# Patient Record
Sex: FEMALE | Race: WHITE | NOT HISPANIC OR LATINO | Employment: OTHER | URBAN - METROPOLITAN AREA
[De-identification: names, ages, dates, MRNs, and addresses within clinical notes are randomized per-mention and may not be internally consistent; named-entity substitution may affect disease eponyms.]

---

## 2023-07-12 ENCOUNTER — OFFICE VISIT (OUTPATIENT)
Dept: PRIMARY CARE | Facility: CLINIC | Age: 68
End: 2023-07-12
Payer: MEDICARE

## 2023-07-12 VITALS
HEIGHT: 65 IN | SYSTOLIC BLOOD PRESSURE: 122 MMHG | OXYGEN SATURATION: 97 % | WEIGHT: 101 LBS | RESPIRATION RATE: 16 BRPM | HEART RATE: 64 BPM | DIASTOLIC BLOOD PRESSURE: 72 MMHG | TEMPERATURE: 98.2 F | BODY MASS INDEX: 16.83 KG/M2

## 2023-07-12 DIAGNOSIS — E46 PROTEIN-CALORIE MALNUTRITION, UNSPECIFIED SEVERITY (MULTI): Primary | ICD-10-CM

## 2023-07-12 DIAGNOSIS — F51.01 PRIMARY INSOMNIA: ICD-10-CM

## 2023-07-12 DIAGNOSIS — G47.33 OSA ON CPAP: ICD-10-CM

## 2023-07-12 DIAGNOSIS — Z00.00 HEALTHCARE MAINTENANCE: ICD-10-CM

## 2023-07-12 DIAGNOSIS — E55.9 VITAMIN D DEFICIENCY: ICD-10-CM

## 2023-07-12 DIAGNOSIS — E04.2 NONTOXIC MULTINODULAR GOITER: ICD-10-CM

## 2023-07-12 DIAGNOSIS — E03.9 ACQUIRED HYPOTHYROIDISM: ICD-10-CM

## 2023-07-12 PROBLEM — Z20.822 EXPOSURE TO COVID-19 VIRUS: Status: RESOLVED | Noted: 2023-07-12 | Resolved: 2023-07-12

## 2023-07-12 PROBLEM — K29.01 ACUTE GASTRITIS WITH HEMORRHAGE: Status: RESOLVED | Noted: 2023-07-12 | Resolved: 2023-07-12

## 2023-07-12 PROBLEM — L53.9 ERYTHEMA OF BREAST: Status: ACTIVE | Noted: 2023-07-12

## 2023-07-12 PROBLEM — T17.308A CHOKING: Status: ACTIVE | Noted: 2023-07-12

## 2023-07-12 PROBLEM — R13.10 DYSPHAGIA: Status: ACTIVE | Noted: 2023-07-12

## 2023-07-12 PROBLEM — R06.81 WITNESSED EPISODE OF APNEA: Status: RESOLVED | Noted: 2023-07-12 | Resolved: 2023-07-12

## 2023-07-12 PROBLEM — J02.9 EXUDATIVE PHARYNGITIS: Status: ACTIVE | Noted: 2023-07-12

## 2023-07-12 PROBLEM — N63.0 BREAST LUMP: Status: ACTIVE | Noted: 2023-07-12

## 2023-07-12 PROBLEM — R06.83 SNORING: Status: ACTIVE | Noted: 2023-07-12

## 2023-07-12 PROBLEM — R06.09 EXERTIONAL DYSPNEA: Status: RESOLVED | Noted: 2023-07-12 | Resolved: 2023-07-12

## 2023-07-12 PROBLEM — N63.0 BREAST LUMP: Status: RESOLVED | Noted: 2023-07-12 | Resolved: 2023-07-12

## 2023-07-12 PROBLEM — T17.308A CHOKING: Status: RESOLVED | Noted: 2023-07-12 | Resolved: 2023-07-12

## 2023-07-12 PROBLEM — J02.9 EXUDATIVE PHARYNGITIS: Status: RESOLVED | Noted: 2023-07-12 | Resolved: 2023-07-12

## 2023-07-12 PROBLEM — G47.19 DAYTIME HYPERSOMNOLENCE: Status: RESOLVED | Noted: 2023-07-12 | Resolved: 2023-07-12

## 2023-07-12 PROBLEM — R92.8 ABNORMAL FINDING ON BREAST IMAGING: Status: ACTIVE | Noted: 2023-07-12

## 2023-07-12 PROBLEM — M25.542 JOINT PAIN IN BOTH HANDS: Status: ACTIVE | Noted: 2023-07-12

## 2023-07-12 PROBLEM — R92.8 ABNORMAL FINDING ON BREAST IMAGING: Status: RESOLVED | Noted: 2023-07-12 | Resolved: 2023-07-12

## 2023-07-12 PROBLEM — R06.09 EXERTIONAL DYSPNEA: Status: ACTIVE | Noted: 2023-07-12

## 2023-07-12 PROBLEM — J38.3 VOCAL FOLD DYSFUNCTION: Status: RESOLVED | Noted: 2023-07-12 | Resolved: 2023-07-12

## 2023-07-12 PROBLEM — R53.83 FATIGUE: Status: RESOLVED | Noted: 2023-07-12 | Resolved: 2023-07-12

## 2023-07-12 PROBLEM — R13.10 DYSPHAGIA: Status: RESOLVED | Noted: 2023-07-12 | Resolved: 2023-07-12

## 2023-07-12 PROBLEM — L53.9 ERYTHEMA OF BREAST: Status: RESOLVED | Noted: 2023-07-12 | Resolved: 2023-07-12

## 2023-07-12 PROBLEM — R06.83 SNORING: Status: RESOLVED | Noted: 2023-07-12 | Resolved: 2023-07-12

## 2023-07-12 PROBLEM — M25.541 JOINT PAIN IN BOTH HANDS: Status: ACTIVE | Noted: 2023-07-12

## 2023-07-12 PROBLEM — K29.01 ACUTE GASTRITIS WITH HEMORRHAGE: Status: ACTIVE | Noted: 2023-07-12

## 2023-07-12 PROBLEM — R09.81 NASAL SINUS CONGESTION: Status: ACTIVE | Noted: 2023-07-12

## 2023-07-12 PROBLEM — M25.541 JOINT PAIN IN BOTH HANDS: Status: RESOLVED | Noted: 2023-07-12 | Resolved: 2023-07-12

## 2023-07-12 PROBLEM — J38.3 VOCAL FOLD DYSFUNCTION: Status: ACTIVE | Noted: 2023-07-12

## 2023-07-12 PROBLEM — K22.2 ESOPHAGEAL STRICTURE: Status: ACTIVE | Noted: 2023-07-12

## 2023-07-12 PROBLEM — Z85.3 HISTORY OF BREAST CANCER: Status: ACTIVE | Noted: 2023-07-12

## 2023-07-12 PROBLEM — M25.542 JOINT PAIN IN BOTH HANDS: Status: RESOLVED | Noted: 2023-07-12 | Resolved: 2023-07-12

## 2023-07-12 PROBLEM — R09.81 NASAL SINUS CONGESTION: Status: RESOLVED | Noted: 2023-07-12 | Resolved: 2023-07-12

## 2023-07-12 PROBLEM — R06.81 WITNESSED EPISODE OF APNEA: Status: ACTIVE | Noted: 2023-07-12

## 2023-07-12 PROBLEM — G47.19 DAYTIME HYPERSOMNOLENCE: Status: ACTIVE | Noted: 2023-07-12

## 2023-07-12 PROBLEM — R53.83 FATIGUE: Status: ACTIVE | Noted: 2023-07-12

## 2023-07-12 PROBLEM — Z20.822 EXPOSURE TO COVID-19 VIRUS: Status: ACTIVE | Noted: 2023-07-12

## 2023-07-12 PROCEDURE — G0439 PPPS, SUBSEQ VISIT: HCPCS | Performed by: INTERNAL MEDICINE

## 2023-07-12 PROCEDURE — 1126F AMNT PAIN NOTED NONE PRSNT: CPT | Performed by: INTERNAL MEDICINE

## 2023-07-12 PROCEDURE — 1159F MED LIST DOCD IN RCRD: CPT | Performed by: INTERNAL MEDICINE

## 2023-07-12 PROCEDURE — 1160F RVW MEDS BY RX/DR IN RCRD: CPT | Performed by: INTERNAL MEDICINE

## 2023-07-12 PROCEDURE — 1170F FXNL STATUS ASSESSED: CPT | Performed by: INTERNAL MEDICINE

## 2023-07-12 PROCEDURE — 1036F TOBACCO NON-USER: CPT | Performed by: INTERNAL MEDICINE

## 2023-07-12 PROCEDURE — 99214 OFFICE O/P EST MOD 30 MIN: CPT | Performed by: INTERNAL MEDICINE

## 2023-07-12 RX ORDER — CHOLECALCIFEROL (VITAMIN D3) 50 MCG
1 TABLET ORAL DAILY
COMMUNITY
Start: 2021-02-10

## 2023-07-12 RX ORDER — ASCORBIC ACID 1500 MG
1 TABLET, EXTENDED RELEASE ORAL DAILY
Status: ON HOLD | COMMUNITY
Start: 2018-12-31 | End: 2024-04-11 | Stop reason: ENTERED-IN-ERROR

## 2023-07-12 ASSESSMENT — ENCOUNTER SYMPTOMS
OCCASIONAL FEELINGS OF UNSTEADINESS: 0
EYE REDNESS: 0
HEADACHES: 0
FEVER: 0
DYSURIA: 0
DIFFICULTY URINATING: 0
EYE ITCHING: 0
RHINORRHEA: 0
FATIGUE: 0
EYE PAIN: 0
DEPRESSION: 0
BACK PAIN: 0
SORE THROAT: 0
CONSTIPATION: 0
PSYCHIATRIC NEGATIVE: 1
WEAKNESS: 0
NAUSEA: 0
UNEXPECTED WEIGHT CHANGE: 0
ABDOMINAL PAIN: 0
LOSS OF SENSATION IN FEET: 0
WHEEZING: 0
SHORTNESS OF BREATH: 0
PALPITATIONS: 0
ARTHRALGIAS: 0
DIARRHEA: 0
COUGH: 0
FREQUENCY: 0

## 2023-07-12 ASSESSMENT — ACTIVITIES OF DAILY LIVING (ADL)
DRESSING: INDEPENDENT
TAKING_MEDICATION: INDEPENDENT
BATHING: INDEPENDENT
MANAGING_FINANCES: INDEPENDENT
DOING_HOUSEWORK: INDEPENDENT
GROCERY_SHOPPING: INDEPENDENT

## 2023-07-12 ASSESSMENT — PATIENT HEALTH QUESTIONNAIRE - PHQ9
2. FEELING DOWN, DEPRESSED OR HOPELESS: NOT AT ALL
1. LITTLE INTEREST OR PLEASURE IN DOING THINGS: NOT AT ALL
SUM OF ALL RESPONSES TO PHQ9 QUESTIONS 1 AND 2: 0

## 2023-07-12 ASSESSMENT — PAIN SCALES - GENERAL: PAINLEVEL: 0-NO PAIN

## 2023-07-12 NOTE — PROGRESS NOTES
"Subjective   Reason for Visit: Fadia Bolden is an 68 y.o. female here for a Medicare Wellness visit.     Past Medical, Surgical, and Family History reviewed and updated in chart.    Reviewed all medications by prescribing practitioner or clinical pharmacist (such as prescriptions, OTCs, herbal therapies and supplements) and documented in the medical record.    HPI  Influenza declines  Covid declines  PCV declines  Shingrix declines  Tdap 2019  Mammogram 3/2023  Dexa declines  Colonoscopy 2021 dakhil  Adv Dir No  Bmi 17  Depression screen 23  Eye exam  Fall risk 23    Reports \"ingrown hair\" to L axillary area.  Denies pain, swelling, drainage.  Reports discoloration that has changes.    Not sleeping well.  Having difficulty falling back asleep.  Mind racing.  Previously did home sleep study.  Has Cpap. Not sealing well.   Airway closing up d/t tongue position.  Saw Sleep specialist x2 years ago.  Saw Dr. Luis keller 1 year ago.    Pt reports previously noted thyroid nodule.   US done in 2021.    Patient Care Team:  Michelle Martinez DO as PCP - General  Michelle Martinez DO as PCP - [facility] Attributed Provider     Review of Systems   Constitutional:  Negative for fatigue, fever and unexpected weight change.   HENT:  Negative for congestion, ear pain, rhinorrhea and sore throat.    Eyes:  Negative for pain, redness and itching.   Respiratory:  Negative for cough, shortness of breath and wheezing.    Cardiovascular:  Negative for chest pain, palpitations and leg swelling.   Gastrointestinal:  Negative for abdominal pain, constipation, diarrhea and nausea.   Genitourinary:  Negative for difficulty urinating, dysuria and frequency.   Musculoskeletal:  Negative for arthralgias and back pain.   Allergic/Immunologic: Negative for environmental allergies, food allergies and immunocompromised state.   Neurological:  Negative for weakness and headaches.   Psychiatric/Behavioral: Negative.     All other systems reviewed and " "are negative.      Objective   Vitals:  /72   Pulse 64   Temp 36.8 °C (98.2 °F)   Resp 16   Ht 1.638 m (5' 4.5\")   Wt 45.8 kg (101 lb)   SpO2 97%   BMI 17.07 kg/m²       Physical Exam  Vitals and nursing note reviewed.   Constitutional:       Appearance: Normal appearance.   HENT:      Head: Normocephalic.   Eyes:      Conjunctiva/sclera: Conjunctivae normal.      Pupils: Pupils are equal, round, and reactive to light.   Cardiovascular:      Rate and Rhythm: Normal rate and regular rhythm.      Pulses: Normal pulses.      Heart sounds: Normal heart sounds.   Pulmonary:      Effort: Pulmonary effort is normal.      Breath sounds: Normal breath sounds.   Musculoskeletal:         General: No swelling.      Cervical back: Neck supple.   Skin:     General: Skin is warm and dry.   Neurological:      General: No focal deficit present.      Mental Status: She is oriented to person, place, and time.         Assessment/Plan   Problem List Items Addressed This Visit       Acquired hypothyroidism    Relevant Orders    CBC    Comprehensive Metabolic Panel    Lipid Panel    Thyroid Stimulating Hormone    US thyroid    Nontoxic multinodular goiter    Relevant Orders    Thyroid Stimulating Hormone    DAIJA on CPAP    Vitamin D deficiency    Relevant Orders    Vitamin D, Total    Vitamin B12    Protein-calorie malnutrition, unspecified severity (CMS/HCC) - Primary     Other Visit Diagnoses       Healthcare maintenance        Primary insomnia              Cont meds   Check labs  Update preventive  Stable based on symptoms and exam.  Continue established treatment plan and follow up at least yearly.  Contact dme for equipment change  Obs ingrown hair  Check thyroid us         "

## 2023-07-29 ENCOUNTER — LAB (OUTPATIENT)
Dept: LAB | Facility: LAB | Age: 68
End: 2023-07-29
Payer: MEDICARE

## 2023-07-29 DIAGNOSIS — E03.9 ACQUIRED HYPOTHYROIDISM: ICD-10-CM

## 2023-07-29 DIAGNOSIS — E04.2 NONTOXIC MULTINODULAR GOITER: ICD-10-CM

## 2023-07-29 DIAGNOSIS — E55.9 VITAMIN D DEFICIENCY: ICD-10-CM

## 2023-07-29 LAB
ALANINE AMINOTRANSFERASE (SGPT) (U/L) IN SER/PLAS: 18 U/L (ref 7–45)
ALBUMIN (G/DL) IN SER/PLAS: 4.4 G/DL (ref 3.4–5)
ALKALINE PHOSPHATASE (U/L) IN SER/PLAS: 63 U/L (ref 33–136)
ANION GAP IN SER/PLAS: 9 MMOL/L (ref 10–20)
ASPARTATE AMINOTRANSFERASE (SGOT) (U/L) IN SER/PLAS: 19 U/L (ref 9–39)
BILIRUBIN TOTAL (MG/DL) IN SER/PLAS: 1.2 MG/DL (ref 0–1.2)
CALCIDIOL (25 OH VITAMIN D3) (NG/ML) IN SER/PLAS: 87 NG/ML
CALCIUM (MG/DL) IN SER/PLAS: 9.8 MG/DL (ref 8.6–10.3)
CARBON DIOXIDE, TOTAL (MMOL/L) IN SER/PLAS: 34 MMOL/L (ref 21–32)
CHLORIDE (MMOL/L) IN SER/PLAS: 102 MMOL/L (ref 98–107)
CHOLESTEROL (MG/DL) IN SER/PLAS: 193 MG/DL (ref 0–199)
CHOLESTEROL IN HDL (MG/DL) IN SER/PLAS: 71.5 MG/DL
CHOLESTEROL/HDL RATIO: 2.7
COBALAMIN (VITAMIN B12) (PG/ML) IN SER/PLAS: 404 PG/ML (ref 211–911)
CREATININE (MG/DL) IN SER/PLAS: 0.83 MG/DL (ref 0.5–1.05)
ERYTHROCYTE DISTRIBUTION WIDTH (RATIO) BY AUTOMATED COUNT: 12.8 % (ref 11.5–14.5)
ERYTHROCYTE MEAN CORPUSCULAR HEMOGLOBIN CONCENTRATION (G/DL) BY AUTOMATED: 33 G/DL (ref 32–36)
ERYTHROCYTE MEAN CORPUSCULAR VOLUME (FL) BY AUTOMATED COUNT: 94 FL (ref 80–100)
ERYTHROCYTES (10*6/UL) IN BLOOD BY AUTOMATED COUNT: 4.51 X10E12/L (ref 4–5.2)
GFR FEMALE: 76 ML/MIN/1.73M2
GLUCOSE (MG/DL) IN SER/PLAS: 84 MG/DL (ref 74–99)
HEMATOCRIT (%) IN BLOOD BY AUTOMATED COUNT: 42.4 % (ref 36–46)
HEMOGLOBIN (G/DL) IN BLOOD: 14 G/DL (ref 12–16)
LDL: 108 MG/DL (ref 0–99)
LEUKOCYTES (10*3/UL) IN BLOOD BY AUTOMATED COUNT: 4.8 X10E9/L (ref 4.4–11.3)
PLATELETS (10*3/UL) IN BLOOD AUTOMATED COUNT: 173 X10E9/L (ref 150–450)
POTASSIUM (MMOL/L) IN SER/PLAS: 4.2 MMOL/L (ref 3.5–5.3)
PROTEIN TOTAL: 7.3 G/DL (ref 6.4–8.2)
SODIUM (MMOL/L) IN SER/PLAS: 141 MMOL/L (ref 136–145)
THYROTROPIN (MIU/L) IN SER/PLAS BY DETECTION LIMIT <= 0.05 MIU/L: 1.79 MIU/L (ref 0.44–3.98)
TRIGLYCERIDE (MG/DL) IN SER/PLAS: 66 MG/DL (ref 0–149)
UREA NITROGEN (MG/DL) IN SER/PLAS: 20 MG/DL (ref 6–23)
VLDL: 13 MG/DL (ref 0–40)

## 2023-07-29 PROCEDURE — 84443 ASSAY THYROID STIM HORMONE: CPT

## 2023-07-29 PROCEDURE — 82607 VITAMIN B-12: CPT

## 2023-07-29 PROCEDURE — 80061 LIPID PANEL: CPT

## 2023-07-29 PROCEDURE — 82306 VITAMIN D 25 HYDROXY: CPT

## 2023-07-29 PROCEDURE — 36415 COLL VENOUS BLD VENIPUNCTURE: CPT

## 2023-07-29 PROCEDURE — 80053 COMPREHEN METABOLIC PANEL: CPT

## 2023-07-29 PROCEDURE — 85027 COMPLETE CBC AUTOMATED: CPT

## 2023-08-02 ENCOUNTER — TELEPHONE (OUTPATIENT)
Dept: PRIMARY CARE | Facility: CLINIC | Age: 68
End: 2023-08-02
Payer: MEDICARE

## 2023-08-02 NOTE — TELEPHONE ENCOUNTER
----- Message from Michelle Martinez DO sent at 8/2/2023  8:38 AM EDT -----  Call patient her labs look great

## 2023-08-10 DIAGNOSIS — E04.2 NONTOXIC MULTINODULAR GOITER: Primary | ICD-10-CM

## 2023-08-10 NOTE — RESULT ENCOUNTER NOTE
Call patient her thyroid gland shows some enlargement compared to previous  She has also a lymph node in her neck, it looks reactive     I would like to have her do a follow up us in 6 months   Order in

## 2023-08-11 ENCOUNTER — TELEPHONE (OUTPATIENT)
Dept: PRIMARY CARE | Facility: CLINIC | Age: 68
End: 2023-08-11
Payer: MEDICARE

## 2023-08-11 NOTE — TELEPHONE ENCOUNTER
----- Message from Sharmila Rashid LPN sent at 8/11/2023  8:24 AM EDT -----  Left vm returning call.  ----- Message -----  From: Sharmila Rashid LPN  Sent: 8/10/2023   3:48 PM EDT  To: Sharmila Rashid LPN    Lmtcb    ----- Message -----  From: Michelle Martinez DO  Sent: 8/10/2023  12:43 PM EDT  To: Sharmila Rashid LPN    Call patient her thyroid gland shows some enlargement compared to previous  She has also a lymph node in her neck, it looks reactive     I would like to have her do a follow up us in 6 months   Order in

## 2023-11-27 ENCOUNTER — APPOINTMENT (OUTPATIENT)
Dept: NEUROLOGY | Facility: CLINIC | Age: 68
End: 2023-11-27
Payer: COMMERCIAL

## 2023-11-30 ENCOUNTER — OFFICE VISIT (OUTPATIENT)
Dept: ORTHOPEDIC SURGERY | Facility: CLINIC | Age: 68
End: 2023-11-30
Payer: COMMERCIAL

## 2023-11-30 ENCOUNTER — ANCILLARY PROCEDURE (OUTPATIENT)
Dept: RADIOLOGY | Facility: CLINIC | Age: 68
End: 2023-11-30
Payer: COMMERCIAL

## 2023-11-30 VITALS — BODY MASS INDEX: 16.83 KG/M2 | WEIGHT: 101 LBS | HEIGHT: 65 IN

## 2023-11-30 DIAGNOSIS — Z91.89 STROKE RISK: ICD-10-CM

## 2023-11-30 DIAGNOSIS — M25.512 ACUTE PAIN OF LEFT SHOULDER: ICD-10-CM

## 2023-11-30 DIAGNOSIS — S42.292D CLOSED 3-PART FRACTURE OF PROXIMAL HUMERUS WITH ROUTINE HEALING, LEFT: Primary | ICD-10-CM

## 2023-11-30 PROCEDURE — 1036F TOBACCO NON-USER: CPT | Performed by: ORTHOPAEDIC SURGERY

## 2023-11-30 PROCEDURE — 1160F RVW MEDS BY RX/DR IN RCRD: CPT | Performed by: ORTHOPAEDIC SURGERY

## 2023-11-30 PROCEDURE — 1159F MED LIST DOCD IN RCRD: CPT | Performed by: ORTHOPAEDIC SURGERY

## 2023-11-30 PROCEDURE — 99213 OFFICE O/P EST LOW 20 MIN: CPT | Performed by: ORTHOPAEDIC SURGERY

## 2023-11-30 PROCEDURE — 99203 OFFICE O/P NEW LOW 30 MIN: CPT | Performed by: ORTHOPAEDIC SURGERY

## 2023-11-30 PROCEDURE — 73030 X-RAY EXAM OF SHOULDER: CPT | Mod: LEFT SIDE | Performed by: ORTHOPAEDIC SURGERY

## 2023-11-30 PROCEDURE — 1126F AMNT PAIN NOTED NONE PRSNT: CPT | Performed by: ORTHOPAEDIC SURGERY

## 2023-11-30 PROCEDURE — 73030 X-RAY EXAM OF SHOULDER: CPT | Mod: LT,FY

## 2023-11-30 ASSESSMENT — PAIN - FUNCTIONAL ASSESSMENT: PAIN_FUNCTIONAL_ASSESSMENT: 0-10

## 2023-11-30 ASSESSMENT — PAIN SCALES - GENERAL: PAINLEVEL_OUTOF10: 2

## 2023-11-30 NOTE — PROGRESS NOTES
History of present: Patient is here for evaluation of her proximal humerus she had a fracture of the is gone on to heal is about 5 weeks out she had a stroke she is interested in therapy and evaluation and progression and weaning out of the sling        Past medical history:    The patient's past medical history, family history, social history and review of systems were documented on the patient's medical intake form.  The medical intake form was reviewed and scanned into the electronic medical record for future use.  History is otherwise negative except as stated in the history of present illness.    Physical exam:    General: Alert and oriented to person place and time.  No acute distress and breathing comfortably, pleasant and cooperative with examination.    Head and neck exam: Head is normocephalic atraumatic.    Neck: Supple no visible swelling or deformity.    Cardiovascular: Good perfusion to affected extremities without signs or symptoms of chest pain.    Lungs no audible wheezing or labored breathing on examination.    Abdominal exam: Nondistended nontender    Extremities: The left shoulder was inspected and was found to have no obvious deformity.  There was tenderness to touch over the lateral edges of the shoulder over the rotator cuff insertion.  Active forward flexion 120 degrees, external rotation to 60 degrees, abduction to 45 degrees, and internal rotation to the level of L2.    At this time the shoulder is neurovascular tact and neurosensory intact.  Motor intact C5-T1.  There was no obvious neck pain or radiculopathy noted.  There was no gross instability or adhesive capsulitis symptoms.  There was no evidence of apprehension or apprehension suppression.    Strength was tested in 4 planes with weakness in the supraspinatus strength testing and external rotation position.  There was no strength deficit in internal rotation.  Impingement signs were positive both supine and standing for impingement  test type I and II.  There was mild pain over the bicipital groove with a positive speeds sign    Before aspiration injection the benefits of a cortisone injection including infection, local skin irritation, skin atrophy, calcification, continued pain and discomfort, elevated blood sugar, burning, failure to relieve pain, possible late infection were discussed with the patient.    Postprocedure discomfort can be alleviated with additional medications, ice, elevation, rest over the first 24 hours as recommended.    Patient verbalized understanding and wanted to proceed with the planned procedure.    After informed consent was provided and allergies verified, the patient was positioned appropriately on thel bed.  The left shoulder to be aspirated and injected was prepped and draped in a sterile fashion.  The skin was anesthetized with ethyl chloride spray.  A joint aspiration was to be performed an 18-gauge needle was used otherwise a 22-gauge needle was used to inject the appropriate joint.    Joint injection was performed with a mixture of 5 cc 1% lidocaine plain and 2 cc Celestone Soluspan 6 mg per mL.  The needle was removed and the puncture site closed and sealed with a Band-Aid.  The patient tolerated the procedure well.        Diagnostic studies: X-rays show an impacted healed proximal humerus fracture with minimal to no displacement      Impression: Left proximal humerus fracture already healed now for therapy and range of motion and rotator cuff rehab program      Plan: DC sling    1 to 2 pound max resistance    Active passive motion with gentle therapy conditioning program twice a week for 6 weeks    I will see her back in 5 weeks with AP axillary x-ray and then we will release her from all restrictions she knows to down the road if she develops cuff issues or cuff tearing symptoms injections MRI could be obtained she is recovering from a stroke and had a big craniotomy so she is a nonsurgical candidate at  least I would say for 6 months and most likely she will not need surgery

## 2023-12-05 ENCOUNTER — EVALUATION (OUTPATIENT)
Dept: PHYSICAL THERAPY | Facility: CLINIC | Age: 68
End: 2023-12-05
Payer: COMMERCIAL

## 2023-12-05 ENCOUNTER — EVALUATION (OUTPATIENT)
Dept: SPEECH THERAPY | Facility: CLINIC | Age: 68
End: 2023-12-05
Payer: COMMERCIAL

## 2023-12-05 VITALS — DIASTOLIC BLOOD PRESSURE: 68 MMHG | SYSTOLIC BLOOD PRESSURE: 143 MMHG

## 2023-12-05 DIAGNOSIS — S42.295D OTHER CLOSED NONDISPLACED FRACTURE OF PROXIMAL END OF LEFT HUMERUS WITH ROUTINE HEALING, SUBSEQUENT ENCOUNTER: ICD-10-CM

## 2023-12-05 DIAGNOSIS — I63.9 CEREBRAL INFARCTION, UNSPECIFIED (MULTI): ICD-10-CM

## 2023-12-05 DIAGNOSIS — R53.1 WEAKNESS GENERALIZED: ICD-10-CM

## 2023-12-05 DIAGNOSIS — I69.320 APHASIA AS LATE EFFECT OF CEREBROVASCULAR ACCIDENT (CVA): Primary | ICD-10-CM

## 2023-12-05 DIAGNOSIS — R26.89 BALANCE PROBLEM: ICD-10-CM

## 2023-12-05 DIAGNOSIS — Z74.09 DECREASED MOBILITY AND ENDURANCE: Primary | ICD-10-CM

## 2023-12-05 PROBLEM — S42.202D CLOSED FRACTURE OF PROXIMAL END OF LEFT HUMERUS WITH ROUTINE HEALING: Status: ACTIVE | Noted: 2023-12-05

## 2023-12-05 PROCEDURE — 92523 SPEECH SOUND LANG COMPREHEN: CPT | Mod: GN | Performed by: STUDENT IN AN ORGANIZED HEALTH CARE EDUCATION/TRAINING PROGRAM

## 2023-12-05 PROCEDURE — 97162 PT EVAL MOD COMPLEX 30 MIN: CPT | Mod: GP

## 2023-12-05 PROCEDURE — 97112 NEUROMUSCULAR REEDUCATION: CPT | Mod: GP

## 2023-12-05 ASSESSMENT — PATIENT HEALTH QUESTIONNAIRE - PHQ9
1. LITTLE INTEREST OR PLEASURE IN DOING THINGS: NOT AT ALL
2. FEELING DOWN, DEPRESSED OR HOPELESS: NOT AT ALL
SUM OF ALL RESPONSES TO PHQ9 QUESTIONS 1 AND 2: 0

## 2023-12-05 ASSESSMENT — ENCOUNTER SYMPTOMS
DEPRESSION: 0
OCCASIONAL FEELINGS OF UNSTEADINESS: 0
LOSS OF SENSATION IN FEET: 0

## 2023-12-05 ASSESSMENT — PAIN SCALES - GENERAL
PAINLEVEL_OUTOF10: 0 - NO PAIN
PAINLEVEL_OUTOF10: 0 - NO PAIN

## 2023-12-05 ASSESSMENT — PAIN - FUNCTIONAL ASSESSMENT
PAIN_FUNCTIONAL_ASSESSMENT: 0-10
PAIN_FUNCTIONAL_ASSESSMENT: 0-10

## 2023-12-05 NOTE — PROGRESS NOTES
Speech-Language Pathology    SLP ADULT Outpatient Speech-Language Cognition  Patient Name: Fadia Bolden  MRN: [id number]  : 1955  Today's Date: 23  Time Calculation  Start Time: 1115  Stop Time: 1200  Time Calculation (min): 45 min        SLP Assessment:  Patient presents with moderate expressive aphasia characterized by anomia, impaired verbal reasoning and impaired written language at the word level. She also presents with mild receptive aphasia specific to reading and following along with conversations per patient report. The patient has residual R visual deficits which likely contribute to reading difficulties. The patient is able to produce sentence level utterances with speech rate slower than baseline production. Neither the patient nor family report cognitive concerns but this can be assessed in future sessions if warranted. Processing speed is generally slowed.    SLP Plan:  Skilled speech therapy services are warranted in order for the patient to actively participate in communication exchanges with familiar and unfamiliar communication partners in the home and clinical settings to a level commensurate with her prior level of functioning.    General Information  Chart Reviewed: Yes  Referred By: Latha Echavarria  Past Medical History Relevant to Rehab: Patient was on a family vacation in [place] in 2022 when she sustained a CVA. She is s/p craniotomy  Patient Seen During This Visit: Yes  Certification Period Start Date: 23  Certification Period End Date: 24  Frequency: SLP Frequency: 2x per week  Duration: Duration: 12 weeks (re-evalutate 3/4/24)    Goals:  Short-term goals:  Patient will complete naming activities (including picture naming, generative naming, verb naming, descriptive naming, synonyms/antonyms, etc.) with 80% accuracy given minimal-moderate cues  Patient will complete targeted expressive language tasks (including stating personal information,  phrase/sentence completion, open-ended questions) with 80% accuracy given minimal-moderate cues.  Patient will complete targeted receptive language tasks (including complex yes/no questions, functional reading, paragraph level comprehension) with 80% accuracy given minimal-moderate cues.  Patient will complete further assessment of cognitive-linguistic skills (MoCA, EM, etc.) to determine additional therapy goals as indicated.    Long-term goals:  Patient will improve functional global communication skills in order to promote safety and independence with ADLs and communicate wants/needs/preferences/opinions.     Subjective:  The patient arrived with her spouse and daughter. 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 They call themselves [employer].    Pain:  Scale: 0-10   Pt. Rating: No pain    Cognition:   No impairments identified at this time    Motor Speech Production: WFL    Auditory Comprehension: Mild impairment    Expressive language: Moderate    Results for the [place] Evidence-Based Language Test (Standard Level) as follows:  Auditory Comprehension:  17/18     Y/N questions      Following verbal commands        Verbal Expression:   39/119     Automatic speech      Sentence completion 2/2     Personal/orientation questions      Repetition      Picture Naming 2/4     Naming objects in the room /     Naming gestures 2/     Picture description      Verbal fluency - Animals      Verbal fluency - F words        Perceptual      Semantic links     Readin/14     Written word to picture matching      Following written commands      Reading aloud         Functional reading - Medicine label 3/3       Writin/13     Functional writing - Name 22     Functional writing - Address 3/3     Writing to dictation 3/6     Written object      Written gesture 0    TOTAL: 81/169    The  [place] EBLT is not based on the premise that a perfect test score is indicative of normal (non-impaired) language. Instead, the test was specifically designed to avoid floor and ceiling effects (where nil or perfect scores are achieved) and be able to reflect variation in language functioning across the entire ability spectrum.  Judgement as to whether a test score constitutes a 'mild', 'moderate' or 'severe' condition is at the discretion of the  assessing clinician.       Education:  Adult Outpatient Education  Individual(s) Educated: Patient, Spouse, Child  Verbal Education : Results of the assessment and POC  Risk and Benefits Discussed with Patient/Caregiver/Other: yes  Patient/Caregiver Demonstrated Understanding: yes  Plan of Care Discussed and Agreed Upon: yes  Patient Response to Education: Patient/Caregiver Verbalized Understanding of Information     Treatment:   Eval only

## 2023-12-05 NOTE — PROGRESS NOTES
Physical Therapy    Physical Therapy Evaluation and Treatment      Patient Name: Fadia Bolden  MRN: [id number]  Today's Date: 12/5/2023  Time Calculation  Start Time: 0848  Stop Time: 0945  Time Calculation (min): 57 min    Insurance:  3900 OOP MAX, VS MED NEC, NO AUTH    Assessment:     Pt is a 68 year old female presenting to PT with hemorrhagic stroke. Standardized testing of FGA, gait mechanics, UE function and MMT reveal that pt has multiple impairments in body structures/functions, activity limitations and participation restrictions. These include subjective and objective findings such as decreased righting reactions, altered gait mechanics, decreased pain free ROM of L UE and LE weakness. Pt to benefit from skilled PT interventions to improve functional mobility deficits to improve independence and decrease fall risk.      Plan:  Treatment/Interventions: Education/ Instruction, Gait training, Therapeutic activities, Therapeutic exercises  PT Plan: Skilled PT  PT Frequency: 2 times per week  Duration: 6 weeks      Current Problem:   1. Decreased mobility and endurance  Follow Up In Physical Therapy      2. Cerebral infarction, unspecified (CMS/HCC)  Referral to Physical Therapy      3. Balance problem  Follow Up In Physical Therapy      4. Weakness generalized  Follow Up In Physical Therapy      5. Other closed nondisplaced fracture of proximal end of left humerus with routine healing, subsequent encounter  Follow Up In Physical Therapy          Subjective     Pt presents to PT with spouse and dtr. Spouse reports family out of town on October 24 when pt got up to go to the restroom resulting in a fall onto L UE. Pt went back to bed and upon awakening, pt unable to speak clearly. At the ER; pt found to have L parietal occipital hemorrhage and subdural hemorrhage resulting in craniectomy. Pt currently with helmet. Pt also found to have L proximal humeral fracture; ortho recently discharge sling    General:    "  Vital Signs:  Vital Signs  BP: 143/68  BP Location: Right arm  Precautions  1-2 lbs max resistance  Active passive motion with gentle therapy conditioning   Pain:   Pain Assessment  Pain Assessment: 0-10  Pain Score: 0 - No pain  Home Living:    Pt lives in a single level home, no JOB, lives with spouse and dtr  Prior Level of Function:   Functional mobility; independent  ADLs; independent  IADLs; family assists   Patient goal;  \"Normalize function\"       Objective   Right lower extremity  Hip flexion;3+/5  Hip abduction; 4-/5  Hip adduction; 4-/5   Knee flexion; 4/5  Knee extension; 4/5  Ankle dorsiflexion; 4/5  Ankle plantarflexion; 4/5    Left lower extremity  Hip flexion; 4-/5  Hip abduction; 4/5  Hip adduction; 4/5   Knee flexion; 4/5  Knee extension; 4/5  Ankle dorsiflexion; 4/5  Ankle plantarflexion; 4/5    Right upper extremity  Shldr flexion; 4/5  Shldr abduction; 4/5  Elbow flexion; 4/5  Elbow extension; 4/5  ; 4/5    Left upper extremity  Shldr flexion; limited AROM due to pain ~85*  Shldr abduction; limited AROM due to pain ~70* without compensation  Elbow flexion; WFL  Elbow extension; WFL  ; WFL    Sensation:  Intact B LE    Coordination  RAMSEY at ankles; intact  Heel to shin, slightly altered R LE    Vision   Peripheral; decreased on R  Double vision reported with bringing cap to pen, pt overshoots      Outcome Measures:  FGA - Functional Gait Assessment  Gait level surface: 3  Change in gait speed: 2  Gait with horizontal head turns: 2  Gait with vertical head turns: 3  Gait and pivot turn: 2  Step over obstacle: 3  Gait with narrow base of support: 1  Gait with eyes closed: 3  Ambulating backwards: 3  Steps: 2  FGA Total Score: 24    Timed Up and Go Test  How many seconds did it take to complete the 5 tasks?: 13.06 seconds    Other Measures  Activities - Specific Balance Confidence Scale: 66.875     Treatments:  Neuromuscular Re-education (10991): timed minutes 10, units 1 .  Education and " discussion on HEP and treatment regarding the benefits related to current condition, POC, pathophysiology, and precautions    HEP  -Tandem amb  -Side stepping  -Backward amb  -Romberg with eyes closed    Goals:  Pt will improve FGA by 4 points to meet KENDRICK  Pt will improve TUG to < 12 seconds to decrease fall risk  Pt will demonstrate independence with HEP  Pt will report 0 falls during POC  Pt will demonstrate full PROM of L UE without pain

## 2023-12-11 ENCOUNTER — TREATMENT (OUTPATIENT)
Dept: SPEECH THERAPY | Facility: CLINIC | Age: 68
End: 2023-12-11
Payer: COMMERCIAL

## 2023-12-11 DIAGNOSIS — I69.320 APHASIA AS LATE EFFECT OF CEREBROVASCULAR ACCIDENT (CVA): Primary | ICD-10-CM

## 2023-12-11 PROCEDURE — 92507 TX SP LANG VOICE COMM INDIV: CPT | Mod: GN | Performed by: STUDENT IN AN ORGANIZED HEALTH CARE EDUCATION/TRAINING PROGRAM

## 2023-12-11 ASSESSMENT — PAIN - FUNCTIONAL ASSESSMENT: PAIN_FUNCTIONAL_ASSESSMENT: 0-10

## 2023-12-11 ASSESSMENT — PAIN SCALES - GENERAL: PAINLEVEL_OUTOF10: 0 - NO PAIN

## 2023-12-11 NOTE — PROGRESS NOTES
Speech-Language Pathology    Outpatient Speech-Language Pathology Treatment     Patient Name: Fadia Bolden  MRN: [id number]  Today's Date: 2023  Time Calculation  Start Time: 0850  Stop Time: 0935  Time Calculation (min): 45 min         Current Problem:   1. Aphasia as late effect of cerebrovascular accident (CVA)              SLP Assessment:  The patient was actively engaged in therapeutic tasks targeting expressive language. The patient had difficulty with naming and semantic analysis which make it difficulty to use compensatory strategies. When given extra time, the patient was able to find the word with min cues.  SLP Assessment Results: Receptive Comprehension deficits, Expression deficits  Prognosis: Good       Plan:  Inpatient/Swing Bed or Outpatient: Outpatient  SLP Plan: Skilled SLP  SLP Frequency: 2x per week  Duration: 12 weeks (re-eval 3/4/24)  Next Treatment Priority: semantic analysis  Discussed POC: Patient, Caregiver/family  Discussed Risks/Benefits: Yes  Patient/Caregiver Agreeable: Yes    Goals:  Short-term goals:  Patient will complete naming activities (including picture naming, generative naming, verb naming, descriptive naming, synonyms/antonyms, etc.) with 80% accuracy given minimal-moderate cues   -confrontational namin%; improved to 90 with min cues and extended time   -Name the category (concrete) - task attempted with IEV apps (words only, words + pics). The patient required extensive time to scan choices. Reading comprehension and/or visual field deficits may have contributed to difficulty with this task. Use of the speaker option did not affect performance on this task. Will attempt similar task with a different presentation next session    Patient will complete targeted expressive language tasks (including stating personal information, phrase/sentence completion, open-ended questions) with 80% accuracy given minimal-moderate cues.  Patient will complete targeted receptive  language tasks (including complex yes/no questions, functional reading, paragraph level comprehension) with 80% accuracy given minimal-moderate cues.  Patient will complete further assessment of cognitive-linguistic skills (MoCA, EM, etc.) to determine additional therapy goals as indicated.     Long-term goals:  Patient will improve functional global communication skills in order to promote safety and independence with ADLs and communicate wants/needs/preferences/opinions.     Subjective   Patient reports no new or worsening symptoms.    Most Recent Visit:  SLP Received On: 12/11/23    General Visit Information:   Certification Period Start Date: 12/05/23  Certification Period End Date: 03/04/24  Total Number of Visits : 2      Pain Assessment:   Pain Assessment: 0-10  Pain Score: 0 - No pain      Outpatient Education:  Adult Outpatient Education  Individual(s) Educated: Patient, Spouse, Child  Written Education : Home exercises, cueing and communciation strategies  Risk and Benefits Discussed with Patient/Caregiver/Other: yes  Patient/Caregiver Demonstrated Understanding: yes  Plan of Care Discussed and Agreed Upon: yes  Patient Response to Education: Patient/Caregiver Verbalized Understanding of Information

## 2023-12-12 ENCOUNTER — OFFICE VISIT (OUTPATIENT)
Dept: PRIMARY CARE | Facility: CLINIC | Age: 68
End: 2023-12-12
Payer: COMMERCIAL

## 2023-12-12 ENCOUNTER — APPOINTMENT (OUTPATIENT)
Dept: OCCUPATIONAL THERAPY | Facility: CLINIC | Age: 68
End: 2023-12-12
Payer: COMMERCIAL

## 2023-12-12 VITALS
RESPIRATION RATE: 16 BRPM | HEART RATE: 80 BPM | BODY MASS INDEX: 17.83 KG/M2 | WEIGHT: 107 LBS | HEIGHT: 65 IN | SYSTOLIC BLOOD PRESSURE: 118 MMHG | OXYGEN SATURATION: 100 % | DIASTOLIC BLOOD PRESSURE: 72 MMHG | TEMPERATURE: 97.9 F

## 2023-12-12 DIAGNOSIS — R26.89 IMPAIRED GAIT AND MOBILITY: ICD-10-CM

## 2023-12-12 DIAGNOSIS — Z17.0 MALIGNANT NEOPLASM OF UPPER-OUTER QUADRANT OF BREAST IN FEMALE, ESTROGEN RECEPTOR POSITIVE, UNSPECIFIED LATERALITY (MULTI): Primary | ICD-10-CM

## 2023-12-12 DIAGNOSIS — R47.01 APHASIA: ICD-10-CM

## 2023-12-12 DIAGNOSIS — I63.89 CEREBROVASCULAR ACCIDENT (CVA) DUE TO OTHER MECHANISM (MULTI): ICD-10-CM

## 2023-12-12 DIAGNOSIS — C50.419 MALIGNANT NEOPLASM OF UPPER-OUTER QUADRANT OF BREAST IN FEMALE, ESTROGEN RECEPTOR POSITIVE, UNSPECIFIED LATERALITY (MULTI): Primary | ICD-10-CM

## 2023-12-12 DIAGNOSIS — I69.320 APHASIA AS LATE EFFECT OF CEREBROVASCULAR ACCIDENT (CVA): ICD-10-CM

## 2023-12-12 DIAGNOSIS — I10 PRIMARY HYPERTENSION: ICD-10-CM

## 2023-12-12 DIAGNOSIS — E78.2 MIXED HYPERLIPIDEMIA: ICD-10-CM

## 2023-12-12 PROBLEM — I61.2: Status: ACTIVE | Noted: 2023-10-24

## 2023-12-12 PROBLEM — R53.1 RIGHT SIDED WEAKNESS: Status: ACTIVE | Noted: 2023-10-27

## 2023-12-12 PROBLEM — H57.02 ANISOCORIA: Status: ACTIVE | Noted: 2023-10-30

## 2023-12-12 PROBLEM — G93.6 CEREBRAL EDEMA (MULTI): Status: ACTIVE | Noted: 2023-10-27

## 2023-12-12 PROBLEM — I63.9 CEREBROVASCULAR ACCIDENT (CVA) (MULTI): Status: ACTIVE | Noted: 2023-11-08

## 2023-12-12 PROCEDURE — 1126F AMNT PAIN NOTED NONE PRSNT: CPT | Performed by: INTERNAL MEDICINE

## 2023-12-12 PROCEDURE — 3078F DIAST BP <80 MM HG: CPT | Performed by: INTERNAL MEDICINE

## 2023-12-12 PROCEDURE — 3074F SYST BP LT 130 MM HG: CPT | Performed by: INTERNAL MEDICINE

## 2023-12-12 PROCEDURE — 1159F MED LIST DOCD IN RCRD: CPT | Performed by: INTERNAL MEDICINE

## 2023-12-12 PROCEDURE — 1160F RVW MEDS BY RX/DR IN RCRD: CPT | Performed by: INTERNAL MEDICINE

## 2023-12-12 PROCEDURE — 99215 OFFICE O/P EST HI 40 MIN: CPT | Performed by: INTERNAL MEDICINE

## 2023-12-12 PROCEDURE — 1036F TOBACCO NON-USER: CPT | Performed by: INTERNAL MEDICINE

## 2023-12-12 RX ORDER — LISINOPRIL 20 MG/1
20 TABLET ORAL
COMMUNITY
Start: 2023-11-20 | End: 2023-12-12 | Stop reason: SDUPTHER

## 2023-12-12 RX ORDER — ATORVASTATIN CALCIUM 80 MG/1
80 TABLET, FILM COATED ORAL DAILY
COMMUNITY
Start: 2023-11-20 | End: 2023-12-12 | Stop reason: SDUPTHER

## 2023-12-12 RX ORDER — ATORVASTATIN CALCIUM 80 MG/1
80 TABLET, FILM COATED ORAL DAILY
Qty: 30 TABLET | Refills: 11 | Status: SHIPPED | OUTPATIENT
Start: 2023-12-12 | End: 2024-12-12

## 2023-12-12 RX ORDER — LISINOPRIL 20 MG/1
20 TABLET ORAL
Qty: 30 TABLET | Refills: 11 | Status: SHIPPED | OUTPATIENT
Start: 2023-12-12 | End: 2024-12-12

## 2023-12-12 RX ORDER — AMLODIPINE BESYLATE 10 MG/1
10 TABLET ORAL
Qty: 30 TABLET | Refills: 11 | Status: SHIPPED | OUTPATIENT
Start: 2023-12-12 | End: 2024-02-20 | Stop reason: HOSPADM

## 2023-12-12 RX ORDER — AMLODIPINE BESYLATE 10 MG/1
10 TABLET ORAL
COMMUNITY
Start: 2023-11-20 | End: 2023-12-12 | Stop reason: SDUPTHER

## 2023-12-12 RX ORDER — NAPROXEN SODIUM 220 MG/1
81 TABLET, FILM COATED ORAL
COMMUNITY
Start: 2023-11-20 | End: 2023-12-14 | Stop reason: SDUPTHER

## 2023-12-12 ASSESSMENT — PAIN SCALES - GENERAL: PAINLEVEL: 0-NO PAIN

## 2023-12-12 NOTE — PROGRESS NOTES
"Subjective   Fadia Bolden is a 68 y.o. female who presents for Hospital Follow-up.    HPI   Hospitalized [facility] 10/24/23 - 11/8/23  Rehab 11/8/23-11/21/23  Dx: CVA, L humerus fx, Nontraumatic Cortical Hemorrhage of L cerebral hemisphere, Ischemic stroke, Aphasia.  Med changes: Amlodipine, Atorvastatin, ASA,  Lisinopril  Outpatient PT/OT, Speech  Follow up: PCP, needing referral to Neurosurgeon.     Review of Systems   Constitutional:  Positive for fatigue. Negative for fever and unexpected weight change.   HENT:  Negative for congestion, ear pain, rhinorrhea and sore throat.    Eyes:  Negative for pain, redness and itching.   Respiratory:  Negative for cough, shortness of breath and wheezing.    Cardiovascular:  Negative for chest pain, palpitations and leg swelling.   Gastrointestinal:  Negative for abdominal pain, constipation, diarrhea and nausea.   Genitourinary:  Negative for difficulty urinating, dysuria and frequency.   Musculoskeletal:  Positive for back pain. Negative for arthralgias.   Allergic/Immunologic: Negative for environmental allergies, food allergies and immunocompromised state.   Neurological:  Positive for speech difficulty and weakness. Negative for headaches.   Psychiatric/Behavioral:  Positive for decreased concentration.    All other systems reviewed and are negative.      Health Maintenance Due   Topic Date Due    Bone Density Scan  Never done    COVID-19 Vaccine (1) Never done    Hepatitis C Screening  Never done    Zoster Vaccines (1 of 2) Never done    Hepatitis B Vaccines (2 of 3 - 19+ 3-dose series) 08/18/2005    Pneumococcal Vaccine: 65+ Years (1 - PCV) Never done    Influenza Vaccine (1) Never done       Objective   /72   Pulse 80   Temp 36.6 °C (97.9 °F)   Resp 16   Ht 1.651 m (5' 5\")   Wt 48.5 kg (107 lb)   SpO2 100%   BMI 17.81 kg/m²     Physical Exam  Vitals and nursing note reviewed.   Constitutional:       Appearance: Normal " appearance.   Eyes:      Conjunctiva/sclera: Conjunctivae normal.      Pupils: Pupils are equal, round, and reactive to light.   Cardiovascular:      Rate and Rhythm: Normal rate and regular rhythm.      Pulses: Normal pulses.      Heart sounds: Normal heart sounds.   Pulmonary:      Effort: Pulmonary effort is normal.      Breath sounds: Normal breath sounds.   Musculoskeletal:         General: No swelling.      Cervical back: Neck supple.   Skin:     General: Skin is warm and dry.   Neurological:      General: No focal deficit present.      Mental Status: She is oriented to person, place, and time.     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, estrogen receptor positive, unspecified laterality (CMS/HCC) - Primary    Cerebrovascular accident (CVA) (CMS/HCC)    Relevant Orders    Referral to Neurosurgery    Referral to Neurology    Aphasia    Relevant Orders    Referral to Neurology    Impaired gait and mobility    Relevant Orders    Referral to Neurology    Mixed hyperlipidemia    Relevant Medications    atorvastatin (Lipitor) 80 mg tablet    Primary hypertension    Relevant Medications    amLODIPine (Norvasc) 10 mg tablet    lisinopril 20 mg tablet     Reviewed records  In hx I feel this cva may have been hemorrhagic from fall and head trauma  Per hx  Will review meds  Arrange follow up   Continue therapy  Monitor bp  Adjust meds in time  Answered questions

## 2023-12-13 ENCOUNTER — EVALUATION (OUTPATIENT)
Dept: OCCUPATIONAL THERAPY | Facility: CLINIC | Age: 68
End: 2023-12-13
Payer: COMMERCIAL

## 2023-12-13 ENCOUNTER — APPOINTMENT (OUTPATIENT)
Dept: OCCUPATIONAL THERAPY | Facility: CLINIC | Age: 68
End: 2023-12-13

## 2023-12-13 ENCOUNTER — APPOINTMENT (OUTPATIENT)
Dept: NEUROLOGY | Facility: CLINIC | Age: 68
End: 2023-12-13
Payer: COMMERCIAL

## 2023-12-13 DIAGNOSIS — R53.1 RIGHT SIDED WEAKNESS: Primary | ICD-10-CM

## 2023-12-13 DIAGNOSIS — I63.9 CEREBRAL INFARCTION, UNSPECIFIED (MULTI): ICD-10-CM

## 2023-12-13 DIAGNOSIS — R27.8 COORDINATION ABNORMAL: ICD-10-CM

## 2023-12-13 DIAGNOSIS — R53.1 WEAKNESS GENERALIZED: ICD-10-CM

## 2023-12-13 DIAGNOSIS — S42.295D OTHER CLOSED NONDISPLACED FRACTURE OF PROXIMAL END OF LEFT HUMERUS WITH ROUTINE HEALING, SUBSEQUENT ENCOUNTER: ICD-10-CM

## 2023-12-13 PROCEDURE — 97535 SELF CARE MNGMENT TRAINING: CPT | Mod: GO | Performed by: OCCUPATIONAL THERAPIST

## 2023-12-13 PROCEDURE — 97166 OT EVAL MOD COMPLEX 45 MIN: CPT | Mod: GO | Performed by: OCCUPATIONAL THERAPIST

## 2023-12-13 ASSESSMENT — ENCOUNTER SYMPTOMS
EYE ITCHING: 0
COUGH: 0
EYE PAIN: 0
SORE THROAT: 0
ABDOMINAL PAIN: 0
FREQUENCY: 0
PALPITATIONS: 0
UNEXPECTED WEIGHT CHANGE: 0
NAUSEA: 0
DIARRHEA: 0
EYE REDNESS: 0
ARTHRALGIAS: 0
BACK PAIN: 1
DIFFICULTY URINATING: 0
FEVER: 0
OCCASIONAL FEELINGS OF UNSTEADINESS: 0
SPEECH DIFFICULTY: 1
DEPRESSION: 0
DYSURIA: 0
WEAKNESS: 1
DECREASED CONCENTRATION: 1
HEADACHES: 0
WHEEZING: 0
RHINORRHEA: 0
FATIGUE: 1
CONSTIPATION: 0
LOSS OF SENSATION IN FEET: 0
SHORTNESS OF BREATH: 0

## 2023-12-13 ASSESSMENT — ACTIVITIES OF DAILY LIVING (ADL): HOME_MANAGEMENT_TIME_ENTRY: 14

## 2023-12-13 NOTE — PROGRESS NOTES
Occupational Therapy    Evaluation    Patient Name: Fadia Bolden  MRN: [id number]  Today's Date: 12/14/2023           Assessment:   Fadia Bolden presents to occupational therapy with complaint of decreased LUE ROM/weakness, R UE coordination deficits/weakness, decreased activity tolerance, and impacted perception s/p CVA with craniotomy all limiting independence with ADLs, functional mobility, IADLs, and leisure activities. Standardized testing and measures administered today reveal that the patient has multiple impairments in body structures and functions, activity limitations, and participation restrictions. The patient has personal factors and comorbidities that may serve as barriers affecting the plan of care, including recent L proximal humeral fracture. Skilled OT services are warranted in order to realize measurable change in the above outcome measures and achieve improvements in patient's functional status and individual goals. Pt verbalized understanding and is in agreement with goals and plan of care.        Plan:   Interventions: Self Care/ADL, neuromuscular reeducation, therapeutic exercise   Duration: 1-2x/wk for 10 visits total      Insurance: 10 visits    Subjective   Current Problem:  1. Right sided weakness        2. Cerebral infarction, unspecified (CMS/HCC)  Referral to Occupational Therapy      3. Other closed nondisplaced fracture of proximal end of left humerus with routine healing, subsequent encounter        4. Weakness generalized        5. Coordination abnormal          Patient arrives to OT with spouse present with chief complaint of LUE ROM/weakness, R UE coordination deficits/weakness, decreased activity tolerance, and impacted perception s/p CVA with craniotomy all impacting independence with ADLs, IADLs, functional mobility, work and leisure tasks.   Patient with fall in middle of the night while out of town in [place] on 10/24.  Upon waking up in the morning, patient presented with  stroke like symptoms.  Once at the ED, pt was found to L parietal occipital hemorrhage and subdural hemorrhage treated with craniectomy.  Patient was discharged from hospital to inpatient rehab.  Patient was admitted to [facility] for ~ two weeks.  Patient was discharged home without home Pt/OT.  Patient's recovery is complicated by L proximal humeral fracture that occurred during the fall.      Patient reports she has been during well since returning home.  Patient does note having decreased activity tolerance.     ADL profile:   Pt ambulates without use of device   Patient able dress self with exerted effort   Patient has walk in shower with shower chair; unable to wash back and hair.  Daughter assists   Grooming - Independent   Not driving   Difficulty with handing writing   Patient right hand dominant        Precautions:   Helmet; able to remove when sitting still and sleeping    L proximal humeral fracture; able to begin gentle AROM and lift 1-2#.     Pain:   Pt reports 1/10 aching pain in LUE    Objective   Cognition:   Expressive aphasia  -difficulty with word finding       Required exerted effort and time for clock draw      Home Living:   Pt lives with spouse and daughter     Prior Function:   Independent     Activity Tolerance:  Reports decreased activity tolerance; especially in evening. Pt reports it takes her ~ 1 hour to dress herself     Vision:  Significant right peripheral vision deficits     ROM:   L shoulder flexion- 85*  L shoulder abduction- 70*    RUE - WNL    Sensation:   Numbness hand and feet per patient report   Stereognosis- intact B hands    Strength:   L : 34, 36, 29 #   R : 35, 36, 37 #    L shoulder- 2/5   L elbow- 4/5     R UE grossly 4/5 throughout     Perception:   Effected perception; decreased awareness on right side. Pt noted to bump into objects on right side    Pt with dysmetria with functional reaching     Coordination:   9 Hole Peg:   L- 27.32  R-41.38    Box and  Blocks:  L-41 R- 32     R finger to nose test- impaired   R digit opposition- impaired    Outcome Measures:  QuickDASH: 30= 43.18%    OP EDUCATION:    Treatment: Self Care/ADL: 14 minutes   OT educated patient on role of OT in outpatient setting.  OT developed goals and plan of care with patient in spouse; both in agreement.    OT issued gentle BUE AAROM HEP.  Pt expressed carryover.  Will further assess.   OT issued R FMC HEP. Pt expressed carryover     Goals:  Pt will increase dynamic standing balance to “good” to maximize independence with standing ADLs/IADLs and reduce risk of falls   Pt will demo independence with HEP  Pt will demo and verbalize use of energy conservation/work simplification techniques to increase activity tolerance will completing ADL/IADL tasks.   Pt will increase B  strength by 5-10# to increase independence with ADLs/IADLs (opening jars, medicine caps, etcs)  Pt will demo increased B UE function, as indicated by the QuickDash score, current score is 43.18%  Pt will demo increased R FMC by decreasing 5 seconds on the 9 Hole Peg test, to maximize independence with ADLs/IADLs  Pt will demo increased right sided awareness by completing obstacle course by hitting no objects on right side.   Pt will increase left shoulder ROM to WNL

## 2023-12-13 NOTE — LETTER
December 14, 2023    Latha Echavarria    Patient: Fadia Bolden   YOB: 1955   Date of Visit: 12/13/2023       Dear Latha Echavarria  No address on file    The attached plan of care is being sent to you because your patient’s medical reimbursement requires that you certify the plan of care. Your signature is required to allow uninterrupted insurance coverage.      You may indicate your approval by signing below and faxing this form back to us at Dept Fax: [phone].    Please call Dept: [phone] with any questions or concerns.    Thank you for this referral,        Sanjuana White OT  [facility] [address] [address] [facility]  [address] [facility] [address]    Payer: Payor: [employer] / Plan: [employer] / Product Type: *No Product type* /                                                                         Date:     Dear Sanjuana White OT,     Re: Ms. Fadia Bolden, MRN:[id number]    I certify that I have reviewed the attached plan of care and it is medically necessary for Ms. Fadia Bolden (1955) who is under my care.          ______________________________________                    _________________  Provider name and credentials                                           Date and time                                                                                           Plan of Care 12/14/23   Effective from: 12/14/2023  Effective to: 3/13/2024    Plan ID: [id number]            Participants as of Finalize on 12/14/2023    Name Type Comments Contact Info    Latha Echavarria Referring Provider      Sanjuana White OT Occupational Therapist  [phone]       Last Plan Note     Author: Sanjuana White OT Status: Incomplete Last edited: 12/13/2023  4:00 PM       Occupational Therapy    Evaluation    Patient Name: Fadia Bolden  MRN: [id number]  Today's Date: 12/14/2023           Assessment:   Fadia Bolden presents to occupational therapy with complaint of decreased  LUE ROM/weakness, R UE coordination deficits/weakness, decreased activity tolerance, and impacted perception s/p CVA with craniotomy all limiting independence with ADLs, functional mobility, IADLs, and leisure activities. Standardized testing and measures administered today reveal that the patient has multiple impairments in body structures and functions, activity limitations, and participation restrictions. The patient has personal factors and comorbidities that may serve as barriers affecting the plan of care, including recent L proximal humeral fracture. Skilled OT services are warranted in order to realize measurable change in the above outcome measures and achieve improvements in patient's functional status and individual goals. Pt verbalized understanding and is in agreement with goals and plan of care.        Plan:   Interventions: Self Care/ADL, neuromuscular reeducation, therapeutic exercise   Duration: 1-2x/wk for 10 visits total      Insurance: 10 visits    Subjective  Current Problem:  1. Right sided weakness        2. Cerebral infarction, unspecified (CMS/HCC)  Referral to Occupational Therapy      3. Other closed nondisplaced fracture of proximal end of left humerus with routine healing, subsequent encounter        4. Weakness generalized        5. Coordination abnormal          Patient arrives to OT with spouse present with chief complaint of LUE ROM/weakness, R UE coordination deficits/weakness, decreased activity tolerance, and impacted perception s/p CVA with craniotomy all impacting independence with ADLs, IADLs, functional mobility, work and leisure tasks.   Patient with fall in middle of the night while out of town in [place] on 10/24.  Upon waking up in the morning, patient presented with stroke like symptoms.  Once at the ED, pt was found to L parietal occipital hemorrhage and subdural hemorrhage treated with craniectomy.  Patient was discharged from hospital to inpatient rehab.  Patient was  admitted to [facility] for ~ two weeks.  Patient was discharged home without home Pt/OT.  Patient's recovery is complicated by L proximal humeral fracture that occurred during the fall.      Patient reports she has been during well since returning home.  Patient does note having decreased activity tolerance.     ADL profile:   Pt ambulates without use of device   Patient able dress self with exerted effort   Patient has walk in shower with shower chair; unable to wash back and hair.  Daughter assists   Grooming - Independent   Not driving   Difficulty with handing writing   Patient right hand dominant        Precautions:   Helmet; able to remove when sitting still and sleeping    L proximal humeral fracture; able to begin gentle AROM and lift 1-2#.     Pain:   Pt reports 1/10 aching pain in LUE    Objective  Cognition:   Expressive aphasia  -difficulty with word finding       Required exerted effort and time for clock draw      Home Living:   Pt lives with spouse and daughter     Prior Function:   Independent     Activity Tolerance:  Reports decreased activity tolerance; especially in evening. Pt reports it takes her ~ 1 hour to dress herself     Vision:  Significant right peripheral vision deficits     ROM:   L shoulder flexion- 85*  L shoulder abduction- 70*    RUE - WNL    Sensation:   Numbness hand and feet per patient report   Stereognosis- intact B hands    Strength:   L : 34, 36, 29 #   R : 35, 36, 37 #    L shoulder- 2/5   L elbow- 4/5     R UE grossly 4/5 throughout     Perception:   Effected perception; decreased awareness on right side. Pt noted to bump into objects on right side    Pt with dysmetria with functional reaching     Coordination:   9 Hole Peg:   L- 27.32  R-41.38    Box and Blocks:  L-41 R- 32     R finger to nose test- impaired   R digit opposition- impaired    Outcome Measures:  QuickDASH: 30= 43.18%    OP EDUCATION:    Treatment: Self Care/ADL: 14 minutes   OT educated patient on role of  OT in outpatient setting.  OT developed goals and plan of care with patient in spouse; both in agreement.    OT issued gentle BUE AARJASEN HEP.  Pt expressed carryover.  Will further assess.   OT issued R FMC HEP. Pt expressed carryover     Goals:  Pt will increase dynamic standing balance to “good” to maximize independence with standing ADLs/IADLs and reduce risk of falls   Pt will demo independence with HEP  Pt will demo and verbalize use of energy conservation/work simplification techniques to increase activity tolerance will completing ADL/IADL tasks.   Pt will increase B  strength by 5-10# to increase independence with ADLs/IADLs (opening jars, medicine caps, etcs)  Pt will demo increased B UE function, as indicated by the QuickDash score, current score is 43.18%  Pt will demo increased R FMC by decreasing 5 seconds on the 9 Hole Peg test, to maximize independence with ADLs/IADLs  Pt will demo increased right sided awareness by completing obstacle course by hitting no objects on right side.   Pt will increase left shoulder ROM to WNL               Current Participants as of 12/14/2023    Name Type Comments Contact Info    Latha Echavarria Referring Provider      Signature pending    Sanjuana White OT Occupational Therapist  [phone]    Signature pending

## 2023-12-14 ENCOUNTER — TREATMENT (OUTPATIENT)
Dept: PHYSICAL THERAPY | Facility: CLINIC | Age: 68
End: 2023-12-14
Payer: COMMERCIAL

## 2023-12-14 ENCOUNTER — TELEPHONE (OUTPATIENT)
Dept: PEDIATRICS | Facility: CLINIC | Age: 68
End: 2023-12-14
Payer: COMMERCIAL

## 2023-12-14 ENCOUNTER — TREATMENT (OUTPATIENT)
Dept: SPEECH THERAPY | Facility: CLINIC | Age: 68
End: 2023-12-14
Payer: COMMERCIAL

## 2023-12-14 DIAGNOSIS — S42.295D OTHER CLOSED NONDISPLACED FRACTURE OF PROXIMAL END OF LEFT HUMERUS WITH ROUTINE HEALING, SUBSEQUENT ENCOUNTER: ICD-10-CM

## 2023-12-14 DIAGNOSIS — Z74.09 DECREASED MOBILITY AND ENDURANCE: ICD-10-CM

## 2023-12-14 DIAGNOSIS — I63.9 CEREBROVASCULAR ACCIDENT (CVA), UNSPECIFIED MECHANISM (MULTI): ICD-10-CM

## 2023-12-14 DIAGNOSIS — R53.1 WEAKNESS GENERALIZED: ICD-10-CM

## 2023-12-14 DIAGNOSIS — I69.320 APHASIA AS LATE EFFECT OF CEREBROVASCULAR ACCIDENT (CVA): Primary | ICD-10-CM

## 2023-12-14 DIAGNOSIS — R26.89 BALANCE PROBLEM: ICD-10-CM

## 2023-12-14 PROBLEM — R27.8 COORDINATION ABNORMAL: Status: ACTIVE | Noted: 2023-12-14

## 2023-12-14 PROCEDURE — 92507 TX SP LANG VOICE COMM INDIV: CPT | Mod: GN | Performed by: STUDENT IN AN ORGANIZED HEALTH CARE EDUCATION/TRAINING PROGRAM

## 2023-12-14 PROCEDURE — 97112 NEUROMUSCULAR REEDUCATION: CPT | Mod: GP

## 2023-12-14 RX ORDER — NAPROXEN SODIUM 220 MG/1
81 TABLET, FILM COATED ORAL
Qty: 90 TABLET | Refills: 3 | Status: SHIPPED | OUTPATIENT
Start: 2023-12-14 | End: 2024-02-20 | Stop reason: HOSPADM

## 2023-12-14 ASSESSMENT — PAIN SCALES - GENERAL: PAINLEVEL_OUTOF10: 0 - NO PAIN

## 2023-12-14 ASSESSMENT — PAIN - FUNCTIONAL ASSESSMENT: PAIN_FUNCTIONAL_ASSESSMENT: 0-10

## 2023-12-14 NOTE — PROGRESS NOTES
Physical Therapy    Patient Name: Fadia Bolden  MRN: [id number]  Today's Date: 12/14/2023  Time Calculation  Start Time: 0846  Stop Time: 0928  Time Calculation (min): 42 min    Insurance reviewed   Visit number: 1   VS MED NEC, NO AUTH,     Assessment:  Session focused on improving dynamic balance. Pt challenged with uneven surfaces relying on usage of harness support. Fatigue well managed with therapeutic rest. Pt to benefit from continued skilled PT services to further progress dynamic balance and reduce fall risk.     Plan;  Continue to progress  -Fall prevention  -Dynamic balance    Current Problem:   1. Decreased mobility and endurance  Follow Up In Physical Therapy      2. Balance problem  Follow Up In Physical Therapy      3. Weakness generalized  Follow Up In Physical Therapy      4. Other closed nondisplaced fracture of proximal end of left humerus with routine healing, subsequent encounter  Follow Up In Physical Therapy          Subjective   Pt reports to PT ambulatory without a device accompanied by dtr. No medical changes. No falls    Treatments:      Neuromuscular Re-education (31559): timed minutes 41, units 3 .  Pt with overhead harness usage to complete the following  -Tandem amb on balance beam (12 ft) x 6  -Side stepping on balance beam (12ft) x 4  -Stepping forward over x 6 hurdles x 6  -Stepping laterally over x 6 hurdles x 4  -Stepping on varying surface types (stepping stones, foam pads, rockerboards) 15 ft x 6  -Stepping stones placed on ground covered by mat to mimic uneven surface; 15 ft x 4  -Slip  with anterior/posterior translations; x 7 minutes, difficulty with posterior stepping.       Education and discussion on treatment regarding the benefits related to current condition, POC, pathophysiology, and precautions      Goals;   Pt will improve FGA by 4 points to meet KENDRICK  Pt will improve TUG to < 12 seconds to decrease fall risk  Pt will demonstrate independence with HEP  Pt will  report 0 falls during POC  Pt will demonstrate full PROM of L UE without pain

## 2023-12-14 NOTE — TELEPHONE ENCOUNTER
Refill of the aspirin 81 mg chewable tablet sent to analy in [place]. Please advise.     They are saying she is running very very low.

## 2023-12-14 NOTE — PROGRESS NOTES
Outpatient Speech-Language Pathology Speech Therapy    Patient Name: Fadia Bolden  MRN: [id number]  : 1955  Today's Date: 23  Time Calculation  Start Time: 1355  Stop Time: 1440  Time Calculation (min): 45 min       Current Problem:   1. Aphasia as late effect of cerebrovascular accident (CVA)            SLP Assessment:  The patient was actively engaged in therapeutic tasks targeting word finding skills. She demonstrated improvement with categorization tasks at the concrete level. She demonstrated difficulty recalling family member names and generating synonyms. The patient may continue to benefit from skilled speech therapy services in order to improve expressive language skills in the home and clinical settings.   SLP TX Intervention Outcome: Making Progress Towards Goals  Prognosis: Good    General Visit Information:  Certification Period Start Date: 23  Certification Period End Date: 24  Total Number of Visits : 3    Pain:  Pain Assessment  Pain Assessment: 0-10  Pain Score: 0 - No pain     Plan:  Skilled speech language treatment continues to be warranted to address expressive aphasia.  Plan  Inpatient/Swing Bed or Outpatient: Outpatient  SLP Plan: Skilled SLP  SLP Frequency: 2x per week  Duration: 12 weeks  SLP Discharge Recommendations:  (re-eval 3/4/24)  Discussed POC: Patient, Caregiver/family  Discussed Risks/Benefits: Yes  Patient/Caregiver Agreeable: Yes    Goals:  Short-term goals:  Patient will complete naming activities (including picture naming, generative naming, verb naming, descriptive naming, synonyms/antonyms, etc.) with 80% accuracy given minimal-moderate cues  -synonyms - 60% (3/5)  -antonyms - 100%            Previous             -confrontational namin%; improved to 90 with min cues and extended time              -Name the category (concrete) - task attempted with Parents Journey apps (words only, words + pics). The patient required extensive time to scan choices. Reading  comprehension and/or visual field deficits may have contributed to difficulty with this task. Use of the speaker option did not affect performance on this task. Will attempt similar task with a different presentation next session       Patient will complete targeted expressive language tasks (including stating personal information, phrase/sentence completion, open-ended questions) with 80% accuracy given minimal-moderate cues.   -naming immediate family members and friends, 82% (14/17) to name children, their spouses and close friends and siblings    Patient will complete targeted receptive language tasks (including complex yes/no questions, functional reading, paragraph level comprehension) with 80% accuracy given minimal-moderate cues.  Patient will complete further assessment of cognitive-linguistic skills (MoCA, EM, etc.) to determine additional therapy goals as indicated.    In response to patient's report of feeling overwhelmed with her stroke and recovery. SLP provided support and encouragement as the patient is only 2 months post stroke. SLP informed pt/spouse that there are neuropyschologist available if they wanted that type of service. SLP also introduced pt/spouse to community resources (Speak Easy) as well.     A scanning ismael was used to determine the patient's ability to visually scan a smaller surface area (iPad). The patient scored 100% on the test screen. The patient required min cues to scan the full table tray when completing a sorting task.     Long-term goals:  Patient will improve functional global communication skills in order to promote safety and independence with ADLs and communicate wants/needs/preferences/opinions.     Subjective:  Patient/spouse report that she feels she has been going through this ordeal for a very long time.     Outpatient Education:  Adult Outpatient Education  Individual(s) Educated: Patient, Spouse  Risk and Benefits Discussed with Patient/Caregiver/Other:  yes  Patient/Caregiver Demonstrated Understanding: yes  Plan of Care Discussed and Agreed Upon: yes  Patient Response to Education: Patient/Caregiver Verbalized Understanding of Information

## 2023-12-18 ENCOUNTER — TREATMENT (OUTPATIENT)
Dept: SPEECH THERAPY | Facility: CLINIC | Age: 68
End: 2023-12-18
Payer: COMMERCIAL

## 2023-12-18 DIAGNOSIS — I69.320 APHASIA AS LATE EFFECT OF CEREBROVASCULAR ACCIDENT (CVA): Primary | ICD-10-CM

## 2023-12-18 PROCEDURE — 92507 TX SP LANG VOICE COMM INDIV: CPT | Mod: GN | Performed by: STUDENT IN AN ORGANIZED HEALTH CARE EDUCATION/TRAINING PROGRAM

## 2023-12-18 ASSESSMENT — PAIN - FUNCTIONAL ASSESSMENT: PAIN_FUNCTIONAL_ASSESSMENT: 0-10

## 2023-12-18 ASSESSMENT — PAIN SCALES - GENERAL: PAINLEVEL_OUTOF10: 0 - NO PAIN

## 2023-12-18 NOTE — PROGRESS NOTES
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 She demonstrated improvement with responsive naming and concrete categorization tasks. She demonstrated difficulty when asked to provide a description of common objects. The patient may continue to benefit from skilled speech therapy services in order to address moderate impairments with expressive and receptive language skills.     General Visit Information:  Certification Period Start Date: 23  Certification Period End Date: 24  Total Number of Visits : 4    Pain:  Pain Assessment  Pain Assessment: 0-10  Pain Score: 0 - No pain     Plan:  Skilled speech language treatment continues to be warranted to address expressive and receptive aphasia.   Plan  Inpatient/Swing Bed or Outpatient: Outpatient  SLP Plan: Skilled SLP  SLP Frequency: 2x per week  Duration: 12 weeks  SLP Discharge Recommendations:  (re-eval 3/4/24)  Next Treatment Priority: generative naming; c  Discussed POC: Patient, Caregiver/family  Discussed Risks/Benefits: Yes  Patient/Caregiver Agreeable: Yes    Goals:  Short-term goals:  Patient will complete naming activities (including picture naming, generative naming, verb naming, descriptive naming, synonyms/antonyms, etc.) with 80% accuracy given minimal-moderate cues    -Stating the category ([employer] ismael): FO4 choices, words only - 84% (21/25)   -responsive naming - 85% (17/20)   -describing common objects - 67% (2/3) with min cues; the patient struggled with this task this date    Previous  -synonyms - 60%  (3/5)  -antonyms - 100%          -confrontational namin%; improved to 90 with min cues and extended time              -Name the category (concrete) - task attempted with [employer] apps (words only, words + pics). The patient required extensive time to scan choices. Reading comprehension and/or visual field deficits may have contributed to difficulty with this task. Use of the speaker option did not affect performance on this task. Will attempt similar task with a different presentation next session     Patient will complete targeted expressive language tasks (including stating personal information, phrase/sentence completion, open-ended questions) with 80% accuracy given minimal-moderate cues.              -naming immediate family members and friends, 82% (14/17) to name children, their spouses and close friends and siblings     Patient will complete targeted receptive language tasks (including complex yes/no questions, functional reading, paragraph level comprehension) with 80% accuracy given minimal-moderate cues.  Patient will complete further assessment of cognitive-linguistic skills (MoCA, EM, etc.) to determine additional therapy goals as indicated.     In response to patient's report of feeling overwhelmed with her stroke and recovery. SLP provided support and encouragement as the patient is only 2 months post stroke. SLP informed pt/spouse that there are neuropyschologist available if they wanted that type of service. SLP also introduced pt/spouse to community resources (Spe[employer] Easy) as well.      A scanning ismael was used to determine the patient's ability to visually scan a smaller surface area (iPad). The patient scored 100% on the test screen. The patient required min cues to scan the full table tray when completing a sorting task.     Long-term goals:  Patient will improve functional global communication skills in order to promote safety and independence with ADLs and communicate  wants/needs/preferences/opinions.     Subjective:  The patient required extended time to generate responses during all of the therapy exercises.     Outpatient Education:  Adult Outpatient Education  Individual(s) Educated: Patient, Spouse  Risk and Benefits Discussed with Patient/Caregiver/Other: yes  Patient/Caregiver Demonstrated Understanding: yes  Plan of Care Discussed and Agreed Upon: yes  Patient Response to Education: Patient/Caregiver Verbalized Understanding of Information

## 2023-12-19 ENCOUNTER — OFFICE VISIT (OUTPATIENT)
Dept: NEUROSURGERY | Facility: HOSPITAL | Age: 68
End: 2023-12-19
Payer: COMMERCIAL

## 2023-12-19 ENCOUNTER — TREATMENT (OUTPATIENT)
Dept: PHYSICAL THERAPY | Facility: CLINIC | Age: 68
End: 2023-12-19
Payer: COMMERCIAL

## 2023-12-19 VITALS
BODY MASS INDEX: 17.69 KG/M2 | DIASTOLIC BLOOD PRESSURE: 67 MMHG | SYSTOLIC BLOOD PRESSURE: 133 MMHG | RESPIRATION RATE: 15 BRPM | HEART RATE: 72 BPM | WEIGHT: 103.62 LBS | HEIGHT: 64 IN

## 2023-12-19 DIAGNOSIS — S42.295D OTHER CLOSED NONDISPLACED FRACTURE OF PROXIMAL END OF LEFT HUMERUS WITH ROUTINE HEALING, SUBSEQUENT ENCOUNTER: ICD-10-CM

## 2023-12-19 DIAGNOSIS — R53.1 WEAKNESS GENERALIZED: ICD-10-CM

## 2023-12-19 DIAGNOSIS — M95.2 ACQUIRED SKULL DEFECT: ICD-10-CM

## 2023-12-19 DIAGNOSIS — S06.5XAA SDH (SUBDURAL HEMATOMA) (MULTI): Primary | ICD-10-CM

## 2023-12-19 DIAGNOSIS — Z74.09 DECREASED MOBILITY AND ENDURANCE: ICD-10-CM

## 2023-12-19 DIAGNOSIS — R26.89 BALANCE PROBLEM: ICD-10-CM

## 2023-12-19 DIAGNOSIS — I63.89 CEREBROVASCULAR ACCIDENT (CVA) DUE TO OTHER MECHANISM (MULTI): ICD-10-CM

## 2023-12-19 PROCEDURE — 1126F AMNT PAIN NOTED NONE PRSNT: CPT | Performed by: NEUROLOGICAL SURGERY

## 2023-12-19 PROCEDURE — 99214 OFFICE O/P EST MOD 30 MIN: CPT | Performed by: NEUROLOGICAL SURGERY

## 2023-12-19 PROCEDURE — 1159F MED LIST DOCD IN RCRD: CPT | Performed by: NEUROLOGICAL SURGERY

## 2023-12-19 PROCEDURE — 1036F TOBACCO NON-USER: CPT | Performed by: NEUROLOGICAL SURGERY

## 2023-12-19 PROCEDURE — 3075F SYST BP GE 130 - 139MM HG: CPT | Performed by: NEUROLOGICAL SURGERY

## 2023-12-19 PROCEDURE — 99204 OFFICE O/P NEW MOD 45 MIN: CPT | Performed by: NEUROLOGICAL SURGERY

## 2023-12-19 PROCEDURE — 1160F RVW MEDS BY RX/DR IN RCRD: CPT | Performed by: NEUROLOGICAL SURGERY

## 2023-12-19 PROCEDURE — 97112 NEUROMUSCULAR REEDUCATION: CPT | Mod: GP

## 2023-12-19 PROCEDURE — 3078F DIAST BP <80 MM HG: CPT | Performed by: NEUROLOGICAL SURGERY

## 2023-12-19 NOTE — PATIENT INSTRUCTIONS
Welcome to Dr. Blevins's clinic. We are here to assist you through your Neurological Surgery care at [facility]. Dr. Blevins's office number is [phone]. This number is the most direct way to communicate with the office. The office fax number is [phone].     A MRI brain w/wo, MRA head, and MR venogram was ordered during your visit today. Please call [phone] to assist you in scheduling your imaging.

## 2023-12-19 NOTE — PROGRESS NOTES
"Chief Complaint:   NPV      History Of Present Illness:    Fadia Bolden is a 68-year-old female with a PMH significant for hypothyroidism, DAIJA on CPAP, breast CA ( ), esophageal stricture, who was visiting her daughter in [place] when her  heard her fall in the early morning. Post fall patient with an episode of vomiting and went back to sleep. When she woke up around 9am she had difficulty with language and R visual deficit and was taken to [facility] ED in [place]. 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 Neurosurgery was consulted and patient was taken to the OR for left hemicraniectomy and left occipital and left thin subdural hematoma evacuation with Dr. Rodriguez on 10/25/23. Neurology was consulted for concern for ischemic CVA with hemorrhagic vonversion and started on ASA 81mg and atorvastatin 80mg daily. Additionally, patient was found to have an acute L comminuted nondisplaced proximal humeral neck fracture. She was recommended for NWB status and follow up outpatient with orthopedic surgery, whom she has seen in followup and have recommended she begin light weight bearing exercises. Patient was discharged to rehab with instructions for outpatient follow up with repeat CTH and evaluation for cranioplasty. She presents to clinic for NPV.     She has not yet seen stroke neurology in follow-up. She did have echo and holter per the records.         Vital Signs   /67   Pulse 72   Resp 15   Ht 1.626 m (5' 4\")   Wt 47 kg (103 lb 9.9 oz)   BMI 17.79 kg/m²          Physical Exam:  Awake and alert; some hesitation in speech  Able to repeat  Difficulty in following R/L crossed commands  Face symmetric  Digitini minimi sign on R  Walking tentative , but gait steady  R VF homonymous hemianopsia   Sensation intact to LT without extinction    Hemicrani flap relatively sunken, soft. Incision well healed " except areas of eschar posteriorly        Past Medical History:  Past Medical History:   Diagnosis Date    Other chest pain 02/10/2021    Chest tightness    Other specified health status 12/31/2018    No pertinent past medical history    Personal history of malignant neoplasm of breast 06/27/2022    History of malignant neoplasm of breast       Past Surgical History:   Past Surgical History:   Procedure Laterality Date    MR HEAD ANGIO W AND WO IV CONTRAST  10/25/2023    MR HEAD ANGIO W AND WO IV CONTRAST 10/25/2023    OTHER SURGICAL HISTORY  11/22/2021    Tonsillectomy       Outpatient Medications:  Current Outpatient Medications   Medication Instructions    amLODIPine (NORVASC) 10 mg, oral, Daily RT    ascorbic acid, vitamin C, 1,500 mg ER tablet 1 tablet, oral, Daily    aspirin 81 mg, oral, Daily RT    atorvastatin (LIPITOR) 80 mg, oral, Daily    cholecalciferol (Vitamin D-3) 50 MCG (2000 UT) tablet 1 tablet, oral, Daily    lisinopril 20 mg, oral, Daily RT         Relevant Results:   I imaging from her admission in October, which demonstrates patchy hemorrhage in the left primarily occipital lobe.  There is some progressive edema and shift on subsequent scans, and scans demonstrating hemicraniectomy with improvement in midline shift.    She also has MRI and CTA and MRV imaging.  On the MRV she has an attenuated left transverse sinus which is interpreted on the reports as hypoplastic.  No obvious vessel stenoses.  Her MRI does not show derrick areas of DWI abnormality beyond the region of hemorrahge but is obscured by the hemorrhage in those regions.      Assessment/Plan   The primary encounter diagnosis was SDH (subdural hematoma) (CMS/HCC). Diagnoses of Acquired skull defect and Cerebrovascular accident (CVA) due to other mechanism (CMS/HCC) were also pertinent to this visit.    Patient presents status post hemicraniectomy approximately 2 months ago in the setting of presumptive hemorrhagic conversion of ischemic  stroke.  Her imaging has the appearance that could be consistent also with venous infarction and hemorrhage, although her MRV imaging was interpreted as not suspicious for sinus thrombosis.  She did have a fall at the time of the event, but no obvious sequelae of trauma in terms of skull fracture or external evidence of head injury based on the family's report, and her imaging  findings or not as typical for traumatic contusion.    I discussed with the patient and her family (, and 2 daughters) was present with her today that we would recommend proceeding with cranioplasty.  However she would benefit from having repeat MRI MRA and MRV imaging prior as a follow-up to her prior workup.  Additionally she would need a CT scan for custom made implant as her previous bone flap was stored but contaminated per the family's report.  Additionally she does need follow-up with stroke neurology and we will refer her to Dr. Jones  We discussed in detail the risks and benefits of the implant, including but not limited to the risk of seizure, bleeding, neurological deficit, exacerbation of prior symptoms, healing issues, infection, need for redo surgery, and unexpected complications.  She has a large hemicrania excision which extends towards the occiput on the left.  Will ask plastic service to be available if needed.  She would need to stop aspirin 1 week preoperatively and for up to a month postoperatively and we discussed with the family that may pose risk of additional strokes if in fact she has a ischemic stroke etiology to her original presentation.    I also requested they start a regimen of applying antibiotic ointment to the area of eschar to allow this to soften and loosen and that we would need to see the incision is fully healed without eschar before proceeding with the surgery.  Questions were answered and they wish to proceed.    Total time for this visit was 55 minutes.

## 2023-12-19 NOTE — PROGRESS NOTES
Physical Therapy    Patient Name: Fadia Bolden  MRN: [id number]  Today's Date: 12/19/2023  Time Calculation  Start Time: 0920  Stop Time: 0959  Time Calculation (min): 39 min    Insurance reviewed   Visit number: 2   VS MED NEC, NO AUTH,     Assessment:  Session focused on dual tasking. Pt challenged with addition of dual tasking leading to a decrease of speed and sequencing. No more than CGA to maintain balance with tasks. Pt to benefit from continued skilled PT services to further progress dynamic balance and reduce fall risk.     Plan;  Continue to progress  -Dynamic balance  -Usage of Neurocom    Current Problem:   1. Decreased mobility and endurance  Follow Up In Physical Therapy      2. Balance problem  Follow Up In Physical Therapy      3. Weakness generalized  Follow Up In Physical Therapy      4. Other closed nondisplaced fracture of proximal end of left humerus with routine healing, subsequent encounter  Follow Up In Physical Therapy          Subjective   Pt reports to PT ambulatory without a device accompanied by dtr. No medical changes. No falls.    Treatments:      Neuromuscular Re-education (52356): timed minutes 39, units 3 .  To improve dual tasking  -Amb while pouring water between two cups, FW and BW ~ 5 minutes, decreased speed with focus on task  -Stepping over canes on floor while balancing ball on spoon; 10 ft each lateral direction x 3, forward x 4  -Weaving in/out of canes while throwing ball in air; forward weave 10 ft x 4, side ways 10 ft x 6, difficulty with sequencing  -Pt amb while carrying weight 1# in L UE and 5# in R UE on resistance bands to challenge weight distrubution and lateral shifting; x 4 minutes     Education and discussion on treatment regarding the benefits related to current condition, POC, pathophysiology, and precautions      Goals;   Pt will improve FGA by 4 points to meet KENDRICK  Pt will improve TUG to < 12 seconds to decrease fall risk  Pt will demonstrate independence  with HEP  Pt will report 0 falls during POC  Pt will demonstrate full PROM of L UE without pain

## 2023-12-20 ENCOUNTER — TREATMENT (OUTPATIENT)
Dept: OCCUPATIONAL THERAPY | Facility: CLINIC | Age: 68
End: 2023-12-20
Payer: COMMERCIAL

## 2023-12-20 DIAGNOSIS — R27.8 COORDINATION ABNORMAL: Primary | ICD-10-CM

## 2023-12-20 DIAGNOSIS — R53.1 WEAKNESS GENERALIZED: ICD-10-CM

## 2023-12-20 DIAGNOSIS — R53.1 RIGHT SIDED WEAKNESS: ICD-10-CM

## 2023-12-20 DIAGNOSIS — S42.295D OTHER CLOSED NONDISPLACED FRACTURE OF PROXIMAL END OF LEFT HUMERUS WITH ROUTINE HEALING, SUBSEQUENT ENCOUNTER: ICD-10-CM

## 2023-12-20 PROCEDURE — 97110 THERAPEUTIC EXERCISES: CPT | Mod: GO | Performed by: OCCUPATIONAL THERAPIST

## 2023-12-20 PROCEDURE — 97112 NEUROMUSCULAR REEDUCATION: CPT | Mod: GO | Performed by: OCCUPATIONAL THERAPIST

## 2023-12-20 NOTE — PROGRESS NOTES
"Occupational Therapy    Treatment  Patient Name: Fadia Bolden  MRN: [id number]  Today's Date: 12/20/2023       Visit: #2     Assessment:   Patient participated in treatment well with minimal complaints of pain.  Pt took intermittent rest breaks throughout. Patient demonstrated good visual scanning techniques throughout course of treatment    Plan:   Interventions: Self Care/ADL, neuromuscular reeducation, therapeutic exercise   Duration: 1-2x/wk for 10 visits total      Insurance: 10 visits    Subjective   Current Problem:  1. Coordination abnormal        2. Weakness generalized        3. Right sided weakness        4. Other closed nondisplaced fracture of proximal end of left humerus with routine healing, subsequent encounter          Patient reports she has been working on her exercises intermittently       Precautions:   Helmet; able to remove when sitting still and sleeping    L proximal humeral fracture; able to begin gentle AROM and lift 1-2#.     Pain:   Pt reports 1/10 aching pain in LUE    Objective   Required intermittent rest breaks with ROM exercises   Expressed \"stinging\" in L shoulder with ROM >90*  Demonstrate good R visual scanning     Treatment:     Therapeutic Exercise:     OT provided AAROM stretches to LUE at start of treatment     OT facilitated BUE AAROM stretches with use of [place]  -shoulder flexion 2x10  -horizontal abduction 2x10      Neuromuscular Reeducation:     OT addressed R visual scanning and R hand coordination by having patient complete pattern block card with R hand   -OT placed pieces on patient's R side to facilitate R visual scanning       "

## 2023-12-21 ENCOUNTER — TREATMENT (OUTPATIENT)
Dept: PHYSICAL THERAPY | Facility: CLINIC | Age: 68
End: 2023-12-21
Payer: COMMERCIAL

## 2023-12-21 DIAGNOSIS — R53.1 WEAKNESS GENERALIZED: ICD-10-CM

## 2023-12-21 DIAGNOSIS — Z74.09 DECREASED MOBILITY AND ENDURANCE: ICD-10-CM

## 2023-12-21 DIAGNOSIS — R26.89 BALANCE PROBLEM: ICD-10-CM

## 2023-12-21 DIAGNOSIS — S42.295D OTHER CLOSED NONDISPLACED FRACTURE OF PROXIMAL END OF LEFT HUMERUS WITH ROUTINE HEALING, SUBSEQUENT ENCOUNTER: ICD-10-CM

## 2023-12-21 PROCEDURE — 97112 NEUROMUSCULAR REEDUCATION: CPT | Mod: GP

## 2023-12-21 NOTE — PROGRESS NOTES
Physical Therapy    Patient Name: Fadia Bolden  MRN: [id number]  Today's Date: 12/21/2023  Time Calculation  Start Time: 0916  Stop Time: 0958  Time Calculation (min): 42 min    Insurance reviewed   Visit number: 3   VS MED NEC, NO AUTH,     Assessment:  Session focused on improving single leg stability and control in preparation for stairs. Pt demonstrates ability to complete stairs reciprocally without UE support with only SBA.  At some times during descent exhibits poor control however improves with decreased speed of movement. Pt to benefit from continued skilled PT services to further progress dynamic balance and reduce fall risk.     Plan;  Continue to progress  -Dynamic balance  -Usage of Neurocom    Current Problem:   1. Decreased mobility and endurance  Follow Up In Physical Therapy      2. Balance problem  Follow Up In Physical Therapy      3. Weakness generalized  Follow Up In Physical Therapy      4. Other closed nondisplaced fracture of proximal end of left humerus with routine healing, subsequent encounter  Follow Up In Physical Therapy          Subjective   Pt reports to PT ambulatory without a device accompanied by spouse. No medical changes. No falls.    Treatments:    Neuromuscular Re-education (11246): timed minutes 39, units 3 .    To improve SLS stability in preparation for stairs  -Standing on foam surface completing toe taps onto cones; 2 x 2 minutes  -Stepping forward over hurdles with reciprocal lead LE; 10 ft x 6  -Modified runners step up; x 20 on 4 in riser, x 20 on 6 in riser, no HR  -Eccentric step downs off 6 inch riser, U UE support; 2 x 15 each LE lead    To improve stair climbing ability and dynamic balance  -Pt ascend/descend 4 stairs with step to pattern, no HR  -Pt ascend/descend 12 stairs with reciprocal pattern, no HR    Education and discussion on treatment regarding the benefits related to current condition, POC, pathophysiology, and precautions      Goals;   Pt will improve  FGA by 4 points to meet KENDRICK  Pt will improve TUG to < 12 seconds to decrease fall risk  Pt will demonstrate independence with HEP  Pt will report 0 falls during POC  Pt will demonstrate full PROM of L UE without pain

## 2023-12-26 ENCOUNTER — APPOINTMENT (OUTPATIENT)
Dept: OCCUPATIONAL THERAPY | Facility: CLINIC | Age: 68
End: 2023-12-26
Payer: COMMERCIAL

## 2023-12-27 ENCOUNTER — TREATMENT (OUTPATIENT)
Dept: OCCUPATIONAL THERAPY | Facility: CLINIC | Age: 68
End: 2023-12-27
Payer: COMMERCIAL

## 2023-12-27 DIAGNOSIS — R27.8 COORDINATION ABNORMAL: Primary | ICD-10-CM

## 2023-12-27 DIAGNOSIS — R53.1 RIGHT SIDED WEAKNESS: ICD-10-CM

## 2023-12-27 DIAGNOSIS — R53.1 WEAKNESS GENERALIZED: ICD-10-CM

## 2023-12-27 DIAGNOSIS — S42.295D OTHER CLOSED NONDISPLACED FRACTURE OF PROXIMAL END OF LEFT HUMERUS WITH ROUTINE HEALING, SUBSEQUENT ENCOUNTER: ICD-10-CM

## 2023-12-27 PROCEDURE — 97110 THERAPEUTIC EXERCISES: CPT | Mod: GO | Performed by: OCCUPATIONAL THERAPIST

## 2023-12-27 NOTE — PROGRESS NOTES
Occupational Therapy    Occupational Therapy    Treatment  Patient Name: Fadia Bolden  MRN: [id number]  Today's Date: 12/27/2023       Visit: #3    Assessment:   Patient with noted increased L UE AROM within pain free range. Pt able to lift LUE ~100*    Plan:   Interventions: Self Care/ADL, neuromuscular reeducation, therapeutic exercise   Duration: 1-2x/wk for 10 visits total      Insurance: 10 visits    Subjective   Current Problem:  1. Coordination abnormal        2. Right sided weakness        3. Weakness generalized        4. Other closed nondisplaced fracture of proximal end of left humerus with routine healing, subsequent encounter          Patient reports she has been working on her exercises regularly       Precautions:   Helmet; able to remove when sitting still and sleeping    L proximal humeral fracture; able to begin gentle AROM and lift 1-2#.     Objective   Pt took intermittent rest breaks throughout   Noted decreased complaints of join with joint support from therapist   Pt with increased L shoulder ROM; ~100*    Treatment:     Therapeutic Exercise:     Left UE strengthening and ROM with use of #1 weight:  -shoulder flexion 3x10  -shoulder external rotation 3x10  -elbow flexion 3x10   *patient with 5 second hold at end range of each exercise     L digit strengthening addressed with use of power web; 5 second hold at end range - 2x10    OT issued hand exercise ball (red) and red theraband for digit strengthening home program

## 2023-12-28 ENCOUNTER — TREATMENT (OUTPATIENT)
Dept: SPEECH THERAPY | Facility: CLINIC | Age: 68
End: 2023-12-28
Payer: COMMERCIAL

## 2023-12-28 ENCOUNTER — TREATMENT (OUTPATIENT)
Dept: PHYSICAL THERAPY | Facility: CLINIC | Age: 68
End: 2023-12-28
Payer: COMMERCIAL

## 2023-12-28 DIAGNOSIS — R53.1 WEAKNESS GENERALIZED: ICD-10-CM

## 2023-12-28 DIAGNOSIS — S42.295D OTHER CLOSED NONDISPLACED FRACTURE OF PROXIMAL END OF LEFT HUMERUS WITH ROUTINE HEALING, SUBSEQUENT ENCOUNTER: ICD-10-CM

## 2023-12-28 DIAGNOSIS — Z74.09 DECREASED MOBILITY AND ENDURANCE: ICD-10-CM

## 2023-12-28 DIAGNOSIS — I69.320 APHASIA AS LATE EFFECT OF CEREBROVASCULAR ACCIDENT (CVA): Primary | ICD-10-CM

## 2023-12-28 DIAGNOSIS — R26.89 BALANCE PROBLEM: ICD-10-CM

## 2023-12-28 PROCEDURE — 92507 TX SP LANG VOICE COMM INDIV: CPT | Mod: GN | Performed by: STUDENT IN AN ORGANIZED HEALTH CARE EDUCATION/TRAINING PROGRAM

## 2023-12-28 PROCEDURE — 97112 NEUROMUSCULAR REEDUCATION: CPT | Mod: GP

## 2023-12-28 ASSESSMENT — PAIN SCALES - GENERAL: PAINLEVEL_OUTOF10: 0 - NO PAIN

## 2023-12-28 ASSESSMENT — PAIN - FUNCTIONAL ASSESSMENT: PAIN_FUNCTIONAL_ASSESSMENT: 0-10

## 2023-12-28 NOTE — PROGRESS NOTES
Physical Therapy    Patient Name: Fadia Bolden  MRN: [id number]  Today's Date: 12/28/2023  Time Calculation  Start Time: 1018  Stop Time: 1058  Time Calculation (min): 40 min    Insurance reviewed   Visit number: 4   VS MED NEC, NO AUTH,     Assessment:  Session focused on improving balance. Pt challenged with altered surfaces with slight shift from midline requiring no more than CGA.  Pt to benefit from continued skilled PT services to further progress dynamic balance and reduce fall risk.     Plan;  Continue to progress  -Dynamic balance  -Usage of Neurocom    Current Problem:   1. Decreased mobility and endurance  Follow Up In Physical Therapy      2. Balance problem  Follow Up In Physical Therapy      3. Weakness generalized  Follow Up In Physical Therapy      4. Other closed nondisplaced fracture of proximal end of left humerus with routine healing, subsequent encounter  Follow Up In Physical Therapy          Subjective   Pt reports to PT ambulatory without a device accompanied by spouse. No medical changes. No falls.    Treatments:    Neuromuscular Re-education (19708): timed minutes 39, units 3 .    To utilize SOT completed NeuroCom  --Con 1; no sway  --Con 2; no sway  --Con 3; minimal A-P sway  --Con 4; moderate A-P sway  --Con 5; moderate A-P sway  --Con 6; moderate A-P sway    To improve standing balance  -BOSU ball  --Ball side down; finding midline balance x 2 minutes, Squats 2 x 12  --Ball side up; marching in place; 2 minutes x 2  -Dynadisc  --Holding with one LE on each disc; x 2 minute hold      Education and discussion on treatment regarding the benefits related to current condition, POC, pathophysiology, and precautions      Goals;   Pt will improve FGA by 4 points to meet KENDRICK  Pt will improve TUG to < 12 seconds to decrease fall risk  Pt will demonstrate independence with HEP  Pt will report 0 falls during POC  Pt will demonstrate full PROM of L UE without pain

## 2023-12-28 NOTE — PROGRESS NOTES
Speech-Language Pathology    Outpatient Speech-Language Pathology Treatment     Patient Name: Fadia Bolden  MRN: [id number]  Today's Date: 12/28/2023  Time Calculation  Start Time: 1300  Stop Time: 1345  Time Calculation (min): 45 min         Current Problem:   1. Aphasia as late effect of cerebrovascular accident (CVA)            SLP Assessment:  SLP TX Intervention Outcome: Making Progress Towards Goals  Prognosis: Good    The patient was actively engaged in therapeutic tasks targeting word finding and semantic analysis skills. She demonstrated improvement naming items from description. She demonstrated difficulty with describing objects. The patient may continue to benefit from skilled speech therapy services in order to improve her ability to communicate in the home and clinical settings.     General Visit Information:  Certification Period Start Date: 12/05/23  Certification Period End Date: 03/04/24  Total Number of Visits : 5    Pain:  Pain Assessment  Pain Assessment: 0-10  Pain Score: 0 - No pain     Plan:  Plan  Inpatient/Swing Bed or Outpatient: Outpatient  SLP Plan: Skilled SLP  SLP Frequency: 2x per week  Duration: 12 weeks (Re-eval 3/4/24)  Next Treatment Priority: confront name w/ semantic analysis  Discussed POC: Patient, Caregiver/family  Discussed Risks/Benefits: Yes  Patient/Caregiver Agreeable: Yes    Goals:  Short-term goals:  Patient will complete naming activities (including picture naming, generative naming, verb naming, descriptive naming, synonyms/antonyms, etc.) with 80% accuracy given minimal-moderate cues               -describing common objects - 56% (5/9), mod cues required to complete task   -naming items from definitions/description - 70% (7/10)    Previous    -Stating the category ([employer] ismael): FO4 choices, words only - 84% (21/25)              -responsive naming - 85% (17/20)              -describing common objects - 67% (2/3) with min cues; the patient struggled with this task this  date  -synonyms - 60% (3/5)  -antonyms - 100%          -confrontational namin%; improved to 90 with min cues and extended time                Patient will complete targeted expressive language tasks (including stating personal information, phrase/sentence completion, open-ended questions) with 80% accuracy given minimal-moderate cues.              -naming immediate family members and friends, 82% (14/17) to name children, their spouses and close friends and siblings     Patient will complete targeted receptive language tasks (including complex yes/no questions, functional reading, paragraph level comprehension) with 80% accuracy given minimal-moderate cues.    Patient will complete further assessment of cognitive-linguistic skills (MoCA, EM, etc.) to determine additional therapy goals as indicated.     Long-term goals:  Patient will improve functional global communication skills in order to promote safety and independence with ADLs and communicate wants/needs/preferences/opinions.     Subjective:  Patient reports no new or worsening symptoms.    Outpatient Education:  Adult Outpatient Education  Individual(s) Educated: Patient, Spouse  Risk and Benefits Discussed with Patient/Caregiver/Other: yes  Patient/Caregiver Demonstrated Understanding: yes  Plan of Care Discussed and Agreed Upon: yes  Patient Response to Education: Patient/Caregiver Verbalized Understanding of Information

## 2024-01-02 ENCOUNTER — TREATMENT (OUTPATIENT)
Dept: PHYSICAL THERAPY | Facility: CLINIC | Age: 69
End: 2024-01-02
Payer: COMMERCIAL

## 2024-01-02 DIAGNOSIS — S42.295D OTHER CLOSED NONDISPLACED FRACTURE OF PROXIMAL END OF LEFT HUMERUS WITH ROUTINE HEALING, SUBSEQUENT ENCOUNTER: ICD-10-CM

## 2024-01-02 DIAGNOSIS — R26.89 BALANCE PROBLEM: ICD-10-CM

## 2024-01-02 DIAGNOSIS — Z74.09 DECREASED MOBILITY AND ENDURANCE: ICD-10-CM

## 2024-01-02 DIAGNOSIS — R53.1 WEAKNESS GENERALIZED: ICD-10-CM

## 2024-01-02 PROCEDURE — 97112 NEUROMUSCULAR REEDUCATION: CPT | Mod: GP

## 2024-01-02 NOTE — PROGRESS NOTES
Physical Therapy    Patient Name: Fadia Bolden  MRN: [id number]  Today's Date: 1/2/2024  Time Calculation  Start Time: 1018  Stop Time: 1059  Time Calculation (min): 41 min    Insurance reviewed   Visit number: 5   VS MED NEC, NO AUTH,     Assessment:  Session focused on improving balance. Pt challenged with dynamic tasks without a external support, typically resulting in right lateral lean. Pt running into objects frequently on right side during session; urged pt and spouse to consult with neurologist regarding visual deficits.   Pt to benefit from continued skilled PT services to further progress dynamic balance and reduce fall risk.     Plan;  Continue to progress  -Dynamic balance  -Usage of Neurocom    Current Problem:   1. Decreased mobility and endurance  Follow Up In Physical Therapy      2. Balance problem  Follow Up In Physical Therapy      3. Weakness generalized  Follow Up In Physical Therapy      4. Other closed nondisplaced fracture of proximal end of left humerus with routine healing, subsequent encounter  Follow Up In Physical Therapy          Subjective   Pt reports to PT ambulatory without a device accompanied by spouse. No medical changes. No falls.    Treatments:    Neuromuscular Re-education (90557): timed minutes 39, units 3 .    To challenge dynamic balance  -Tandem amb on balance beam (12 ft) x 6  -Side stepping on balance beam (12ft) x 4  -Stepping forward over x 6 hurdles x 6  -Stepping laterally over x 6 hurdles x 4  -Stepping on stepping stones with narrow MARY; 10 ft x 8  -Stepping stones placed on ground covered by mat to mimic uneven surface; 15 ft x 6  -Amb with perturbations; 400ft       Education and discussion on treatment regarding the benefits related to current condition, POC, pathophysiology, and precautions      Goals;   Pt will improve FGA by 4 points to meet KENDRICK  Pt will improve TUG to < 12 seconds to decrease fall risk  Pt will demonstrate independence with HEP  Pt will  report 0 falls during POC  Pt will demonstrate full PROM of L UE without pain

## 2024-01-03 ENCOUNTER — LAB (OUTPATIENT)
Dept: LAB | Facility: LAB | Age: 69
End: 2024-01-03
Payer: COMMERCIAL

## 2024-01-03 DIAGNOSIS — I63.89 CEREBROVASCULAR ACCIDENT (CVA) DUE TO OTHER MECHANISM (MULTI): ICD-10-CM

## 2024-01-03 DIAGNOSIS — M95.2 ACQUIRED SKULL DEFECT: ICD-10-CM

## 2024-01-03 DIAGNOSIS — S06.5XAA SDH (SUBDURAL HEMATOMA) (MULTI): ICD-10-CM

## 2024-01-03 LAB
CREAT SERPL-MCNC: 0.75 MG/DL (ref 0.5–1.05)
GFR SERPL CREATININE-BSD FRML MDRD: 86 ML/MIN/1.73M*2

## 2024-01-03 PROCEDURE — 82565 ASSAY OF CREATININE: CPT

## 2024-01-03 PROCEDURE — 36415 COLL VENOUS BLD VENIPUNCTURE: CPT

## 2024-01-04 ENCOUNTER — TELEPHONE (OUTPATIENT)
Dept: PRIMARY CARE | Facility: CLINIC | Age: 69
End: 2024-01-04
Payer: COMMERCIAL

## 2024-01-04 NOTE — TELEPHONE ENCOUNTER
Patient calling retaining water in leg and ankles is due for surgery soon.  Is concerned on what she should do.  Please call

## 2024-01-05 ENCOUNTER — ANCILLARY PROCEDURE (OUTPATIENT)
Dept: RADIOLOGY | Facility: CLINIC | Age: 69
End: 2024-01-05
Payer: COMMERCIAL

## 2024-01-05 DIAGNOSIS — I63.89 CEREBROVASCULAR ACCIDENT (CVA) DUE TO OTHER MECHANISM (MULTI): ICD-10-CM

## 2024-01-05 DIAGNOSIS — S06.5XAA SDH (SUBDURAL HEMATOMA) (MULTI): ICD-10-CM

## 2024-01-05 DIAGNOSIS — M95.2 ACQUIRED SKULL DEFECT: ICD-10-CM

## 2024-01-05 PROCEDURE — 2550000001 HC RX 255 CONTRASTS: Performed by: NURSE PRACTITIONER

## 2024-01-05 PROCEDURE — 70544 MR ANGIOGRAPHY HEAD W/O DYE: CPT | Mod: 59

## 2024-01-05 PROCEDURE — 70544 MR ANGIOGRAPHY HEAD W/O DYE: CPT | Performed by: RADIOLOGY

## 2024-01-05 PROCEDURE — A9575 INJ GADOTERATE MEGLUMI 0.1ML: HCPCS | Performed by: NURSE PRACTITIONER

## 2024-01-05 PROCEDURE — 70553 MRI BRAIN STEM W/O & W/DYE: CPT

## 2024-01-05 PROCEDURE — 70553 MRI BRAIN STEM W/O & W/DYE: CPT | Performed by: RADIOLOGY

## 2024-01-05 RX ORDER — GADOTERATE MEGLUMINE 376.9 MG/ML
9 INJECTION INTRAVENOUS
Status: COMPLETED | OUTPATIENT
Start: 2024-01-05 | End: 2024-01-05

## 2024-01-05 RX ADMIN — GADOTERATE MEGLUMINE 9 ML: 376.9 INJECTION INTRAVENOUS at 11:36

## 2024-01-08 ENCOUNTER — OFFICE VISIT (OUTPATIENT)
Dept: PRIMARY CARE | Facility: CLINIC | Age: 69
End: 2024-01-08
Payer: COMMERCIAL

## 2024-01-08 ENCOUNTER — OFFICE VISIT (OUTPATIENT)
Dept: ORTHOPEDIC SURGERY | Facility: CLINIC | Age: 69
End: 2024-01-08
Payer: COMMERCIAL

## 2024-01-08 ENCOUNTER — ANCILLARY PROCEDURE (OUTPATIENT)
Dept: RADIOLOGY | Facility: CLINIC | Age: 69
End: 2024-01-08
Payer: COMMERCIAL

## 2024-01-08 ENCOUNTER — LAB (OUTPATIENT)
Dept: LAB | Facility: LAB | Age: 69
End: 2024-01-08
Payer: COMMERCIAL

## 2024-01-08 VITALS
DIASTOLIC BLOOD PRESSURE: 70 MMHG | OXYGEN SATURATION: 100 % | BODY MASS INDEX: 17.93 KG/M2 | TEMPERATURE: 98.2 F | WEIGHT: 105 LBS | HEIGHT: 64 IN | SYSTOLIC BLOOD PRESSURE: 128 MMHG | HEART RATE: 72 BPM | RESPIRATION RATE: 16 BRPM

## 2024-01-08 DIAGNOSIS — E78.2 MIXED HYPERLIPIDEMIA: ICD-10-CM

## 2024-01-08 DIAGNOSIS — S42.292D CLOSED 3-PART FRACTURE OF PROXIMAL HUMERUS WITH ROUTINE HEALING, LEFT: ICD-10-CM

## 2024-01-08 DIAGNOSIS — Z17.0 MALIGNANT NEOPLASM OF UPPER-OUTER QUADRANT OF BREAST IN FEMALE, ESTROGEN RECEPTOR POSITIVE, UNSPECIFIED LATERALITY (MULTI): ICD-10-CM

## 2024-01-08 DIAGNOSIS — R60.1 GENERALIZED EDEMA: ICD-10-CM

## 2024-01-08 DIAGNOSIS — C50.419 MALIGNANT NEOPLASM OF UPPER-OUTER QUADRANT OF BREAST IN FEMALE, ESTROGEN RECEPTOR POSITIVE, UNSPECIFIED LATERALITY (MULTI): ICD-10-CM

## 2024-01-08 DIAGNOSIS — E46 PROTEIN-CALORIE MALNUTRITION, UNSPECIFIED SEVERITY (MULTI): ICD-10-CM

## 2024-01-08 DIAGNOSIS — F41.9 ANXIETY: ICD-10-CM

## 2024-01-08 DIAGNOSIS — S06.5XAA SDH (SUBDURAL HEMATOMA) (MULTI): Primary | ICD-10-CM

## 2024-01-08 DIAGNOSIS — I10 PRIMARY HYPERTENSION: ICD-10-CM

## 2024-01-08 LAB
ALBUMIN SERPL BCP-MCNC: 4.4 G/DL (ref 3.4–5)
ALP SERPL-CCNC: 77 U/L (ref 33–136)
ALT SERPL W P-5'-P-CCNC: 15 U/L (ref 7–45)
ANION GAP SERPL CALC-SCNC: 9 MMOL/L (ref 10–20)
AST SERPL W P-5'-P-CCNC: 16 U/L (ref 9–39)
BILIRUB SERPL-MCNC: 0.9 MG/DL (ref 0–1.2)
BUN SERPL-MCNC: 18 MG/DL (ref 6–23)
CALCIUM SERPL-MCNC: 9.5 MG/DL (ref 8.6–10.3)
CHLORIDE SERPL-SCNC: 103 MMOL/L (ref 98–107)
CHOLEST SERPL-MCNC: 139 MG/DL (ref 0–199)
CHOLESTEROL/HDL RATIO: 2
CO2 SERPL-SCNC: 31 MMOL/L (ref 21–32)
CREAT SERPL-MCNC: 0.81 MG/DL (ref 0.5–1.05)
EGFRCR SERPLBLD CKD-EPI 2021: 79 ML/MIN/1.73M*2
ERYTHROCYTE [DISTWIDTH] IN BLOOD BY AUTOMATED COUNT: 12.5 % (ref 11.5–14.5)
GLUCOSE SERPL-MCNC: 103 MG/DL (ref 74–99)
HCT VFR BLD AUTO: 38.4 % (ref 36–46)
HDLC SERPL-MCNC: 69.9 MG/DL
HGB BLD-MCNC: 12.7 G/DL (ref 12–16)
LDLC SERPL CALC-MCNC: 55 MG/DL
MCH RBC QN AUTO: 31.1 PG (ref 26–34)
MCHC RBC AUTO-ENTMCNC: 33.1 G/DL (ref 32–36)
MCV RBC AUTO: 94 FL (ref 80–100)
NON HDL CHOLESTEROL: 69 MG/DL (ref 0–149)
NRBC BLD-RTO: 0 /100 WBCS (ref 0–0)
PLATELET # BLD AUTO: 204 X10*3/UL (ref 150–450)
POTASSIUM SERPL-SCNC: 4.1 MMOL/L (ref 3.5–5.3)
PROT SERPL-MCNC: 7.1 G/DL (ref 6.4–8.2)
RBC # BLD AUTO: 4.08 X10*6/UL (ref 4–5.2)
SODIUM SERPL-SCNC: 139 MMOL/L (ref 136–145)
TRIGL SERPL-MCNC: 70 MG/DL (ref 0–149)
VLDL: 14 MG/DL (ref 0–40)
WBC # BLD AUTO: 6.4 X10*3/UL (ref 4.4–11.3)

## 2024-01-08 PROCEDURE — 1126F AMNT PAIN NOTED NONE PRSNT: CPT | Performed by: INTERNAL MEDICINE

## 2024-01-08 PROCEDURE — 1159F MED LIST DOCD IN RCRD: CPT | Performed by: INTERNAL MEDICINE

## 2024-01-08 PROCEDURE — 99213 OFFICE O/P EST LOW 20 MIN: CPT | Performed by: ORTHOPAEDIC SURGERY

## 2024-01-08 PROCEDURE — 80061 LIPID PANEL: CPT

## 2024-01-08 PROCEDURE — 80053 COMPREHEN METABOLIC PANEL: CPT

## 2024-01-08 PROCEDURE — 1126F AMNT PAIN NOTED NONE PRSNT: CPT | Performed by: ORTHOPAEDIC SURGERY

## 2024-01-08 PROCEDURE — 36415 COLL VENOUS BLD VENIPUNCTURE: CPT

## 2024-01-08 PROCEDURE — 85027 COMPLETE CBC AUTOMATED: CPT

## 2024-01-08 PROCEDURE — 1036F TOBACCO NON-USER: CPT | Performed by: ORTHOPAEDIC SURGERY

## 2024-01-08 PROCEDURE — 1160F RVW MEDS BY RX/DR IN RCRD: CPT | Performed by: INTERNAL MEDICINE

## 2024-01-08 PROCEDURE — 1036F TOBACCO NON-USER: CPT | Performed by: INTERNAL MEDICINE

## 2024-01-08 PROCEDURE — 3074F SYST BP LT 130 MM HG: CPT | Performed by: INTERNAL MEDICINE

## 2024-01-08 PROCEDURE — 3078F DIAST BP <80 MM HG: CPT | Performed by: INTERNAL MEDICINE

## 2024-01-08 PROCEDURE — 73030 X-RAY EXAM OF SHOULDER: CPT | Mod: LEFT SIDE | Performed by: ORTHOPAEDIC SURGERY

## 2024-01-08 PROCEDURE — 1159F MED LIST DOCD IN RCRD: CPT | Performed by: ORTHOPAEDIC SURGERY

## 2024-01-08 PROCEDURE — 1160F RVW MEDS BY RX/DR IN RCRD: CPT | Performed by: ORTHOPAEDIC SURGERY

## 2024-01-08 PROCEDURE — 73030 X-RAY EXAM OF SHOULDER: CPT | Mod: LT

## 2024-01-08 PROCEDURE — 99214 OFFICE O/P EST MOD 30 MIN: CPT | Performed by: INTERNAL MEDICINE

## 2024-01-08 RX ORDER — SERTRALINE HYDROCHLORIDE 25 MG/1
25 TABLET, FILM COATED ORAL DAILY
Qty: 30 TABLET | Refills: 5 | Status: SHIPPED | OUTPATIENT
Start: 2024-01-08 | End: 2024-01-31 | Stop reason: WASHOUT

## 2024-01-08 ASSESSMENT — PAIN SCALES - GENERAL: PAINLEVEL: 0-NO PAIN

## 2024-01-08 NOTE — PROGRESS NOTES
History of present illness: 4 months out proximal humerus fracture patient is gone on to heal nicely with essentially nondisplaced consolidated healed tuberosity fracture fragment    Physical exam:    General: No acute distress or breathing difficulty or discomfort, pleasant and cooperative with the examination.    Extremities: Examination today shows that she can flex up to 160 abduct 70 externally to 80 region push pull she has good strength at her side and not too bad overhead no impingement no apprehension no apprehension suppression she is neurovascular tact ecchymosis improving is completely resolved besides a little bit of limited forward flexion she has no significant complaints    Diagnostic studies: X-rays 2 views show well-positioned healed greater tuberosity fracture left shoulder    Impression: Left shoulder healed greater tuberosity fracture with no obvious strength deficit or anything to suggest rotator cuff injury    Plan: Follow-up as needed resume full activities no restrictions

## 2024-01-08 NOTE — PROGRESS NOTES
"Subjective   Fadia Bolden is a 68 y.o. female who presents for Edema.    HPI   Pt reports new onset of BLE edema x1 month.  Denies dyspnea.  2 lb weight gain since last visit.    MRI's completed last week.  Continues w/PT.  Not sleeping well d/t Anxiety    Review of Systems   Constitutional:  Negative for fatigue and fever.   Respiratory:  Negative for cough and shortness of breath.    Cardiovascular:  Negative for chest pain and leg swelling.   All other systems reviewed and are negative.      Health Maintenance Due   Topic Date Due    Bone Density Scan  Never done    COVID-19 Vaccine (1) Never done    Hepatitis C Screening  Never done    Zoster Vaccines (1 of 2) Never done    Hepatitis B Vaccines (2 of 3 - 19+ 3-dose series) 08/18/2005    Pneumococcal Vaccine: 65+ Years (1 - PCV) Never done    Influenza Vaccine (1) Never done       Objective   /70   Pulse 72   Temp 36.8 °C (98.2 °F)   Resp 16   Ht 1.626 m (5' 4\")   Wt 47.6 kg (105 lb)   SpO2 100%   BMI 18.02 kg/m²     Physical Exam  Vitals and nursing note reviewed.   Constitutional:       Appearance: Normal appearance.   HENT:      Head: Normocephalic.   Eyes:      Conjunctiva/sclera: Conjunctivae normal.      Pupils: Pupils are equal, round, and reactive to light.   Cardiovascular:      Rate and Rhythm: Normal rate and regular rhythm.      Pulses: Normal pulses.      Heart sounds: Normal heart sounds.   Pulmonary:      Effort: Pulmonary effort is normal.      Breath sounds: Normal breath sounds.   Musculoskeletal:         General: No swelling.      Cervical back: Neck supple.   Skin:     General: Skin is warm and dry.   Neurological:      General: No focal deficit present.      Mental Status: She is oriented to person, place, and time.         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  Continue established treatment plan and follow up at least yearly.

## 2024-01-09 ENCOUNTER — TREATMENT (OUTPATIENT)
Dept: OCCUPATIONAL THERAPY | Facility: CLINIC | Age: 69
End: 2024-01-09
Payer: COMMERCIAL

## 2024-01-09 ENCOUNTER — TREATMENT (OUTPATIENT)
Dept: SPEECH THERAPY | Facility: CLINIC | Age: 69
End: 2024-01-09
Payer: COMMERCIAL

## 2024-01-09 ENCOUNTER — TELEPHONE (OUTPATIENT)
Dept: PRIMARY CARE | Facility: CLINIC | Age: 69
End: 2024-01-09

## 2024-01-09 ENCOUNTER — TREATMENT (OUTPATIENT)
Dept: PHYSICAL THERAPY | Facility: CLINIC | Age: 69
End: 2024-01-09
Payer: COMMERCIAL

## 2024-01-09 DIAGNOSIS — R53.1 RIGHT SIDED WEAKNESS: ICD-10-CM

## 2024-01-09 DIAGNOSIS — R53.1 WEAKNESS GENERALIZED: ICD-10-CM

## 2024-01-09 DIAGNOSIS — I69.320 APHASIA AS LATE EFFECT OF CEREBROVASCULAR ACCIDENT (CVA): Primary | ICD-10-CM

## 2024-01-09 DIAGNOSIS — Z74.09 DECREASED MOBILITY AND ENDURANCE: Primary | ICD-10-CM

## 2024-01-09 DIAGNOSIS — R27.8 COORDINATION ABNORMAL: Primary | ICD-10-CM

## 2024-01-09 DIAGNOSIS — R26.89 BALANCE PROBLEM: ICD-10-CM

## 2024-01-09 PROCEDURE — 97112 NEUROMUSCULAR REEDUCATION: CPT | Mod: GO,CO

## 2024-01-09 PROCEDURE — 92507 TX SP LANG VOICE COMM INDIV: CPT | Mod: GN | Performed by: STUDENT IN AN ORGANIZED HEALTH CARE EDUCATION/TRAINING PROGRAM

## 2024-01-09 PROCEDURE — 97112 NEUROMUSCULAR REEDUCATION: CPT | Mod: GP

## 2024-01-09 PROCEDURE — 97535 SELF CARE MNGMENT TRAINING: CPT | Mod: GO,CO

## 2024-01-09 ASSESSMENT — PAIN SCALES - GENERAL: PAINLEVEL_OUTOF10: 0 - NO PAIN

## 2024-01-09 ASSESSMENT — ACTIVITIES OF DAILY LIVING (ADL): HOME_MANAGEMENT_TIME_ENTRY: 10

## 2024-01-09 ASSESSMENT — ENCOUNTER SYMPTOMS
FATIGUE: 0
FEVER: 0
SHORTNESS OF BREATH: 0
COUGH: 0

## 2024-01-09 ASSESSMENT — PAIN - FUNCTIONAL ASSESSMENT: PAIN_FUNCTIONAL_ASSESSMENT: 0-10

## 2024-01-09 NOTE — PROGRESS NOTES
Speech-Language Pathology    Outpatient Speech-Language Pathology Treatment     Patient Name: Fadia Bolden  MRN: [id number]  Today's Date: 1/9/2024  Time Calculation  Start Time: 1122  Stop Time: 1205  Time Calculation (min): 43 min         Current Problem:   1. 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 She demonstrated improvement with confrontational naming. She demonstrated difficulty verbalizing semantic features of an item.     General Visit Information:  Certification Period Start Date: 12/05/23  Certification Period End Date: 03/04/24  Total Number of Visits : 6    Pain:  Pain Assessment  Pain Assessment: 0-10  Pain Score: 0 - No pain     Plan:  Skilled speech language treatment continues to be warranted to address expressive language skills.  Plan  Inpatient/Swing Bed or Outpatient: Outpatient  SLP Plan: Skilled SLP  SLP Frequency: 2x per week  Duration: 12 weeks  Next Treatment Priority: verb naming; Y/N ?s; WH questions  Discussed POC: Patient, Caregiver/family  Discussed Risks/Benefits: Yes  Patient/Caregiver Agreeable: Yes    Goals:  Short-term goals:  Patient will complete naming activities (including picture naming, generative naming, verb naming, descriptive naming, synonyms/antonyms, etc.) with 80% accuracy given minimal-moderate cues              -object naming - 100% given occasional cues and extra time    Previous     -describing common objects - 56% (5/9), mod cues required to complete task              -naming items from definitions/description - 70% (7/10)               -Stating the category ([employer] ismael): FO4 choices, words only - 84% (21/25)              -responsive naming - 85% (17/20)              -describing common objects - 67% (2/3) with min cues; the patient struggled with this task this  date  -synonyms - 60% (3/5)  -antonyms - 100%          -confrontational namin%; improved to 90 with min cues and extended time                Patient will complete targeted expressive language tasks (including stating personal information, phrase/sentence completion, open-ended questions) with 80% accuracy given minimal-moderate cues.   -Semantic analysis task - 88% accuracy to provide relevant and accurate features of the target item; accuracy improved when given cues or suggested non verbal stategies    Previous              -naming immediate family members and friends, 82% (14/17) to name children, their spouses and close friends and siblings     Patient will complete targeted receptive language tasks (including complex yes/no questions, functional reading, paragraph level comprehension) with 80% accuracy given minimal-moderate cues.     Patient will complete further assessment of cognitive-linguistic skills (MoCA, EM, etc.) to determine additional therapy goals as indicated.    Subjective:  Patient reports no new or worsening symptoms.    Outpatient Education:  Adult Outpatient Education  Individual(s) Educated: Patient, Spouse  Risk and Benefits Discussed with Patient/Caregiver/Other: yes  Patient/Caregiver Demonstrated Understanding: yes  Plan of Care Discussed and Agreed Upon: yes  Patient Response to Education: Patient/Caregiver Verbalized Understanding of Information

## 2024-01-09 NOTE — PROGRESS NOTES
Occupational Therapy    Occupational Therapy    Treatment  Patient Name: Fadia Bolden  MRN: [id number]  Today's Date: 1/9/2024      Time Calculation  Start Time: 1016  Stop Time: 1101  Time Calculation (min): 45 min  Visit: #3    Assessment:   Pt appears to tolerate treatment well without complaints of pain. Increased ROM UE, rest breaks taken as needed. Home exercise program reviewed and issued.     Plan:   Interventions: Self Care/ADL, neuromuscular reeducation, therapeutic exercise   Duration: 1-2x/wk for 10 visits total      Insurance: 10 visits    Subjective   Current Problem:  1. Coordination abnormal        2. Right sided weakness        3. Weakness generalized              Patient reports she has been working on her exercises regularly       Precautions:   Helmet; able to remove when sitting still and sleeping    L proximal humeral healed,    Objective   Pt took intermittent rest breaks throughout     Treatment:     Therapeutic Exercise:     Left UE strengthening and ROM: 10  -shoulder flexion 1x10, shoulder flexion palms up 1x10  -rows, yellow theraband 2x10  Home exercise program issued and reviewed to include listed exercises, 3x10.    Neuro 35    Static standing to complete functional table top activities to promote coordination, multitasking, B UE reaching to promote ease of ADL and IADL tasks.   - Pt grasps cones from each corner of the desk, alternates hands to unstack and restack cones.  -pt repeats grasping cones by crossing midline, releasing onto tray table in diagnol direction, alternates hands each time. Verbal cues as needed, good follow through  -using right hand pt places small blocks on top of cone, removes with left hand. Pt repeats by grasping two blocks at a time, removing two blocks at a time.    Medical Necessity Statement: Based on my medical judgement, Mohs surgery is the most appropriate treatment for this cancer compared to other treatments.

## 2024-01-09 NOTE — PROGRESS NOTES
Physical Therapy    Patient Name: Fadia Bolden  MRN: [id number]  Today's Date: 1/9/2024  Time Calculation  Start Time: 0932  Stop Time: 1014  Time Calculation (min): 42 min    Insurance reviewed   Visit number: 6   VS MED NEC, NO AUTH,     Assessment:  Session focused on improving balance and visual attention toward R side. Pt with difficulty recognizing objects on R side even with visual cues to attend. Pt frequently bumping into objects on R side as well.  Pt to benefit from continued skilled PT services to further progress dynamic balance and reduce fall risk.     Plan;  Continue to progress  -Dynamic balance  -Usage of Neurocom    Current Problem:   1. Decreased mobility and endurance        2. Balance problem        3. Weakness generalized              Subjective   Pt reports to PT ambulatory without a device accompanied by spouse. No medical changes. Saw PCP yesterday, changing BP meds to assist with swelling. No falls.    Treatments:    Neuromuscular Re-education (33996): timed minutes 40, units 3 .    To challenge dynamic balance while encouraging awareness toward R visual field  -Weaving in/out of canes in figure 8 pattern while touching targets placed on R side; x 5 minutes  -Amb around track x 2 while attending toward R side to find and tap playing cards  -Stepping over thresholds while retreiving bean bags on R side x 5 minutes  -Pt retreiving ball-->side stepping to then place ball on top of cone placed on right side x 4 minutes    Education and discussion on treatment regarding the benefits related to current condition, POC, pathophysiology, and precautions      Goals;   Pt will improve FGA by 4 points to meet KENDRICK  Pt will improve TUG to < 12 seconds to decrease fall risk  Pt will demonstrate independence with HEP  Pt will report 0 falls during POC  Pt will demonstrate full PROM of L UE without pain

## 2024-01-09 NOTE — TELEPHONE ENCOUNTER
----- Message from Michelle Martinez DO sent at 1/9/2024  9:26 AM EST -----  Call patient labs look great

## 2024-01-10 ENCOUNTER — APPOINTMENT (OUTPATIENT)
Dept: RADIOLOGY | Facility: CLINIC | Age: 69
End: 2024-01-10
Payer: COMMERCIAL

## 2024-01-10 DIAGNOSIS — M95.2 ACQUIRED SKULL DEFECT: Primary | ICD-10-CM

## 2024-01-11 ENCOUNTER — APPOINTMENT (OUTPATIENT)
Dept: SPEECH THERAPY | Facility: CLINIC | Age: 69
End: 2024-01-11
Payer: COMMERCIAL

## 2024-01-11 ENCOUNTER — TREATMENT (OUTPATIENT)
Dept: PHYSICAL THERAPY | Facility: CLINIC | Age: 69
End: 2024-01-11
Payer: COMMERCIAL

## 2024-01-11 ENCOUNTER — TREATMENT (OUTPATIENT)
Dept: OCCUPATIONAL THERAPY | Facility: CLINIC | Age: 69
End: 2024-01-11
Payer: COMMERCIAL

## 2024-01-11 DIAGNOSIS — R53.1 RIGHT SIDED WEAKNESS: ICD-10-CM

## 2024-01-11 DIAGNOSIS — R27.8 COORDINATION ABNORMAL: Primary | ICD-10-CM

## 2024-01-11 DIAGNOSIS — R53.1 WEAKNESS GENERALIZED: ICD-10-CM

## 2024-01-11 DIAGNOSIS — Z74.09 DECREASED MOBILITY AND ENDURANCE: Primary | ICD-10-CM

## 2024-01-11 DIAGNOSIS — R26.89 BALANCE PROBLEM: ICD-10-CM

## 2024-01-11 PROCEDURE — 97112 NEUROMUSCULAR REEDUCATION: CPT | Mod: GP

## 2024-01-11 PROCEDURE — 97112 NEUROMUSCULAR REEDUCATION: CPT | Mod: GO,CO

## 2024-01-11 PROCEDURE — 97110 THERAPEUTIC EXERCISES: CPT | Mod: GO,CO

## 2024-01-11 NOTE — PROGRESS NOTES
Occupational Therapy    Occupational Therapy    Treatment  Patient Name: Fadia Bolden  MRN: [id number]  Today's Date: 1/11/2024      Time Calculation  Start Time: 1101  Stop Time: 1146  Time Calculation (min): 45 min  Visit: #4    Assessment:   Pt appears to tolerate treatment well without complaints of pain. Increased ROM UE, rest breaks taken as needed. Home exercise program reviewed and issued.     Plan:   Interventions: Self Care/ADL, neuromuscular reeducation, therapeutic exercise   Duration: 1-2x/wk for 10 visits total      Insurance: 10 visits    Subjective   Pt and spouse state they have been doing home exercises, working on coordination activities. Pt spouse states wife was practicing touching opposite ears, then tried to lift foot at       Current Problem:  1. Coordination abnormal        2. Right sided weakness        3. Weakness generalized                Precautions:   Helmet; able to remove when sitting still and sleeping    L proximal humeral healed,    Objective   Pt took intermittent rest breaks throughout     Treatment:     Therapeutic Exercise: 10    Seated on blue dynadisc, B UE horizontal shoulder abduction, 1x10 ROM. 1x10 passing 1# dumbbell back and forth to each hand. Rest breaks taken as needed.    Neuro 35    Seated edge of mat to complete 2 rows of Kwesi peg board using tweezers to place pegs. Pt uses right hand 1x10, left hand 1x10.  MALDONADO instructs pt to choose color parlor bead and place over peg, MALDONADO instructs pt using a letter the color begins with, pt must choose from pile of beads (ex. B, blue bead).   Static standing pt alternates hands to remove beads, pegs from kwesi peg board. MALDONADO instructs pt on 2-3 step sequence of which color beads to remove.

## 2024-01-11 NOTE — PROGRESS NOTES
Physical Therapy    Patient Name: Fadia Bolden  MRN: [id number]  Today's Date: 1/11/2024  Time Calculation  Start Time: 1147  Stop Time: 1230  Time Calculation (min): 43 min    Insurance reviewed   Visit number: 7   VS MED NEC, NO AUTH,     Assessment:  Session focused on improving balance, coordination and visual attention toward R side. Improved visual scanning toward R side  with usage of blaze pods. Difficulty with uneven surfaces requiring min assist. Pt to benefit from continued skilled PT services to further progress dynamic balance and reduce fall risk.     Plan;  Continue to progress  -Dynamic balance  -Usage of Neurocom    Current Problem:   1. Decreased mobility and endurance        2. Balance problem        3. Weakness generalized                Subjective   Pt reports to PT ambulatory without a device accompanied by spouse. No medical changes.  No falls.    Treatments:    Neuromuscular Re-education (68228): timed minutes 40, units 3 .    To improve balance,coordination and overall R sided awareness;  -In standing reaching cross body to hit blaze pods on R side; 2 x 90 seconds 19 hits and 25 hits  -Amb through ladder while hitting blaze pods on R side; length of ladder x 4 each patterning; forward amb, narrow to wide MARY  -Standing on foam surface, tapping blaze pods for 90 seconds each LE; 33 with R LE tapping, 38 with L LE tapping   -Standing tandem on foam surface; 60 seconds each LE forward    Education and discussion on treatment regarding the benefits related to current condition, POC, pathophysiology, and precautions      Goals;   Pt will improve FGA by 4 points to meet KENDRICK  Pt will improve TUG to < 12 seconds to decrease fall risk  Pt will demonstrate independence with HEP  Pt will report 0 falls during POC  Pt will demonstrate full PROM of L UE without pain

## 2024-01-16 ENCOUNTER — TREATMENT (OUTPATIENT)
Dept: SPEECH THERAPY | Facility: CLINIC | Age: 69
End: 2024-01-16
Payer: COMMERCIAL

## 2024-01-16 ENCOUNTER — TREATMENT (OUTPATIENT)
Dept: PHYSICAL THERAPY | Facility: CLINIC | Age: 69
End: 2024-01-16
Payer: COMMERCIAL

## 2024-01-16 ENCOUNTER — TREATMENT (OUTPATIENT)
Dept: OCCUPATIONAL THERAPY | Facility: CLINIC | Age: 69
End: 2024-01-16
Payer: COMMERCIAL

## 2024-01-16 DIAGNOSIS — R53.1 WEAKNESS GENERALIZED: Primary | ICD-10-CM

## 2024-01-16 DIAGNOSIS — Z74.09 DECREASED MOBILITY AND ENDURANCE: Primary | ICD-10-CM

## 2024-01-16 DIAGNOSIS — R27.8 COORDINATION ABNORMAL: ICD-10-CM

## 2024-01-16 DIAGNOSIS — R53.1 RIGHT SIDED WEAKNESS: ICD-10-CM

## 2024-01-16 DIAGNOSIS — S42.295D OTHER CLOSED NONDISPLACED FRACTURE OF PROXIMAL END OF LEFT HUMERUS WITH ROUTINE HEALING, SUBSEQUENT ENCOUNTER: ICD-10-CM

## 2024-01-16 DIAGNOSIS — R53.1 WEAKNESS GENERALIZED: ICD-10-CM

## 2024-01-16 DIAGNOSIS — I69.320 APHASIA AS LATE EFFECT OF CEREBROVASCULAR ACCIDENT (CVA): Primary | ICD-10-CM

## 2024-01-16 DIAGNOSIS — R26.89 BALANCE PROBLEM: ICD-10-CM

## 2024-01-16 PROCEDURE — 97112 NEUROMUSCULAR REEDUCATION: CPT | Mod: GO | Performed by: OCCUPATIONAL THERAPIST

## 2024-01-16 PROCEDURE — 97112 NEUROMUSCULAR REEDUCATION: CPT | Mod: GP

## 2024-01-16 PROCEDURE — 97535 SELF CARE MNGMENT TRAINING: CPT | Mod: GO | Performed by: OCCUPATIONAL THERAPIST

## 2024-01-16 PROCEDURE — 92507 TX SP LANG VOICE COMM INDIV: CPT | Mod: GN | Performed by: STUDENT IN AN ORGANIZED HEALTH CARE EDUCATION/TRAINING PROGRAM

## 2024-01-16 ASSESSMENT — PAIN - FUNCTIONAL ASSESSMENT: PAIN_FUNCTIONAL_ASSESSMENT: 0-10

## 2024-01-16 ASSESSMENT — PAIN SCALES - GENERAL: PAINLEVEL_OUTOF10: 0 - NO PAIN

## 2024-01-16 ASSESSMENT — ACTIVITIES OF DAILY LIVING (ADL): HOME_MANAGEMENT_TIME_ENTRY: 23

## 2024-01-16 NOTE — PROGRESS NOTES
Occupational Therapy    Occupational Therapy    Treatment  Patient Name: Fadia Bolden  MRN: [id number]  Today's Date: 1/16/2024         Visit: #5    Assessment:   Patient participated well in towards with noted improved LUE ROM, visual scanning techniques and R FMC     Plan:   Interventions: Self Care/ADL, neuromuscular reeducation, therapeutic exercise   Duration: 1-2x/wk for 10 visits total      Insurance: 10 visits    Subjective   Patient reports she has been completing exercises regularly and is doing well.       Current Problem:  1. Weakness generalized        2. Coordination abnormal        3. Right sided weakness        4. Other closed nondisplaced fracture of proximal end of left humerus with routine healing, subsequent encounter                Precautions:   Helmet; able to remove when sitting still and sleeping    L proximal humeral healed,    Objective   Pt with noted improved LUE ROM; near full ROM   Demonstrated good R FMC and visual scanning techniques    Treatment:     Therapeutic Exercise:    Shoulder flexion w/ 1# weight -1x10  Shoulder abduction w/ 1# weight - 1x10  Shoulder abduction AAROM - 1x10    Neuro     R visual scanning and R FMC addressed by having patient place perler beads into pattern block with tweezers in R hand.  OT created pattern of perler beads and placed on patient's right side.  Patient to recreate pattern.

## 2024-01-16 NOTE — PROGRESS NOTES
Outpatient Speech-Language Pathology Speech Therapy    Patient Name: Fadia Bolden  MRN: [id number]  : 1955  Today's Date: 24  Time Calculation  Start Time: 1033  Stop Time: 1115  Time Calculation (min): 42 min       Diagnosis:    Aphasia as late result of CVA    SLP Assessment:  SLP TX Intervention Outcome: Making Progress Towards Goals  Prognosis: Good    The patient was actively engaged in therapeutic tasks targeting word finding and receptive language skills. She demonstrated improvement with responsive naming and verb naming tasks. She demonstrated mild difficulty with a functional reading task. In general, responses were faster than previous sessions. The patient may continue to benefit from skilled speech therapy services in order to improve her ability to communicate with others in the home, social and clinical settings as well as engage in meaningful conversations with family and friends.    General Visit Information:  Certification Period Start Date: 23  Certification Period End Date: 24  Total Number of Visits : 7    Pain:  Pain Assessment  Pain Assessment: 0-10  Pain Score: 0 - No pain     Plan:  Plan  Inpatient/Swing Bed or Outpatient: Outpatient  SLP Plan: Skilled SLP  SLP Frequency: 2x per week  Duration: 12 weeks  Next Treatment Priority: functional reading  Discussed POC: Patient, Caregiver/family  Discussed Risks/Benefits: Yes  Patient/Caregiver Agreeable: Yes    Goals:  Short-term goals:  Patient will complete naming activities (including picture naming, generative naming, verb naming, descriptive naming, synonyms/antonyms, etc.) with 80% accuracy given minimal-moderate cues   -Verb namin% (16/16), indep   -Complete the sentence (1 word): 95% (19/20)     Previous      -object naming - 100% given occasional cues and extra time                -describing common objects - 56% (5/9), mod cues required to complete task              -naming items from  definitions/description - 70% (7/10)               -Stating the category ([employer] ismael): FO4 choices, words only - 84% (21/25)              -responsive naming - 85% (17/20)              -describing common objects - 67% (2/3) with min cues; the patient struggled with this task this date  -synonyms - 60% (3/5)  -antonyms - 100%          -confrontational namin%; improved to 90 with min cues and extended time                Patient will complete targeted expressive language tasks (including stating personal information, phrase/sentence completion, open-ended questions) with 80% accuracy given minimal-moderate cues.              -Semantic analysis task - 88% accuracy to provide relevant and accurate features of the target item; accuracy improved when given cues or suggested non verbal stategies     Previous              -naming immediate family members and friends, 82% (14/17) to name children, their spouses and close friends and siblings     Patient will complete targeted receptive language tasks (including complex yes/no questions, functional reading, paragraph level comprehension) with 80% accuracy given minimal-moderate cues.  -Complex Y/N, comparisons - 89% (40/45)  -Functional reading (CT ismael lvl 1/3) - 88%    Patient will complete further assessment of cognitive-linguistic skills (MoCA, EM, etc.) to determine additional therapy goals as indicated.    Subjective:  Patient reports no new or worsening symptoms.      Outpatient Education:  Adult Outpatient Education  Individual(s) Educated: Patient, Spouse  Risk and Benefits Discussed with Patient/Caregiver/Other: yes  Patient/Caregiver Demonstrated Understanding: yes  Plan of Care Discussed and Agreed Upon: yes  Patient Response to Education: Patient/Caregiver Verbalized Understanding of Information

## 2024-01-16 NOTE — PROGRESS NOTES
Physical Therapy    Patient Name: Fadia Bolden  MRN: [id number]  Today's Date: 1/16/2024  Time Calculation  Start Time: 0845  Stop Time: 0914  Time Calculation (min): 29 min    Insurance reviewed   Visit number: 8   VS MED NEC, NO AUTH,     Assessment:  Session focused on reassessment of goals. Pt achieved 3/5 goals with positive progress on forth goal. Pt and spouse feel comfortable with discharge from PT at this time. Pt and spouse aware of needs for referral in future should needs arise.     Plan;  Discharge from PT    Current Problem:   1. Decreased mobility and endurance        2. Balance problem        3. Weakness generalized            Subjective   Pt reports to PT ambulatory without a device accompanied by spouse. No medical changes.  No falls.    Treatments:    Neuromuscular Re-education (22676): timed minutes 29, units 2 .    Reassessment of goals  -FGA; 26  -TUG; 9.9    Education and discussion on treatment regarding the benefits related to current condition, POC, pathophysiology, and precautions      Goals;   Pt will improve FGA by 4 points to meet KENDRICK SCORE APPROVED BY 2 POINTS  Pt will improve TUG to < 12 seconds to decrease fall risk GOAL ACHIEVED; TUG AT 9.9 SEC  Pt will demonstrate independence with HEP GOAL ACHIEVED  Pt will report 0 falls during POC GOAL ACHIEVED   Pt will demonstrate full PROM of L UE without pain DEFER TO OT

## 2024-01-17 ENCOUNTER — HOSPITAL ENCOUNTER (OUTPATIENT)
Dept: RADIOLOGY | Facility: HOSPITAL | Age: 69
Discharge: HOME | End: 2024-01-17
Payer: COMMERCIAL

## 2024-01-17 DIAGNOSIS — M95.2 ACQUIRED SKULL DEFECT: ICD-10-CM

## 2024-01-17 PROCEDURE — 70450 CT HEAD/BRAIN W/O DYE: CPT

## 2024-01-17 PROCEDURE — 70450 CT HEAD/BRAIN W/O DYE: CPT | Performed by: STUDENT IN AN ORGANIZED HEALTH CARE EDUCATION/TRAINING PROGRAM

## 2024-01-18 ENCOUNTER — APPOINTMENT (OUTPATIENT)
Dept: PHYSICAL THERAPY | Facility: CLINIC | Age: 69
End: 2024-01-18
Payer: COMMERCIAL

## 2024-01-18 ENCOUNTER — TREATMENT (OUTPATIENT)
Dept: SPEECH THERAPY | Facility: CLINIC | Age: 69
End: 2024-01-18
Payer: COMMERCIAL

## 2024-01-18 ENCOUNTER — TREATMENT (OUTPATIENT)
Dept: OCCUPATIONAL THERAPY | Facility: CLINIC | Age: 69
End: 2024-01-18
Payer: COMMERCIAL

## 2024-01-18 DIAGNOSIS — R53.1 RIGHT SIDED WEAKNESS: Primary | ICD-10-CM

## 2024-01-18 DIAGNOSIS — I69.320 APHASIA AS LATE EFFECT OF CEREBROVASCULAR ACCIDENT (CVA): Primary | ICD-10-CM

## 2024-01-18 DIAGNOSIS — R27.8 COORDINATION ABNORMAL: ICD-10-CM

## 2024-01-18 DIAGNOSIS — S42.295D OTHER CLOSED NONDISPLACED FRACTURE OF PROXIMAL END OF LEFT HUMERUS WITH ROUTINE HEALING, SUBSEQUENT ENCOUNTER: ICD-10-CM

## 2024-01-18 PROCEDURE — 97112 NEUROMUSCULAR REEDUCATION: CPT | Mod: GO | Performed by: OCCUPATIONAL THERAPIST

## 2024-01-18 PROCEDURE — 92507 TX SP LANG VOICE COMM INDIV: CPT | Mod: GN | Performed by: STUDENT IN AN ORGANIZED HEALTH CARE EDUCATION/TRAINING PROGRAM

## 2024-01-18 ASSESSMENT — PAIN SCALES - GENERAL: PAINLEVEL_OUTOF10: 0 - NO PAIN

## 2024-01-18 ASSESSMENT — PAIN - FUNCTIONAL ASSESSMENT: PAIN_FUNCTIONAL_ASSESSMENT: 0-10

## 2024-01-18 NOTE — PROGRESS NOTES
Speech-Language Pathology    Outpatient Speech-Language Pathology Treatment     Patient Name: Fadia Bolden  MRN: [id number]  Today's Date: 2024  Time Calculation  Start Time: 1300  Stop Time: 1345  Time Calculation (min): 45 min         Current Problem:   1. 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 She demonstrated improvement with responsive naming skills and difficulty identifying key features when completing description task. The patient may continue to benefit from skilled speech therapy services in order to improve her ability to communicate wants, needs and participate in meaningful communication in the home, social and clinical settings.    General Visit Information:  Certification Period Start Date: 23  Certification Period End Date: 24  Total Number of Visits : 8    Pain:  Pain Assessment  Pain Assessment: 0-10  Pain Score: 0 - No pain     Plan:  Plan  Inpatient/Swing Bed or Outpatient: Outpatient  SLP Plan: Skilled SLP  SLP Frequency: 2x per week  Duration: 12 weeks  Next Treatment Priority: functional reading  Discussed POC: Patient, Caregiver/family  Discussed Risks/Benefits: Yes  Patient/Caregiver Agreeable: Yes    Goals:  Short-term goals:  Patient will complete naming activities (including picture naming, generative naming, verb naming, descriptive naming, synonyms/antonyms, etc.) with 80% accuracy given minimal-moderate cues   -Responsive naming, general information: 71% (15/21)              -describing common objects - 33% (2/6) indep; increased to 67% with min cues; mod-max cues required to provide enough relevant information about the item    Previous               -Verb namin% (16/16), indep              -Complete the sentence (1 word): 95% (19/20)-object naming -  100% given occasional cues and extra time                         -naming items from definitions/description - 70% (7/10)               -Stating the category (Tact[employer] ismael): FO4 choices, words only - 84% (21/25)              -responsive naming - 85% (17/20)              -describing common objects - 67% (2/3) with min cues; the patient struggled with this task this date  -synonyms - 60% (3/5)  -antonyms - 100%          -confrontational namin%; improved to 90 with min cues and extended time                Patient will complete targeted expressive language tasks (including stating personal information, phrase/sentence completion, open-ended questions) with 80% accuracy given minimal-moderate cues.                Previous               -Semantic analysis task - 88% accuracy to provide relevant and accurate features of the target item; accuracy improved when given cues or suggested non verbal strategies  -naming immediate family members and friends, 82% (14/17) to name children, their spouses and close friends and siblings     Patient will complete targeted receptive language tasks (including complex yes/no questions, functional reading, paragraph level comprehension) with 80% accuracy given minimal-moderate cues.  -Complex Y/N, comparisons - 89% (40/45)  -Functional reading (CT ismael lvl 1/3) - 88%     Patient will complete further assessment of cognitive-linguistic skills (MoCA, EM, etc.) to determine additional therapy goals as indicated.    Subjective:  Patient reports no new or worsening symptoms.    Outpatient Education:  Adult Outpatient Education  Individual(s) Educated: Patient, Spouse  Risk and Benefits Discussed with Patient/Caregiver/Other: yes  Patient/Caregiver Demonstrated Understanding: yes  Plan of Care Discussed and Agreed Upon: yes  Patient Response to Education: Patient/Caregiver Verbalized Understanding of Information

## 2024-01-18 NOTE — PROGRESS NOTES
Occupational Therapy    Treatment  Patient Name: Fadia Bolden  MRN: [id number]  Today's Date: 1/18/2024         Visit: #6    Assessment:   Patient with mild difficulty with multitasking and categorization activity.  Patient required task grading for full completion of task.    Plan:   Interventions: Self Care/ADL, neuromuscular reeducation, therapeutic exercise   Duration: 1-2x/wk for 10 visits total      Insurance: 10 visits    Subjective   Patient reports no new changes       Current Problem:  1. Right sided weakness        2. Coordination abnormal        3. Other closed nondisplaced fracture of proximal end of left humerus with routine healing, subsequent encounter              Precautions:   Helmet; able to remove when sitting still and sleeping    L proximal humeral healed,    Objective   Required seated rest breaks after two minutes on arm bike  Pt with difficulty w/ multitasking; unable to provide >5 words with categorization task    Treatment:     Neuro: 45    Standing arm bike at workload 1.0 for total 5 minutes.  Rest breaks taken at 2 minutes     OT provided PROM stretches to LUE   Scapular retraction - 1x10     R GMC addressed by patient toss/catch ball in upside down cone with R UE   -upgraded by having patient identify playing card held out at right side while tossing ball within cone   -further upgraded by having patient name words within category provided by therapist while tossing ball within cone

## 2024-01-23 ENCOUNTER — TREATMENT (OUTPATIENT)
Dept: SPEECH THERAPY | Facility: CLINIC | Age: 69
End: 2024-01-23
Payer: COMMERCIAL

## 2024-01-23 ENCOUNTER — TREATMENT (OUTPATIENT)
Dept: OCCUPATIONAL THERAPY | Facility: CLINIC | Age: 69
End: 2024-01-23
Payer: COMMERCIAL

## 2024-01-23 DIAGNOSIS — S42.295D OTHER CLOSED NONDISPLACED FRACTURE OF PROXIMAL END OF LEFT HUMERUS WITH ROUTINE HEALING, SUBSEQUENT ENCOUNTER: ICD-10-CM

## 2024-01-23 DIAGNOSIS — R53.1 WEAKNESS GENERALIZED: Primary | ICD-10-CM

## 2024-01-23 DIAGNOSIS — I69.320 APHASIA AS LATE EFFECT OF CEREBROVASCULAR ACCIDENT (CVA): Primary | ICD-10-CM

## 2024-01-23 DIAGNOSIS — R27.8 COORDINATION ABNORMAL: ICD-10-CM

## 2024-01-23 DIAGNOSIS — R53.1 RIGHT SIDED WEAKNESS: ICD-10-CM

## 2024-01-23 PROCEDURE — 97112 NEUROMUSCULAR REEDUCATION: CPT | Mod: GO | Performed by: OCCUPATIONAL THERAPIST

## 2024-01-23 PROCEDURE — 97110 THERAPEUTIC EXERCISES: CPT | Mod: GO | Performed by: OCCUPATIONAL THERAPIST

## 2024-01-23 PROCEDURE — 92507 TX SP LANG VOICE COMM INDIV: CPT | Mod: GN | Performed by: STUDENT IN AN ORGANIZED HEALTH CARE EDUCATION/TRAINING PROGRAM

## 2024-01-23 ASSESSMENT — PAIN - FUNCTIONAL ASSESSMENT: PAIN_FUNCTIONAL_ASSESSMENT: 0-10

## 2024-01-23 ASSESSMENT — PAIN SCALES - GENERAL: PAINLEVEL_OUTOF10: 0 - NO PAIN

## 2024-01-23 NOTE — PROGRESS NOTES
Speech-Language Pathology    Outpatient Speech-Language Pathology Treatment     Patient Name: Fadia Bolden  MRN: [id number]  Today's Date: 2024  Time Calculation  Start Time: 1115  Stop Time: 1200  Time Calculation (min): 45 min         Current Problem:   1. Aphasia as late effect of cerebrovascular accident (CVA)          SLP Assessment:  SLP TX Intervention Outcome: Making Progress Towards Goals  Prognosis: Good    The patient was actively engaged in therapeutic tasks targeting expressive and receptive language skills. She demonstrated improvement with naming tasks when given extra time. She demonstrated difficulty recalling a family member's name. The patient may continue to benefit from skilled speech therapy services in order to improve her ability to express wants, needs and opinions in the home and clinical settings.    General Visit Information:  Certification Period Start Date: 23  Certification Period End Date: 24     Total Number of Visits : 9    Pain:  Pain Assessment  Pain Assessment: 0-10  Pain Score: 0 - No pain     Plan:  Plan  Inpatient/Swing Bed or Outpatient: Outpatient  SLP Plan: Skilled SLP  SLP Frequency: 2x per week  Duration: 12 weeks  Next Treatment Priority: [facility] 8 gen info  Discussed POC: Patient, Caregiver/family  Discussed Risks/Benefits: Yes  Patient/Caregiver Agreeable: Yes    Goals:  Short-term goals:  Patient will complete naming activities (including picture naming, generative naming, verb naming, descriptive naming, synonyms/antonyms, etc.) with 80% accuracy given minimal-moderate cues   -Naming from description: 90% (18/20) when given extended time; phonemic cues increased accuracy to 100%    Previous              -Responsive naming, general information: 71% (15/21)              -describing common objects - 33% (2/6) indep; increased to 67% with min cues; mod-max cues required to provide enough relevant information about the item  -Verb namin% (16/16),  indep              -Complete the sentence (1 word): 95% (19/20)-object naming - 100% given occasional cues and extra time                         -naming items from definitions/description - 70% (7/10)               -Stating the category (Gini ismael): FO4 choices, words only - 84% (21/25)              -responsive naming - 85% (17/20)              -describing common objects - 67% (2/3) with min cues; the patient struggled with this task this date  -synonyms - 60% (3/5)  -antonyms - 100%          -confrontational namin%; improved to 90 with min cues and extended time                Patient will complete targeted expressive language tasks (including stating personal information, phrase/sentence completion, open-ended questions) with 80% accuracy given minimal-moderate cues.              -Patient was able to state her full name, address and the names of her children and their spouses. She required extra time and one phonemic cue for her son in law.    Previous               -Semantic analysis task - 88% accuracy to provide relevant and accurate features of the target item; accuracy improved when given cues or suggested non verbal strategies  -naming immediate family members and friends, 82% (14/17) to name children, their spouses and close friends and siblings     Patient will complete targeted receptive language tasks (including complex yes/no questions, functional reading, paragraph level comprehension) with 80% accuracy given minimal-moderate cues.   -Reading comprehension (121 and 136 word passages) - when given FO4 choices the patient answered comprehension questions with 100% accuracy. She referred back to the passage in 1/6 questions. Patient required extended time    Previous:  -Complex Y/N, comparisons - 89% (40/45)  -Functional reading (CT ismael lvl 1/3) - 88%     Patient will complete further assessment of cognitive-linguistic skills (MoCA, EM, etc.) to determine additional therapy goals as  indicated.    Subjective:  Patient reports no new or worsening symptoms.    Outpatient Education:  Adult Outpatient Education  Individual(s) Educated: Patient, Spouse  Risk and Benefits Discussed with Patient/Caregiver/Other: yes  Patient/Caregiver Demonstrated Understanding: yes  Plan of Care Discussed and Agreed Upon: yes  Patient Response to Education: Patient/Caregiver Verbalized Understanding of Information

## 2024-01-23 NOTE — PROGRESS NOTES
Occupational Therapy    Treatment  Patient Name: Fadia Bolden  MRN: [id number]  Today's Date: 1/23/2024         Visit: #7    Assessment:  Patient participated well in OT treatment; required several rest breaks throughout.  Pt required several verbal cues for form correction in treatment     Plan:   Interventions: Self Care/ADL, neuromuscular reeducation, therapeutic exercise   Duration: 1-2x/wk for 10 visits total      Insurance: 10 visits    Subjective   Patient reports she has been completing her cog word exercises       Current Problem:  1. Weakness generalized        2. Other closed nondisplaced fracture of proximal end of left humerus with routine healing, subsequent encounter        3. Coordination abnormal        4. Right sided weakness                Precautions:   Helmet; able to remove when sitting still and sleeping    L proximal humeral healed,    Objective   Required decreased verbal cues to attend to R side during visual scanning activity   Required standing rest break after two minutes on standing arm bike     Treatment:     Therapeutic Exercise:   Standing arm bike at workload 1.0 for total 5 minutes.  Rest breaks taken at 2 minutes     LUE strengthening exercises with use of 1# weight   -shoulder flexion 1x10  -elbow flexion 1x10  -shoulder abduction 1x10  -forearm pronation/supination 1x10    Neuro:   R visual scanning, FMC and problem solving addressed with pattern block card

## 2024-01-25 ENCOUNTER — TREATMENT (OUTPATIENT)
Dept: SPEECH THERAPY | Facility: CLINIC | Age: 69
End: 2024-01-25
Payer: COMMERCIAL

## 2024-01-25 ENCOUNTER — TREATMENT (OUTPATIENT)
Dept: OCCUPATIONAL THERAPY | Facility: CLINIC | Age: 69
End: 2024-01-25
Payer: COMMERCIAL

## 2024-01-25 DIAGNOSIS — R53.1 WEAKNESS GENERALIZED: ICD-10-CM

## 2024-01-25 DIAGNOSIS — I69.320 APHASIA AS LATE EFFECT OF CEREBROVASCULAR ACCIDENT (CVA): Primary | ICD-10-CM

## 2024-01-25 DIAGNOSIS — R53.1 RIGHT SIDED WEAKNESS: ICD-10-CM

## 2024-01-25 DIAGNOSIS — R27.8 COORDINATION ABNORMAL: ICD-10-CM

## 2024-01-25 DIAGNOSIS — S42.295D OTHER CLOSED NONDISPLACED FRACTURE OF PROXIMAL END OF LEFT HUMERUS WITH ROUTINE HEALING, SUBSEQUENT ENCOUNTER: Primary | ICD-10-CM

## 2024-01-25 PROCEDURE — 97530 THERAPEUTIC ACTIVITIES: CPT | Mod: GO | Performed by: OCCUPATIONAL THERAPIST

## 2024-01-25 PROCEDURE — 92507 TX SP LANG VOICE COMM INDIV: CPT | Mod: GN | Performed by: STUDENT IN AN ORGANIZED HEALTH CARE EDUCATION/TRAINING PROGRAM

## 2024-01-25 ASSESSMENT — PAIN SCALES - GENERAL: PAINLEVEL_OUTOF10: 0 - NO PAIN

## 2024-01-25 ASSESSMENT — PAIN - FUNCTIONAL ASSESSMENT: PAIN_FUNCTIONAL_ASSESSMENT: 0-10

## 2024-01-25 NOTE — PROGRESS NOTES
Occupational Therapy    Reassessment   Patient Name: Fadia Bolden  MRN: [id number]  Today's Date: 1/25/2024         Visit: #8    Assessment:  Patient is progressing well towards therapy goals within noted improved L UE ROM/strength, L and R UE FMC and GMC, and  strength.  Patient continues to lack full ROM, BUE strength, decreased R sided awareness and executive functioning deficits impacting independence with ADLs, IADLs, work and leisure tasks.  Patient would benefit from continued skilled therapy to further continue progress for optimal participation in ADLs, IADLs, and leisure tasks.     Plan:   Interventions: Self Care/ADL, neuromuscular reeducation, therapeutic exercise   Duration: 1x/wk for 4 weeks     Subjective   Patient reports that she has been having a dull pain in her left arm lately.       Current Problem:  1. Other closed nondisplaced fracture of proximal end of left humerus with routine healing, subsequent encounter        2. Weakness generalized        3. Coordination abnormal        4. Right sided weakness          Precautions:   Helmet; able to remove when sitting still and sleeping       Objective     ROM:  L shoulder flexion- 130*    L shoulder abduction- 135*     RUE - WNL    Strength:   L : 34, 36, 29      42#, 40#, 40#    R : 35, 36, 37 #  40#, 40#, 41#      L lateral: 9#   R lateral: 13#   L shoulder- 2/5   L elbow- 4/5     Coordination:   9 Hole Peg:   L- 25.19 R-30.39      Box and Blocks:  L-51 R- 41     Treatment:     Therapeutic Activity:   Standing arm bike at workload 1.0 for total 5 minutes.  Rest breaks taken at 2:30 minutes     Patient completed standardized tests for reassessment:   -box and blocks, 9 hole peg, goniometry, MMT,  and pinch strength     Goals:   Pt will increase dynamic standing balance to “good” to maximize independence with standing ADLs/IADLs and reduce risk of falls (goal met)  Pt will demo independence with HEP (goal met; home exercise program  updated to reflect patient progress)   Pt will demo and verbalize use of energy conservation/work simplification techniques to increase activity tolerance will completing ADL/IADL tasks.   Pt will increase B  strength by 5-10# to increase independence with ADLs/IADLs (opening jars, medicine caps, etcs) (goal met)    * updated 1/25/24: Pt will increase B  strength by 3# to increase independence with ADLs/IADLs   Pt will demo increased B UE function, as indicated by the QuickDash score, current score is 43.18%  Pt will demo increased R FMC by decreasing 5 seconds on the 9 Hole Peg test, to maximize independence with ADLs/IADLs- goal met   Pt will demo increased right sided awareness by completing obstacle course by hitting no objects on right side. (Goal in progress)   Pt will increase left shoulder ROM to WNL (goal in progress)

## 2024-01-25 NOTE — PROGRESS NOTES
Speech-Language Pathology    Outpatient Speech-Language Pathology Treatment     Patient Name: Fadia Bolden  MRN: [id number]  Today's Date: 1/25/2024  Time Calculation  Start Time: 1120  Stop Time: 1200  Time Calculation (min): 40 min         Current Problem:   1. 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 She demonstrated improvement describing common objects. She demonstrated difficulty identifying people, characters and animals although some of the prompts may have been difficult for the patient before the stroke and may not truly represent patient function. The patient may continue to benefit from skilled speech therapy services in order to improve her ability to express wants, needs and opinions and engage in meaningful conversation with family and friends.      General Visit Information:  Certification Period Start Date: 12/05/23  Certification Period End Date: 03/04/24     Total Number of Visits : 10    Pain:  Pain Assessment  Pain Assessment: 0-10  Pain Score: 0 - No pain     Plan:  Plan  Inpatient/Swing Bed or Outpatient: Outpatient  SLP Plan: Skilled SLP  SLP Frequency: 2x per week  Duration: 12 weeks  Next Treatment Priority: verbal sequencing  Discussed POC: Patient, Caregiver/family  Discussed Risks/Benefits: Yes  Patient/Caregiver Agreeable: Yes    Goals:  Short-term goals:  Patient will complete naming activities (including picture naming, generative naming, verb naming, descriptive naming, synonyms/antonyms, etc.) with 80% accuracy given minimal-moderate cues   -Word finding, general info (animals/people/characters); 67% (10/15)      -Describing common objects - 80% (4/5)    Previous  -Naming from description: 90% (18/20) when given extended time; phonemic cues increased accuracy to 100%               -Responsive naming, general information: 71% (15/21)              -describing common objects - 33% (2/6) indep; increased to 67% with min cues; mod-max cues required to provide enough relevant information about the item  -Verb namin% (16/16), indep              -Complete the sentence (1 word): 95% (19/20)-object naming - 100% given occasional cues and extra time                         -naming items from definitions/description - 70% (7/10)               -Stating the category ([employer] ismael): FO4 choices, words only - 84% (21/25)              -responsive naming - 85% (17/20)              -describing common objects - 67% (2/3) with min cues; the patient struggled with this task this date  -synonyms - 60% (3/5)  -antonyms - 100%          -confrontational namin%; improved to 90 with min cues and extended time                Patient will complete targeted expressive language tasks (including stating personal information, phrase/sentence completion, open-ended questions) with 80% accuracy given minimal-moderate cues.                Previous               -Patient was able to state her full name, address and the names of her children and their spouses. She required extra time and one phonemic cue for her son in law  -Semantic analysis task - 88% accuracy to provide relevant and accurate features of the target item; accuracy improved when given cues or suggested non verbal strategies  -naming immediate family members and friends, 82% (14/17) to name children, their spouses and close friends and siblings     Patient will complete targeted receptive language tasks (including complex yes/no questions, functional reading, paragraph level comprehension) with 80% accuracy given minimal-moderate cues.     Previous:  -Reading comprehension (121 and 136 word passages) - when given FO4 choices the patient answered comprehension questions with 100% accuracy. She referred back to the passage in 1/6 questions. Patient required  extended time  -Complex Y/N, comparisons - 89% (40/45)  -Functional reading (CT ismael lvl 1/3) - 88%     Patient will complete further assessment of cognitive-linguistic skills (MoCA, EM, etc.) to determine additional therapy goals as indicated.    Subjective:  Patient reports no new or worsening symptoms.      Outpatient Education:  Adult Outpatient Education  Individual(s) Educated: Patient, Spouse  Risk and Benefits Discussed with Patient/Caregiver/Other: yes  Patient/Caregiver Demonstrated Understanding: yes  Plan of Care Discussed and Agreed Upon: yes  Patient Response to Education: Patient/Caregiver Verbalized Understanding of Information    Bilateral Helical Rim Advancement Flap Text: The defect edges were debeveled with a #15 blade scalpel.  Given the location of the defect and the proximity to free margins (helical rim) a bilateral helical rim advancement flap was deemed most appropriate.  Using a sterile surgical marker, the appropriate advancement flaps were drawn incorporating the defect and placing the expected incisions between the helical rim and antihelix where possible.  The area thus outlined was incised through and through with a #15 scalpel blade.  With a skin hook and iris scissors, the flaps were gently and sharply undermined and freed up.

## 2024-01-30 ENCOUNTER — TREATMENT (OUTPATIENT)
Dept: SPEECH THERAPY | Facility: CLINIC | Age: 69
End: 2024-01-30
Payer: COMMERCIAL

## 2024-01-30 DIAGNOSIS — I69.320 APHASIA AS LATE EFFECT OF CEREBROVASCULAR ACCIDENT (CVA): Primary | ICD-10-CM

## 2024-01-30 PROCEDURE — 92507 TX SP LANG VOICE COMM INDIV: CPT | Mod: GN | Performed by: STUDENT IN AN ORGANIZED HEALTH CARE EDUCATION/TRAINING PROGRAM

## 2024-01-30 ASSESSMENT — PAIN - FUNCTIONAL ASSESSMENT: PAIN_FUNCTIONAL_ASSESSMENT: 0-10

## 2024-01-30 ASSESSMENT — PAIN SCALES - GENERAL: PAINLEVEL_OUTOF10: 0 - NO PAIN

## 2024-01-30 NOTE — PROGRESS NOTES
"Speech-Language Pathology    Outpatient Speech-Language Pathology Treatment     Patient Name: Fadia Bolden  MRN: [id number]  Today's Date: 1/30/2024  Time Calculation  Start Time: 1030  Stop Time: 1115  Time Calculation (min): 45 min         Current Problem:   1. Aphasia as late effect of cerebrovascular accident (CVA)          SLP Assessment:  SLP TX Intervention Outcome: Making Progress Towards Goals  Prognosis: Good    The patient was actively engaged in therapeutic tasks targeting categorization skills. She demonstrated improvement labeling concrete categories. She demonstrated difficulty naming abstract categories and identifying the \"odd one out\". The patient may continue to benefit from skilled speech therapy services in order to improve language skills to improve communication skills in the home and clinical setting.    General Visit Information:  Certification Period Start Date: 12/05/23  Certification Period End Date: 03/04/24     Total Number of Visits : 11    Pain:  Pain Assessment  Pain Assessment: 0-10  Pain Score: 0 - No pain     Plan:  Plan  Inpatient/Swing Bed or Outpatient: Outpatient  SLP Plan: Skilled SLP  SLP Frequency: 2x per week  Duration: 12 weeks  Next Treatment Priority: Categorization tasks  Discussed POC: Patient, Caregiver/family  Discussed Risks/Benefits: Yes  Patient/Caregiver Agreeable: Yes    Goals:  Short-term goals:  Patient will complete naming activities (including picture naming, generative naming, verb naming, descriptive naming, synonyms/antonyms, etc.) with 80% accuracy given minimal-moderate cues   -Name the category: 93% (concrete), 90% (abstract)   -Exclusion task, adaptive: 76% (10/13)    Previous  -Word finding, general info (animals/people/characters); 67% (10/15)      -Describing common objects - 80% (4/5)  -Naming from description: 90% (18/20) when given extended time; phonemic cues increased accuracy to 100%              -Responsive naming, general information: 71% " (15/21)              -describing common objects - 33% (2/6) indep; increased to 67% with min cues; mod-max cues required to provide enough relevant information about the item  -Verb namin% (16/16), indep              -Complete the sentence (1 word): 95% (19/20)-object naming - 100% given occasional cues and extra time                         -naming items from definitions/description - 70% (7/10)               -Stating the category (Rabbit ismael): FO4 choices, words only - 84% (21/25)              -responsive naming - 85% (17/20)              -describing common objects - 67% (2/3) with min cues; the patient struggled with this task this date  -synonyms - 60% (3/5)  -antonyms - 100%          -confrontational namin%; improved to 90 with min cues and extended time                Patient will complete targeted expressive language tasks (including stating personal information, phrase/sentence completion, open-ended questions) with 80% accuracy given minimal-moderate cues.                Previous               -Patient was able to state her full name, address and the names of her children and their spouses. She required extra time and one phonemic cue for her son in law  -Semantic analysis task - 88% accuracy to provide relevant and accurate features of the target item; accuracy improved when given cues or suggested non verbal strategies  -naming immediate family members and friends, 82% (14/17) to name children, their spouses and close friends and siblings     Patient will complete targeted receptive language tasks (including complex yes/no questions, functional reading, paragraph level comprehension) with 80% accuracy given minimal-moderate cues.     Previous:  -Reading comprehension (121 and 136 word passages) - when given FO4 choices the patient answered comprehension questions with 100% accuracy. She referred back to the passage in 1/6 questions. Patient required extended time  -Complex Y/N, comparisons -  89% (40/45)  -Functional reading (CT ismael lvl 1/3) - 88%     Patient will complete further assessment of cognitive-linguistic skills (MoCA, EM, etc.) to determine additional therapy goals as indicated.    Subjective:  Patient reports no new or worsening symptoms.    Outpatient Education:  Adult Outpatient Education  Individual(s) Educated: Patient, Spouse  Risk and Benefits Discussed with Patient/Caregiver/Other: yes  Patient/Caregiver Demonstrated Understanding: yes  Plan of Care Discussed and Agreed Upon: yes  Patient Response to Education: Patient/Caregiver Verbalized Understanding of Information

## 2024-01-31 ENCOUNTER — OFFICE VISIT (OUTPATIENT)
Dept: NEUROLOGY | Facility: CLINIC | Age: 69
End: 2024-01-31
Payer: COMMERCIAL

## 2024-01-31 VITALS
SYSTOLIC BLOOD PRESSURE: 144 MMHG | WEIGHT: 109 LBS | RESPIRATION RATE: 16 BRPM | DIASTOLIC BLOOD PRESSURE: 77 MMHG | HEART RATE: 67 BPM | BODY MASS INDEX: 18.61 KG/M2 | HEIGHT: 64 IN | TEMPERATURE: 97.1 F

## 2024-01-31 DIAGNOSIS — R47.01 APHASIA: ICD-10-CM

## 2024-01-31 DIAGNOSIS — I69.320 APHASIA AS LATE EFFECT OF CEREBROVASCULAR ACCIDENT (CVA): ICD-10-CM

## 2024-01-31 DIAGNOSIS — R00.2 PALPITATIONS WITH REGULAR CARDIAC RHYTHM: ICD-10-CM

## 2024-01-31 DIAGNOSIS — S06.5XAA SDH (SUBDURAL HEMATOMA) (MULTI): ICD-10-CM

## 2024-01-31 DIAGNOSIS — G47.33 OSA (OBSTRUCTIVE SLEEP APNEA): Primary | ICD-10-CM

## 2024-01-31 DIAGNOSIS — M95.2 ACQUIRED SKULL DEFECT: ICD-10-CM

## 2024-01-31 DIAGNOSIS — R26.89 IMPAIRED GAIT AND MOBILITY: ICD-10-CM

## 2024-01-31 DIAGNOSIS — I63.89 CEREBROVASCULAR ACCIDENT (CVA) DUE TO OTHER MECHANISM (MULTI): ICD-10-CM

## 2024-01-31 PROCEDURE — 3078F DIAST BP <80 MM HG: CPT | Performed by: STUDENT IN AN ORGANIZED HEALTH CARE EDUCATION/TRAINING PROGRAM

## 2024-01-31 PROCEDURE — 1126F AMNT PAIN NOTED NONE PRSNT: CPT | Performed by: STUDENT IN AN ORGANIZED HEALTH CARE EDUCATION/TRAINING PROGRAM

## 2024-01-31 PROCEDURE — 3077F SYST BP >= 140 MM HG: CPT | Performed by: STUDENT IN AN ORGANIZED HEALTH CARE EDUCATION/TRAINING PROGRAM

## 2024-01-31 PROCEDURE — 1159F MED LIST DOCD IN RCRD: CPT | Performed by: STUDENT IN AN ORGANIZED HEALTH CARE EDUCATION/TRAINING PROGRAM

## 2024-01-31 PROCEDURE — 1036F TOBACCO NON-USER: CPT | Performed by: STUDENT IN AN ORGANIZED HEALTH CARE EDUCATION/TRAINING PROGRAM

## 2024-01-31 PROCEDURE — 1160F RVW MEDS BY RX/DR IN RCRD: CPT | Performed by: STUDENT IN AN ORGANIZED HEALTH CARE EDUCATION/TRAINING PROGRAM

## 2024-01-31 PROCEDURE — 99205 OFFICE O/P NEW HI 60 MIN: CPT | Performed by: STUDENT IN AN ORGANIZED HEALTH CARE EDUCATION/TRAINING PROGRAM

## 2024-01-31 PROCEDURE — 1123F ACP DISCUSS/DSCN MKR DOCD: CPT | Performed by: STUDENT IN AN ORGANIZED HEALTH CARE EDUCATION/TRAINING PROGRAM

## 2024-01-31 ASSESSMENT — ENCOUNTER SYMPTOMS
DEPRESSION: 0
LOSS OF SENSATION IN FEET: 0
OCCASIONAL FEELINGS OF UNSTEADINESS: 0

## 2024-01-31 ASSESSMENT — PAIN SCALES - GENERAL: PAINLEVEL: 0-NO PAIN

## 2024-01-31 NOTE — PROGRESS NOTES
Vascular Neurology NPV:    HPI: Fadia Bolden is a 68 y.o.  female with DAIJA, breast cancer x2 now in remission, HTN, HLD who presents to stroke clinic for a follow up of hemorrhagic stroke thought to be hemorrhagic transformation of an ischemic stroke.     In Oct, she was visiting her daughter in [place] when her  heard her fall in the early morning. Post fall patient with an episode of vomiting and went back to sleep. When she woke up around 9am she had difficulty with language and R visual deficit and was taken to [facility] ED in [place]. 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 Neurosurgery was consulted and patient was taken to the OR for left hemicraniectomy and left occipital and left thin subdural hematoma evacuation with Dr. Rodriguez on 10/25/23. Neurology was consulted for concern for ischemic CVA with hemorrhagic conversion and started on ASA 81mg and atorvastatin 80mg daily. Additionally, patient was found to have an acute L comminuted nondisplaced proximal humeral neck fracture. MRI showed a couple ditzels of diffusion restriction in the L external capsule and as well possibly underneath blood as well.     LDL: 55  TTE: TTE w/bubble:  Summary  1. Normal sinus rhythm during study.  2. Normal LV size with normal wall thickness. Calculated biplane LVEF   >70%.  No regional wall motion abnormalities. (Hyperdynamic function). Grade 1  diastolic dysfunction.  3. Normal RV size and systolic function. Estimated PASP 25 mmHg + RA  Pressure.  4. No significant valvular abnormalities.  5. Grossly negative bubble study, with and without Valsalva.  6. A prior study is not available for comparison.     Vessels: MRV unremarkable  CTA with diminished L P2 compared to R  No heart monitor    Twice a week speech therapy. She is still having issues with fluency and word finding. OT doing once a week. PT is done.  Arm is still sore from the fracture. Not driving currently. Has not seen optho. She is getting the flap replaced 2/19 and is currently wearing a helmet.    Her daughter and  were present during today's visit.    ROS: Denies chest pain, SOB, abdominal pain today.     PMH:   Past Medical History:   Diagnosis Date    Other chest pain 02/10/2021    Chest tightness    Other specified health status 12/31/2018    No pertinent past medical history    Personal history of malignant neoplasm of breast 06/27/2022    History of malignant neoplasm of breast        PSH:   Past Surgical History:   Procedure Laterality Date    MR HEAD ANGIO W AND WO IV CONTRAST  10/25/2023    MR HEAD ANGIO W AND WO IV CONTRAST 10/25/2023    MR HEAD ANGIO WO IV CONTRAST  1/5/2024    MR HEAD ANGIO WO IV CONTRAST 1/5/2024 [facility] [id number] MRI    OTHER SURGICAL HISTORY  11/22/2021    Tonsillectomy        Allergies:   Allergies   Allergen Reactions    Fish Derived Other    Shrimp Rash        FH:   Family History   Problem Relation Name Age of Onset    Thyroid disease Mother      Parkinsonism Father      Parkinsonism Sister      Other (HTN) Sister      Breast cancer Father's Sister      Breast cancer Paternal Grandmother          SH: Lives in [place] with Rich and daughter. 4 kids. Feels safe at home. Smoking: None. Alcohol use: None. Illicit drug use: None. 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     Objective:    Visit Vitals  /77   Pulse 67   Temp 36.2 °C (97.1 °F) (Temporal)   Resp 16          Current Outpatient Medications:     amLODIPine (Norvasc) 10 mg tablet, Take 1 tablet (10 mg) by mouth once daily., Disp: 30 tablet, Rfl: 11    ascorbic acid, vitamin C, 1,500 mg ER tablet, Take 1 tablet (1,500 mg) by mouth once daily., Disp: , Rfl:     aspirin 81 mg chewable tablet, Chew 1 tablet (81 mg) once daily., Disp: 90 tablet, Rfl: 3    atorvastatin (Lipitor) 80 mg tablet, Take 1 tablet  (80 mg) by mouth once daily., Disp: 30 tablet, Rfl: 11    cholecalciferol (Vitamin D-3) 50 MCG (2000 UT) tablet, Take 1 tablet (2,000 Units) by mouth once daily., Disp: , Rfl:     lisinopril 20 mg tablet, Take 1 tablet (20 mg) by mouth once daily., Disp: 30 tablet, Rfl: 11    sertraline (Zoloft) 25 mg tablet, Take 1 tablet (25 mg) by mouth once daily., Disp: 30 tablet, Rfl: 5       Extensive review of notes in EMR, labs, tests, Interpretation of neuroimaging  MRI head results: MR brain w and wo IV contrast    Result Date: 1/5/2024  Interpreted By:  Jasen Sifuentes,  and Steph Del Toro STUDY: MR VENOGRAPHY INTRACRANIAL WO IV CONTRAST; MR ANGIO HEAD WO IV CONTRAST; MR BRAIN W AND WO IV CONTRAST;  1/5/2024 11:39 am; 1/5/2024 11:38 am; 1/5/2024 11:34 am   INDICATION: Signs/Symptoms:SDH;s/p craniotomy and ischemic stroke; Signs/Symptoms:SDH; cranioplasty; ischemic stroke; Signs/Symptoms:SDH s/p resection.   COMPARISON:   Outside hospital MR brain 10/24/2023     ACCESSION NUMBER(S): [id number]; [id number]; [id number]   ORDERING CLINICIAN: SANTOS LOO   TECHNIQUE: Axial diffusion, axial T2, axial FLAIR, axial gradient echo T2, axial T1, post gadolinium volumetric T1, as well as post gadolinium axial T1 weighted MRI images of the brain were obtained. Time-of-flight intracranial MRA and MRV images were obtained. The source MRA images were reformatted in multiple planes. The patient received  9 mL of Dotarem gadolinium intravenously.   FINDINGS: There are postoperative changes compatible with a left hemicraniectomy new when compared with the prior MRI dated 10/24/2023 but noted on the prior more recent CT study dated 11/06/2023.   There is smooth dural enhancement along the left craniectomy site likely postsurgical/reactive in etiology.   There has been near-complete resolution of the previously noted left hemispheric subdural hematoma. A small amount of residual subdural hemorrhage and dural enhancement remains  surrounding the posterior margin of the left occipital lobe measuring approximately 3 mm in greatest thickness.   There is again evidence of a residual but smaller mixed signal intraparenchymal hemorrhage an evolving area of more subacute to chronic infarction within the left occipital lobe, posterior left temporal lobe, and extending cranially into the left parietal lobe. The diffusion-weighted images demonstrate areas of increased diffusion signal corresponding diminished signal on the ADC map associated with the areas of evolving hematoma likely representing diffusion signal changes related to evolving blood products. There has been interval improvement in the previously noted associated mass effect within the left cerebral hemisphere with the current study demonstrating asymmetric interval enlargement of the adjacent posterior body, atrium, occipital horn, and temporal horn of the left lateral ventricle compatible with compensatory dilatation from interval surrounding brain parenchymal volume loss within left cerebral hemisphere. There is residual nonspecific nonenhancing bright signal on the FLAIR and T2 images surrounding the evolving areas of hemorrhage suggesting residual edema and/or gliosis. The post gadolinium images demonstrate enhancement along the margins of the residual evolving hematomas as well as along gyri within the left occipital lobe, posterior left temporal lobe, and left parietal lobe likely representing enhancement related to the evolving hematomas and evolving areas of subacute to more chronic infarction. A follow-up MRI in 3 months would be recommended to confirm interval improvement of these areas of enhancement/exclude underlying pathologic enhancement.   There has been interval improvement in the previously noted midline shift from left to right currently measuring approximately 2 mm compared with 7 mm on the prior MRI dated 10/24/2023.   The above findings are superimposed upon  mild-to-moderate brain parenchymal volume loss.   There are small scattered nonspecific white matter changes within the cerebral hemispheres bilaterally which while nonspecific, given the patient's age, likely represent sequelae of more remote small-vessel ischemic change.   No abnormal diffusion restriction to suggest acute infarction is noted.   The gradient echo T2 images again demonstrate blooming dark signal associated with the areas of hemorrhage within the left occipital lobe, left temporal lobe, and left parietal lobe.   There is minimal mucosal thickening noted within a few scattered ethmoid air cells. The remaining paranasal sinuses are clear.   There is opacification of a few inferior left mastoid air cells.   The intracranial MRA demonstrates asymmetric diminished caliber of the P2 segment of the left posterior cerebral artery as well as diminished visualization of P3 and more distal left posterior cerebral artery when compared with the right. 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 No other significant intracranial stenosis or intracranial aneurysm is noted.   The intracranial MRV demonstrates a right dominant transverse and sigmoid sinus. 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 Otherwise, the intracranial MRV is within normal limits without evidence of venous sinus thrombosis.       There are postoperative changes compatible with a left hemicraniectomy new when compared with the prior MRI dated 10/24/2023 but noted on the prior more recent CT study dated 11/06/2023. There is smooth dural enhancement along the left craniectomy site likely postsurgical/reactive in etiology.    There has been near-complete resolution of the previously noted left hemispheric subdural hematoma. A small amount of residual subdural hemorrhage and dural enhancement remains surrounding the posterior margin of the left occipital lobe measuring approximately 3 mm in greatest thickness.   There is again evidence of a residual but smaller mixed signal intraparenchymal hemorrhage an evolving area of more subacute to chronic infarction within the left occipital lobe, posterior left temporal lobe, and extending cranially into the left parietal lobe. The diffusion-weighted images demonstrate areas of increased diffusion signal corresponding diminished signal on the ADC map associated with the areas of evolving hematoma likely representing diffusion signal changes related to evolving blood products. There has been interval improvement in the previously noted associated mass effect within the left cerebral hemisphere with the current study demonstrating asymmetric interval enlargement of the adjacent posterior body, atrium, occipital horn, and temporal horn of the left lateral ventricle compatible with compensatory dilatation from interval surrounding brain parenchymal volume loss within left cerebral hemisphere. There is residual nonspecific nonenhancing bright signal on the FLAIR and T2 images surrounding the evolving areas of hemorrhage suggesting residual edema and/or gliosis. The post gadolinium images demonstrate enhancement along the margins of the residual evolving hematomas as well as along gyri within the left occipital lobe, posterior left temporal lobe, and left parietal lobe likely representing enhancement related to the evolving hematomas and evolving areas of subacute to more chronic infarction. A follow-up MRI in 3 months would be recommended to confirm interval improvement of these areas of enhancement/exclude underlying pathologic enhancement.   There has been interval improvement in the previously noted  midline shift from left to right currently measuring approximately 2 mm compared with 7 mm on the prior MRI dated 10/24/2023.   The above findings are superimposed upon mild-to-moderate brain parenchymal volume loss.   There are small scattered nonspecific white matter changes within the cerebral hemispheres bilaterally which while nonspecific, given the patient's age, likely represent sequelae of more remote small-vessel ischemic change.     The intracranial MRA demonstrates asymmetric diminished caliber of the P2 segment of the left posterior cerebral artery as well as diminished visualization of P3 and more distal left posterior cerebral artery when compared with the right. 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 No other significant intracranial stenosis or intracranial aneurysm is noted.   The intracranial MRV demonstrates a right dominant transverse and sigmoid sinus. 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 Otherwise, the intracranial MRV is within normal limits without evidence of venous sinus thrombosis.   I personally reviewed the images/study and I agree with the findings as stated by resident physician Dr. Alverto Choi. This study was interpreted at [facility], [place], [place].   MACRO: None.   Signed by: Jasen Sifuentes 1/5/2024 3:59 PM Dictation workstation:   [id number]    MR brain w and wo IV contrast    Result Date: 10/25/2023  EXAM: MR BRAIN W/WO IV CONT LOCATION: [facility] DATE: 10/24/2023 INDICATION: Intracranial hemorrhage COMPARISON:  Earlier same day CTA. CONTRAST: GADOBUTROL 1 MMOL/ML IV SOLN 4.5 mL TECHNIQUE: Routine  multiplanar multisequence head MRI without and with intravenous contrast. FINDINGS: INTRACRANIAL CONTENTS: No significant change in size of the left temporoparietooccipital parenchymal hematomas with associated surrounding edema and small rim of infarct. Tiny punctate infarcts in the left external capsule (series 5, images 47 and 48). Gyriform susceptibility in the region of parenchymal hemorrhage. Patchy area of gyriform enhancement in the left temporal lobe. Similar small left convexity/parafalcine subdural hematoma measuring up to 4 mm in thickness along the left convexity.  Slight increased 5 mm rightward midline shift. Subarachnoid extension of hemorrhage with intraventricular redistribution in the atrium of the right lateral ventricle. Stable ventricular caliber. Normal position of the cerebellar tonsils. SELLA: No abnormality accounting for technique. OSSEOUS STRUCTURES/SOFT TISSUES: Normal marrow signal. Major vascular structures better assessed on earlier same day CTA. ORBITS: No abnormality accounting for technique. SINUSES/MASTOIDS: No paranasal sinus mucosal disease. No middle ear or mastoid effusion. IMPRESSION: 1.  Similar appearance of multicompartmental hemorrhage with surrounding edema and small rim of infarct. 2.  Slight increased 5 mm rightward midline shift. 3.  Patchy gyriform enhancement and edema in the left temporal lobe, favor subacute infarct. Follow-up is recommended to ensure resolution and exclude underlying lesion (4-6 weeks). 4.  Two tiny punctate infarcts in the left external capsule. , CT head results: CT head wo IV contrast    Result Date: 1/17/2024  Interpreted By:  Bradley Morrissey, STUDY: CT HEAD WO IV CONTRAST;  1/17/2024 8:15 am   INDICATION: Signs/Symptoms: Cranioplasty.   COMPARISON: Multiple prior head CTs, most recently 11/06/2023   ACCESSION NUMBER(S): [id number]   ORDERING CLINICIAN: SANTOS LOO   TECHNIQUE: Noncontrast axial CT scan of the head was performed.  Angled reformats in brain and bone windows were generated. The images were reviewed in bone, brain, blood and soft tissue windows.   FINDINGS: Changes of left hemicraniectomy are again noted with resolution of the residual subcutaneous emphysema and pneumocephalus. Thickening of the dura is mildly progressed from the prior exam. The previously noted hemorrhage has resolved and there is now an area of encephalomalacia in the posterior left cerebral hemisphere. Mass effect on the left lateral ventricle has resolved with interval development of ex vacuo dilatation of the left atrium. There is mild flattening of the dura in the hemicraniectomy bed anteriorly (coronal image 39), new from previous. This partially effaces the adjacent sulci. The bifrontal ventricular diameter measures up to 3.6 cm, previously 4.0 cm. Ventricular size is otherwise unchanged. No midline shift or herniation. The basal cisterns are patent.   No new intracranial hemorrhage or loss of gray-white differentiation. No acute bony abnormality. The visualized portions of the paranasal sinuses and mastoid air cells are clear.       1. Evolving postoperative changes with mild flattening of the dura anteriorly, resulting in mild mass effect on the left frontal sulci and frontal horn. 2. Expected evolution of the residual intracranial blood products, no acute intracranial pathology.   MACRO: None   Signed by: Bradley Morrissey 1/17/2024 12:06 PM Dictation workstation:   [id number]    CT head wo IV contrast    Result Date: 11/6/2023  EXAM: CT HEAD WO IV CONT LOCATION: [facility] DATE: 11/6/2023 INDICATION: Planning for cranioplasty COMPARISON: Head and neck CT angiogram 10/30/2023. Head CT 10/30/2023. Brain MRI 10/24/2023 TECHNIQUE: Routine CT Head without IV contrast. Multiplanar reformats. Dose reduction techniques were used. FINDINGS: INTRACRANIAL CONTENTS: No significant change in the brain tissue herniating through the left-sided decompressive  craniectomy defect, similar to the prior head CT 10/30/2023. Interval evolution of the large intraparenchymal hemorrhage centered in the left parietal-occipital region with moderate surrounding vasogenic type edema. Stable to interval slight decrease in the associated local mass effect and partial effacement of the atrium and occipital horn of the left lateral ventricle. Stable mild effacement of the third ventricle. No significant change in the size of the right lateral ventricle. Interval evolution and slight decrease in the size of the posterior parafalcine and left tentorial subdural hematoma which measures 3 to 4 mm in thickness. No new intracranial hemorrhage. Interval resolution of the previously noted small focus of gas along the anterior horn of the left lateral ventricle. Stable 2 mm midline shift to the right at the level of the lateral ventricles. Mild diffuse cerebral parenchymal volume loss. No CT evidence for an acute infarct. No cerebellar tonsillar ectopia. VISUALIZED ORBITS/SINUSES/MASTOIDS: No intraorbital abnormality. No paranasal sinus mucosal disease. No middle ear or mastoid effusion. BONES/SOFT TISSUES: No acute abnormality. IMPRESSION: 1.  No significant change in the brain tissue herniating through the left-sided decompressive craniectomy defect, similar to the prior head CT 10/30/2023. 2.  Interval evolution of the large intraparenchymal hemorrhage centered in the left parietal-occipital region with moderate surrounding vasogenic type edema. Stable to interval slight decrease in the associated local mass effect and partial effacement of the atrium and occipital horn of the left lateral ventricle. 3.  Stable mild effacement of the third ventricle. No significant change in the size of the right lateral ventricle. 4.  Interval evolution and slight decrease in the size of the posterior parafalcine and left tentorial subdural hematoma which measures 3 to 4 mm in thickness. 5.  No new  intracranial hemorrhage or CT evidence for an acute infarct.    CT head wo IV contrast    Result Date: 10/30/2023  EXAM: CT HEAD WO IV CONT LOCATION: [facility] DATE: 10/30/2023 INDICATION: ICH stability. COMPARISON: 10/27/2023. TECHNIQUE: Routine CT Head without IV contrast. Multiplanar reformats. Dose reduction techniques were used. FINDINGS: INTRACRANIAL CONTENTS: Left temporo-occipital hemorrhage is stable. Edema is similar. Previous left-sided craniectomy. Mild left to right midline shift is unchanged. Small amount of pneumocephalus again noted in the anterior left lateral ventricle. VISUALIZED ORBITS/SINUSES/MASTOIDS: No intraorbital abnormality. No paranasal sinus mucosal disease. No middle ear or mastoid effusion. BONES/SOFT TISSUES: No acute abnormality. IMPRESSION: 1.  Stable intracranial hemorrhage, edema and mass effect.         CT head wo IV contrast    Result Date: 10/27/2023  EXAM: CT HEAD WO IV CONT LOCATION: [facility] DATE/TIME: 10/27/2023 12:22 PM CDT INDICATION: Weakness. Intracranial hemorrhage COMPARISON: CT head dated 10/26/2023 1139 hours TECHNIQUE: Routine CT Head without IV contrast. Multiplanar reformats. Dose reduction techniques were used. FINDINGS: INTRACRANIAL CONTENTS: Left craniectomy changes with continued maturation of the large intraparenchymal hemorrhages involving the left parietal, occipital, and temporal lobes with extension into the adjacent subarachnoid spaces and additional hemorrhage along the falx, left tentorial leaflet, and left subdural space where there is an in-situ drainage catheter, not significantly changed since recent head CT dated 10/26/2023 1139 hours. The ventricular system remains unchanged in morphology with in in situ gas in the frontal horn of the left lateral ventricle and persistent 2 mm rightward shift of the midline structures. Patent basal cisterns. VISUALIZED ORBITS/SINUSES/MASTOIDS: The orbits are unremarkable. The visualized paranasal  sinuses and temporal bone structures are well-aerated. BONES/SOFT TISSUES: Left craniectomy changes, otherwise the calvarium and skull base are unremarkable. IMPRESSION: 1. Continued maturation of the multicompartment hemorrhage predominantly involving the left cerebral hemisphere with associated left craniectomy changes, not significantly changed since CT head dated 10/26/2023 1139 hours.    CT head wo IV contrast    Result Date: 10/26/2023  EXAM: CT HEAD WO IV CONT LOCATION: [facility] DATE: 10/26/2023 INDICATION: S/p left craniectomy. COMPARISON: CT brain 10/25/2023. TECHNIQUE: Routine CT Head without IV contrast. Multiplanar reformats. Dose reduction techniques were used. FINDINGS: INTRACRANIAL CONTENTS: Broad left frontal, parietal and temporal craniectomy. There is slight herniation of the brain parenchyma into the craniectomy defect, slightly increased in the interval. Extensive parenchymal contusions and/or subarachnoid hemorrhage are again seen along the posterior aspect of the left parietal and occipital lobes. Ventral to this there is a broad area of low-attenuation involving the posterior left temporal, ventral left occipital lobe and lateral inferior left parietal lobe. Although likely reflecting parenchymal edema, ischemia in this locale cannot be excluded. Overall extent of low-attenuation appears to be stable in the interval. Small areas of hemorrhage are again seen along the inferior lateral aspect of this area of low-attenuation. More superiorly within the left lateral parietal lobe postoperative changes relating to hematoma evacuation are again seen with associated parenchymal gas or hemostatic material. Shallow high attenuation subdural hematomas again seen along the posterior falx and left aspect of the tentorium, stable in the interval. There is mild prominence of the lateral ventricles with small amount of blood products within the right occipital horn. There is effacement of the atrium  and occipital horn left lateral ventricle with likely a small amount of blood products within the posterior left occipital horn. Small amount of nondependent pneumocephalus within the left lateral ventricle. Degree of effacement of the body of the lateral ventricles and third ventricle has diminished in the interval as has the degree of left right midline shift which now measures approximately 1 to 2 mm compared with approximately 4 mm on the prior. Cerebellar tonsils are normally positioned. Sella is unremarkable for technique. Corpus callosum is normally formed. VISUALIZED ORBITS/SINUSES/MASTOIDS: Orbits are unremarkable. Visualized paranasal sinuses, middle ear cavities, and mastoid air cells are essentially clear. BONES/SOFT TISSUES: Broad left-sided craniectomy. Surgical drain overlies the anterior aspect of the defect. IMPRESSION: 1.  Large left frontal, parietal and temporal craniectomy. Slight herniation of the brain is seen extending into the craniectomy defect. Previously seen mass effect upon the lateral and third ventricles and left to right midline shift has significantly diminished in the interval. 2.  Extensive hemorrhagic contusion and/or subarachnoid hemorrhage is again seen along the posterior aspect of the left cerebral hemisphere. Broad area of low-attenuation change is seen ventral to the area of contusion within the posterior left temporal and ventral left occipital lobes compatible with edema and/or ischemic change and similar to prior. Small areas of hemorrhage are seen along the caudal aspect of this area of low-attenuation, similar to prior. 3.  Hyperattenuating subdural hematomas again seen along the posterior falx and tentorium, similar to prior. 4.  Mild prominence of the lateral ventricles, increased in the interval. Stable pneumocephalus. Stable layering blood products within the lateral ventricles.    CT head wo IV contrast    Result Date: 10/25/2023  EXAM: CT HEAD WO IV CONT  LOCATION: [facility] DATE: 10/25/2023 INDICATION: Left craniectomy COMPARISON: CT head 10/25/2023 TECHNIQUE: Routine CT Head without IV contrast. Multiplanar reformats. Dose reduction techniques were used. FINDINGS: Streak artifact limits aspects of exam. INTRACRANIAL CONTENTS: Interval left craniectomy. Left craniectomy margin demonstrates a subdural/subgaleal fluid collection with air and a drain measuring 8 mm in thickness. Pneumocephalus. There has been subtotal resection of the left posterior cerebral hemisphere large acute hematoma with a surgical resection cavity in the left parietal, occipital, temporal region. A large residual acute hematoma is seen within the left parietal occipital region with small satellite acute hemorrhages and left posterior temporal lobe similar compared to prior exam. Surrounding edema similar compared to prior exam. Left tentorium and falx small acute subdural hematomas similar compared to prior examination.  Previously there was extension of acute hemorrhage into the left lateral ventricle. Current exam demonstrates redistribution with acute hemorrhage layering dependently in the right lateral ventricle. Right midline shift measures approximately 2 to 3 mm and appears to measured 8 mm. Decreased size of the right lateral ventricle compared to prior examination related to decreased mass effect. Increased attenuation in region of the venous confluence, left medial transverse sinus, and posterior superior sagittal sinus region likely reflects acute subdural hematoma. Patency of the dural venous sinuses cannot be assessed on current exam.  No CT evidence of acute infarct. Mild presumed chronic small vessel ischemic changes.   VISUALIZED ORBITS/SINUSES/MASTOIDS: No intraorbital abnormality. No paranasal sinus mucosal disease. No middle ear or mastoid effusion. BONES/SOFT TISSUES: Left scalp demonstrates evidence for recent surgery. IMPRESSION: 1.  Interval left craniectomy and  subtotal resection left posterior cerebral hemisphere acute hematoma described above. 2.  Sequelae of recent surgery described above. 3.  No immediate postoperative complications identified. 4.  Residual multi compartmental acute intracranial hemorrhages described above. No progressive acute hemorrhages. 5.  Decreased mass effect described above.    CT head wo IV contrast    Result Date: 10/25/2023  EXAM: CT HEAD WO IV CONT LOCATION: [facility] DATE: 10/25/2023 INDICATION: ICH COMPARISON: Head CT 10/25/2023, brain MRI 10/20/2023. Head and neck CT angiogram 10/20/2023 TECHNIQUE: Routine CT Head without IV contrast. Multiplanar reformats. Dose reduction techniques were used. FINDINGS: INTRACRANIAL CONTENTS: The intraparenchymal hemorrhage in the left parietal-occipital region measures 4 x 7 x 5.4 cm and previously measured 4 x 5.4 x 5.6 cm on 10/20/2023 and 4 x 7 x 6.2 cm on 10/25/2023. The surrounding vasogenic type edema has increased. Stable to interval slight increase in the small intraparenchymal hemorrhage in the posterior left temporal lobe which measures 0.6 x 0.8 cm. The surrounding vasogenic type edema has increased.   Stable 0.6 cm thick subdural hematoma along the left tentorial leaflet. The small subdural hematoma along the falx and left parietal occipital convexity is not significantly changed. Interval increase in the effacement of the posterior body and atrium of the left lateral ventricle. Stable 0.7 cm thick subdural hematoma left lateral frontal convexity. 8.2 mm midline shift to the right at the level of the lateral ventricles, previously 7 mm. Interval increase in the size of the temporal horn of the right lateral ventricle. No cerebellar tonsillar ectopia. No CT evidence for an acute infarct. VISUALIZED ORBITS/SINUSES/MASTOIDS: No intraorbital abnormality. No paranasal sinus mucosal disease. No middle ear or mastoid effusion. BONES/SOFT TISSUES: No acute abnormality. IMPRESSION: 1.  The  intraparenchymal hemorrhage in the left parietal-occipital region measures 4 x 7 x 5.4 cm and previously measured 4 x 5.4 x 5.6 cm on 10/20/2023 and 4 x 7 x 6.2 cm on 10/25/2023. The surrounding vasogenic type edema has increased. 2.  Stable to interval slight increase in the small intraparenchymal hemorrhage in the posterior left temporal lobe which measures 0.6 x 0.8 cm. The surrounding vasogenic type edema has increased. 3.  Stable 0.6 cm thick subdural hematoma along the left tentorial leaflet. The small subdural hematoma along the falx and left parietal occipital convexity is not significantly changed. 4.  Interval increase in the effacement of the posterior body and atrium of the left lateral ventricle. Stable 0.7 cm thick subdural hematoma left lateral frontal convexity. 8.2 mm midline shift to the right at the level of the lateral ventricles, previously 7 mm. Interval increase in the size of the temporal horn of the right lateral ventricle, suspicious for entrapment. Discussed the findings with nurse practitioner Debbi at 11:42 PM, 10/25/2023.    CT head wo IV contrast    Result Date: 10/25/2023  EXAM: CT HEAD WO IV CONT LOCATION: [facility] and/or [place] at 7:25 AM on 10/25/2023 DATE: 10/25/2023 INDICATION: Left parieto occipital hemorrhage COMPARISON: 10/24/2023. TECHNIQUE: Routine CT Head without IV contrast. Multiplanar reformats. Dose reduction techniques were used. FINDINGS: INTRACRANIAL CONTENTS: Slightly increased size of the left occipital parietal parenchymal hemorrhage, with increased ventral extension towards the left lateral ventricle, now measuring 68 mm x 63 mm x 42 mm, previously 65 mm x 62 mm x 42 mm Mildly increased size and increased conspicuity of separate clustered small foci of hemorrhage in the inferior left temporal lobe with moderate surrounding vasogenic edema, similar to the prior MRI. No significant change in small acute subdural hematomas along the left cerebellar tentorial  leaflet and posterior interhemispheric falx measuring up to 5 mm in thickness. No sufficient change in acute subdural hematoma along the left cerebral convexity measuring up to 5 mm anteriorly. No significant change in rightward midline shift of 5 mm at the level of the foramen of Mancera. Increased left lateral ventricular caliber worrisome for entrapment. Unchanged partial effacement of the third ventricle. Right lateral and fourth ventricles are unchanged in caliber. No acute large artery territory infarction.   VISUALIZED ORBITS/SINUSES/MASTOIDS: No intraorbital abnormality. No paranasal sinus mucosal disease. No middle ear or mastoid effusion. BONES/SOFT TISSUES: No acute abnormality. IMPRESSION: 1.  Increased caliber of the left lateral ventricle worrisome for entrapment. 2.  Slightly increased size of the left occipital parietal parenchymal hemorrhage. 3.  Mildly increased size and increased conspicuity of clustered small foci of hemorrhage in the inferior temporal lobe. 4.  No significant change in the subdural hematomas along the left cerebellar tentorial leaflet and posterior interhemispheric falx and along the left cerebral convexity. 5.  No significant change in rightward midline shift. Findings discussed with Dr. Stewart Morrissey at 7:25 AM on 10/25/2023    CT head wo IV contrast    Result Date: 10/24/2023  EXAM: CT HEAD WO IV CONT STEALTH LOCATION: [facility] DATE: 10/24/2023 INDICATION: F/u left pariet occ IPH, HEMATOMA (TRAUMATIC) COMPARISON: Head CT at 10:16 AM today. TECHNIQUE: Routine CT Head without IV contrast. Multiplanar reformats. Dose reduction techniques were used. FINDINGS: INTRACRANIAL CONTENTS: Again seen is the large patchy area of acute parenchymal hemorrhage in the left parietal occipital region. Majority of this patchy acute hemorrhage is stable from earlier today. There are 2 small sites of new hemorrhage from earlier today, one in the posterior left temporal lobe measuring 7 mm  and one additional small site of hemorrhage is noted along the left inferior medial temporal area above the tentorium measuring 5 mm. Again seen is a thin layer of acute subdural hemorrhage along the lateral left frontal parietal area slightly better visualized now but mainly stable in overall thickness measuring up to 3 mm. Stable blood products along the left tentorium and posterior falx measuring up to 3 mm. Associated edema in the left parietal-occipital area has mildly progressed and there is more edema extending into the posterior aspect of the left temporal lobe since earlier today. Mass effect from the hemorrhage and edema has progressed mildly with approximately 5 - 6 mm midline shift to the right at the level of the septum pellucidum versus 4 mm earlier today. More compression on the left lateral ventricle and third ventricle. Early medial displacement of the left temporal lobe uncus now present. Slight prominence of the right lateral ventricle stable from earlier today including the right temporal horn. VISUALIZED ORBITS/SINUSES/MASTOIDS: No intraorbital abnormality. No paranasal sinus mucosal disease. No middle ear or mastoid effusion. BONES/SOFT TISSUES: No acute abnormality. IMPRESSION: 1.  Again seen is the large patchy area of acute parenchymal hemorrhage in the left parietal-occipital region. The majority of this hemorrhage is stable from CT earlier this morning. 2.  There are 2 small sites of new additional hemorrhage in the posterior left temporal lobe, one measuring 7 mm best seen on coronal image 72 and an additional 5 mm focus seen on coronal image 69. 3.  Associated edema with this left parietal-occipital hemorrhage has mildly progressed in the interval extending more into the posterior left temporal lobe and there is early medial displacement of the left temporal lobe uncus. 4.  Mass effect has increased with approximately 5 - 6 mm midline shift to the right versus 4 mm earlier today with  more compression on the left lateral ventricle and third ventricle. The left ambient cistern is more narrowed. The lower basal cisterns remain well visualized. 5.  Again seen is a thin layer of acute subdural hemorrhage along the left cerebral hemisphere, slightly better visualized now but overall size is similar to earlier today measuring up to 3 mm in radial thickness. Additionally there is relatively stable hemorrhage along the posterior falx and left tentorium measuring up to 3 mm as well. 6.  Findings discussed with Dr. Morrissey from neurocritical care at 3:15 PM on 10/24/2023. , and Brain vessel imaging results: MR angio head wo IV contrast    Result Date: 1/5/2024  Interpreted By:  Jasen Sifuentes,  and Steph Del Toro STUDY: MR VENOGRAPHY INTRACRANIAL WO IV CONTRAST; MR ANGIO HEAD WO IV CONTRAST; MR BRAIN W AND WO IV CONTRAST;  1/5/2024 11:39 am; 1/5/2024 11:38 am; 1/5/2024 11:34 am   INDICATION: Signs/Symptoms:SDH;s/p craniotomy and ischemic stroke; Signs/Symptoms:SDH; cranioplasty; ischemic stroke; Signs/Symptoms:SDH s/p resection.   COMPARISON:   Outside hospital MR brain 10/24/2023     ACCESSION NUMBER(S): [id number]; [id number]; [id number]   ORDERING CLINICIAN: SANTOS LOO   TECHNIQUE: Axial diffusion, axial T2, axial FLAIR, axial gradient echo T2, axial T1, post gadolinium volumetric T1, as well as post gadolinium axial T1 weighted MRI images of the brain were obtained. Time-of-flight intracranial MRA and MRV images were obtained. The source MRA images were reformatted in multiple planes. The patient received  9 mL of Dotarem gadolinium intravenously.   FINDINGS: There are postoperative changes compatible with a left hemicraniectomy new when compared with the prior MRI dated 10/24/2023 but noted on the prior more recent CT study dated 11/06/2023.   There is smooth dural enhancement along the left craniectomy site likely postsurgical/reactive in etiology.   There has been near-complete  resolution of the previously noted left hemispheric subdural hematoma. A small amount of residual subdural hemorrhage and dural enhancement remains surrounding the posterior margin of the left occipital lobe measuring approximately 3 mm in greatest thickness.   There is again evidence of a residual but smaller mixed signal intraparenchymal hemorrhage an evolving area of more subacute to chronic infarction within the left occipital lobe, posterior left temporal lobe, and extending cranially into the left parietal lobe. The diffusion-weighted images demonstrate areas of increased diffusion signal corresponding diminished signal on the ADC map associated with the areas of evolving hematoma likely representing diffusion signal changes related to evolving blood products. There has been interval improvement in the previously noted associated mass effect within the left cerebral hemisphere with the current study demonstrating asymmetric interval enlargement of the adjacent posterior body, atrium, occipital horn, and temporal horn of the left lateral ventricle compatible with compensatory dilatation from interval surrounding brain parenchymal volume loss within left cerebral hemisphere. There is residual nonspecific nonenhancing bright signal on the FLAIR and T2 images surrounding the evolving areas of hemorrhage suggesting residual edema and/or gliosis. The post gadolinium images demonstrate enhancement along the margins of the residual evolving hematomas as well as along gyri within the left occipital lobe, posterior left temporal lobe, and left parietal lobe likely representing enhancement related to the evolving hematomas and evolving areas of subacute to more chronic infarction. A follow-up MRI in 3 months would be recommended to confirm interval improvement of these areas of enhancement/exclude underlying pathologic enhancement.   There has been interval improvement in the previously noted midline shift from left to  right currently measuring approximately 2 mm compared with 7 mm on the prior MRI dated 10/24/2023.   The above findings are superimposed upon mild-to-moderate brain parenchymal volume loss.   There are small scattered nonspecific white matter changes within the cerebral hemispheres bilaterally which while nonspecific, given the patient's age, likely represent sequelae of more remote small-vessel ischemic change.   No abnormal diffusion restriction to suggest acute infarction is noted.   The gradient echo T2 images again demonstrate blooming dark signal associated with the areas of hemorrhage within the left occipital lobe, left temporal lobe, and left parietal lobe.   There is minimal mucosal thickening noted within a few scattered ethmoid air cells. The remaining paranasal sinuses are clear.   There is opacification of a few inferior left mastoid air cells.   The intracranial MRA demonstrates asymmetric diminished caliber of the P2 segment of the left posterior cerebral artery as well as diminished visualization of P3 and more distal left posterior cerebral artery when compared with the right. 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 No other significant intracranial stenosis or intracranial aneurysm is noted.   The intracranial MRV demonstrates a right dominant transverse and sigmoid sinus. 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 Otherwise, the intracranial MRV is within normal limits without evidence of venous sinus thrombosis.       There are postoperative changes compatible with a left hemicraniectomy new when compared with the prior MRI dated 10/24/2023 but noted on the prior more  recent CT study dated 11/06/2023. There is smooth dural enhancement along the left craniectomy site likely postsurgical/reactive in etiology.   There has been near-complete resolution of the previously noted left hemispheric subdural hematoma. A small amount of residual subdural hemorrhage and dural enhancement remains surrounding the posterior margin of the left occipital lobe measuring approximately 3 mm in greatest thickness.   There is again evidence of a residual but smaller mixed signal intraparenchymal hemorrhage an evolving area of more subacute to chronic infarction within the left occipital lobe, posterior left temporal lobe, and extending cranially into the left parietal lobe. The diffusion-weighted images demonstrate areas of increased diffusion signal corresponding diminished signal on the ADC map associated with the areas of evolving hematoma likely representing diffusion signal changes related to evolving blood products. There has been interval improvement in the previously noted associated mass effect within the left cerebral hemisphere with the current study demonstrating asymmetric interval enlargement of the adjacent posterior body, atrium, occipital horn, and temporal horn of the left lateral ventricle compatible with compensatory dilatation from interval surrounding brain parenchymal volume loss within left cerebral hemisphere. There is residual nonspecific nonenhancing bright signal on the FLAIR and T2 images surrounding the evolving areas of hemorrhage suggesting residual edema and/or gliosis. The post gadolinium images demonstrate enhancement along the margins of the residual evolving hematomas as well as along gyri within the left occipital lobe, posterior left temporal lobe, and left parietal lobe likely representing enhancement related to the evolving hematomas and evolving areas of subacute to more chronic infarction. A follow-up MRI in 3 months would be recommended to confirm interval  improvement of these areas of enhancement/exclude underlying pathologic enhancement.   There has been interval improvement in the previously noted midline shift from left to right currently measuring approximately 2 mm compared with 7 mm on the prior MRI dated 10/24/2023.   The above findings are superimposed upon mild-to-moderate brain parenchymal volume loss.   There are small scattered nonspecific white matter changes within the cerebral hemispheres bilaterally which while nonspecific, given the patient's age, likely represent sequelae of more remote small-vessel ischemic change.     The intracranial MRA demonstrates asymmetric diminished caliber of the P2 segment of the left posterior cerebral artery as well as diminished visualization of P3 and more distal left posterior cerebral artery when compared with the right. 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 No other significant intracranial stenosis or intracranial aneurysm is noted.   The intracranial MRV demonstrates a right dominant transverse and sigmoid sinus. 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 Otherwise, the intracranial MRV is within normal limits without evidence of venous sinus thrombosis.   I personally reviewed the images/study and I agree with the findings as stated by resident physician Dr. Alverto Choi. This study was interpreted at [facility], [place], [place].   MACRO: None.   Signed by: Jasen Sifuentes 1/5/2024 3:59 PM Dictation workstation:   [id number]    CT angio head and neck w and wo IV contrast    Result Date: 10/30/2023  EXAM: CT ANGIO HEAD NECK W IV CONT CODE CVA  LOCATION: [facility] DATE: 10/30/2023 INDICATION: Asymetric pupils, R now > L COMPARISON: 10/30/2023 at 0353 hours, 10/27/2023, 10/26/2023, 10/24/2023, MRI 10/24/2023. CONTRAST: IOHEXOL 350 MG/ML IV SOLN 100 mL TECHNIQUE: Head and neck CT angiogram with IV contrast. Noncontrast head CT followed by axial helical CT images of the head and neck vessels obtained during the arterial phase of intravenous contrast administration. Axial 2D reconstructed images and multiplanar 3D MIP reconstructed images of the head and neck vessels were performed by the technologist. Dose reduction techniques were used. All stenosis measurements made according to NASCET criteria unless otherwise specified. FINDINGS: NONCONTRAST HEAD CT: INTRACRANIAL CONTENTS: No significant change in multicompartment intracranial hemorrhage involving the left parietal, occipital and posterior temporal lobe parenchyma causing surrounding vasogenic edema, partial left cerebral sulci effacement, and mild rightward midline shift. Similar left tentorial leaflet subdural hemorrhage. Stable scattered foci of intracranial pneumocephalus. No MRI findings of acute hydrocephalus. VISUALIZED ORBITS/SINUSES/MASTOIDS: No intraorbital abnormality. No paranasal sinus mucosal disease. No middle ear or mastoid effusion. BONES/SOFT TISSUES: Stable left-sided craniectomy changes. HEAD CTA: ANTERIOR CIRCULATION: No stenosis/occlusion, aneurysm, or high flow vascular malformation. Standard [place] anatomy. POSTERIOR CIRCULATION: No stenosis/occlusion, aneurysm, or high flow vascular malformation. Balanced vertebral arteries supply a normal basilar artery, the latter of which remains fenestrated. DURAL VENOUS SINUSES: Expected enhancement of the major dural venous sinuses. NECK CTA: RIGHT CAROTID: No measurable stenosis or dissection. LEFT CAROTID: No measurable stenosis or dissection. VERTEBRAL ARTERIES: No focal stenosis or dissection. Balanced vertebral  arteries. AORTIC ARCH: Classic aortic arch anatomy with no significant stenosis at the origin of the great vessels. NONVASCULAR STRUCTURES: Bilateral small pleural effusions. HEAD CT: 1.  No significant change in multicompartment intracranial hemorrhages with similar mass effect as detailed. No findings of acute hydrocephalus. 2.  Stable postsurgical changes. HEAD CTA: 1.  No large vessel occlusion. 2.  No significant stenosis, aneurysm, or high flow vascular malformation identified. NECK CTA: 1.  No hemodynamically significant stenosis in the neck vessels. 2.  No evidence for dissection. 3.  Small bilateral pleural effusions. Findings relayed to Dr. Trevino by me at 0733 hours on 10/30/2023    MR angio head w and wo IV contrast    Result Date: 10/25/2023  EXAM: MR VENOUS HEAD W/WO IV CONT LOCATION: [facility] DATE: 10/24/2023 INDICATION: Hemorrhagic infarct b/c of CVST? COMPARISON:  Earlier same day CTA. CONTRAST: GADOBUTROL 1 MMOL/ML IV SOLN 4.5 mL TECHNIQUE: Phase contrast and 2-D time-of-flight head MRV performed without and with intravenous contrast. FINDINGS: The postcontrast MRV is degraded by motion. DURAL SINUSES: No significant stenosis or occlusion. Dominant right and smaller left transverse and sigmoid dural sinuses. INTERNAL CEREBRAL VEINS: No significant stenosis or occlusion.   MAJOR CORTICAL VENOUS BRANCHES: No significant stenosis or occlusion. OTHER: Intracranial findings described separately. IMPRESSION: 1.  No dural venous sinus thrombosis or significant stenosis.    CT angio head and neck w and wo IV contrast    Result Date: 10/24/2023  EXAM: CT ANGIO HEAD NECK W IV CONT CODE CVA LOCATION: [facility] DATE: 10/24/2023 INDICATION: Neuro deficit, acute, stroke suspected patient found down, aphasic. COMPARISON: None. CONTRAST: IOHEXOL 350 MG/ML IV SOLN 75 mL TECHNIQUE: Head and neck CT angiogram with IV contrast. Noncontrast head CT followed by axial helical CT images of the head and neck  vessels obtained during the arterial phase of intravenous contrast administration. Axial 2D reconstructed images and multiplanar 3D MIP reconstructed images of the head and neck vessels were performed by the technologist. Dose reduction techniques were used. All stenosis measurements made according to NASCET criteria unless otherwise specified. FINDINGS: NONCONTRAST HEAD CT: INTRACRANIAL CONTENTS: Large patchy acute parenchyma hemorrhage centered in the left parieto-occipital region measuring approximately 6 cm AP by 4 cm transverse by 6 cm craniocaudad. It is associated with vasogenic edema. There is an associated thin subdural hematoma along the left parietal-occipital region measuring up to approximately 3 mm in thickness as well as subdural hemorrhage all along the left posterior falx and tentorium measuring up to 3 mm as well. Mass effect on the left lateral ventricle and third ventricle with midline shift to the right measuring approximately 4 mm at the level of the septum pellucidum. Basal cisterns remain well visualized. Normal parenchymal attenuation elsewhere. Mild generalized volume loss. No hydrocephalus. VISUALIZED ORBITS/SINUSES/MASTOIDS: No intraorbital abnormality. No paranasal sinus mucosal disease. No middle ear or mastoid effusion. BONES/SOFT TISSUES: No acute abnormality. HEAD CTA: ANTERIOR CIRCULATION: All of the major large-caliber proximal anterior circulation vessels appear patent without definite aneurysm or AVM. No definite evidence for underlying arteriovenous malformation in the left parietal-occipital hemorrhage region. Paucity of filling of the small cortical branches in the left parietal-occipital hemorrhage region. Standard [place] anatomy. POSTERIOR CIRCULATION: Both intracranial vertebral arteries and the basilar artery are patent as are both posterior cerebral arteries. Balanced vertebral arteries supply a normal basilar artery. DURAL VENOUS SINUSES: Expected early  enhancement of the major dural venous sinuses. The left transverse sinus and sigmoid sinus appear to fill with contrast but are quite small. This could be anatomic variation but it is not certain. NECK CTA: RIGHT CAROTID: No measurable stenosis or dissection. LEFT CAROTID: No measurable stenosis or dissection. VERTEBRAL ARTERIES: No focal stenosis or dissection. Balanced vertebral arteries. AORTIC ARCH: Classic aortic arch anatomy with no significant stenosis at the origin of the great vessels. NONVASCULAR STRUCTURES: Unremarkable. IMPRESSION: HEAD CT: 1.  Large patchy acute parenchymal hemorrhage centered in the left parietal-occipital region as described above with surrounding vasogenic edema. Mass effect on the left lateral ventricle and third ventricle with midline shift to the right of approximately 4 mm at the level of the septum pellucidum. Basal cisterns remain well visualized. 2.  There is a thin layer of associated subdural hemorrhage along the left parietal-occipital region measuring up to 3 mm in thickness. 3 mm in thickness subdural hemorrhage along the posterior left falx and tentorium as well. HEAD CTA: 1.  All of the major large-caliber proximal intracranial vessels are patent without definitive aneurysm or AVM by CT angiogram. 2.  Nothing definite to suggest AVM in the left parietal-occipital hemorrhage region. Paucity of contrast filling of the small cortical branches over the left parietal-occipital region at the site of hemorrhage noted. 3.  Expected early enhancement of the major dural sinuses. The left transverse and sigmoid sinus appear quite small. This could be anatomic but is nonspecific. NECK CTA: 1.  No significant stenosis or acute dissection involving the carotid or vertebral arteries in the neck. 2.  CT head and CTA findings communicated to the stroke team via [employer] ismael at 10:26 AM and 10:52 AM respectively on 10/24/2023.       Neurologic Exam:    Mental Status: Oriented to person,  "place, and time. Language is slow with paucity. Naming is intact to high frequency items, not to low frequency like \"knuckles\" although gets it with multiple choice options. Repetition intact. Follows complex, cross body commands but with multiple trials and breakdown of command per section. Mild apraxia.  Cranial Nerves:   II: Pupils equal and reactive without evidence of APD. R homonymous hemianopsia.  III/IV/VI: EOMI without nystagmus.   V: Facial sensation intact and symmetric. Strong muscles of mastication.   VII: Face symmetric without evidence of facial droop.   VIII: Hearing intact bilaterally to finger rub.  IX: Palate elevates symmetrically. Uvula midline.  XII: Tongue protrudes midline   Motor: Bulk is normal. Tone is normal. No pronator drift. No orbiting. Formal strength testing reveals 5/5 strength in bilateral upper and lower extremities. No abnormal movements.  Sensation: Sensation is intact and symmetric to light touch in bilateral upper and lower extremities. Negative Romberg.  Reflexes: Deferred.  Coordination: No evidence of ataxia in upper or lower extremities.   Gait: Walks without assistive device. Narrow based gait that is steady. Heel, toe, and tandem gait intact.    Assessment/Plan:  Fadia Bolden is a 68 year old female with DAIJA, breast cancer x2 now in remission, HTN, HLD who presents to stroke clinic for a follow up of hemorrhagic stroke. MRI with concern for ischemic stroke with hemorrhagic conversion. Will need to finish stroke workup with ziopatch and repeat MRI in 3 months to ensure no underlying lesion.    Ziopatch x2 weeks  2.   Continue aspirin 81mg  3.   MRI with and without in 3 months  4.   Follow up in 4-6 months    Patient was seen and discussed with Dr. Mann.    Nely Worrell DO  Vascular Neurology Fellow PGY5  [facility]        "

## 2024-02-01 ENCOUNTER — APPOINTMENT (OUTPATIENT)
Dept: SPEECH THERAPY | Facility: CLINIC | Age: 69
End: 2024-02-01
Payer: COMMERCIAL

## 2024-02-01 ENCOUNTER — TREATMENT (OUTPATIENT)
Dept: OCCUPATIONAL THERAPY | Facility: CLINIC | Age: 69
End: 2024-02-01
Payer: COMMERCIAL

## 2024-02-01 DIAGNOSIS — R27.8 COORDINATION ABNORMAL: ICD-10-CM

## 2024-02-01 DIAGNOSIS — R53.1 RIGHT SIDED WEAKNESS: Primary | ICD-10-CM

## 2024-02-01 DIAGNOSIS — R53.1 WEAKNESS GENERALIZED: ICD-10-CM

## 2024-02-01 PROCEDURE — 97110 THERAPEUTIC EXERCISES: CPT | Mod: GO | Performed by: OCCUPATIONAL THERAPIST

## 2024-02-01 PROCEDURE — 97112 NEUROMUSCULAR REEDUCATION: CPT | Mod: GO | Performed by: OCCUPATIONAL THERAPIST

## 2024-02-01 NOTE — PROGRESS NOTES
Occupational Therapy    Reassessment   Patient Name: Fadia Bolden  MRN: [id number]  Today's Date: 2/1/2024         Visit: #9    Assessment:  Patient demonstrated increased activity tolerance on today's date; pt able to tolerate 3:30 on bike before requiring rest breaks.     Plan:   Interventions: Self Care/ADL, neuromuscular reeducation, therapeutic exercise   Duration: 1x/wk for 4 weeks     Subjective   Patient reports that her surgery is scheduled for 2/19.       Current Problem:  1. Right sided weakness        2. Coordination abnormal        3. Weakness generalized            Precautions:   Helmet; able to remove when sitting still and sleeping       Objective   Patient tolerated 3:30 on standing arm bike without requiring a seated rest break  Pt required increased time and verbal cues for participation in multitasking, coordination task    Treatment:     Therapeutic Exercise: 31  Pt completed 5 minutes on standing arm bike     Shoulder flexion with 1# weight -3x10  Shoulder external rotation with 1# weight 3x10    Neuromuscular reeducation: 11    GMC and multi-tasking addressed by having pt step on colored dots as called out by therapist.  Simultaneously, pt to balance ball in upside cone with L hand. While patient steps on colored dot, pt to toss and catch ball within cone.

## 2024-02-02 ENCOUNTER — PREP FOR PROCEDURE (OUTPATIENT)
Dept: NEUROSURGERY | Facility: HOSPITAL | Age: 69
End: 2024-02-02
Payer: COMMERCIAL

## 2024-02-02 DIAGNOSIS — M95.2 ACQUIRED SKULL DEFECT: Primary | ICD-10-CM

## 2024-02-06 ENCOUNTER — TELEPHONE (OUTPATIENT)
Dept: NEUROLOGY | Facility: CLINIC | Age: 69
End: 2024-02-06

## 2024-02-06 ENCOUNTER — TREATMENT (OUTPATIENT)
Dept: SPEECH THERAPY | Facility: CLINIC | Age: 69
End: 2024-02-06
Payer: COMMERCIAL

## 2024-02-06 DIAGNOSIS — I69.320 APHASIA AS LATE EFFECT OF CEREBROVASCULAR ACCIDENT (CVA): Primary | ICD-10-CM

## 2024-02-06 PROCEDURE — 92507 TX SP LANG VOICE COMM INDIV: CPT | Mod: GN | Performed by: STUDENT IN AN ORGANIZED HEALTH CARE EDUCATION/TRAINING PROGRAM

## 2024-02-06 ASSESSMENT — PAIN - FUNCTIONAL ASSESSMENT: PAIN_FUNCTIONAL_ASSESSMENT: 0-10

## 2024-02-06 ASSESSMENT — PAIN SCALES - GENERAL: PAINLEVEL_OUTOF10: 0 - NO PAIN

## 2024-02-06 NOTE — TELEPHONE ENCOUNTER
Toni stopped by the office today. He talked to biometrics about picking up Fadia's monitor. They said they can only do the monitor for 24 to 48 hours? I feel like this is not correct. On your order you state she needs to wear it for 15 days. He wants to know what they should do. He states she's having surgery on the 19th and that you wanted her to have it on before that.    Please advise,  Thanks!

## 2024-02-06 NOTE — PROGRESS NOTES
Speech-Language Pathology    Outpatient Speech-Language Pathology Treatment     Patient Name: Fadia Bolden  MRN: [id number]  Today's Date: 2024  Time Calculation  Start Time: 1030  Stop Time: 1115  Time Calculation (min): 45 min         Current Problem:   1. 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 She demonstrated improvement using common objects in a sentence. She demonstrated difficulty with a naming task and describing objects. The patient may continue to benefit from skilled speech therapy services in order to improve language skills to improve communication skills in the home and clinical setting.    General Visit Information:  Certification Period Start Date: 23  Certification Period End Date: 24     Total Number of Visits : 12    Pain:  Pain Assessment  Pain Assessment: 0-10  Pain Score: 0 - No pain     Plan:  Plan  Inpatient/Swing Bed or Outpatient: Outpatient  SLP Plan: Skilled SLP  SLP Frequency: 2x per week  Duration: 12 weeks  Next Treatment Priority: Categorization ismael  Discussed POC: Patient, Caregiver/family  Discussed Risks/Benefits: Yes  Patient/Caregiver Agreeable: Yes    Goals: Established 23  Short-term goals:   Patient will complete naming activities (including picture naming, generative naming, verb naming, descriptive naming, synonyms/antonyms, etc.) with 80% accuracy given minimal-moderate cues   - Confrontational namin%, improved to 80% with use of additional descriptors in lieu of naming   -Describing common objects: 100% with extended time and min cues  Status: Goal Status: Progressing toward goal  Progress: See objective data below    Previous  -Name the category: 93% (concrete), 90% (abstract)   -Exclusion task,  adaptive: 76% (10/13)  -Word finding, general info (animals/people/characters); 67% (10/15)      -Describing common objects - 80% (4/5)  -Naming from description: 90% (18/20) when given extended time; phonemic cues increased accuracy to 100%              -Responsive naming, general information: 71% (15/21)              -describing common objects - 33% (2/6) indep; increased to 67% with min cues; mod-max cues required to provide enough relevant information about the item  -Verb namin% (16/16), indep              -Complete the sentence (1 word): 95% (19/20)-object naming - 100% given occasional cues and extra time                         -naming items from definitions/description - 70% (7/10)               -Stating the category ([employer] ismael): FO4 choices, words only - 84% (21/25)              -responsive naming - 85% (17/20)              -describing common objects - 67% (2/3) with min cues; the patient struggled with this task this date  -synonyms - 60% (3/5)  -antonyms - 100%          -confrontational namin%; improved to 90 with min cues and extended time                Patient will complete targeted expressive language tasks (including stating personal information, phrase/sentence completion, open-ended questions) with 80% accuracy given minimal-moderate cues.  Status: Goal Status: Progressing toward goal  Progress: See objective data below               -WH questions about objects in the above tasks - 100%    Previous               -Patient was able to state her full name, address and the names of her children and their spouses. She required extra time and one phonemic cue for her son in law  -Semantic analysis task - 88% accuracy to provide relevant and accurate features of the target item; accuracy improved when given cues or suggested non verbal strategies  -naming immediate family members and friends, 82% (14/17) to name children, their spouses and close friends and siblings     Patient will complete  targeted receptive language tasks (including complex yes/no questions, functional reading, paragraph level comprehension) with 80% accuracy given minimal-moderate cues.  Status: Goal Status: Progressing toward goal  Progress: Goal not addressed this date     Previous:  -Reading comprehension (121 and 136 word passages) - when given FO4 choices the patient answered comprehension questions with 100% accuracy. She referred back to the passage in 1/6 questions. Patient required extended time  -Complex Y/N, comparisons - 89% (40/45)  -Functional reading (CT ismael lvl 1/3) - 88%     Patient will complete further assessment of cognitive-linguistic skills (MoCA, EM, etc.) to determine additional therapy goals as indicated.  Status: Not indicated  Progress: No progress, goal to be discontinued with next progress note    Subjective:  Patient reports no new or worsening symptoms. Craniotomy scheduled for 2/19.    Outpatient Education:  Adult Outpatient Education  Individual(s) Educated: Patient, Spouse  Risk and Benefits Discussed with Patient/Caregiver/Other: yes  Patient/Caregiver Demonstrated Understanding: yes  Plan of Care Discussed and Agreed Upon: yes  Patient Response to Education: Patient/Caregiver Verbalized Understanding of Information

## 2024-02-07 ENCOUNTER — APPOINTMENT (OUTPATIENT)
Dept: CARDIOLOGY | Facility: HOSPITAL | Age: 69
End: 2024-02-07
Payer: COMMERCIAL

## 2024-02-07 ENCOUNTER — TELEPHONE (OUTPATIENT)
Dept: CARDIOLOGY | Facility: CLINIC | Age: 69
End: 2024-02-07

## 2024-02-07 ENCOUNTER — TREATMENT (OUTPATIENT)
Dept: OCCUPATIONAL THERAPY | Facility: CLINIC | Age: 69
End: 2024-02-07
Payer: COMMERCIAL

## 2024-02-07 DIAGNOSIS — R53.1 RIGHT SIDED WEAKNESS: Primary | ICD-10-CM

## 2024-02-07 DIAGNOSIS — R27.8 COORDINATION ABNORMAL: ICD-10-CM

## 2024-02-07 DIAGNOSIS — R53.1 WEAKNESS GENERALIZED: ICD-10-CM

## 2024-02-07 PROCEDURE — 97112 NEUROMUSCULAR REEDUCATION: CPT | Mod: GO,CO

## 2024-02-07 NOTE — PROGRESS NOTES
Occupational Therapy    Reassessment   Patient Name: Fadia Bolden  MRN: [id number]  Today's Date: 2/7/2024      Time Calculation  Start Time: 1116  Stop Time: 1200  Time Calculation (min): 44 min    Visit: #10    Assessment:  Pt appears to tolerate treatment well with no complaints of pain. During functional mobility pt bumps wall on right one time, verbal cues provided for awareness, good follow through.     Plan:   Interventions: Self Care/ADL, neuromuscular reeducation, therapeutic exercise   Duration: 1x/wk for 4 weeks     Subjective   Patient reports no changes since last visit.       Current Problem:  1. Right sided weakness        2. Coordination abnormal        3. Weakness generalized                Precautions:   Helmet; able to remove when sitting still and sleeping       Objective     Treatment:       Neuromuscular reeducation: 44    Functional obstacle course and scavenger hunt to promote increased right side awareness and safety during functional mobility tasks.   Functional fine motor tasks to promote increased strength and control of right hand to ease ADL and IADL tasks.  Pt completes bolt/washer board  Pt uses green then blue resistance clothespins to  small beads one at a time and place onto target placed on right side. 1x15 each.

## 2024-02-08 ENCOUNTER — OFFICE VISIT (OUTPATIENT)
Dept: PRIMARY CARE | Facility: CLINIC | Age: 69
End: 2024-02-08
Payer: COMMERCIAL

## 2024-02-08 ENCOUNTER — TREATMENT (OUTPATIENT)
Dept: SPEECH THERAPY | Facility: CLINIC | Age: 69
End: 2024-02-08
Payer: COMMERCIAL

## 2024-02-08 VITALS
DIASTOLIC BLOOD PRESSURE: 68 MMHG | HEART RATE: 60 BPM | HEIGHT: 64 IN | SYSTOLIC BLOOD PRESSURE: 128 MMHG | OXYGEN SATURATION: 100 % | TEMPERATURE: 97.9 F | WEIGHT: 104 LBS | BODY MASS INDEX: 17.75 KG/M2 | RESPIRATION RATE: 16 BRPM

## 2024-02-08 DIAGNOSIS — R10.13 EPIGASTRIC PAIN: ICD-10-CM

## 2024-02-08 DIAGNOSIS — I69.320 APHASIA AS LATE EFFECT OF CEREBROVASCULAR ACCIDENT (CVA): Primary | ICD-10-CM

## 2024-02-08 DIAGNOSIS — R60.1 GENERALIZED EDEMA: ICD-10-CM

## 2024-02-08 DIAGNOSIS — F51.01 PRIMARY INSOMNIA: ICD-10-CM

## 2024-02-08 DIAGNOSIS — S06.5XAA SDH (SUBDURAL HEMATOMA) (MULTI): ICD-10-CM

## 2024-02-08 DIAGNOSIS — I63.89 CEREBROVASCULAR ACCIDENT (CVA) DUE TO OTHER MECHANISM (MULTI): ICD-10-CM

## 2024-02-08 DIAGNOSIS — I10 PRIMARY HYPERTENSION: Primary | ICD-10-CM

## 2024-02-08 PROCEDURE — 99214 OFFICE O/P EST MOD 30 MIN: CPT | Performed by: INTERNAL MEDICINE

## 2024-02-08 PROCEDURE — 1036F TOBACCO NON-USER: CPT | Performed by: INTERNAL MEDICINE

## 2024-02-08 PROCEDURE — 92507 TX SP LANG VOICE COMM INDIV: CPT | Mod: GN | Performed by: STUDENT IN AN ORGANIZED HEALTH CARE EDUCATION/TRAINING PROGRAM

## 2024-02-08 PROCEDURE — 1159F MED LIST DOCD IN RCRD: CPT | Performed by: INTERNAL MEDICINE

## 2024-02-08 PROCEDURE — 1123F ACP DISCUSS/DSCN MKR DOCD: CPT | Performed by: INTERNAL MEDICINE

## 2024-02-08 PROCEDURE — 3074F SYST BP LT 130 MM HG: CPT | Performed by: INTERNAL MEDICINE

## 2024-02-08 PROCEDURE — 1126F AMNT PAIN NOTED NONE PRSNT: CPT | Performed by: INTERNAL MEDICINE

## 2024-02-08 PROCEDURE — 1160F RVW MEDS BY RX/DR IN RCRD: CPT | Performed by: INTERNAL MEDICINE

## 2024-02-08 PROCEDURE — 3078F DIAST BP <80 MM HG: CPT | Performed by: INTERNAL MEDICINE

## 2024-02-08 ASSESSMENT — ENCOUNTER SYMPTOMS
FEVER: 0
SHORTNESS OF BREATH: 0
FATIGUE: 0
COUGH: 0

## 2024-02-08 ASSESSMENT — PAIN - FUNCTIONAL ASSESSMENT: PAIN_FUNCTIONAL_ASSESSMENT: 0-10

## 2024-02-08 ASSESSMENT — PAIN SCALES - GENERAL: PAINLEVEL_OUTOF10: 0 - NO PAIN

## 2024-02-08 NOTE — PROGRESS NOTES
"Subjective   Fadia Bolden is a 68 y.o. female who presents for 1 month Follow-up.    HPI   Pt reports improvement in BLE edema.  Recent appt /72.  Denies chest pain, SOB, vision changes, headache.    Sleeping better at night.  Did not start Sertraline.  Reports feeling nervous regarding upcoming surgery on 2/19/24.  C/o epigastric burning when nervous. Denies nausea.      Review of Systems   Constitutional:  Negative for fatigue and fever.   Respiratory:  Negative for cough and shortness of breath.    Cardiovascular:  Negative for chest pain and leg swelling.   All other systems reviewed and are negative.      Health Maintenance Due   Topic Date Due    Bone Density Scan  Never done    COVID-19 Vaccine (1) Never done    Hepatitis C Screening  Never done    Zoster Vaccines (1 of 2) Never done    Hepatitis B Vaccines (2 of 3 - 19+ 3-dose series) 08/18/2005    Pneumococcal Vaccine: 65+ Years (1 - PCV) Never done    Influenza Vaccine (1) Never done       Objective   /68   Pulse 60   Temp 36.6 °C (97.9 °F)   Resp 16   Ht 1.626 m (5' 4\")   Wt 47.2 kg (104 lb)   SpO2 100%   BMI 17.85 kg/m²     Physical Exam  Vitals and nursing note reviewed.   Constitutional:       Appearance: Normal appearance.   HENT:      Head: Normocephalic.   Eyes:      Conjunctiva/sclera: Conjunctivae normal.      Pupils: Pupils are equal, round, and reactive to light.   Cardiovascular:      Rate and Rhythm: Normal rate and regular rhythm.      Pulses: Normal pulses.      Heart sounds: Normal heart sounds.   Pulmonary:      Effort: Pulmonary effort is normal.      Breath sounds: Normal breath sounds.   Musculoskeletal:         General: No swelling.      Cervical back: Neck supple.   Skin:     General: Skin is warm and dry.   Neurological:      General: No focal deficit present.      Mental Status: She is oriented to person, place, and time.      Gait: Gait abnormal.         Assessment/Plan   Problem List Items Addressed This Visit  "      Cerebrovascular accident (CVA) (CMS/HCC)    Primary hypertension - Primary    SDH (subdural hematoma) (CMS/HCC)     Other Visit Diagnoses       Primary insomnia        Generalized edema        Epigastric pain              Cont amlodipine  Will follow up after surgery  Prilosec otc x 2 weeks  Follow up on holter  Check with surgeon office if needs to hold baby asa  Stable based on symptoms and exam.  Continue established treatment plan and follow up at least yearly.

## 2024-02-08 NOTE — PROGRESS NOTES
Speech-Language Pathology    Outpatient Speech-Language Pathology Treatment     Patient Name: Fadia Bolden  MRN: [id number]  Today's Date: 2/8/2024  Time Calculation  Start Time: 1033  Stop Time: 1115  Time Calculation (min): 42 min         Current Problem:   1. 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 She demonstrated improvement word finding and reading comprehension. She demonstrated difficulty with a description task but trials were limited due to time constraints. The patient may continue to benefit from skilled speech therapy services in order to improve her ability to state wants, needs, opinions and engage in meaningful conversation.     General Visit Information:  Certification Period Start Date: 12/05/23  Certification Period End Date: 03/04/24     Total Number of Visits : 13    Pain:  Pain Assessment  Pain Assessment: 0-10  Pain Score: 0 - No pain     Plan:  Plan  Inpatient/Swing Bed or Outpatient: Outpatient  SLP Plan: Skilled SLP  SLP Frequency: 2x per week  Duration: 12 weeks  Next Treatment Priority: describing objects  Discussed POC: Patient, Caregiver/family  Discussed Risks/Benefits: Yes  Patient/Caregiver Agreeable: Yes    Goals: Established 12/5/23  Short-term goals:   Patient will complete naming activities (including picture naming, generative naming, verb naming, descriptive naming, synonyms/antonyms, etc.) with 80% accuracy given minimal-moderate cues  Status: Goal Status: Progressing toward goal  Progress: See objective data below   -Generating words to fill in blanks during the receptive language task - 100% indep  -Naming objects from description: 100% (2/2, ltd trials d/t to time constraints)   -Exclusion task; med difficulty, FO4 words+pics: 96% (24/25)  indep    Previous  - Confrontational namin%, improved to 80% with use of additional descriptors in lieu of naming  -Name the category: 93% (concrete), 90% (abstract)   -Exclusion task, adaptive: 76% (10/13)  -Word finding, gen info (animals/people/characters); 67% (10/15)      -Describing common objects - 80% (4/5)  -Naming from description: 90% (18/20) when given extended time; phonemic cues increased accuracy to 100%              -Responsive naming, general information: 71% (15/21)              -describing common objects - 33% (2/6) indep; increased to 67% with min cues; mod-max cues required to provide enough relevant information about the item  -Verb namin% (16/16), indep              -Complete the sentence (1 word): 95% (19/20)-object naming - 100% given occasional cues and extra time                         -naming items from definitions/description - 70% (7/10)               -Stating the category ([employer] ismael): FO4 choices, words only - 84% (21/25)              -responsive naming - 85% (17/20)              -describing common objects - 67% (2/3) with min cues; the patient struggled with this task this date  -synonyms - 60% (3/5)  -antonyms - 100%          -confrontational namin%; improved to 90 with min cues and extended time                Patient will complete targeted expressive language tasks (including stating personal information, phrase/sentence completion, open-ended questions) with 80% accuracy given minimal-moderate cues.  Status: Goal Status: Progressing toward goal  Progress: See objective data below           -Describing common objects: 100% with extended time and min cues (3/3, ltd trials d/t to time constraints)    Previous              -WH questions about objects in the above tasks - 100%     -Patient was able to state her full name, address and the names of her children and their spouses. She required extra time and one phonemic cue for her son in law  -Semantic analysis task -  88% accuracy to provide relevant and accurate features of the target item; accuracy improved when given cues or suggested non verbal strategies  -naming immediate family members and friends, 82% (14/17) to name children, their spouses and close friends and siblings     Patient will complete targeted receptive language tasks (including complex yes/no questions, functional reading, paragraph level comprehension) with 80% accuracy given minimal-moderate cues.  Status: Goal Status: Progressing toward goal  Progress: See objective data below    -Paragraph with missing words; patient accuracy was 100% with min cues to ensure all words were read correctly which enabled her to generate an appropriate word to fill in the blanks    Previous:  -Reading comprehension (121 and 136 word passages) - when given FO4 choices the patient answered comprehension questions with 100% accuracy. She referred back to the passage in 1/6 questions. Patient required extended time  -Complex Y/N, comparisons - 89% (40/45)  -Functional reading (CT ismael lvl 1/3) - 88%     Patient will complete further assessment of cognitive-linguistic skills (MoCA, EM, etc.) to determine additional therapy goals as indicated.  Status: Not indicated  Progress: No progress, goal to be discontinued with next progress note    Subjective:  Patient reports no new or worsening symptoms. Craniotomy scheduled for 2/19.    Outpatient Education:  Adult Outpatient Education  Individual(s) Educated: Patient, Spouse  Risk and Benefits Discussed with Patient/Caregiver/Other: yes  Patient/Caregiver Demonstrated Understanding: yes  Plan of Care Discussed and Agreed Upon: yes  Patient Response to Education: Patient/Caregiver Verbalized Understanding of Information

## 2024-02-09 ENCOUNTER — HOSPITAL ENCOUNTER (OUTPATIENT)
Dept: CARDIOLOGY | Facility: HOSPITAL | Age: 69
Discharge: HOME | End: 2024-02-09
Payer: COMMERCIAL

## 2024-02-09 DIAGNOSIS — R00.2 PALPITATIONS WITH REGULAR CARDIAC RHYTHM: ICD-10-CM

## 2024-02-09 DIAGNOSIS — I63.89 CEREBROVASCULAR ACCIDENT (CVA) DUE TO OTHER MECHANISM (MULTI): ICD-10-CM

## 2024-02-09 PROCEDURE — 93270 REMOTE 30 DAY ECG REV/REPORT: CPT

## 2024-02-09 PROCEDURE — 93272 ECG/REVIEW INTERPRET ONLY: CPT | Performed by: STUDENT IN AN ORGANIZED HEALTH CARE EDUCATION/TRAINING PROGRAM

## 2024-02-12 ENCOUNTER — APPOINTMENT (OUTPATIENT)
Dept: PRIMARY CARE | Facility: CLINIC | Age: 69
End: 2024-02-12
Payer: COMMERCIAL

## 2024-02-13 ENCOUNTER — HOSPITAL ENCOUNTER (OUTPATIENT)
Dept: RADIOLOGY | Facility: HOSPITAL | Age: 69
Discharge: HOME | End: 2024-02-13
Payer: COMMERCIAL

## 2024-02-13 ENCOUNTER — TREATMENT (OUTPATIENT)
Dept: SPEECH THERAPY | Facility: CLINIC | Age: 69
End: 2024-02-13
Payer: COMMERCIAL

## 2024-02-13 DIAGNOSIS — I69.320 APHASIA AS LATE EFFECT OF CEREBROVASCULAR ACCIDENT (CVA): Primary | ICD-10-CM

## 2024-02-13 DIAGNOSIS — E04.2 NONTOXIC MULTINODULAR GOITER: ICD-10-CM

## 2024-02-13 PROCEDURE — 76536 US EXAM OF HEAD AND NECK: CPT

## 2024-02-13 PROCEDURE — 92507 TX SP LANG VOICE COMM INDIV: CPT | Mod: GN | Performed by: STUDENT IN AN ORGANIZED HEALTH CARE EDUCATION/TRAINING PROGRAM

## 2024-02-13 PROCEDURE — 76536 US EXAM OF HEAD AND NECK: CPT | Performed by: RADIOLOGY

## 2024-02-13 ASSESSMENT — PAIN SCALES - GENERAL: PAINLEVEL_OUTOF10: 0 - NO PAIN

## 2024-02-13 ASSESSMENT — PAIN - FUNCTIONAL ASSESSMENT: PAIN_FUNCTIONAL_ASSESSMENT: 0-10

## 2024-02-13 NOTE — PROGRESS NOTES
"Speech-Language Pathology    Outpatient Speech-Language Pathology Treatment     Patient Name: Fadia Bolden  MRN: [id number]  Today's Date: 2/13/2024  Time Calculation  Start Time: 1115  Stop Time: 1200  Time Calculation (min): 45 min         Current Problem:   1. 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 She demonstrated improvement when generating lists that start with a certain letter or rhymes with a starting word. She demonstrated difficulty naming cities, presidents, diary products and pro sports teams. The patient may continue to benefit from skilled speech therapy services in order to improve word finding and verbal expression to actively participate in the home, social and clinical settings.    General Visit Information:  Certification Period Start Date: 12/05/23  Certification Period End Date: 03/04/24     Total Number of Visits : 14    Pain:  Pain Assessment  Pain Assessment: 0-10  Pain Score: 0 - No pain     Plan:  Plan  Inpatient/Swing Bed or Outpatient: Outpatient  SLP Plan: Skilled SLP  SLP Frequency: 2x per week  Duration: 12 weeks  Discussed POC: Patient, Caregiver/family  Discussed Risks/Benefits: Yes  Patient/Caregiver Agreeable: Yes    Goals: Established 12/5/23  Short-term goals:   Patient will complete naming activities (including picture naming, generative naming, verb naming, descriptive naming, synonyms/antonyms, etc.) with 80% accuracy given minimal-moderate cues  Status: Goal Status: Progressing toward goal  Progress: See objective data below      -Generative naming, 2 min, goal of 10 items    Rhymes with \"bee\" - 5    Dairy products - 2    Presidents - 3    Starts with \"T\" - 10    Rhymes with \"ray\" - 10    Cities - 1    Colors - 9    Starts with \"L\" - 9    Rhymes with \"ride\" - " "10    Pro sports teams - 3    Starts with \"C\" - 10      Avg of 6.5 items (65%)  Previous  -Generating words to fill in blanks during the receptive language task - 100% indep  -Naming objects from description: 100% (2/2, ltd trials d/t to time constraints)   -Exclusion task; med difficulty, FO4 words+pics: 96% (24/25) indep  - Confrontational namin%, improved to 80% with use of additional descriptors in lieu of naming  -Name the category: 93% (concrete), 90% (abstract)   -Exclusion task, adaptive: 76% (10/13)  -Word finding, gen info (animals/people/characters); 67% (10/15)      -Describing common objects - 80% (4/5)  -Naming from description: 90% (18/20) when given extended time; phonemic cues increased accuracy to 100%              -Responsive naming, general information: 71% (15/21)              -describing common objects - 33% (2/6) indep; increased to 67% with min cues; mod-max cues required to provide enough relevant information about the item  -Verb namin% (16/16), indep              -Complete the sentence (1 word): 95% (19/20)-object naming - 100% given occasional cues and extra time                         -naming items from definitions/description - 70% (7/10)               -Stating the category (Tactus ismael): FO4 choices, words only - 84% (21/25)              -responsive naming - 85% (17/20)              -describing common objects - 67% (2/3) with min cues; the patient struggled with this task this date  -synonyms - 60% (3/5)  -antonyms - 100%          -confrontational namin%; improved to 90 with min cues and extended time                Patient will complete targeted expressive language tasks (including stating personal information, phrase/sentence completion, open-ended questions) with 80% accuracy given minimal-moderate cues.  Status: Goal Status: Progressing toward goal  Progress: Goal not addressed this date             Previous              -Describing common objects: 100% with " extended time and min cues (3/3, ltd trials d/t to time constraints)  -WH questions about objects in the above tasks - 100%     -Patient was able to state her full name, address and the names of her children and their spouses. She required extra time and one phonemic cue for her son in law  -Semantic analysis task - 88% accuracy to provide relevant and accurate features of the target item; accuracy improved when given cues or suggested non verbal strategies  -naming immediate family members and friends, 82% (14/17) to name children, their spouses and close friends and siblings     Patient will complete targeted receptive language tasks (including complex yes/no questions, functional reading, paragraph level comprehension) with 80% accuracy given minimal-moderate cues.  Status: Goal Status: Progressing toward goal  Progress: Goal not addressed this date      Previous:  -Paragraph with missing words; patient accuracy was 100% with min cues to ensure all words were read correctly which enabled her to generate an appropriate word to fill in the blanks  -Reading comprehension (121 and 136 word passages) - when given FO4 choices the patient answered comprehension questions with 100% accuracy. She referred back to the passage in 1/6 questions. Patient required extended time  -Complex Y/N, comparisons - 89% (40/45)  -Functional reading (CT ismael lvl 1/3) - 88%     Patient will complete further assessment of cognitive-linguistic skills (MoCA, EM, etc.) to determine additional therapy goals as indicated.  Status: Not indicated  Progress: No progress, goal to be discontinued with next progress note    Subjective:  Patient reports no new or worsening symptoms. Craniotomy scheduled for 2/19.    Outpatient Education:  Adult Outpatient Education  Individual(s) Educated: Patient, Spouse  Risk and Benefits Discussed with Patient/Caregiver/Other: yes  Patient/Caregiver Demonstrated Understanding: yes  Plan of Care Discussed and  Agreed Upon: yes  Patient Response to Education: Patient/Caregiver Verbalized Understanding of Information

## 2024-02-14 ENCOUNTER — TELEPHONE (OUTPATIENT)
Dept: PRIMARY CARE | Facility: CLINIC | Age: 69
End: 2024-02-14
Payer: COMMERCIAL

## 2024-02-14 DIAGNOSIS — R68.89 ABNORMAL FINDINGS ON EXAMINATION OF SKULL AND HEAD: ICD-10-CM

## 2024-02-14 DIAGNOSIS — Z79.82 ENCOUNTER FOR MONITORING ASPIRIN THERAPY: ICD-10-CM

## 2024-02-14 DIAGNOSIS — Z01.818 PREOPERATIVE EXAMINATION: Primary | ICD-10-CM

## 2024-02-14 DIAGNOSIS — Z51.81 ENCOUNTER FOR MONITORING ASPIRIN THERAPY: ICD-10-CM

## 2024-02-14 PROBLEM — M95.2 ACQUIRED SKULL DEFECT: Status: ACTIVE | Noted: 2024-02-02

## 2024-02-14 RX ORDER — CHLORHEXIDINE GLUCONATE ORAL RINSE 1.2 MG/ML
SOLUTION DENTAL
Qty: 118 ML | Refills: 0 | Status: SHIPPED | OUTPATIENT
Start: 2024-02-14 | End: 2024-02-20 | Stop reason: HOSPADM

## 2024-02-14 RX ORDER — CHLORHEXIDINE GLUCONATE 40 MG/ML
SOLUTION TOPICAL DAILY
Qty: 473 ML | Refills: 0 | Status: SHIPPED | OUTPATIENT
Start: 2024-02-14 | End: 2024-02-20 | Stop reason: HOSPADM

## 2024-02-14 NOTE — TELEPHONE ENCOUNTER
----- Message from Michelle Martinez DO sent at 2/14/2024 10:09 AM EST -----  Call patient her thyroid us looks good

## 2024-02-15 ENCOUNTER — TREATMENT (OUTPATIENT)
Dept: SPEECH THERAPY | Facility: CLINIC | Age: 69
End: 2024-02-15
Payer: COMMERCIAL

## 2024-02-15 ENCOUNTER — TREATMENT (OUTPATIENT)
Dept: OCCUPATIONAL THERAPY | Facility: CLINIC | Age: 69
End: 2024-02-15
Payer: COMMERCIAL

## 2024-02-15 ENCOUNTER — TELEPHONE (OUTPATIENT)
Dept: NEUROSURGERY | Facility: HOSPITAL | Age: 69
End: 2024-02-15

## 2024-02-15 DIAGNOSIS — I69.320 APHASIA AS LATE EFFECT OF CEREBROVASCULAR ACCIDENT (CVA): Primary | ICD-10-CM

## 2024-02-15 DIAGNOSIS — R27.8 COORDINATION ABNORMAL: Primary | ICD-10-CM

## 2024-02-15 DIAGNOSIS — R53.1 WEAKNESS GENERALIZED: ICD-10-CM

## 2024-02-15 DIAGNOSIS — S42.295D OTHER CLOSED NONDISPLACED FRACTURE OF PROXIMAL END OF LEFT HUMERUS WITH ROUTINE HEALING, SUBSEQUENT ENCOUNTER: ICD-10-CM

## 2024-02-15 PROCEDURE — 92507 TX SP LANG VOICE COMM INDIV: CPT | Mod: GN | Performed by: STUDENT IN AN ORGANIZED HEALTH CARE EDUCATION/TRAINING PROGRAM

## 2024-02-15 PROCEDURE — 97112 NEUROMUSCULAR REEDUCATION: CPT | Mod: GO | Performed by: OCCUPATIONAL THERAPIST

## 2024-02-15 ASSESSMENT — PAIN SCALES - GENERAL: PAINLEVEL_OUTOF10: 0 - NO PAIN

## 2024-02-15 ASSESSMENT — PAIN - FUNCTIONAL ASSESSMENT: PAIN_FUNCTIONAL_ASSESSMENT: 0-10

## 2024-02-15 NOTE — PROGRESS NOTES
Speech-Language Pathology    Outpatient Speech-Language Pathology Treatment     Patient Name: Fadia Bolden  MRN: [id number]  Today's Date: 2/15/2024  Time Calculation  Start Time: 1030  Stop Time: 1115  Time Calculation (min): 45 min         Current Problem:   1. Aphasia as late effect of cerebrovascular accident (CVA)            SLP Assessment:  SLP TX Intervention Outcome: Making Progress Towards Goals  Prognosis: Good    The patient was actively engaged in therapeutic tasks targeting sentence level reading comprehension and word finding skills. She demonstrated improvement naming items to description. She demonstrated appropriate ability to find appropriate words but continues to require extra time. The patient may continue to benefit from skilled speech therapy services in order to improve expressive language to more actively engage with familiar and unfamiliar communication partners. Progress note to be completed at next visit. Craniotomy scheduled for this Monday 2/19.    General Visit Information:  Certification Period Start Date: 12/05/23  Certification Period End Date: 03/04/24     Total Number of Visits : 15    Pain:  Pain Assessment  Pain Assessment: 0-10  Pain Score: 0 - No pain     Plan:  Plan  Inpatient/Swing Bed or Outpatient: Outpatient  SLP Plan: Skilled SLP  SLP Frequency: 2x per week  Duration: 12 weeks  Next Treatment Priority: PN  Discussed POC: Patient, Caregiver/family  Discussed Risks/Benefits: Yes  Patient/Caregiver Agreeable: Yes    Goals: Established 12/5/23  Short-term goals:   Patient will complete naming activities (including picture naming, generative naming, verb naming, descriptive naming, synonyms/antonyms, etc.) with 80% accuracy given minimal-moderate cues  Status: Goal Status: Progressing toward goal  Progress: See objective data below   -Correcting illogical sentences - 100% indep   -Naming to description - 90%   -Spot It! - verbalizing the matching picture (15 trials in 10  minutes) - 87% accuracy     Previous  -Generative naming, 2 min, goal of 10 items: Avg of 6.5 items over 11 trials (65%)  -Generating words to fill in blanks during the receptive language task - 100% indep  -Naming objects from description: 100% (2/2, ltd trials d/t to time constraints)   -Exclusion task; med difficulty, FO4 words+pics: 96% (24/25) indep  - Confrontational namin%, improved to 80% with use of additional descriptors in lieu of naming  -Name the category: 93% (concrete), 90% (abstract)   -Exclusion task, adaptive: 76% (10/13)  -Word finding, gen info (animals/people/characters); 67% (10/15)      -Describing common objects - 80% (4/5)  -Naming from description: 90% (18/20) when given extended time; phonemic cues increased accuracy to 100%              -Responsive naming, general information: 71% (15/21)              -describing common objects - 33% (2/6) indep; increased to 67% with min cues; mod-max cues required to provide enough relevant information about the item  -Verb namin% (16/16), indep              -Complete the sentence (1 word): 95% (19/20)-object naming - 100% given occasional cues and extra time                         -naming items from definitions/description - 70% (7/10)               -Stating the category (Tactus ismael): FO4 choices, words only - 84% (21/25)              -responsive naming - 85% (17/20)              -describing common objects - 67% (2/3) with min cues; the patient struggled with this task this date  -synonyms - 60% (3/5)  -antonyms - 100%          -confrontational namin%; improved to 90 with min cues and extended time                Patient will complete targeted expressive language tasks (including stating personal information, phrase/sentence completion, open-ended questions) with 80% accuracy given minimal-moderate cues.  Status: Goal Status: Progressing toward goal  Progress: Goal not addressed this date             Previous              -Describing  common objects: 100% with extended time and min cues (3/3, ltd trials d/t to time constraints)  -WH questions about objects in the above tasks - 100%     -Patient was able to state her full name, address and the names of her children and their spouses. She required extra time and one phonemic cue for her son in law  -Semantic analysis task - 88% accuracy to provide relevant and accurate features of the target item; accuracy improved when given cues or suggested non verbal strategies  -naming immediate family members and friends, 82% (14/17) to name children, their spouses and close friends and siblings     Patient will complete targeted receptive language tasks (including complex yes/no questions, functional reading, paragraph level comprehension) with 80% accuracy given minimal-moderate cues.  Status: Goal Status: Progressing toward goal  Progress: Goal not addressed this date      -Illogical sentences - 90% acc to independently read and comprehend sentences in order to identify what word/words to substitute    Previous:  -Paragraph with missing words; patient accuracy was 100% with min cues to ensure all words were read correctly which enabled her to generate an appropriate word to fill in the blanks  -Reading comprehension (121 and 136 word passages) - when given FO4 choices the patient answered comprehension questions with 100% accuracy. She referred back to the passage in 1/6 questions. Patient required extended time  -Complex Y/N, comparisons - 89% (40/45)  -Functional reading (CT ismael lvl 1/3) - 88%     Patient will complete further assessment of cognitive-linguistic skills (MoCA, EM, etc.) to determine additional therapy goals as indicated.  Status: Not indicated  Progress: No progress, goal to be discontinued with next progress note    Subjective:  Patient reports no new or worsening symptoms. Craniotomy scheduled for 2/19.    Outpatient Education:  Adult Outpatient Education  Individual(s) Educated:  Patient, Spouse  Risk and Benefits Discussed with Patient/Caregiver/Other: yes  Patient/Caregiver Demonstrated Understanding: yes  Plan of Care Discussed and Agreed Upon: yes  Patient Response to Education: Patient/Caregiver Verbalized Understanding of Information

## 2024-02-15 NOTE — PROGRESS NOTES
Occupational Therapy    Reassessment   Patient Name: Fadia Bolden  MRN: [id number]  Today's Date: 2/16/2024           Visit: #10    Assessment:  Patient tolerated treatment well with no complaints of pain; noted improved tolerance to RUE exercises with improved R UE ROM.    Plan:   Interventions: Self Care/ADL, neuromuscular reeducation, therapeutic exercise   Duration: 1x/wk for 4 weeks     Subjective   Patient reports no changes since last visit.       Current Problem:  1. Coordination abnormal        2. Other closed nondisplaced fracture of proximal end of left humerus with routine healing, subsequent encounter        3. Weakness generalized                Precautions:   Helmet; able to remove when sitting still and sleeping       Objective   Patient demonstrated difficulty with word finding while completing scanning and FMC activity   Patient required verbal cues for accuracy with visual scanning exercise     Treatment:     Neuromuscular reeducation: 45    BUE strengthenng addressed with use of 4# dowel  -shoulder flexion 2x10  -shoulder external rotation 2x10  -shoulder abduction 2x10    OT addressed R visual scanning and R FMC with the following exercise:   -OT placed 5 colored cones on right side of tabletop.  OT placed ten beads on left side of tabletop.  Pt to  the beads one at a time with right hand and hold beads within palm of hand.  Pt it release beads one at a time into color cone specific cone as assigned by therapist.    *OT upgraded by having patient complete categorization cognitive exercises simultaneously.

## 2024-02-15 NOTE — TELEPHONE ENCOUNTER
Spoke with patients daughter 2/14/2024 late entry:  I spoke with patients daughter to clarify the need for pre-op labs and OR scheduling. Nicki Licea CNP will place lab orders for patient that will need to be drawn in the next two days at [facility]. Patient stopped her ASA on Monday and family understands that an OR coordinator will call them on Friday afternoon with their surgery time on Monday.

## 2024-02-16 ENCOUNTER — LAB REQUISITION (OUTPATIENT)
Dept: LAB | Facility: HOSPITAL | Age: 69
End: 2024-02-16
Payer: COMMERCIAL

## 2024-02-16 ENCOUNTER — LAB (OUTPATIENT)
Dept: LAB | Facility: LAB | Age: 69
End: 2024-02-16
Payer: COMMERCIAL

## 2024-02-16 ENCOUNTER — ANESTHESIA EVENT (OUTPATIENT)
Dept: OPERATING ROOM | Facility: HOSPITAL | Age: 69
DRG: 581 | End: 2024-02-16
Payer: COMMERCIAL

## 2024-02-16 DIAGNOSIS — Z01.818 ENCOUNTER FOR OTHER PREPROCEDURAL EXAMINATION: ICD-10-CM

## 2024-02-16 DIAGNOSIS — R68.89 ABNORMAL FINDINGS ON EXAMINATION OF SKULL AND HEAD: ICD-10-CM

## 2024-02-16 DIAGNOSIS — Z79.82 ENCOUNTER FOR MONITORING ASPIRIN THERAPY: ICD-10-CM

## 2024-02-16 DIAGNOSIS — Z01.818 PREOPERATIVE EXAMINATION: ICD-10-CM

## 2024-02-16 DIAGNOSIS — Z51.81 ENCOUNTER FOR MONITORING ASPIRIN THERAPY: ICD-10-CM

## 2024-02-16 LAB
ABO GROUP (TYPE) IN BLOOD: NORMAL
ALBUMIN SERPL BCP-MCNC: 4.3 G/DL (ref 3.4–5)
ALP SERPL-CCNC: 75 U/L (ref 33–136)
ALT SERPL W P-5'-P-CCNC: 18 U/L (ref 7–45)
ANION GAP SERPL CALC-SCNC: 13 MMOL/L (ref 10–20)
ANTIBODY SCREEN: NORMAL
APPEARANCE UR: CLEAR
APTT PPP: 29 SECONDS (ref 27–38)
AST SERPL W P-5'-P-CCNC: 17 U/L (ref 9–39)
BACTERIA #/AREA URNS AUTO: ABNORMAL /HPF
BASOPHILS # BLD AUTO: 0.04 X10*3/UL (ref 0–0.1)
BASOPHILS NFR BLD AUTO: 0.8 %
BILIRUB SERPL-MCNC: 1 MG/DL (ref 0–1.2)
BILIRUB UR STRIP.AUTO-MCNC: NEGATIVE MG/DL
BUN SERPL-MCNC: 25 MG/DL (ref 6–23)
CALCIUM SERPL-MCNC: 9.5 MG/DL (ref 8.6–10.3)
CHLORIDE SERPL-SCNC: 104 MMOL/L (ref 98–107)
CO2 SERPL-SCNC: 29 MMOL/L (ref 21–32)
COLOR UR: COLORLESS
CREAT SERPL-MCNC: 0.83 MG/DL (ref 0.5–1.05)
EGFRCR SERPLBLD CKD-EPI 2021: 76 ML/MIN/1.73M*2
EOSINOPHIL # BLD AUTO: 0.1 X10*3/UL (ref 0–0.7)
EOSINOPHIL NFR BLD AUTO: 2 %
ERYTHROCYTE [DISTWIDTH] IN BLOOD BY AUTOMATED COUNT: 12.2 % (ref 11.5–14.5)
GLUCOSE SERPL-MCNC: 122 MG/DL (ref 74–99)
GLUCOSE UR STRIP.AUTO-MCNC: NEGATIVE MG/DL
HCT VFR BLD AUTO: 41.4 % (ref 36–46)
HGB BLD-MCNC: 13.8 G/DL (ref 12–16)
IMM GRANULOCYTES # BLD AUTO: 0.01 X10*3/UL (ref 0–0.7)
IMM GRANULOCYTES NFR BLD AUTO: 0.2 % (ref 0–0.9)
INR PPP: 1.1 (ref 0.9–1.1)
KETONES UR STRIP.AUTO-MCNC: NEGATIVE MG/DL
LEUKOCYTE ESTERASE UR QL STRIP.AUTO: ABNORMAL
LYMPHOCYTES # BLD AUTO: 1.54 X10*3/UL (ref 1.2–4.8)
LYMPHOCYTES NFR BLD AUTO: 30.8 %
MCH RBC QN AUTO: 31.3 PG (ref 26–34)
MCHC RBC AUTO-ENTMCNC: 33.3 G/DL (ref 32–36)
MCV RBC AUTO: 94 FL (ref 80–100)
MONOCYTES # BLD AUTO: 0.66 X10*3/UL (ref 0.1–1)
MONOCYTES NFR BLD AUTO: 13.2 %
NEUTROPHILS # BLD AUTO: 2.65 X10*3/UL (ref 1.2–7.7)
NEUTROPHILS NFR BLD AUTO: 53 %
NITRITE UR QL STRIP.AUTO: NEGATIVE
NRBC BLD-RTO: 0 /100 WBCS (ref 0–0)
PH UR STRIP.AUTO: 6 [PH]
PLATELET # BLD AUTO: 209 X10*3/UL (ref 150–450)
POTASSIUM SERPL-SCNC: 3.9 MMOL/L (ref 3.5–5.3)
PROT SERPL-MCNC: 7.2 G/DL (ref 6.4–8.2)
PROT UR STRIP.AUTO-MCNC: NEGATIVE MG/DL
PROTHROMBIN TIME: 12.2 SECONDS (ref 9.8–12.8)
RBC # BLD AUTO: 4.41 X10*6/UL (ref 4–5.2)
RBC # UR STRIP.AUTO: NEGATIVE /UL
RBC #/AREA URNS AUTO: ABNORMAL /HPF
RH FACTOR (ANTIGEN D): NORMAL
SODIUM SERPL-SCNC: 142 MMOL/L (ref 136–145)
SP GR UR STRIP.AUTO: 1
UROBILINOGEN UR STRIP.AUTO-MCNC: <2 MG/DL
WBC # BLD AUTO: 5 X10*3/UL (ref 4.4–11.3)
WBC #/AREA URNS AUTO: ABNORMAL /HPF

## 2024-02-16 PROCEDURE — 85610 PROTHROMBIN TIME: CPT

## 2024-02-16 PROCEDURE — 81001 URINALYSIS AUTO W/SCOPE: CPT

## 2024-02-16 PROCEDURE — 87086 URINE CULTURE/COLONY COUNT: CPT

## 2024-02-16 PROCEDURE — 86900 BLOOD TYPING SEROLOGIC ABO: CPT

## 2024-02-16 PROCEDURE — 86850 RBC ANTIBODY SCREEN: CPT

## 2024-02-16 PROCEDURE — 86901 BLOOD TYPING SEROLOGIC RH(D): CPT

## 2024-02-16 PROCEDURE — 86901 BLOOD TYPING SEROLOGIC RH(D): CPT | Mod: OUT | Performed by: NURSE PRACTITIONER

## 2024-02-16 PROCEDURE — 85730 THROMBOPLASTIN TIME PARTIAL: CPT

## 2024-02-16 PROCEDURE — 36415 COLL VENOUS BLD VENIPUNCTURE: CPT

## 2024-02-16 PROCEDURE — 80053 COMPREHEN METABOLIC PANEL: CPT

## 2024-02-16 PROCEDURE — 85025 COMPLETE CBC W/AUTO DIFF WBC: CPT

## 2024-02-18 LAB — BACTERIA UR CULT: NORMAL

## 2024-02-19 ENCOUNTER — ANESTHESIA (OUTPATIENT)
Dept: OPERATING ROOM | Facility: HOSPITAL | Age: 69
DRG: 581 | End: 2024-02-19
Payer: COMMERCIAL

## 2024-02-19 ENCOUNTER — APPOINTMENT (OUTPATIENT)
Dept: RADIOLOGY | Facility: HOSPITAL | Age: 69
DRG: 581 | End: 2024-02-19
Payer: COMMERCIAL

## 2024-02-19 ENCOUNTER — HOSPITAL ENCOUNTER (INPATIENT)
Facility: HOSPITAL | Age: 69
LOS: 1 days | Discharge: HOME | DRG: 581 | End: 2024-02-20
Attending: NEUROLOGICAL SURGERY | Admitting: NEUROLOGICAL SURGERY
Payer: COMMERCIAL

## 2024-02-19 DIAGNOSIS — M95.2 ACQUIRED SKULL DEFECT: Primary | ICD-10-CM

## 2024-02-19 PROBLEM — H54.7 VISION LOSS: Status: ACTIVE | Noted: 2024-02-19

## 2024-02-19 PROBLEM — K21.9 GASTROESOPHAGEAL REFLUX DISEASE: Status: ACTIVE | Noted: 2024-02-19

## 2024-02-19 LAB
ABO GROUP (TYPE) IN BLOOD: NORMAL
RH FACTOR (ANTIGEN D): NORMAL

## 2024-02-19 PROCEDURE — 3600000009 HC OR TIME - EACH INCREMENTAL 1 MINUTE - PROCEDURE LEVEL FOUR: Performed by: NEUROLOGICAL SURGERY

## 2024-02-19 PROCEDURE — A4217 STERILE WATER/SALINE, 500 ML: HCPCS | Performed by: NEUROLOGICAL SURGERY

## 2024-02-19 PROCEDURE — 2500000004 HC RX 250 GENERAL PHARMACY W/ HCPCS (ALT 636 FOR OP/ED)

## 2024-02-19 PROCEDURE — 2500000004 HC RX 250 GENERAL PHARMACY W/ HCPCS (ALT 636 FOR OP/ED): Performed by: NEUROLOGICAL SURGERY

## 2024-02-19 PROCEDURE — 0NR00JZ REPLACEMENT OF SKULL WITH SYNTHETIC SUBSTITUTE, OPEN APPROACH: ICD-10-PCS | Performed by: NEUROLOGICAL SURGERY

## 2024-02-19 PROCEDURE — 99291 CRITICAL CARE FIRST HOUR: CPT | Performed by: REGISTERED NURSE

## 2024-02-19 PROCEDURE — 62141 CRNOP SKULL DEFECT>5 CM DIAM: CPT | Performed by: NEUROLOGICAL SURGERY

## 2024-02-19 PROCEDURE — 2780000003 HC OR 278 NO HCPCS: Performed by: NEUROLOGICAL SURGERY

## 2024-02-19 PROCEDURE — 70450 CT HEAD/BRAIN W/O DYE: CPT

## 2024-02-19 PROCEDURE — 2500000001 HC RX 250 WO HCPCS SELF ADMINISTERED DRUGS (ALT 637 FOR MEDICARE OP): Performed by: NEUROLOGICAL SURGERY

## 2024-02-19 PROCEDURE — 3700000001 HC GENERAL ANESTHESIA TIME - INITIAL BASE CHARGE: Performed by: NEUROLOGICAL SURGERY

## 2024-02-19 PROCEDURE — 2720000007 HC OR 272 NO HCPCS: Performed by: NEUROLOGICAL SURGERY

## 2024-02-19 PROCEDURE — 2500000005 HC RX 250 GENERAL PHARMACY W/O HCPCS: Performed by: NEUROLOGICAL SURGERY

## 2024-02-19 PROCEDURE — A62141 PR REPAIR SKULL DEFECT,>5 CM: Performed by: NURSE ANESTHETIST, CERTIFIED REGISTERED

## 2024-02-19 PROCEDURE — A62141 PR REPAIR SKULL DEFECT,>5 CM: Performed by: ANESTHESIOLOGY

## 2024-02-19 PROCEDURE — 2500000005 HC RX 250 GENERAL PHARMACY W/O HCPCS

## 2024-02-19 PROCEDURE — 2500000001 HC RX 250 WO HCPCS SELF ADMINISTERED DRUGS (ALT 637 FOR MEDICARE OP): Performed by: STUDENT IN AN ORGANIZED HEALTH CARE EDUCATION/TRAINING PROGRAM

## 2024-02-19 PROCEDURE — 70450 CT HEAD/BRAIN W/O DYE: CPT | Performed by: RADIOLOGY

## 2024-02-19 PROCEDURE — 3700000002 HC GENERAL ANESTHESIA TIME - EACH INCREMENTAL 1 MINUTE: Performed by: NEUROLOGICAL SURGERY

## 2024-02-19 PROCEDURE — P9045 ALBUMIN (HUMAN), 5%, 250 ML: HCPCS | Mod: JZ

## 2024-02-19 PROCEDURE — 36415 COLL VENOUS BLD VENIPUNCTURE: CPT | Performed by: ANESTHESIOLOGY

## 2024-02-19 PROCEDURE — 2020000001 HC ICU ROOM DAILY

## 2024-02-19 PROCEDURE — C1713 ANCHOR/SCREW BN/BN,TIS/BN: HCPCS | Performed by: NEUROLOGICAL SURGERY

## 2024-02-19 PROCEDURE — 2500000004 HC RX 250 GENERAL PHARMACY W/ HCPCS (ALT 636 FOR OP/ED): Performed by: STUDENT IN AN ORGANIZED HEALTH CARE EDUCATION/TRAINING PROGRAM

## 2024-02-19 PROCEDURE — 3600000004 HC OR TIME - INITIAL BASE CHARGE - PROCEDURE LEVEL FOUR: Performed by: NEUROLOGICAL SURGERY

## 2024-02-19 DEVICE — PLATE, 2H 10MM BAR ULTRA LOW PROFILE: Type: IMPLANTABLE DEVICE | Site: CRANIAL | Status: FUNCTIONAL

## 2024-02-19 DEVICE — IMPLANTABLE DEVICE: Type: IMPLANTABLE DEVICE | Site: CRANIAL | Status: FUNCTIONAL

## 2024-02-19 DEVICE — CROSS PIN, AXS SELF-TAP, 1.5 X 5MM: Type: IMPLANTABLE DEVICE | Site: CRANIAL | Status: FUNCTIONAL

## 2024-02-19 DEVICE — PLATE, BOX, LOW PROFILE 2X2: Type: IMPLANTABLE DEVICE | Site: CRANIAL | Status: FUNCTIONAL

## 2024-02-19 DEVICE — CROSS PIN, AXS SELF-TAP, 1.5 X 4MM: Type: IMPLANTABLE DEVICE | Site: CRANIAL | Status: FUNCTIONAL

## 2024-02-19 RX ORDER — SODIUM CHLORIDE, SODIUM LACTATE, POTASSIUM CHLORIDE, CALCIUM CHLORIDE 600; 310; 30; 20 MG/100ML; MG/100ML; MG/100ML; MG/100ML
INJECTION, SOLUTION INTRAVENOUS CONTINUOUS PRN
Status: DISCONTINUED | OUTPATIENT
Start: 2024-02-19 | End: 2024-02-19

## 2024-02-19 RX ORDER — PROMETHAZINE HYDROCHLORIDE 25 MG/1
25 TABLET ORAL EVERY 6 HOURS PRN
Status: DISCONTINUED | OUTPATIENT
Start: 2024-02-19 | End: 2024-02-20 | Stop reason: HOSPADM

## 2024-02-19 RX ORDER — HYDROMORPHONE HYDROCHLORIDE 1 MG/ML
0.2 INJECTION, SOLUTION INTRAMUSCULAR; INTRAVENOUS; SUBCUTANEOUS
Status: DISCONTINUED | OUTPATIENT
Start: 2024-02-19 | End: 2024-02-20 | Stop reason: HOSPADM

## 2024-02-19 RX ORDER — PROPOFOL 10 MG/ML
INJECTION, EMULSION INTRAVENOUS AS NEEDED
Status: DISCONTINUED | OUTPATIENT
Start: 2024-02-19 | End: 2024-02-19

## 2024-02-19 RX ORDER — ESMOLOL HYDROCHLORIDE 10 MG/ML
INJECTION INTRAVENOUS AS NEEDED
Status: DISCONTINUED | OUTPATIENT
Start: 2024-02-19 | End: 2024-02-19

## 2024-02-19 RX ORDER — AMOXICILLIN 250 MG
2 CAPSULE ORAL 2 TIMES DAILY
Status: DISCONTINUED | OUTPATIENT
Start: 2024-02-19 | End: 2024-02-20 | Stop reason: HOSPADM

## 2024-02-19 RX ORDER — SODIUM CHLORIDE 9 MG/ML
INJECTION, SOLUTION INTRAVENOUS CONTINUOUS PRN
Status: DISCONTINUED | OUTPATIENT
Start: 2024-02-19 | End: 2024-02-19

## 2024-02-19 RX ORDER — ONDANSETRON 4 MG/1
4 TABLET, FILM COATED ORAL EVERY 8 HOURS PRN
Status: DISCONTINUED | OUTPATIENT
Start: 2024-02-19 | End: 2024-02-20 | Stop reason: HOSPADM

## 2024-02-19 RX ORDER — HYDRALAZINE HYDROCHLORIDE 20 MG/ML
INJECTION INTRAMUSCULAR; INTRAVENOUS AS NEEDED
Status: DISCONTINUED | OUTPATIENT
Start: 2024-02-19 | End: 2024-02-19

## 2024-02-19 RX ORDER — PHENYLEPHRINE 10 MG/250 ML(40 MCG/ML)IN 0.9 % SOD.CHLORIDE INTRAVENOUS
CONTINUOUS PRN
Status: DISCONTINUED | OUTPATIENT
Start: 2024-02-19 | End: 2024-02-19

## 2024-02-19 RX ORDER — CEFAZOLIN SODIUM 1 G/50ML
1 SOLUTION INTRAVENOUS EVERY 8 HOURS
Status: DISCONTINUED | OUTPATIENT
Start: 2024-02-20 | End: 2024-02-20 | Stop reason: HOSPADM

## 2024-02-19 RX ORDER — PANTOPRAZOLE SODIUM 40 MG/1
40 TABLET, DELAYED RELEASE ORAL
Status: DISCONTINUED | OUTPATIENT
Start: 2024-02-20 | End: 2024-02-20 | Stop reason: HOSPADM

## 2024-02-19 RX ORDER — VANCOMYCIN HYDROCHLORIDE 500 MG/10ML
INJECTION, POWDER, LYOPHILIZED, FOR SOLUTION INTRAVENOUS AS NEEDED
Status: DISCONTINUED | OUTPATIENT
Start: 2024-02-19 | End: 2024-02-19

## 2024-02-19 RX ORDER — OXYCODONE HYDROCHLORIDE 5 MG/1
5 TABLET ORAL EVERY 4 HOURS PRN
Status: DISCONTINUED | OUTPATIENT
Start: 2024-02-19 | End: 2024-02-20 | Stop reason: HOSPADM

## 2024-02-19 RX ORDER — AMLODIPINE BESYLATE 10 MG/1
10 TABLET ORAL
Status: DISCONTINUED | OUTPATIENT
Start: 2024-02-19 | End: 2024-02-20 | Stop reason: HOSPADM

## 2024-02-19 RX ORDER — ONDANSETRON HYDROCHLORIDE 2 MG/ML
INJECTION, SOLUTION INTRAVENOUS
Status: COMPLETED
Start: 2024-02-19 | End: 2024-02-19

## 2024-02-19 RX ORDER — LABETALOL HYDROCHLORIDE 5 MG/ML
INJECTION, SOLUTION INTRAVENOUS AS NEEDED
Status: DISCONTINUED | OUTPATIENT
Start: 2024-02-19 | End: 2024-02-19

## 2024-02-19 RX ORDER — VASOPRESSIN 20 U/ML
INJECTION PARENTERAL AS NEEDED
Status: DISCONTINUED | OUTPATIENT
Start: 2024-02-19 | End: 2024-02-19

## 2024-02-19 RX ORDER — FENTANYL CITRATE 50 UG/ML
INJECTION, SOLUTION INTRAMUSCULAR; INTRAVENOUS AS NEEDED
Status: DISCONTINUED | OUTPATIENT
Start: 2024-02-19 | End: 2024-02-19

## 2024-02-19 RX ORDER — ONDANSETRON HYDROCHLORIDE 2 MG/ML
4 INJECTION, SOLUTION INTRAVENOUS EVERY 8 HOURS PRN
Status: DISCONTINUED | OUTPATIENT
Start: 2024-02-19 | End: 2024-02-20 | Stop reason: HOSPADM

## 2024-02-19 RX ORDER — ONDANSETRON HYDROCHLORIDE 2 MG/ML
INJECTION, SOLUTION INTRAVENOUS AS NEEDED
Status: DISCONTINUED | OUTPATIENT
Start: 2024-02-19 | End: 2024-02-19

## 2024-02-19 RX ORDER — HYDROMORPHONE HYDROCHLORIDE 1 MG/ML
0.4 INJECTION, SOLUTION INTRAMUSCULAR; INTRAVENOUS; SUBCUTANEOUS EVERY 5 MIN PRN
Status: DISCONTINUED | OUTPATIENT
Start: 2024-02-19 | End: 2024-02-20 | Stop reason: HOSPADM

## 2024-02-19 RX ORDER — HEPARIN SODIUM 5000 [USP'U]/ML
5000 INJECTION, SOLUTION INTRAVENOUS; SUBCUTANEOUS EVERY 12 HOURS
Status: DISCONTINUED | OUTPATIENT
Start: 2024-02-20 | End: 2024-02-20 | Stop reason: HOSPADM

## 2024-02-19 RX ORDER — PROMETHAZINE HYDROCHLORIDE 25 MG/1
25 SUPPOSITORY RECTAL EVERY 12 HOURS PRN
Status: DISCONTINUED | OUTPATIENT
Start: 2024-02-19 | End: 2024-02-20 | Stop reason: HOSPADM

## 2024-02-19 RX ORDER — HYDRALAZINE HYDROCHLORIDE 20 MG/ML
10 INJECTION INTRAMUSCULAR; INTRAVENOUS
Status: DISCONTINUED | OUTPATIENT
Start: 2024-02-19 | End: 2024-02-20 | Stop reason: HOSPADM

## 2024-02-19 RX ORDER — POLYETHYLENE GLYCOL 3350 17 G/17G
17 POWDER, FOR SOLUTION ORAL DAILY
Status: DISCONTINUED | OUTPATIENT
Start: 2024-02-19 | End: 2024-02-20 | Stop reason: HOSPADM

## 2024-02-19 RX ORDER — METOPROLOL TARTRATE 1 MG/ML
INJECTION, SOLUTION INTRAVENOUS AS NEEDED
Status: DISCONTINUED | OUTPATIENT
Start: 2024-02-19 | End: 2024-02-19

## 2024-02-19 RX ORDER — LEVETIRACETAM 250 MG/1
500 TABLET ORAL 2 TIMES DAILY
Status: DISCONTINUED | OUTPATIENT
Start: 2024-02-19 | End: 2024-02-20 | Stop reason: HOSPADM

## 2024-02-19 RX ORDER — PHENYLEPHRINE HCL IN 0.9% NACL 0.4MG/10ML
SYRINGE (ML) INTRAVENOUS AS NEEDED
Status: DISCONTINUED | OUTPATIENT
Start: 2024-02-19 | End: 2024-02-19

## 2024-02-19 RX ORDER — LEVETIRACETAM 5 MG/ML
INJECTION INTRAVASCULAR AS NEEDED
Status: DISCONTINUED | OUTPATIENT
Start: 2024-02-19 | End: 2024-02-19

## 2024-02-19 RX ORDER — HYDROMORPHONE HYDROCHLORIDE 1 MG/ML
0.2 INJECTION, SOLUTION INTRAMUSCULAR; INTRAVENOUS; SUBCUTANEOUS EVERY 5 MIN PRN
Status: DISCONTINUED | OUTPATIENT
Start: 2024-02-19 | End: 2024-02-20 | Stop reason: HOSPADM

## 2024-02-19 RX ORDER — PANTOPRAZOLE SODIUM 40 MG/10ML
40 INJECTION, POWDER, LYOPHILIZED, FOR SOLUTION INTRAVENOUS
Status: DISCONTINUED | OUTPATIENT
Start: 2024-02-20 | End: 2024-02-20 | Stop reason: HOSPADM

## 2024-02-19 RX ORDER — NALOXONE HYDROCHLORIDE 0.4 MG/ML
0.2 INJECTION, SOLUTION INTRAMUSCULAR; INTRAVENOUS; SUBCUTANEOUS EVERY 5 MIN PRN
Status: DISCONTINUED | OUTPATIENT
Start: 2024-02-19 | End: 2024-02-20 | Stop reason: HOSPADM

## 2024-02-19 RX ORDER — ONDANSETRON HYDROCHLORIDE 2 MG/ML
4 INJECTION, SOLUTION INTRAVENOUS ONCE AS NEEDED
Status: DISCONTINUED | OUTPATIENT
Start: 2024-02-19 | End: 2024-02-20 | Stop reason: HOSPADM

## 2024-02-19 RX ORDER — OXYCODONE HYDROCHLORIDE 5 MG/1
10 TABLET ORAL EVERY 4 HOURS PRN
Status: DISCONTINUED | OUTPATIENT
Start: 2024-02-19 | End: 2024-02-20 | Stop reason: HOSPADM

## 2024-02-19 RX ORDER — LABETALOL HYDROCHLORIDE 5 MG/ML
20 INJECTION, SOLUTION INTRAVENOUS EVERY 30 MIN PRN
Status: DISCONTINUED | OUTPATIENT
Start: 2024-02-19 | End: 2024-02-20 | Stop reason: HOSPADM

## 2024-02-19 RX ORDER — SODIUM CHLORIDE 0.9 G/100ML
IRRIGANT IRRIGATION AS NEEDED
Status: DISCONTINUED | OUTPATIENT
Start: 2024-02-19 | End: 2024-02-19 | Stop reason: HOSPADM

## 2024-02-19 RX ORDER — BISACODYL 5 MG
10 TABLET, DELAYED RELEASE (ENTERIC COATED) ORAL DAILY PRN
Status: DISCONTINUED | OUTPATIENT
Start: 2024-02-19 | End: 2024-02-20 | Stop reason: HOSPADM

## 2024-02-19 RX ORDER — DEXAMETHASONE SODIUM PHOSPHATE 4 MG/ML
INJECTION, SOLUTION INTRA-ARTICULAR; INTRALESIONAL; INTRAMUSCULAR; INTRAVENOUS; SOFT TISSUE AS NEEDED
Status: DISCONTINUED | OUTPATIENT
Start: 2024-02-19 | End: 2024-02-19

## 2024-02-19 RX ORDER — ALBUMIN HUMAN 50 G/1000ML
SOLUTION INTRAVENOUS AS NEEDED
Status: DISCONTINUED | OUTPATIENT
Start: 2024-02-19 | End: 2024-02-19

## 2024-02-19 RX ORDER — LISINOPRIL 20 MG/1
20 TABLET ORAL
Status: DISCONTINUED | OUTPATIENT
Start: 2024-02-19 | End: 2024-02-20 | Stop reason: HOSPADM

## 2024-02-19 RX ORDER — ATORVASTATIN CALCIUM 80 MG/1
80 TABLET, FILM COATED ORAL DAILY
Status: DISCONTINUED | OUTPATIENT
Start: 2024-02-19 | End: 2024-02-20 | Stop reason: HOSPADM

## 2024-02-19 RX ORDER — CEFTRIAXONE 2 G/1
INJECTION, POWDER, FOR SOLUTION INTRAMUSCULAR; INTRAVENOUS AS NEEDED
Status: DISCONTINUED | OUTPATIENT
Start: 2024-02-19 | End: 2024-02-19

## 2024-02-19 RX ORDER — SODIUM CHLORIDE, SODIUM LACTATE, POTASSIUM CHLORIDE, CALCIUM CHLORIDE 600; 310; 30; 20 MG/100ML; MG/100ML; MG/100ML; MG/100ML
75 INJECTION, SOLUTION INTRAVENOUS CONTINUOUS
Status: DISCONTINUED | OUTPATIENT
Start: 2024-02-19 | End: 2024-02-19

## 2024-02-19 RX ORDER — LIDOCAINE HCL/PF 100 MG/5ML
SYRINGE (ML) INTRAVENOUS AS NEEDED
Status: DISCONTINUED | OUTPATIENT
Start: 2024-02-19 | End: 2024-02-19

## 2024-02-19 RX ORDER — ACETAMINOPHEN 325 MG/1
650 TABLET ORAL EVERY 6 HOURS SCHEDULED
Status: DISCONTINUED | OUTPATIENT
Start: 2024-02-19 | End: 2024-02-20 | Stop reason: HOSPADM

## 2024-02-19 RX ORDER — NORETHINDRONE AND ETHINYL ESTRADIOL 0.5-0.035
KIT ORAL AS NEEDED
Status: DISCONTINUED | OUTPATIENT
Start: 2024-02-19 | End: 2024-02-19

## 2024-02-19 RX ORDER — ROCURONIUM BROMIDE 10 MG/ML
INJECTION, SOLUTION INTRAVENOUS AS NEEDED
Status: DISCONTINUED | OUTPATIENT
Start: 2024-02-19 | End: 2024-02-19

## 2024-02-19 RX ORDER — PHENYLEPHRINE HYDROCHLORIDE 10 MG/ML
INJECTION INTRAVENOUS AS NEEDED
Status: DISCONTINUED | OUTPATIENT
Start: 2024-02-19 | End: 2024-02-19

## 2024-02-19 RX ADMIN — ALBUMIN HUMAN 250 ML: 0.05 INJECTION, SOLUTION INTRAVENOUS at 09:23

## 2024-02-19 RX ADMIN — VASOPRESSIN 1 UNITS: 20 INJECTION INTRAVENOUS at 09:31

## 2024-02-19 RX ADMIN — ATORVASTATIN CALCIUM 80 MG: 80 TABLET, FILM COATED ORAL at 20:18

## 2024-02-19 RX ADMIN — EPHEDRINE SULFATE 5 MG: 50 INJECTION, SOLUTION INTRAVENOUS at 12:00

## 2024-02-19 RX ADMIN — DEXAMETHASONE SODIUM PHOSPHATE 4 MG: 4 INJECTION, SOLUTION INTRA-ARTICULAR; INTRALESIONAL; INTRAMUSCULAR; INTRAVENOUS; SOFT TISSUE at 08:56

## 2024-02-19 RX ADMIN — Medication 0.3 MCG/KG/MIN: at 09:25

## 2024-02-19 RX ADMIN — SODIUM CHLORIDE: 0.9 INJECTION, SOLUTION INTRAVENOUS at 08:15

## 2024-02-19 RX ADMIN — SUGAMMADEX 200 MG: 100 INJECTION, SOLUTION INTRAVENOUS at 12:17

## 2024-02-19 RX ADMIN — ONDANSETRON 4 MG: 2 INJECTION INTRAMUSCULAR; INTRAVENOUS at 13:39

## 2024-02-19 RX ADMIN — METOPROLOL TARTRATE 3 MG: 1 INJECTION, SOLUTION INTRAVENOUS at 09:51

## 2024-02-19 RX ADMIN — VANCOMYCIN HYDROCHLORIDE 500 MG: 500 INJECTION, POWDER, LYOPHILIZED, FOR SOLUTION INTRAVENOUS at 08:00

## 2024-02-19 RX ADMIN — HEPARIN SODIUM 5000 UNITS: 5000 INJECTION INTRAVENOUS; SUBCUTANEOUS at 23:05

## 2024-02-19 RX ADMIN — FENTANYL CITRATE 25 MCG: 50 INJECTION, SOLUTION INTRAMUSCULAR; INTRAVENOUS at 12:11

## 2024-02-19 RX ADMIN — LEVETIRACETAM 500 MG: 5 INJECTION INTRAVENOUS at 08:36

## 2024-02-19 RX ADMIN — SENNOSIDES AND DOCUSATE SODIUM 2 TABLET: 8.6; 5 TABLET ORAL at 20:18

## 2024-02-19 RX ADMIN — FENTANYL CITRATE 25 MCG: 50 INJECTION, SOLUTION INTRAMUSCULAR; INTRAVENOUS at 08:38

## 2024-02-19 RX ADMIN — FENTANYL CITRATE 25 MCG: 50 INJECTION, SOLUTION INTRAMUSCULAR; INTRAVENOUS at 07:51

## 2024-02-19 RX ADMIN — PROPOFOL 25 MG: 10 INJECTION, EMULSION INTRAVENOUS at 09:12

## 2024-02-19 RX ADMIN — Medication 80 MCG: at 09:29

## 2024-02-19 RX ADMIN — PHENYLEPHRINE HYDROCHLORIDE 80 MCG: 10 INJECTION INTRAVENOUS at 09:19

## 2024-02-19 RX ADMIN — ONDANSETRON HYDROCHLORIDE 4 MG: 2 INJECTION, SOLUTION INTRAVENOUS at 13:39

## 2024-02-19 RX ADMIN — LIDOCAINE HYDROCHLORIDE 40 MG: 20 INJECTION INTRAVENOUS at 07:51

## 2024-02-19 RX ADMIN — ACETAMINOPHEN 650 MG: 325 TABLET ORAL at 18:47

## 2024-02-19 RX ADMIN — ESMOLOL HYDROCHLORIDE 50 MG: 10 INJECTION, SOLUTION INTRAVENOUS at 07:58

## 2024-02-19 RX ADMIN — ROCURONIUM BROMIDE 50 MG: 10 INJECTION INTRAVENOUS at 07:52

## 2024-02-19 RX ADMIN — ACETAMINOPHEN 650 MG: 325 TABLET ORAL at 23:05

## 2024-02-19 RX ADMIN — Medication 80 MCG: at 09:25

## 2024-02-19 RX ADMIN — ONDANSETRON 4 MG: 2 INJECTION INTRAMUSCULAR; INTRAVENOUS at 10:33

## 2024-02-19 RX ADMIN — LEVETIRACETAM 500 MG: 5 INJECTION INTRAVENOUS at 08:00

## 2024-02-19 RX ADMIN — PROPOFOL 125 MG: 10 INJECTION, EMULSION INTRAVENOUS at 07:51

## 2024-02-19 RX ADMIN — ROCURONIUM BROMIDE 10 MG: 10 INJECTION INTRAVENOUS at 09:12

## 2024-02-19 RX ADMIN — PROPOFOL 25 MG: 10 INJECTION, EMULSION INTRAVENOUS at 08:12

## 2024-02-19 RX ADMIN — FENTANYL CITRATE 50 MCG: 50 INJECTION, SOLUTION INTRAMUSCULAR; INTRAVENOUS at 09:36

## 2024-02-19 RX ADMIN — ROCURONIUM BROMIDE 5 MG: 10 INJECTION INTRAVENOUS at 10:10

## 2024-02-19 RX ADMIN — LEVETIRACETAM 500 MG: 500 TABLET, FILM COATED ORAL at 20:18

## 2024-02-19 RX ADMIN — LABETALOL HYDROCHLORIDE 5 MG: 5 INJECTION, SOLUTION INTRAVENOUS at 11:13

## 2024-02-19 RX ADMIN — ALBUMIN HUMAN 250 ML: 0.05 INJECTION, SOLUTION INTRAVENOUS at 10:39

## 2024-02-19 RX ADMIN — Medication 80 MCG: at 12:00

## 2024-02-19 RX ADMIN — PROPOFOL 25 MG: 10 INJECTION, EMULSION INTRAVENOUS at 08:39

## 2024-02-19 RX ADMIN — CEFTRIAXONE SODIUM 2 G: 2 INJECTION, POWDER, FOR SOLUTION INTRAMUSCULAR; INTRAVENOUS at 08:25

## 2024-02-19 RX ADMIN — ALBUMIN HUMAN 250 ML: 0.05 INJECTION, SOLUTION INTRAVENOUS at 09:30

## 2024-02-19 RX ADMIN — SODIUM CHLORIDE, POTASSIUM CHLORIDE, SODIUM LACTATE AND CALCIUM CHLORIDE: 600; 310; 30; 20 INJECTION, SOLUTION INTRAVENOUS at 06:56

## 2024-02-19 RX ADMIN — METOPROLOL TARTRATE 2 MG: 1 INJECTION, SOLUTION INTRAVENOUS at 09:43

## 2024-02-19 RX ADMIN — VASOPRESSIN 1 UNITS: 20 INJECTION INTRAVENOUS at 10:12

## 2024-02-19 RX ADMIN — ROCURONIUM BROMIDE 5 MG: 10 INJECTION INTRAVENOUS at 09:52

## 2024-02-19 RX ADMIN — HYDRALAZINE HYDROCHLORIDE 2 MG: 20 INJECTION INTRAMUSCULAR; INTRAVENOUS at 11:22

## 2024-02-19 RX ADMIN — HYDRALAZINE HYDROCHLORIDE 10 MG: 20 INJECTION INTRAMUSCULAR; INTRAVENOUS at 13:36

## 2024-02-19 RX ADMIN — EPHEDRINE SULFATE 10 MG: 50 INJECTION, SOLUTION INTRAVENOUS at 09:29

## 2024-02-19 SDOH — HEALTH STABILITY: MENTAL HEALTH: CURRENT SMOKER: 0

## 2024-02-19 ASSESSMENT — COGNITIVE AND FUNCTIONAL STATUS - GENERAL
WALKING IN HOSPITAL ROOM: A LOT
DRESSING REGULAR UPPER BODY CLOTHING: A LITTLE
TOILETING: A LOT
MOVING TO AND FROM BED TO CHAIR: A LITTLE
HELP NEEDED FOR BATHING: A LITTLE
CLIMB 3 TO 5 STEPS WITH RAILING: A LOT
TURNING FROM BACK TO SIDE WHILE IN FLAT BAD: A LITTLE
MOBILITY SCORE: 15
DRESSING REGULAR LOWER BODY CLOTHING: A LITTLE
PATIENT BASELINE BEDBOUND: NO
EATING MEALS: A LOT
DAILY ACTIVITIY SCORE: 15
STANDING UP FROM CHAIR USING ARMS: A LOT
MOVING FROM LYING ON BACK TO SITTING ON SIDE OF FLAT BED WITH BEDRAILS: A LITTLE
PERSONAL GROOMING: A LOT

## 2024-02-19 ASSESSMENT — ACTIVITIES OF DAILY LIVING (ADL)
WALKS IN HOME: NEEDS ASSISTANCE
BATHING: NEEDS ASSISTANCE
TOILETING: NEEDS ASSISTANCE
HEARING - LEFT EAR: FUNCTIONAL
JUDGMENT_ADEQUATE_SAFELY_COMPLETE_DAILY_ACTIVITIES: YES
DRESSING YOURSELF: NEEDS ASSISTANCE
HEARING - RIGHT EAR: FUNCTIONAL
ADEQUATE_TO_COMPLETE_ADL: YES
PATIENT'S MEMORY ADEQUATE TO SAFELY COMPLETE DAILY ACTIVITIES?: YES
GROOMING: NEEDS ASSISTANCE
FEEDING YOURSELF: NEEDS ASSISTANCE

## 2024-02-19 ASSESSMENT — PAIN SCALES - GENERAL
PAINLEVEL_OUTOF10: 4
PAINLEVEL_OUTOF10: 0 - NO PAIN
PAINLEVEL_OUTOF10: 4
PAINLEVEL_OUTOF10: 3
PAINLEVEL_OUTOF10: 3
PAINLEVEL_OUTOF10: 6

## 2024-02-19 ASSESSMENT — PAIN DESCRIPTION - LOCATION: LOCATION: HEAD

## 2024-02-19 ASSESSMENT — PAIN - FUNCTIONAL ASSESSMENT
PAIN_FUNCTIONAL_ASSESSMENT: 0-10

## 2024-02-19 ASSESSMENT — COLUMBIA-SUICIDE SEVERITY RATING SCALE - C-SSRS
6. HAVE YOU EVER DONE ANYTHING, STARTED TO DO ANYTHING, OR PREPARED TO DO ANYTHING TO END YOUR LIFE?: NO
1. IN THE PAST MONTH, HAVE YOU WISHED YOU WERE DEAD OR WISHED YOU COULD GO TO SLEEP AND NOT WAKE UP?: NO
2. HAVE YOU ACTUALLY HAD ANY THOUGHTS OF KILLING YOURSELF?: NO

## 2024-02-19 NOTE — CARE PLAN
The patient's goals for the shift include      The clinical goals for the shift include remain neurologically stable through end of shift        Problem: Pain  Goal: My pain/discomfort is manageable  Outcome: Progressing     Problem: Safety  Goal: Patient will be injury free during hospitalization  Outcome: Progressing  Goal: I will remain free of falls  Outcome: Progressing     Problem: Daily Care  Goal: Daily care needs are met  Outcome: Progressing     Problem: Psychosocial Needs  Goal: Demonstrates ability to cope with hospitalization/illness  Outcome: Progressing  Goal: Collaborate with me, my family, and caregiver to identify my specific goals  Outcome: Progressing     Problem: Discharge Barriers  Goal: My discharge needs are met  Outcome: Progressing

## 2024-02-19 NOTE — PROGRESS NOTES
Pharmacy Medication History Review    Fadia Bolden is a 68 y.o. female admitted for Acquired skull defect. Pharmacy reviewed the patient's [id number] medications and allergies for accuracy.    The list below reflects the updated PTA list. Comments regarding how patient may be taking medications differently can be found in the Admit Orders Activity  Prior to Admission Medications   Prescriptions Last Dose Informant   amLODIPine (Norvasc) 10 mg tablet Not Taking Self, Spouse/Significant Other   Sig: Take 1 tablet (10 mg) by mouth once daily.   Patient not taking: Reported on 2/19/2024   ascorbic acid, vitamin C, 1,500 mg ER tablet Past Week Self, Spouse/Significant Other   Sig: Take 1 tablet (1,500 mg) by mouth once daily.   aspirin 81 mg chewable tablet Past Week Self, Spouse/Significant Other   Sig: Chew 1 tablet (81 mg) once daily.   atorvastatin (Lipitor) 80 mg tablet Past Week Self, Spouse/Significant Other   Sig: Take 1 tablet (80 mg) by mouth once daily.  *taking 0.5 tab (40mg) daily   chlorhexidine (Hibiclens) 4 % external liquid  Self, Spouse/Significant Other   Sig: Apply topically once daily for 5 days. Shower nightly 5 days prior to surgery. Apply from neck down after normal shower routine. Leave on 5 minutes then rinse off. Please repeat this process the morning of surgery.   chlorhexidine (Peridex) 0.12 % solution  Self, Spouse/Significant Other   Sig: RINSE MOUTH WITH 15ML (1 CAPFUL) FOR 30 SECONDS AM AND PM PRIOR TO SURGERY. AFTER TOOTHBRUSHING. EXPECTORATE AFTER.   cholecalciferol (Vitamin D-3) 50 MCG (2000 UT) tablet Past Week Self, Spouse/Significant Other   Sig: Take 1 tablet (2,000 Units) by mouth once daily.   lisinopril 20 mg tablet 2/18/2024 Self, Spouse/Significant Other   Sig: Take 1 tablet (20 mg) by mouth once daily.      Facility-Administered Medications: None        The list below reflects the updated allergy list. Please review each documented allergy for additional  clarification and justification.  Allergies  Reviewed by Marlena Orlando PharmD on 2/19/2024        Severity Reactions Comments    Fish Derived Not Specified Other             Patient was unable to be assessed for M2B at discharge. Pharmacy has been updated to [facility]. M2B service not offered prior to surgery, please reassess prior to patient discharge if Meds to Beds is desired.     Sources used to complete the med history include: Patient/Spouse interview - good historian of medications/ Pharmacy - Walalices/ Chart review    Marlena Orlando PharmD  Transitions of Care Pharmacist   Meds Ambulatory and Retail Services  Please reach out via Secure Chat for questions, or if no response call [facility] or [facility]

## 2024-02-19 NOTE — HOSPITAL COURSE
H/o hypoT, breast CA, esophageal stricture, DAIJA on CPAP, L occipital ischemic stroke w hemorrhagic conversion 10/25/23 s/p L hemicrani ([place]), on ASA, 2/19 s/p L cranioplasty ([place])    Preop:  Ox3  R HH  Mild word finding difficulty, naming/repetition in tact  5/5x4    Other meds:  Ancef (drain ppx), Keppra 500BID (ppx)    PLAN  NSU  Drain (1/2 suction)  -140  ASA restart at follow up  PT/OT

## 2024-02-19 NOTE — ANESTHESIA PROCEDURE NOTES
Airway  Date/Time: 2/19/2024 7:57 AM  Urgency: elective    Airway not difficult    Staffing  Performed: CRNA   Authorized by: Shyam Akhtar DO    Performed by: Diana Boo  Patient location during procedure: OR    Indications and Patient Condition  Indications for airway management: anesthesia and airway protection  Spontaneous ventilation: present  Sedation level: deep  Preoxygenated: yes  Patient position: sniffing  MILS maintained throughout  Mask difficulty assessment: 1 - vent by mask  Planned trial extubation    Final Airway Details  Final airway type: endotracheal airway      Successful airway: ETT  Cuffed: yes   Successful intubation technique: direct laryngoscopy  Facilitating devices/methods: anterior pressure/BURP and intubating stylet  Endotracheal tube insertion site: oral  Blade: Leilani  Blade size: #3  ETT size (mm): 7.0  Cormack-Lehane Classification: grade III - view of epiglottis only  Placement verified by: chest auscultation, capnometry and palpation of cuff   Cuff volume (mL): 8  Measured from: teeth  ETT to teeth (cm): 21  Number of attempts at approach: 2    Additional Comments  Grade 4 improved to a 3 with cricoid/BURP pressure. SRNA attempted, CRNA took over. Lips and teeth remain intact.

## 2024-02-19 NOTE — BRIEF OP NOTE
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]     Procedures  Cranioplasty  14584 - AL CRANIOPLASTY SKULL DEFECT >5 CM DIAMETER    AL MICROSURG TQS REQ USE OPERATING MICROSCOPE [04517]  AL STRTCTC CPTR ASSTD PX CRANIAL INTRADURAL [20226]  Surgeons      * Tammie Blevins - Primary     * Swapna Roach    Resident/Fellow/Other Assistant:  Surgeon(s) and Role:     * Swapna Roach MD    Procedure Summary  Anesthesia: General  ASA: III  Anesthesia Staff: Anesthesiologist: Shyam Akhtar DO  CRNA: Lisset Lomeli APRN-CRNA  C-AA: MICHAEL Lantigua  SRNA: Diana Boo  Estimated Blood Loss: 286mL  Intra-op Medications:   Administrations occurring from 0715 to 1130 on 24:   Medication Name Total Dose   gelatin absorbable (Gelfoam) 100 sponge 1 each   thrombin (Human)-fibrinogen-aprotinin-calcium (Tisseel) 10 mL 10 mL   thrombin (recombinant) (Recothrom) topical solution 10,000 Units   polymyxin B 500,000 Units in sodium chloride 0.9 % 1,000 mL irrigation Cannot be calculated              Anesthesia Record               Intraprocedure I/O Totals          Intake    Phenylephrine Drip 0.00 mL    The total shown is the total volume documented since Anesthesia Start was filed.    LR infusion 100.00 mL    NaCl 0.9 % infusion 300.00 mL    Total Intake 400 mL       Output    Urine 600 mL    Est. Blood Loss 286 mL    Total Output 886 mL       Net    Net Volume -486 mL          Specimen: No specimens collected     Staff:   Circulator: Nuha Rudd RN; Debo Su RN  Scrub Person: Suzan Ogden LPN; Joanie Lu          Findings: Good cosmesis    Complications:  None; patient tolerated the procedure well.     Disposition: PACU - hemodynamically stable.  Condition: stable  Specimens  Collected: No specimens collected  Attending Attestation: I was present and scrubbed for the entire procedure.    Tammie Blevins  Phone Number: [phone]

## 2024-02-19 NOTE — ANESTHESIA PROCEDURE NOTES
Peripheral IV  Date/Time: 2/19/2024 8:15 AM  Inserted by: Diana Boo    Placement  Needle size: 18 G  Laterality: right  Location: forearm  Local anesthetic: none  Site prep: alcohol  Technique: anatomical landmarks  Attempts: 1

## 2024-02-19 NOTE — H&P
History Of Present Illness  Fadia Bolden is a 68 y.o. female H/o hypoT, breast CA, esophageal stricture, DAIJA on CPAP, L occipital ischemic stroke w hemorrhagic conversion 10/25/23 s/p L hemicrani ([place]), on ASA, presenting for left cranioplasty.     Past Medical History  Past Medical History:   Diagnosis Date    Other chest pain 02/10/2021    Chest tightness    Other specified health status 12/31/2018    No pertinent past medical history    Personal history of malignant neoplasm of breast 06/27/2022    History of malignant neoplasm of breast       Surgical History  Past Surgical History:   Procedure Laterality Date    MR HEAD ANGIO W AND WO IV CONTRAST  10/25/2023    MR HEAD ANGIO W AND WO IV CONTRAST 10/25/2023    MR HEAD ANGIO WO IV CONTRAST  1/5/2024    MR HEAD ANGIO WO IV CONTRAST 1/5/2024 USMAN [id number] MRI    OTHER SURGICAL HISTORY  11/22/2021    Tonsillectomy        Social History  She reports that she has never smoked. She has never used smokeless tobacco. She reports that she does not currently use alcohol. She reports that she does not currently use drugs.    Family History  Family History   Problem Relation Name Age of Onset    Thyroid disease Mother      Parkinsonism Father      Parkinsonism Sister      Other (HTN) Sister      Breast cancer Father's Sister      Breast cancer Paternal Grandmother          Allergies  Fish derived and Shrimp    Review of Systems  Neg except as stated in HPI     Physical Exam  Ox3  FCx4     Last Recorded Vitals  There were no vitals taken for this visit.    Relevant Results           Assessment/Plan   Principal Problem:    Acquired skull defect      Fadia Bolden is a 68 y.o. female H/o hypoT, breast CA, esophageal stricture, DAIJA on CPAP, L occipital ischemic stroke w hemorrhagic conversion 10/25/23 s/p L hemicrani ([place]), on ASA, presenting for left cranioplasty with Dr. Blevins.             Swapna Roach MD

## 2024-02-19 NOTE — SIGNIFICANT EVENT
Arrival to NSU:   Report received from anesthesia and neurosurgery.    Airway: extubated, Mallampati II    See anesthesia record for medications given during case.     Objective    Exam on arrival:   NEURO:  - CN2-12 intact. Somnolent, Agx4, FC  CV:  -RRR on telemetry, NSR  RESP:  -Regular, unlabored  -Oxygen: non-re breather  :  -Gonzalez catheter in place  GI:  -Abdomen NT/ND, soft  SKIN:  -Intact    Plan:  -NSU, Q1H neuro checks  -Post-op vitals: Q15min x4, then Q30 min x3, then Q1H  -Goal -140 - Nicardipine, Hydralazine and Labetalol PRN  -Pain Management: scheduled tylenol, PRN oxycodone, dilaudid  -Nausea: Promethazine and Ondansetron PRN  -Continuous pulse oximetry - O2 PRN to maintain SpO2 > 94%, wean as tolerated  -Incentive spirometry Q2H while awake  -D/c gonzalez catheter by POD#1  -Monitor and replace electrolytes per ICU protocol  -Accuchecks & ISS Q6H while NPO, AC&HS with diet  Post-op CTH pending    ACCESS:  -PIV    Total Recovery Time: 30 Minutes

## 2024-02-19 NOTE — ANESTHESIA PREPROCEDURE EVALUATION
Patient: Fadia Bolden    Procedure Information       Date/Time: 02/19/24 0715    Procedure: Cranioplasty (Left)    Location: [facility] OR 23 / [facility] OR    Surgeons: Tammie Blevins MD            Relevant Problems   Anesthesia (within normal limits)      Cardiovascular   (+) Mixed hyperlipidemia   (+) Primary hypertension      Endocrine   (+) Acquired hypothyroidism   (+) Nontoxic multinodular goiter      GI   (+) Esophageal stricture   (+) Gastroesophageal reflux disease      Neuro/Psych   (+) Cerebrovascular accident (CVA) (CMS/HCC)   (+) Nontraumatic hemorrhage of left cerebral hemisphere (CMS/HCC)      Pulmonary   (+) DAIJA on CPAP      Eyes, Ears, Nose, and Throat  Residual peripheral vision lost from stroke.   (+) Vision loss       Clinical information reviewed:   Tobacco  Allergies  Meds   Med Hx  Surg Hx   Fam Hx  Soc Hx        NPO Detail:  NPO/Void Status  Date of Last Liquid: 02/19/24  Time of Last Liquid: 0000  Date of Last Solid: 02/19/24  Time of Last Solid: 0000         Physical Exam    Airway  Mallampati: II  TM distance: >3 FB  Neck ROM: full     Cardiovascular   Rhythm: regular  Rate: normal     Dental - normal exam  Comments: Denies   Pulmonary   Breath sounds clear to auscultation     Abdominal        Echo 1/2019  EF 60-65%    Anesthesia Plan    History of general anesthesia?: yes  History of complications of general anesthesia?: no    ASA 3     general   (GETA, +/- arterial line)  The patient is not a current smoker.    intravenous induction   Trial extubation is planned.  Anesthetic plan and risks discussed with patient.  Use of blood products discussed with patient who consented to blood products.    Plan discussed with CRNA and attending.      Vitals:    02/19/24 0609   BP: (!) 196/90   Pulse: 88   Resp: 18   Temp: 36.1 °C (97 °F)   SpO2: 100%       Past Surgical History:   Procedure Laterality Date    MR HEAD ANGIO W AND WO IV CONTRAST  10/25/2023    MR HEAD ANGIO W  AND WO IV CONTRAST 10/25/2023    MR HEAD ANGIO WO IV CONTRAST  1/5/2024    MR HEAD ANGIO WO IV CONTRAST 1/5/2024 USMAN [id number] MRI    OTHER SURGICAL HISTORY  11/22/2021    Tonsillectomy     Past Medical History:   Diagnosis Date    Breast cancer (CMS/HCC)     bilateral    HTN (hypertension)     Hypothyroidism     DAIJA (obstructive sleep apnea)     Other chest pain 02/10/2021    Chest tightness    Other specified health status 12/31/2018    No pertinent past medical history    Peripheral vision loss, right     Personal history of malignant neoplasm of breast 06/27/2022    History of malignant neoplasm of breast    Stroke (CMS/HCC)      No current facility-administered medications for this encounter.  Prior to Admission medications    Medication Sig Start Date End Date Taking? Authorizing Provider   amLODIPine (Norvasc) 10 mg tablet Take 1 tablet (10 mg) by mouth once daily.  Patient not taking: Reported on 2/19/2024 12/12/23 12/12/24  Michelle Martinez,    ascorbic acid, vitamin C, 1,500 mg ER tablet Take 1 tablet (1,500 mg) by mouth once daily. 12/31/18   Historical Provider, MD   aspirin 81 mg chewable tablet Chew 1 tablet (81 mg) once daily. 12/14/23 12/13/24  Michelle Martinez DO   atorvastatin (Lipitor) 80 mg tablet Take 1 tablet (80 mg) by mouth once daily. 12/12/23 12/12/24  Michelle Martinez,    chlorhexidine (Hibiclens) 4 % external liquid Apply topically once daily for 5 days. Shower nightly 5 days prior to surgery. Apply from neck down after normal shower routine. Leave on 5 minutes then rinse off. Please repeat this process the morning of surgery. 2/14/24 2/19/24  DESHAWN Durand-CNP   chlorhexidine (Peridex) 0.12 % solution RINSE MOUTH WITH 15ML (1 CAPFUL) FOR 30 SECONDS AM AND PM PRIOR TO SURGERY. AFTER TOOTHBRUSHING. EXPECTORATE AFTER. 2/14/24   TATIANNA Durand   cholecalciferol (Vitamin D-3) 50 MCG (2000 UT) tablet Take 1 tablet (2,000 Units) by mouth once daily. 2/10/21    Historical Provider, MD   lisinopril 20 mg tablet Take 1 tablet (20 mg) by mouth once daily. 12/12/23 12/12/24  Michelle Martinez,      Allergies   Allergen Reactions    Fish Derived Other     Social History     Tobacco Use    Smoking status: Never    Smokeless tobacco: Never   Substance Use Topics    Alcohol use: Not Currently         Chemistry    Lab Results   Component Value Date/Time     02/16/2024 1024    K 3.9 02/16/2024 1024     02/16/2024 1024    CO2 29 02/16/2024 1024    BUN 25 (H) 02/16/2024 1024    CREATININE 0.83 02/16/2024 1024    Lab Results   Component Value Date/Time    CALCIUM 9.5 02/16/2024 1024    ALKPHOS 75 02/16/2024 1024    AST 17 02/16/2024 1024    ALT 18 02/16/2024 1024    BILITOT 1.0 02/16/2024 1024          Lab Results   Component Value Date/Time    WBC 5.0 02/16/2024 1024    HGB 13.8 02/16/2024 1024    HCT 41.4 02/16/2024 1024     02/16/2024 1024     Lab Results   Component Value Date/Time    PROTIME 12.2 02/16/2024 1024    INR 1.1 02/16/2024 1024

## 2024-02-20 ENCOUNTER — APPOINTMENT (OUTPATIENT)
Dept: SPEECH THERAPY | Facility: CLINIC | Age: 69
End: 2024-02-20
Payer: COMMERCIAL

## 2024-02-20 VITALS
DIASTOLIC BLOOD PRESSURE: 58 MMHG | WEIGHT: 102 LBS | RESPIRATION RATE: 16 BRPM | OXYGEN SATURATION: 98 % | TEMPERATURE: 97.7 F | SYSTOLIC BLOOD PRESSURE: 115 MMHG | HEART RATE: 70 BPM | BODY MASS INDEX: 17.42 KG/M2 | HEIGHT: 64 IN

## 2024-02-20 DIAGNOSIS — S06.5XAA SDH (SUBDURAL HEMATOMA) (MULTI): Primary | ICD-10-CM

## 2024-02-20 LAB
ALBUMIN SERPL BCP-MCNC: 4.3 G/DL (ref 3.4–5)
ANION GAP SERPL CALC-SCNC: 17 MMOL/L (ref 10–20)
APTT PPP: 28 SECONDS (ref 27–38)
BASOPHILS # BLD AUTO: 0.03 X10*3/UL (ref 0–0.1)
BASOPHILS NFR BLD AUTO: 0.2 %
BUN SERPL-MCNC: 17 MG/DL (ref 6–23)
CALCIUM SERPL-MCNC: 9.2 MG/DL (ref 8.6–10.6)
CHLORIDE SERPL-SCNC: 103 MMOL/L (ref 98–107)
CO2 SERPL-SCNC: 24 MMOL/L (ref 21–32)
CREAT SERPL-MCNC: 0.87 MG/DL (ref 0.5–1.05)
EGFRCR SERPLBLD CKD-EPI 2021: 72 ML/MIN/1.73M*2
EOSINOPHIL # BLD AUTO: 0.01 X10*3/UL (ref 0–0.7)
EOSINOPHIL NFR BLD AUTO: 0.1 %
ERYTHROCYTE [DISTWIDTH] IN BLOOD BY AUTOMATED COUNT: 12.3 % (ref 11.5–14.5)
GLUCOSE SERPL-MCNC: 105 MG/DL (ref 74–99)
HCT VFR BLD AUTO: 31.4 % (ref 36–46)
HGB BLD-MCNC: 10.4 G/DL (ref 12–16)
IMM GRANULOCYTES # BLD AUTO: 0.03 X10*3/UL (ref 0–0.7)
IMM GRANULOCYTES NFR BLD AUTO: 0.2 % (ref 0–0.9)
INR PPP: 1.1 (ref 0.9–1.1)
LYMPHOCYTES # BLD AUTO: 1.91 X10*3/UL (ref 1.2–4.8)
LYMPHOCYTES NFR BLD AUTO: 15.8 %
MCH RBC QN AUTO: 31.1 PG (ref 26–34)
MCHC RBC AUTO-ENTMCNC: 33.1 G/DL (ref 32–36)
MCV RBC AUTO: 94 FL (ref 80–100)
MONOCYTES # BLD AUTO: 1.53 X10*3/UL (ref 0.1–1)
MONOCYTES NFR BLD AUTO: 12.7 %
NEUTROPHILS # BLD AUTO: 8.58 X10*3/UL (ref 1.2–7.7)
NEUTROPHILS NFR BLD AUTO: 71 %
NRBC BLD-RTO: 0 /100 WBCS (ref 0–0)
PHOSPHATE SERPL-MCNC: 4.3 MG/DL (ref 2.5–4.9)
PLATELET # BLD AUTO: 164 X10*3/UL (ref 150–450)
POTASSIUM SERPL-SCNC: 3.8 MMOL/L (ref 3.5–5.3)
PROTHROMBIN TIME: 12.7 SECONDS (ref 9.8–12.8)
RBC # BLD AUTO: 3.34 X10*6/UL (ref 4–5.2)
SODIUM SERPL-SCNC: 140 MMOL/L (ref 136–145)
WBC # BLD AUTO: 12.1 X10*3/UL (ref 4.4–11.3)

## 2024-02-20 PROCEDURE — 2500000004 HC RX 250 GENERAL PHARMACY W/ HCPCS (ALT 636 FOR OP/ED): Performed by: STUDENT IN AN ORGANIZED HEALTH CARE EDUCATION/TRAINING PROGRAM

## 2024-02-20 PROCEDURE — 80069 RENAL FUNCTION PANEL: CPT | Performed by: STUDENT IN AN ORGANIZED HEALTH CARE EDUCATION/TRAINING PROGRAM

## 2024-02-20 PROCEDURE — 2500000002 HC RX 250 W HCPCS SELF ADMINISTERED DRUGS (ALT 637 FOR MEDICARE OP, ALT 636 FOR OP/ED): Performed by: STUDENT IN AN ORGANIZED HEALTH CARE EDUCATION/TRAINING PROGRAM

## 2024-02-20 PROCEDURE — 97165 OT EVAL LOW COMPLEX 30 MIN: CPT | Mod: GO

## 2024-02-20 PROCEDURE — 97161 PT EVAL LOW COMPLEX 20 MIN: CPT | Mod: GP

## 2024-02-20 PROCEDURE — 85025 COMPLETE CBC W/AUTO DIFF WBC: CPT | Performed by: STUDENT IN AN ORGANIZED HEALTH CARE EDUCATION/TRAINING PROGRAM

## 2024-02-20 PROCEDURE — 36415 COLL VENOUS BLD VENIPUNCTURE: CPT | Performed by: STUDENT IN AN ORGANIZED HEALTH CARE EDUCATION/TRAINING PROGRAM

## 2024-02-20 PROCEDURE — 85610 PROTHROMBIN TIME: CPT | Performed by: STUDENT IN AN ORGANIZED HEALTH CARE EDUCATION/TRAINING PROGRAM

## 2024-02-20 PROCEDURE — 2500000001 HC RX 250 WO HCPCS SELF ADMINISTERED DRUGS (ALT 637 FOR MEDICARE OP): Performed by: STUDENT IN AN ORGANIZED HEALTH CARE EDUCATION/TRAINING PROGRAM

## 2024-02-20 RX ORDER — ACETAMINOPHEN 325 MG/1
650 TABLET ORAL EVERY 6 HOURS SCHEDULED
Qty: 56 TABLET | Refills: 0 | Status: SHIPPED | OUTPATIENT
Start: 2024-02-20 | End: 2024-02-27

## 2024-02-20 RX ORDER — OXYCODONE HYDROCHLORIDE 5 MG/1
5 TABLET ORAL EVERY 6 HOURS PRN
Qty: 28 TABLET | Refills: 0 | Status: SHIPPED | OUTPATIENT
Start: 2024-02-20 | End: 2024-02-27

## 2024-02-20 RX ORDER — LEVETIRACETAM 500 MG/1
500 TABLET ORAL 2 TIMES DAILY
Qty: 14 TABLET | Refills: 0 | Status: SHIPPED | OUTPATIENT
Start: 2024-02-20 | End: 2024-03-15 | Stop reason: ALTCHOICE

## 2024-02-20 RX ORDER — ONDANSETRON 4 MG/1
4 TABLET, FILM COATED ORAL EVERY 8 HOURS PRN
Qty: 20 TABLET | Refills: 0 | Status: SHIPPED | OUTPATIENT
Start: 2024-02-20 | End: 2024-02-27

## 2024-02-20 RX ORDER — OMEPRAZOLE 40 MG/1
40 CAPSULE, DELAYED RELEASE ORAL
Qty: 14 CAPSULE | Refills: 0 | Status: SHIPPED | OUTPATIENT
Start: 2024-02-20 | End: 2024-03-15 | Stop reason: ALTCHOICE

## 2024-02-20 RX ADMIN — ACETAMINOPHEN 650 MG: 325 TABLET ORAL at 06:00

## 2024-02-20 RX ADMIN — LEVETIRACETAM 500 MG: 500 TABLET, FILM COATED ORAL at 08:09

## 2024-02-20 RX ADMIN — LISINOPRIL 20 MG: 20 TABLET ORAL at 06:00

## 2024-02-20 RX ADMIN — ACETAMINOPHEN 650 MG: 325 TABLET ORAL at 17:23

## 2024-02-20 RX ADMIN — ACETAMINOPHEN 650 MG: 325 TABLET ORAL at 12:45

## 2024-02-20 RX ADMIN — CEFAZOLIN SODIUM 1 G: 1 INJECTION, SOLUTION INTRAVENOUS at 17:23

## 2024-02-20 RX ADMIN — SENNOSIDES AND DOCUSATE SODIUM 2 TABLET: 8.6; 5 TABLET ORAL at 08:09

## 2024-02-20 RX ADMIN — AMLODIPINE BESYLATE 10 MG: 10 TABLET ORAL at 06:00

## 2024-02-20 RX ADMIN — HEPARIN SODIUM 5000 UNITS: 5000 INJECTION INTRAVENOUS; SUBCUTANEOUS at 12:40

## 2024-02-20 RX ADMIN — CEFAZOLIN SODIUM 1 G: 1 INJECTION, SOLUTION INTRAVENOUS at 08:09

## 2024-02-20 RX ADMIN — PANTOPRAZOLE SODIUM 40 MG: 40 TABLET, DELAYED RELEASE ORAL at 06:00

## 2024-02-20 ASSESSMENT — COGNITIVE AND FUNCTIONAL STATUS - GENERAL
TURNING FROM BACK TO SIDE WHILE IN FLAT BAD: A LITTLE
STANDING UP FROM CHAIR USING ARMS: A LITTLE
CLIMB 3 TO 5 STEPS WITH RAILING: A LITTLE
WALKING IN HOSPITAL ROOM: A LITTLE
MOVING TO AND FROM BED TO CHAIR: A LITTLE
TOILETING: A LITTLE
MOBILITY SCORE: 18
MOVING FROM LYING ON BACK TO SITTING ON SIDE OF FLAT BED WITH BEDRAILS: A LITTLE
DRESSING REGULAR LOWER BODY CLOTHING: A LITTLE
DAILY ACTIVITIY SCORE: 21
HELP NEEDED FOR BATHING: A LITTLE

## 2024-02-20 ASSESSMENT — PAIN SCALES - GENERAL
PAINLEVEL_OUTOF10: 3
PAINLEVEL_OUTOF10: 4
PAINLEVEL_OUTOF10: 3
PAINLEVEL_OUTOF10: 4
PAINLEVEL_OUTOF10: 7
PAINLEVEL_OUTOF10: 3
PAINLEVEL_OUTOF10: 0 - NO PAIN

## 2024-02-20 ASSESSMENT — PAIN - FUNCTIONAL ASSESSMENT
PAIN_FUNCTIONAL_ASSESSMENT: 0-10

## 2024-02-20 ASSESSMENT — PAIN DESCRIPTION - LOCATION: LOCATION: HEAD

## 2024-02-20 ASSESSMENT — ACTIVITIES OF DAILY LIVING (ADL)
BATHING_ASSISTANCE: MINIMAL
ADLS_ADDRESSED: NO

## 2024-02-20 NOTE — DISCHARGE SUMMARY
Discharge Diagnosis  Acquired skull defect    Issues Requiring Follow-Up  Follow up    Test Results Pending At Discharge  Pending Labs       No current pending labs.            Hospital Course  Pt is a 69 yo F w/ history of hypothyroidism, breast CA, esophageal stricture, DAIJA on CPAP, L occipital ischemic stroke w hemorrhagic conversion 10/25/23 s/p L hemicrani ([place]), on ASA, 2/19 s/p L cranioplasty ([facility]).    She tolerated the procedure well and is being discharged home in stable condition with follow up arranged/.        Pertinent Physical Exam At Time of Discharge  Physical Exam  Ox3, awake  R-HH  Word finding difficulty 1/3 names, repetition intact  BUE and BLE 5/5, SILT  Dressing c/d/I    Home Medications     Medication List      START taking these medications     acetaminophen 325 mg tablet; Commonly known as: Tylenol; Take 2 tablets   (650 mg) by mouth every 6 hours for 7 days.   levETIRAcetam 500 mg tablet; Commonly known as: Keppra; Take 1 tablet   (500 mg) by mouth 2 times a day for 7 days.   omeprazole 40 mg DR capsule; Commonly known as: PriLOSEC; Take 1 capsule   (40 mg) by mouth once daily in the morning. Take before meals for 14 days.   Do not crush or chew.   ondansetron 4 mg tablet; Commonly known as: Zofran; Take 1 tablet (4 mg)   by mouth every 8 hours if needed for nausea or vomiting for up to 7 days.   oxyCODONE 5 mg immediate release tablet; Commonly known as: Roxicodone;   Take 1 tablet (5 mg) by mouth every 6 hours if needed for moderate pain (4   - 6) for up to 7 days.     CONTINUE taking these medications     ascorbic acid (vitamin C) 1,500 mg ER tablet   atorvastatin 80 mg tablet; Commonly known as: Lipitor; Take 1 tablet (80   mg) by mouth once daily.   cholecalciferol 50 MCG (2000 UT) tablet; Commonly known as: Vitamin D-3   lisinopril 20 mg tablet; Take 1 tablet (20 mg) by mouth once daily.     STOP taking these medications     amLODIPine 10 mg tablet; Commonly known as:  Norvasc   aspirin 81 mg chewable tablet   chlorhexidine 0.12 % solution; Commonly known as: Peridex   chlorhexidine 4 % external liquid; Commonly known as: Hibiclens       Outpatient Follow-Up  Future Appointments   Date Time Provider Department Center   2/22/2024  9:15 AM Sanjuana Fields, OT [id number] [place]   2/27/2024 11:15 AM Diana Hurt [facility] [id number] ST [place]   2/29/2024  9:15 AM Sanjuana Fields, OT [id number] [place]   2/29/2024 11:15 AM Diana Hurt [facility] [id number] STJ [place]   3/5/2024 11:15 AM Diana Hurt [facility] [id number] STJ [place]   3/7/2024 11:15 AM Diana Hurt [facility] [id number] STJ [place]   3/15/2024 11:00 AM Michelle Martinez DO [id number] [place]   3/25/2024  9:30 AM STJ MAMMO 2 STJMAM ST [place]   3/25/2024 10:00 AM STJ BREAST ULTRASOUND 1 STJMAM ST [place]   3/25/2024 10:30 AM Delisa Rascon DO STJONCS [place]   4/18/2024  8:00 AM Vaughn Vazquez DO [id number] [place]   4/29/2024  9:00 AM ELY [facility] ELYMRI USMAN [place]    7/31/2024 10:30 AM Annabel Mann MD [id number] [place]       Carey Michelle MD

## 2024-02-20 NOTE — PROGRESS NOTES
Physical Therapy    Physical Therapy Evaluation    Patient Name: Fadia Bolden  MRN: [id number]  Today's Date: 2/20/2024   Time Calculation  Start Time: 0954  Stop Time: 1008  Time Calculation (min): 14 min    Assessment/Plan   PT Assessment  PT Assessment Results: Decreased endurance, Impaired balance, Decreased mobility, Decreased coordination  Rehab Prognosis: Excellent  Evaluation/Treatment Tolerance: Patient tolerated treatment well  Strengths: Attitude of self, Support and attitude of living partners  End of Session Communication: Bedside nurse  Assessment Comment: Pt to benefit from ongoing PT services to address the above limitations and to prepare pt for timely return to prior level of function.  End of Session Patient Position: Up in chair, Alarm off, not on at start of session  IP OR SWING BED PT PLAN  Inpatient or Swing Bed: Inpatient  PT Plan  Treatment/Interventions: Bed mobility, Transfer training, Gait training, Stair training, Balance training, Neuromuscular re-education, Strengthening, Endurance training, Therapeutic exercise, Therapeutic activity, Home exercise program, Postural re-education  PT Plan: Skilled PT  PT Frequency: 3 times per week  PT Discharge Recommendations: Low intensity level of continued care  Equipment Recommended upon Discharge:  (n/a)  PT Recommended Transfer Status: Assist x1  PT - OK to Discharge: Yes (When medically ready.)      Subjective   General Visit Information:  Reason for Referral: 2/19 s/p L cranioplasty ([place]). GCS 15, NIHSS 4.  Past Medical History Relevant to Rehab: hypoT, breast CA, esophageal stricture, DAIJA on CPAP, L occipital ischemic stroke w hemorrhagic conversion 10/25/23 s/p L hemicrani ([place]), on ASA  Prior to Session Communication: Bedside nurse  Patient Position Received: Up in chair, Alarm off, not on at start of session  Family/Caregiver Present: No  Caregiver Feedback: n/a   Home Living:  Home Living  Type of Home: House  Lives With:   (spouse + adult daughter)  Home Adaptive Equipment: None  Home Layout: One level  Home Access: Stairs to enter with rails  Entrance Stairs-Number of Steps: 2  Bathroom Shower/Tub: Tub/shower unit  Bathroom Equipment: Shower chair with back  Prior Level of Function:  Prior Function Per Pt/Caregiver Report  Level of [place]: Independent with ADLs and functional transfers, Independent with homemaking with ambulation  ADL Assistance:  (Difficult to determine extent of assist; Per pt, she is able to dress IND, but occasionally requires assist for bathing.)  Homemaking Assistance: Needs assistance  Ambulatory Assistance: Independent  Hand Dominance: Right  Precautions:  Precautions  Hearing/Visual Limitations: WFL  Medical Precautions: Fall precautions  Precautions Comment: -140  Vital Signs:  Vital Signs  Heart Rate: 78 (80 end)  SpO2: 100 %  BP: 111/60 (121/56 (76) during, 134/115 (123) end)  MAP (mmHg): 75  BP Method: Automatic    Objective   Lines/Tubes/Drains:  Closed/Suction Drain Left Scalp Accordion 10 Fr. (Active)   Number of days: 0     Continuous Medications/Drips:     Oxygen:    Pain:  Pain Assessment  Pain Assessment: 0-10  Pain Score: 7  Pain Type: Surgical pain  Pain Location: Head  Pain Interventions:  (Pain did not limit session)  Cognition:  Cognition  Overall Cognitive Status: Impaired (Expressive language deficits appreicated. \)  Arousal/Alertness: Appropriate responses to stimuli  Orientation Level: Oriented X4  Following Commands: Follows one step commands consistently  Insight: Within function limits  Impulsive: Within functional limits  Processing Speed: Delayed      Extremity/Trunk Assessments:  Strength:           RLE   RLE : Exceptions to WFL (ROM WFL)  Strength RLE  R Hip Flexion: 4+/5  R Knee Flexion: 5/5  R Knee Extension: 5/5  R Ankle Dorsiflexion: 5/5  R Ankle Plantar Flexion: 5/5  LLE   LLE : Exceptions to WFL (ROM WFL)  Strength LLE  L Hip Flexion: 4+/5  L Knee Flexion:  5/5  L Knee Extension: 5/5  L Ankle Dorsiflexion: 5/5  L Ankle Plantar Flexion: 5/5    General Assessments:         Activity Tolerance  Endurance: Endurance does not limit participation in activity  Sensation  Light Touch:  (Difficult to assess 2/2 expressive language deficits; Pt implies decreased sensation LLE/LUE compared to R, but able to sense light touch.)  Coordination  Finger to Nose:  (Slight dysmetria (worse in R visual field than L) BUE.)  Heel to [place]:  (RLE intact, mild dysmetria appreciated LLE.)  Static Sitting Balance  Static Sitting-Balance Support: Bilateral upper extremity supported, Feet supported  Static Sitting-Level of Assistance: Close supervision  Dynamic Sitting Balance  Dynamic Sitting-Balance Support: Bilateral upper extremity supported  Dynamic Sitting-Comments: Close sup  Static Standing Balance  Static Standing-Balance Support: No upper extremity supported  Static Standing-Level of Assistance: Contact guard  Dynamic Standing Balance  Dynamic Standing-Balance Support: No upper extremity supported  Dynamic Standing-Comments: CGA    Functional Assessments:  Bed Mobility  Bed Mobility: No (Pt in bedside chair at start and end of session.)  Transfers  Transfer: Yes  Transfer 1  Technique 1: Sit to stand, Stand to sit  Transfer Device 1: Gait belt  Transfer Level of Assistance 1: Contact guard  Trials/Comments 1: Cues for controlling speed.  Ambulation/Gait Training  Ambulation/Gait Training Performed: Yes  Ambulation/Gait Training 1  Surface 1: Level tile  Device 1: No device  Gait Support Devices: Gait belt  Assistance 1: Contact guard  Quality of Gait 1: Decreased step length, Shuffling gait, Forward flexed posture (Decreased B reciprocal arm swing. Cues for self-monitoring activity tolerance.)  Comments/Distance (ft) 1: 14 ft  Stairs  Stairs: No       Outcome Measures:  [facility] Basic Mobility  Turning from your back to your side while in a flat bed without using bedrails: A little  Moving  from lying on your back to sitting on the side of a flat bed without using bedrails: A little  Moving to and from bed to chair (including a wheelchair): A little  Standing up from a chair using your arms (e.g. wheelchair or bedside chair): A little  To walk in hospital room: A little  Climbing 3-5 steps with railing: A little  Basic Mobility - Total Score: 18                    Tinetti  Sitting Balance: Steady, safe  Arises: Able, uses arms to help  Attempts to Arise: Able to arise, one attempt  Immediate Standing Balance (First 5 Seconds): Steady without walker or other support  Standing Balance: Narrow stance without support  Nudged: Staggers, grabs, catches self  Eyes Closed: Unsteady  Turned 360 Degrees: Steadiness: Steady  Turned 360 Degrees: Continuity of Steps: Discontinuous steps  Sitting Down: Uses arms or not a smooth motion  Balance Score: 11  Initiation of Gait: No hesitancy  Step Height: R Swing Foot: Right foot complete clears floor  Step Length: R Swing Foot: Passes left stance foot  Step Height: L Swing Foot: Left foot complete clears floor  Step Length: L Swing Foot: Passes right stance foot  Step Symmetry: Right and left step appear equal  Step Continuity: Steps appear continuous  Path: Straight without walking aid  Trunk: No sway, no flexion, no use of arms, no walking aid  Walking Time: Heels almost touching while walking  Gait Score: 12  Total Score: 23          Encounter Problems       Encounter Problems (Active)       PT Problem       Patient will score >/= 24/28 points on the Tinetti to indicate low risk of falling.        Start:  02/20/24    Expected End:  03/05/24            Patient will perform sit to stand and stand to sit transfers IND to increase functional strength.        Start:  02/20/24    Expected End:  03/05/24            Patient will ambulate at least 200  ft. with MOD-I with LRD to improve tolerance of community distances.          Start:  02/20/24    Expected End:  03/05/24             Patient will ascend and descend >/= 3 steps with railing and </= close sup to facilitate safe navigation of stairs in the home.          Start:  02/20/24    Expected End:  03/05/24            Patient will perform home exercise program as prescribed with cues as needed.         Start:  02/20/24    Expected End:  03/05/24                   Education Documentation  Precautions, taught by Krystin Webb PT at 2/20/2024 10:35 AM.  Learner: Patient  Readiness: Acceptance  Method: Explanation  Response: Verbalizes Understanding    Mobility Training, taught by Krystin Webb PT at 2/20/2024 10:35 AM.  Learner: Patient  Readiness: Acceptance  Method: Explanation  Response: Verbalizes Understanding    Education Comments  No comments found.      02/20/24 at 10:36 AM   Krystin Webb PT   Rehab Office: [phone]

## 2024-02-20 NOTE — PROGRESS NOTES
"Subjective   68 y.o. female with PMH Hypothyroidism, breast CA, esophageal stricture, DAIJA on CPAP, L occipital ischemic stroke with hemorrhagic conversion (10/25/23) s/p L hemicrani ( [place]), on ASA. Admitted 2/19/2024 s/p L cranioplasty to NSU for close neuro monitoring.     Objective   Vitals 24 hour ranges:  Heart Rate:  [57-88]   Temp:  [35.7 °C (96.3 °F)-36.6 °C (97.9 °F)]   Resp:  [11-21]   BP: (118-196)/()   Height:  [162.6 cm (5' 4\")]   Weight:  [46.7 kg (102 lb 15.3 oz)]   SpO2:  [97 %-100 %]      Intake/Output for last 24 hours:    Intake/Output Summary (Last 24 hours) at 2/19/2024 2205  Last data filed at 2/19/2024 2144  Gross per 24 hour   Intake 400 ml   Output 1646 ml   Net -1246 ml      Physical Exam:  NEURO:  Awake, Alert, oriented x4, follows commands  EOS, PERRL, EOMI, VFF  GILES 5/5 strength, no drift, no ataxia  L cranioplasty c/d/i  CV:  RRR on telemetry, NSR  RESP:  Regular, unlabored  On RA  :  Bertrand draining clear yellow urine to gravity   GI:  Abdomen NT/ND, soft  SKIN:  Intact    Medications  Scheduled:  acetaminophen, 650 mg, oral, q6h RUDY  amLODIPine, 10 mg, oral, Daily  atorvastatin, 80 mg, oral, Daily  [START ON 2/20/2024] ceFAZolin, 1 g, intravenous, q8h  [START ON 2/20/2024] heparin (porcine), 5,000 Units, subcutaneous, q12h  levETIRAcetam, 500 mg, oral, BID  lisinopril, 20 mg, oral, Daily  oxygen, , inhalation, Continuous - 02/gases  [START ON 2/20/2024] pantoprazole, 40 mg, oral, Daily before breakfast   Or  [START ON 2/20/2024] pantoprazole, 40 mg, intravenous, Daily before breakfast  polyethylene glycol, 17 g, oral, Daily  sennosides-docusate sodium, 2 tablet, oral, BID    PRN:  PRN medications: bisacodyl, hydrALAZINE, HYDROmorphone, HYDROmorphone, HYDROmorphone, labetaloL, naloxone, ondansetron, ondansetron **OR** ondansetron, oxyCODONE, oxyCODONE, promethazine (Phenergan) 6.25 mg in sodium chloride 0.9% 50 mL IV, promethazine **OR** promethazine     Continuous:     Lab " Results                        Imaging Results  CT head wo IV contrast   Final Result   Status post left hemicraniectomy with interval cranioplasty.   Re-expansion left cerebral parenchyma and decreased left sulcal   effacement. No extra-axial collection.        No acute intracranial abnormality. Encephalomalacia/gliosis in the   left PCA territory with ex vacuo dilatation of the left lateral   ventricle, similar to previous exam.        Signed by: Cecy Wilson 2/19/2024 2:34 PM   Dictation workstation:   [id number]            Assessment/Plan   68 y.o. female with PMH Hypothyroidism, breast CA, esophageal stricture, DAIJA on CPAP, L occipital ischemic stroke with hemorrhagic conversion (10/25/23) s/p L hemicrani ([place]), on ASA. Admitted 2/19/2024 s/p L cranioplasty to NSU for close neuro monitoring.     NEURO:  #s/p L cranioplasty   #L occipital ischemic stroke with hemorrhagic conversion (10/25/23) s/p L hemicrani ([place])  Assessment:  Neurologically: intact   Plan:  NSU  Q1H neuro checks  Pain: acetaminophen, oxycodone, hydromorphone PRN  Nausea: ondansetron PRN  Ancef 1g q8h  PT/OT/SLP  Post-op CTH   Ok to get out of bed tonight    CARDIOVASCULAR:  Assessment:  SR on tele  Plan:  Continue to monitor on telemetry  Goal -140 - Nicardipine, Hydralazine and Labetalol PRN  Amlodipine 10mg qd, lisinopril 20mg qd    RESPIRATORY:  #DAIJA on CPAP  Assessment:  On RA  Plan:  Continuous pulse oximetry   O2 PRN to maintain SpO2 > 94%, wean as tolerated  Incentive spirometry while awake   CPAP when needed     RENAL/:  Assessment:  BUN/Cre 2/16/24: 25/0.83  Plan:  Monitor with daily RFP  Remove gonzalez tonight  Void trial     FEN/GI:  #Esophageal stricture  Assessment:  Last BM: PTA  Plan:  Monitor and replace electrolytes per protocol  Diet: Regular  Bowel Regimen: Docusate-Senna    ENDOCRINE:  #Hypothyroidism  Assessment:  Results from last 7 days   Lab Units 02/16/24  1024   GLUCOSE mg/dL 122*       Plan:  Accuchecks & ISS Q6H    HEMATOLOGY/ONC:  #breast CA  Assessment:  Results from last 7 days   Lab Units 24  1024   HEMOGLOBIN g/dL 13.8   HEMATOCRIT % 41.4   PLATELETS AUTO x10*3/uL 209     Plan:  Continue to monitor with daily CBC and Coag panel    INFECTIOUS DISEASE:  Assessment:  Results from last 7 days   Lab Units 24  1024   WBC AUTO x10*3/uL 5.0     Temp (24hrs), Av.2 °C (97.1 °F), Min:35.7 °C (96.3 °F), Max:36.6 °C (97.9 °F)    Plan:  Continue to monitor for s/sx of infection  Pan culture for temperature > 38.4 C    MUSCULOSKELETAL:  No acute issues    SKIN:  No acute issues  Turns and skin care per NSU protocol    ACCESS:  PIVs    PROPHYLAXIS:  DVT/VTE: SCDs, SQH POD1  GI: PPI    RESTRAINTS:  Not indicated/Patient does not meet criteria for restraints    Michaela Dubois APRN-CNP  Neuroscience Intensive Care       Total critical care time of 60 minutes, with > 50% of time spent in direct contact with patient/family for education, counseling and coordination of care.  s

## 2024-02-20 NOTE — CARE PLAN
Problem: Pain  Goal: My pain/discomfort is manageable  Outcome: Progressing     Problem: Safety  Goal: Patient will be injury free during hospitalization  Outcome: Progressing  Goal: I will remain free of falls  Outcome: Progressing     Problem: Daily Care  Goal: Daily care needs are met  Outcome: Progressing     Problem: Psychosocial Needs  Goal: Demonstrates ability to cope with hospitalization/illness  Outcome: Progressing  Goal: Collaborate with me, my family, and caregiver to identify my specific goals  Outcome: Progressing     Problem: Discharge Barriers  Goal: My discharge needs are met  Outcome: Progressing       The clinical goals for the shift include neuro exam will remain stable throughout shift

## 2024-02-20 NOTE — PROGRESS NOTES
"Fadia Bolden is a 68 y.o. female on day 1 of admission presenting with Acquired skull defect.    Subjective   Naeo  Bertrand removed, patient voided       Objective     Physical Exam  Ox3, awake  R-HH  Word finding difficulty 1/3 names, repetition intact  BUE and BLE 5/5, SILT  Dressing c/d/i    Last Recorded Vitals  Blood pressure 136/57, pulse 65, temperature 36.8 °C (98.2 °F), temperature source Temporal, resp. rate (!) 9, height 1.626 m (5' 4\"), weight 46.3 kg (102 lb), SpO2 100 %.  Intake/Output last 3 Shifts:  I/O last 3 completed shifts:  In: 400 (8.6 mL/kg) [I.V.:400 (8.6 mL/kg)]  Out: 1556 (33.3 mL/kg) [Urine:1270 (0.8 mL/kg/hr); Blood:286]  Weight: 46.7 kg     Relevant Results                             Assessment/Plan   Principal Problem:    Acquired skull defect  Active Problems:    Gastroesophageal reflux disease    Vision loss    68 year old with h/o hypoT, breast CA, esophageal stricture, DAIJA on CPAP, L occipital ischemic stroke w hemorrhagic conversion 10/25/23 s/p L hemicrani ([place]), on ASA,     Hospital Course  2/19 s/p L cranioplasty ([facility]), [facility] POC    Plan  Floor  Oox3   Drain off suction  PTOT, SLP evaluation  dispo pending above evaluations and drain output           Brit Reeder MD      "

## 2024-02-20 NOTE — PROGRESS NOTES
Care Transitions Progress Note:  Plan per medical team: HX of Hemorrhagic stroke; s/p L steve crani; PT:OT  Team Members Present: NP: MD: TCC: PCN: SW  PT:OT recommend Low intensity ; Patient is active with OP therapy and would like to continue those services.  Status: inpatient  Payor: [employer]  Barriers:none  Adod: 2 days

## 2024-02-20 NOTE — PROGRESS NOTES
Occupational Therapy    Evaluation    Patient Name: Fadia Bolden  MRN: [id number]  Today's Date: 2/20/2024  Time Calculation  Start Time: 0926  Stop Time: 957  Assessment:  OT Assessment: Fadia is a 69yo female presenting with deficits in coordination, visual motor, transfers, safety awareness/awareness of deficits, and UE strength. Pt would benefit from additional therapy during acute stay and LOW int at dc with 24hr supervision 2/2 cognition  Prognosis: Good  Barriers to Discharge: None  Evaluation/Treatment Tolerance: Patient tolerated treatment well  Medical Staff Made Aware: Yes  End of Session Communication: Bedside nurse  End of Session Patient Position: Up in chair, Alarm off, not on at start of session  OT Assessment Results: Decreased ADL status, Decreased cognition, Decreased sensation, Visual deficit, Decreased fine motor control, Decreased upper extremity strength, Decreased safe judgment during ADL  Prognosis: Good  Barriers to Discharge: None  Evaluation/Treatment Tolerance: Patient tolerated treatment well  Medical Staff Made Aware: Yes  Strengths: Attitude of self, Capable of completing ADLs semi/independent, Housing layout, Support of Caregivers  Barriers to Participation: Insight into problems  Plan:  OT Frequency: 2 times per week  OT Discharge Recommendations: Low intensity level of continued care, 24 hr supervision due to cognition  OT Recommended Transfer Status: Assist of 1, Stand by assist  OT - OK to Discharge: Yes       Subjective   Current Problem:  1. Acquired skull defect          General:  General  Reason for Referral: 2/19 s/p L cranioplasty ([facility]) GCS 15, NIHSS 4  Past Medical History Relevant to Rehab: hypoT, breast CA, esophageal stricture, DAIJA on CPAP, L occipital ischemic stroke w hemorrhagic conversion 10/25/23 s/p L hemicrani  Family/Caregiver Present: No  Prior to Session Communication: Bedside nurse  Patient Position Received: Up in chair, Alarm off, not on at start of  "session, Alarm off, caregiver present  Preferred Learning Style: auditory, verbal, visual  General Comment: Pt up in chair upon entry to room. Pt alert, pleasant and willing to participate in therapy. Pt demos difficulty with word finding throughout. Noted to have R visual field cut  Precautions:  Hearing/Visual Limitations: R visual field cut GEORGE eyes w/wo opposite eye obstructed  Medical Precautions: Fall precautions  Precautions Comment: -140  Vital Signs:  Heart Rate: 80 (73 post)  Resp: 13  SpO2: 100 %  BP: 119/61 (111/60 post)  MAP (mmHg): 77 (75 post)  BP Method: Automatic  Pain:  Pain Assessment  Pain Assessment: 0-10  Pain Score: 3  Pain Type: Surgical pain  Pain Location: Head  Pain Orientation: Left  Pain Interventions: Distraction    Objective   Cognition:  Overall Cognitive Status: Impaired  Arousal/Alertness: Appropriate responses to stimuli  Orientation Level:  (requires extended time for orientation questions, difficulty with word finding. Expressive difficulties)  Following Commands: Follows one step commands with increased time  Memory: Exceptions to WFL  Insight: Mild  Cognition Test Scores  Cognition Tests: Cognition Test Performed  SBT Test Score: 13/28 (0-4 normal) pt score concludes \"impairment consistent with dementia). Pt demos error with month, correct year with increase time, correct time, correct counting backwards from 20-1, 2 errors with months backwards, 3 errors with name/address        Home Living:  Type of Home: House  Lives With: Spouse, Adult children  Home Adaptive Equipment: None  Home Layout: One level  Home Access: Stairs to enter with rails  Entrance Stairs-Number of Steps: 2  Bathroom Shower/Tub: Tub/shower unit  Bathroom Toilet: Standard  Bathroom Equipment: Shower chair with back  Prior Function:  Level of [place]: Independent with ADLs and functional transfers, Independent with homemaking with ambulation (independent prior to initial stroke)  ADL Assistance:  " (difficult to determine level of assist 2/2 expressive difficulty, reports some assist with bathing)  Homemaking Assistance: Needs assistance  Ambulatory Assistance: Independent  Hand Dominance: Right  Prior Function Comments: prior to initial CVA pt reports driving and independence. Denies falls  IADL History:     ADL:  Eating Assistance: Independent  Grooming Assistance: Independent  Bathing Assistance: Minimal  UE Dressing Assistance: Stand by  LE Dressing Assistance: Stand by  Toileting Assistance with Device: Stand by  Activity Tolerance:  Endurance: Endurance does not limit participation in activity  Bed Mobility/Transfers: Transfers  Transfer: Yes  Transfer 1  Transfer From 1: Sit to, Stand to  Transfer to 1: Sit, Stand  Technique 1: Sit to stand, Stand to sit  Transfer Level of Assistance 1: Contact guard  Trials/Comments 1: cues for pacing and for safety with objects on R side of body  Transfers 2  Transfer From 2: Toilet to, Stand to  Transfer to 2: Stand, Toilet  Technique 2: Stand to sit, Sit to stand  Transfer Level of Assistance 2: Contact guard  Trials/Comments 2: cues for pacing and for safety with objects on R side of body   Modalities:     Vision:Vision - Basic Assessment  Current Vision: Does not wear glasses  Patient Visual Report: Other (Comment) (difficulty seeing R side of body)   and Vision - Complex Assessment  Tracking: R eye does not track laterally, L eye does not track medially  Visual Screen Results: Right homonymous hemianopsia  Sensation:  Light Touch: No apparent deficits  Strength:     Perception:     Coordination:  Finger to Nose:  (R side dysmetria noted)  Rapid Alternating Movements: Bradykinesia (R side > L)  Heel to Delcid: Bradykinesia (R>L)  Coordination Comment: Pt demos decrease speed with coordination tasks, dysmetria in R UE noted   Hand Function:     Extremities: RUE   RUE : Exceptions to WFL  RUE AROM (degrees)  RUE AROM Comment: WFL  RUE Strength  RUE Overall Strength:  Within Functional Limits - able to perform ADL tasks with strength  R Shoulder Flexion: 4/5  R Shoulder Extension: 4/5  R Elbow Flexion: 4/5, LUE   LUE: Exceptions to WFL  LUE AROM (degrees)  LUE AROM Comment: WFL  LUE Strength  LUE Overall Strength: Within Functional Limits - able to perform ADL tasks with strength  L Shoulder Flexion: 4+/5  L Shoulder Extension: 4+/5  L Elbow Flexion: 4+/5  L Elbow Extension: 4+/5, RLE   RLE : Exceptions to WFL  AROM RLE (degrees)  RLE AROM Comment: WFL  Strength RLE  RLE Overall Strength: Greater than or equal to 3/5 as evidenced by functional mobility, and Strength LLE  LLE Overall Strength: Greater than or equal to 3/5 as evidenced by functional mobility        Outcome Measures:[facility] Daily Activity  Putting on and taking off regular lower body clothing: A little  Bathing (including washing, rinsing, drying): A little  Putting on and taking off regular upper body clothing: None  Toileting, which includes using toilet, bedpan or urinal: A little  Taking care of personal grooming such as brushing teeth: None  Eating Meals: None  Daily Activity - Total Score: 21         and OT Adult Other Outcome Measures  Short Blessed Test (SBT): 13/28    Education Documentation  Precautions, taught by Lore Callahan OT at 2/20/2024 11:35 AM.  Learner: Patient  Readiness: Acceptance  Method: Explanation  Response: Needs Reinforcement    ADL Training, taught by Lore Callahan OT at 2/20/2024 11:35 AM.  Learner: Patient  Readiness: Acceptance  Method: Explanation  Response: Needs Reinforcement    Education Comments  No comments found.        OP EDUCATION:       Goals:  Encounter Problems       Encounter Problems (Active)       ADLs       Pt will demo simulated IADL tasks in standing with dynamic reaching outside of base of support with no LOB and proper safety awareness.  (Progressing)       Start:  02/20/24    Expected End:  03/12/24               COGNITION/SAFETY       Patient will score WFL on  standardized cognitive assessment with visual cues and within reasonable time frame (Progressing)       Start:  02/20/24    Expected End:  03/12/24               TRANSFERS       Patient will complete all functional transfers with no AD with supervision level of assistance with appropriate negotiation of objects on R  (Progressing)       Start:  02/20/24    Expected End:  03/12/24               VISION       Patient will visually attend to Right  side of Body using compensatory strategies and  supervision level of assistance.  (Progressing)       Start:  02/20/24    Expected End:  03/12/24 02/20/24 at 11:37 AM - Lore Callahan OT

## 2024-02-20 NOTE — PROGRESS NOTES
Speech-Language Pathology  Therapy Communication Note  Patient Name: Fadia Bolden  MRN: [id number]  Today's Date: 2/20/2024   Time: 1315    Discipline: Speech-Language Pathology    Reason: Attempt    Comment:  Orders received for speech/language eval. Spoke with pt/ at the bedside; both report pt with speech/language deficits since stroke in Oct. 2023 and currently receives speech therapy 2x per week. Pt/ deny any acute difficulties; i.e., speech at baseline. Speech/language evaluation contraindicated at this time; SLP to sign off. If pt presents with any acute difficulties, please re-consult SLP. MD aware.

## 2024-02-20 NOTE — OP NOTE
Cranioplasty (L) Operative Note     Date: 2024  OR Location: [facility] OR    Name: Fadia Bolden, : 1955, Age: 68 y.o., MRN: [id number], Sex: female    Diagnosis  Pre-op Diagnosis     * Acquired skull defect [M95.2] Post-op Diagnosis     * Acquired skull defect [M95.2]     Procedures  Cranioplasty  60151 - [place] CRANIOPLASTY SKULL DEFECT >5 CM DIAMETER    [place] MICROSURG TQS REQ USE OPERATING MICROSCOPE [77440]  [place] STRTCTC CPTR ASSTD PX CRANIAL INTRADURAL [26399]  Surgeons      * Tammie Blevins - Primary     * Swapna Roach    Resident/Fellow/Other Assistant:  Surgeon(s) and Role:     * Swapna Roach MD    Procedure Summary  Anesthesia: General  ASA: III  Anesthesia Staff: Anesthesiologist: Shyam Akhtar DO  CRNA: Lisset Lomeli APRN-CRNA  C-AA: MICHAEL Lantigua  SRNA: Diana Boo  Estimated Blood Loss: 286mL  Intra-op Medications:   Administrations occurring from 0715 to 1130 on 24:   Medication Name Total Dose   gelatin absorbable (Gelfoam) 100 sponge 1 each   thrombin (Human)-fibrinogen-aprotinin-calcium (Tisseel) 10 mL 10 mL   thrombin (recombinant) (Recothrom) topical solution 10,000 Units   polymyxin B 500,000 Units in sodium chloride 0.9 % 1,000 mL irrigation Cannot be calculated              Anesthesia Record               Intraprocedure I/O Totals          Intake    Phenylephrine Drip 0.00 mL    The total shown is the total volume documented since Anesthesia Start was filed.    LR infusion 100.00 mL    NaCl 0.9 % infusion 300.00 mL    Total Intake 400 mL       Output    Urine 600 mL    Est. Blood Loss 286 mL    Total Output 886 mL       Net    Net Volume -486 mL          Specimen: No specimens collected     Staff:   Circulator: Nuha Rudd RN; Debo Su RN  Scrub Person: Suzan Ogden LPN; Joanie Lu         Drains and/or Catheters:   Closed/Suction Drain Left Scalp Accordion 10 Fr. (Active)   Site Description Unable to view 24 1600    Dressing Status Dry;Occlusive 02/19/24 1600   Drainage Appearance Serosanguineous 02/19/24 1600   Status Other (Comment) 02/19/24 1600   Output (mL) 90 mL 02/19/24 1900       External Urinary Catheter Female (Active)   Wall Suction (mmHg) 40 02/19/24 1700   External Catheter Status Other (Comment) 02/19/24 1700   Securement Method Adhesive device 02/19/24 1700       [REMOVED] Urethral Catheter Double-lumen;Non-latex 16 Fr. (Removed)   Site Assessment Clean;Skin intact 02/19/24 1600   Collection Container Standard drainage bag 02/19/24 1600   Securement Method Tape 02/19/24 1600   Reason for Continuing Urinary Catheterization acute urinary retention 02/19/24 1300   Output (mL) 100 mL 02/19/24 1700       Tourniquet Times:         Implants:  Implants       Type Name Action Serial No.       MEDPOR PRIORITY CUSTOMIZED - PLUS - LARGE IMPLANT Implanted      Screw PLATE, BOX, LOW PROFILE 2X2 - [id number] Implanted      Screw COVER, LW PROF SHARI HOLE, 14MM W/ - [id number] Implanted      Neuro Interventional Implant CROSS PIN, AXS SELF-TAP, 1.5 X 4MM - [id number] Implanted      Neuro Interventional Implant CROSS PIN, AXS SELF-TAP, 1.5 X 5MM - [id number] Implanted      Screw PLATE, 2H 10MM BAR ULTRA LOW PROFILE - [id number] Implanted                   Indications: Fadia Bolden is an 68 y.o. female who is having surgery for Acquired skull defect [M95.2]. Patient had initially presented to the hospital in October 2023 while on a trip in [place], after having altered mental status followed by a fall, at which time was found to have a presumed left-sided infarct with hemorrhagic conversion for which she underwent left sided hemicraniectomy. She made a good recovery and presented to us for follow up and plan for cranioplasty. Reportedly, pats bone flap was contaminated for which a custom synthetic [facility] cranioplasty was designed from pre-operative CT scan.    Consent:  We discussed procedure in detail with the patient as well as  her .  We  discussed the risks, benefits, alternatives and possible complications.  They understood the risk   from cranioplasty include but is not limited to infection requiring further surgeries, bleeding   requiring surgery, seizure with its clinical implications, intraparenchymal damage, risk from anesthesia, risk from hospital stay.  They verbalized understanding. All their questions were answered and they requested that we proceed.    Procedure:  The patient was identified in the preoperative area. She was brought in to the operating room.  She was placed under anesthesia by the anesthesia team.  She was placed on the   operative table in supine position.  All pressure areas were well padded.  We rested the head on a   horseshoe, slightly rotated to the right, exposing the left side of the head. The hair was clipped around her previous scar.  The incision was a trauma flap that extended more posteriorly.We then prepped and  draped in the usual sterile fashion. We performed a time-out, reviewed the plan of procedure, ensured the patient received antibiotic and antiepileptic.     Then using 10 blade, we cut through the scar tissue, scalp layers down to the skull. We utilized minimal John clips to minimize ischemia to the flap given the redo   surgery.  Then, using a blunt dissection, we identified a plane between the galea and dura,   reflected the scalp anteriorly inferiorly.  Then, we identified the temporalis muscle,    from the dura and reflected the flap until we saw the bone edges.  We irrigated with copious amount   of antibiotic irrigation, obtained hemostasis in the epidural space with the bipolar, then placed a   layer of Surgicel. The only defect measured approximately 10 by 12 cm We then identified the Medpor custom bone flap and placed venting holes in it using a B1 drill bit.  The temporal aspect of the flap was trimmed down and drilled flatter. We then secured it with [employer]  titanium bone flap fixation plates and 4 and 5-millimeter screws.      We irrigated with antibiotic irrigation again and then we closed attached the temporalis muscle tand tented the superior temporal line to the plates of  the bone flap utilizing 2-0 vicryl, and then we placed a 10 [place] round hemovac drain, tunneled it posterior medially and secured with 3-0 nylon tie.     We reflected the flap back and approximated the galea with inverted interrupted 2-0 and 3-0 Vicryl stitches and the skin with running continuous 3-0 Nylon.  We then placed a sterile dressing and removed the drapes.  The patient was then reversed, awakened by anesthesia team. She was at neurologic baseline.    Complications:  None; patient tolerated the procedure well.    Disposition: ICU - extubated and stable.  Condition: stable         Attending Attestation: I, Dr Tammie Blevins was available throughout and was present for and performed all critical portions    Tammie Blevins  Phone Number: [phone]

## 2024-02-22 ENCOUNTER — APPOINTMENT (OUTPATIENT)
Dept: OCCUPATIONAL THERAPY | Facility: CLINIC | Age: 69
End: 2024-02-22
Payer: COMMERCIAL

## 2024-02-22 ENCOUNTER — APPOINTMENT (OUTPATIENT)
Dept: SPEECH THERAPY | Facility: CLINIC | Age: 69
End: 2024-02-22
Payer: COMMERCIAL

## 2024-02-27 ENCOUNTER — TREATMENT (OUTPATIENT)
Dept: SPEECH THERAPY | Facility: CLINIC | Age: 69
End: 2024-02-27
Payer: COMMERCIAL

## 2024-02-27 DIAGNOSIS — I69.320 APHASIA AS LATE EFFECT OF CEREBROVASCULAR ACCIDENT (CVA): Primary | ICD-10-CM

## 2024-02-27 PROCEDURE — 92507 TX SP LANG VOICE COMM INDIV: CPT | Mod: GN | Performed by: STUDENT IN AN ORGANIZED HEALTH CARE EDUCATION/TRAINING PROGRAM

## 2024-02-27 ASSESSMENT — PAIN SCALES - GENERAL: PAINLEVEL_OUTOF10: 0 - NO PAIN

## 2024-02-27 ASSESSMENT — PAIN - FUNCTIONAL ASSESSMENT: PAIN_FUNCTIONAL_ASSESSMENT: 0-10

## 2024-02-27 NOTE — LETTER
February 27, 2024    Michelle Martinez DO  [address] [place] [address]    Patient: Fadia Bolden   YOB: 1955   Date of Visit: 2/27/2024       Dear No referring provider defined for this encounter.    The attached plan of care is being sent to you because your patient’s medical reimbursement requires that you certify the plan of care. Your signature is required to allow uninterrupted insurance coverage.      You may indicate your approval by signing below and faxing this form back to us at Dept Fax: [phone].    Please call Dept: [phone] with any questions or concerns.    Thank you for this referral,        Diana J Porachan, CCC-SLP  [address] [place] [address]    Payer: Payor: [employer] / Plan: [employer] / Product Type: *No Product type* /                                                                         Date:     Dear KEENAN Zuluaga,     Re: Ms. Fadia Bolden, MRN:[id number]    I certify that I have reviewed the attached plan of care and it is medically necessary for Ms. Fadia Bolden (1955) who is under my care.          ______________________________________                    _________________  Provider name and credentials                                           Date and time                                                                                      The following plan is in draft form.  Please refer to the current version for the most up-to-date information.                Plan of Care 2/27/24   Effective from: 2/27/2024  Effective to: 5/27/2024    Draft  Plan ID: [id number]               Participants as of 2/27/2024      Name Type Comments Contact Info    MICHAEL ZuluagaSLP Speech Language Pathologist  [phone]    Michelle Martinez DO PCP - General  [phone]          Last Plan Note       Author: KEENAN Zuluaga Status: Incomplete Last  edited: 2/27/2024 11:15 AM         Speech-Language Pathology    Outpatient Speech-Language Pathology Progress Note     Patient Name: Fadia Bolden  MRN: [id number]  Today's Date: 2/27/2024  Time Calculation  Start Time: 1120  Stop Time: 1205  Time Calculation (min): 45 min       Current Problem:   1. Aphasia as late effect of cerebrovascular accident (CVA)            SLP Assessment:  SLP TX Intervention Outcome: Making Progress Towards Goals  Prognosis: Good    Ms. Bolden has returned to speech therapy s/p craniotomy 2/19. Procedure completed with no complications. Various probes were completed this date for a progress note. The patient demonstrates improved expressive language during spontaneous speech but continues to have difficulty with basic word finding skills. Probes this date show some decline with word finding skills and little to no use of compensatory strategies when encountering anomia. The patient may continue to benefit from skilled speech therapy services in order to improve her ability to express wants, needs and opinions and engage in meaningful conversation with friends and family.      General Visit Information:  Certification Period Start Date: 02/27/24  Certification Period End Date: 05/27/24     Total Number of Visits : 16    Pain:  Pain Assessment  Pain Assessment: 0-10  Pain Score: 0 - No pain     Plan:  Plan  Inpatient/Swing Bed or Outpatient: Outpatient  SLP Plan: Skilled SLP  SLP Frequency: 2x per week  Duration:  (8 weeks)  Discussed POC: Patient, Caregiver/family  Discussed Risks/Benefits: Yes  Patient/Caregiver Agreeable: Yes    Goals: Updated 2/27/24  Short-term goals:   Patient will complete naming activities (including picture naming, generative naming, verb naming, descriptive naming, synonyms/antonyms, etc.) with 80% accuracy given minimal-moderate cues  Status: Goal Status: Progressing toward goal  Progress: See objective data below   -Correcting illogical sentences - 80%, improved  to 100% with phonemic cue   -Confrontational naming - 25% (1/4), improved to 75% with phonemic cue    Previous  -Correcting illogical sentences - 100% indep   -Naming to description - 90%   -Spot It! - 87% accuracy  -Generative naming, 2 min, goal of 10 items: 65%  -Fill in the blanks - 100% indep  -Naming to description: 100%   -Exclusion task; med difficulty, FO4 words+pics: 96%   - Confrontational namin%, improved to 80% with use of additional descriptors in lieu of naming  -Name the category: 93% (concrete), 90% (abstract)   -Exclusion task, adaptive: 76% (10/13)  -Responsive naming; 67% (10/15)      -Describing common objects - 80% (4/5)  -Naming from description: 90% (18/20) when given extended time; phonemic cues increased accuracy to 100%              -Responsive naming, general information: 71% (15/21)              -describing common objects - 33% (2/6) indep; increased to 67% with min cues; mod-max cues required to provide enough relevant information about the item  -Verb namin% (16/16), indep              -Complete the sentence (1 word): 95% (19/20)-object naming - 100% given occasional cues and extra time               -Naming from definitions/description - 70% (7/10)               -Stating the category ([employer] ismael): FO4 choices, words only - 84% (21/25)              -responsive naming - 85% (17/20)              -describing common objects - 67% (2/3) with min cues; the patient struggled with this task this date  -synonyms - 60% (3/5)  -antonyms - 100%          -confrontational namin%; improved to 90 with min cues and extended time                Patient will complete targeted expressive language tasks (including stating personal information, phrase/sentence completion, open-ended questions) with 80% accuracy given minimal-moderate cues.  Status: Goal Status: Progressing toward goal  Progress: See objective data below            -Describing common objects - 50% (1/2), extra time  required   -Patient was able to retell how her daughter, Shikha, and her  moved to [place], the name of his city and what they have been busy doing (citizenship). The patient was unable to state the country her [place] is from   - Cookie therandi picture description: 4 utterances, no change from initial evaluation which was also 4 utterances   -Improvement noted from writing probes compared to initial evaluation; not a priority goal at this time    Previous              -Describing common objects: 100% with extended time and min cues (3/3, ltd trials d/t to time constraints)  -WH questions about objects in the above tasks - 100%     -Patient was able to state her full name, address and the names of her children and their spouses. She required extra time and one phonemic cue for her son in law  -Semantic analysis task - 88% accuracy to provide relevant and accurate features of the target item; accuracy improved when given cues or suggested non verbal strategies  -naming immediate family members and friends, 82% (14/17) to name children, their spouses and close friends and siblings     Patient will complete targeted receptive language tasks (including complex yes/no questions, functional reading, paragraph level comprehension) with 80% accuracy given minimal-moderate cues.  Status: Goal Status: Progressing toward goal  Progress: Goal not addressed this date      Previous:  -Illogical sentences - 90% acc to independently read and comprehend sentences in order to identify what word/words to substitute  -Paragraph with missing words; patient accuracy was 100% with min cues to ensure all words were read correctly which enabled her to generate an appropriate word to fill in the blanks  -Reading comprehension (121 and 136 word passages) - when given FO4 choices the patient answered comprehension questions with 100% accuracy. She referred back to the passage in 1/6 questions. Patient required extended time  -Complex Y/N,  comparisons - 89% (40/45)  -Functional reading (CT ismael lvl 1/3) - 88%     Patient will complete further assessment of cognitive-linguistic skills (MoCA, EM, etc.) to determine additional therapy goals as indicated.   Status: Not indicated  Progress: DC goal    Subjective:  Patient reports she feels like a new dimension has been added. Spontaneous speech appears more fluid and natural.    Outpatient Education:  Adult Outpatient Education  Individual(s) Educated: Patient, Spouse  Risk and Benefits Discussed with Patient/Caregiver/Other: yes  Patient/Caregiver Demonstrated Understanding: yes  Plan of Care Discussed and Agreed Upon: yes  Patient Response to Education: Patient/Caregiver Verbalized Understanding of Information

## 2024-02-27 NOTE — PROGRESS NOTES
Speech-Language Pathology    Outpatient Speech-Language Pathology Progress Note     Patient Name: Fadia Bolden  MRN: [id number]  Today's Date: 2/27/2024  Time Calculation  Start Time: 1120  Stop Time: 1205  Time Calculation (min): 45 min       Current Problem:   1. Aphasia as late effect of cerebrovascular accident (CVA)            SLP Assessment:  SLP TX Intervention Outcome: Making Progress Towards Goals  Prognosis: Good    Ms. Bolden has returned to speech therapy s/p craniotomy 2/19. Procedure completed with no complications. Various probes were completed this date for a progress note. The patient demonstrates improved expressive language during spontaneous speech but continues to have difficulty with basic word finding skills. Probes this date show some decline with word finding skills and little to no use of compensatory strategies when encountering anomia. The patient may continue to benefit from skilled speech therapy services in order to improve her ability to express wants, needs and opinions and engage in meaningful conversation with friends and family.      General Visit Information:  Certification Period Start Date: 02/27/24  Certification Period End Date: 05/27/24     Total Number of Visits : 16    Pain:  Pain Assessment  Pain Assessment: 0-10  Pain Score: 0 - No pain     Plan:  Plan  Inpatient/Swing Bed or Outpatient: Outpatient  SLP Plan: Skilled SLP  SLP Frequency: 2x per week  Duration:  (8 weeks)  Discussed POC: Patient, Caregiver/family  Discussed Risks/Benefits: Yes  Patient/Caregiver Agreeable: Yes    Goals: Updated 2/27/24  Short-term goals:   Patient will complete naming activities (including picture naming, generative naming, verb naming, descriptive naming, synonyms/antonyms, etc.) with 80% accuracy given minimal-moderate cues  Status: Goal Status: Progressing toward goal  Progress: See objective data below   -Correcting illogical sentences - 80%, improved to 100% with phonemic  cue   -Confrontational naming - 25% (1/4), improved to 75% with phonemic cue    Previous  -Correcting illogical sentences - 100% indep   -Naming to description - 90%   -Spot It! - 87% accuracy  -Generative naming, 2 min, goal of 10 items: 65%  -Fill in the blanks - 100% indep  -Naming to description: 100%   -Exclusion task; med difficulty, FO4 words+pics: 96%   - Confrontational namin%, improved to 80% with use of additional descriptors in lieu of naming  -Name the category: 93% (concrete), 90% (abstract)   -Exclusion task, adaptive: 76% (10/13)  -Responsive naming; 67% (10/15)      -Describing common objects - 80% (4/5)  -Naming from description: 90% (18/20) when given extended time; phonemic cues increased accuracy to 100%              -Responsive naming, general information: 71% (15/21)              -describing common objects - 33% (2/6) indep; increased to 67% with min cues; mod-max cues required to provide enough relevant information about the item  -Verb namin% (16/16), indep              -Complete the sentence (1 word): 95% (19/20)-object naming - 100% given occasional cues and extra time               -Naming from definitions/description - 70% (7/10)               -Stating the category ([employer] ismael): FO4 choices, words only - 84% (21/25)              -responsive naming - 85% (17/20)              -describing common objects - 67% (2/3) with min cues; the patient struggled with this task this date  -synonyms - 60% (3/5)  -antonyms - 100%          -confrontational namin%; improved to 90 with min cues and extended time                Patient will complete targeted expressive language tasks (including stating personal information, phrase/sentence completion, open-ended questions) with 80% accuracy given minimal-moderate cues.  Status: Goal Status: Progressing toward goal  Progress: See objective data below            -Describing common objects - 50% (1/2), extra time required   -Patient was able to  retell how her daughter, Shikha, and her  moved to [place], the name of his city and what they have been busy doing (citizenship). The patient was unable to state the country her [place] is from   - Cookie theft picture description: 4 utterances, no change from initial evaluation which was also 4 utterances   -Improvement noted from writing probes compared to initial evaluation; not a priority goal at this time    Previous              -Describing common objects: 100% with extended time and min cues (3/3, ltd trials d/t to time constraints)  -WH questions about objects in the above tasks - 100%     -Patient was able to state her full name, address and the names of her children and their spouses. She required extra time and one phonemic cue for her son in law  -Semantic analysis task - 88% accuracy to provide relevant and accurate features of the target item; accuracy improved when given cues or suggested non verbal strategies  -naming immediate family members and friends, 82% (14/17) to name children, their spouses and close friends and siblings     Patient will complete targeted receptive language tasks (including complex yes/no questions, functional reading, paragraph level comprehension) with 80% accuracy given minimal-moderate cues.  Status: Goal Status: Progressing toward goal  Progress: Goal not addressed this date      Previous:  -Illogical sentences - 90% acc to independently read and comprehend sentences in order to identify what word/words to substitute  -Paragraph with missing words; patient accuracy was 100% with min cues to ensure all words were read correctly which enabled her to generate an appropriate word to fill in the blanks  -Reading comprehension (121 and 136 word passages) - when given FO4 choices the patient answered comprehension questions with 100% accuracy. She referred back to the passage in 1/6 questions. Patient required extended time  -Complex Y/N, comparisons - 89% (40/45)  -Functional  reading (CT ismael lvl 1/3) - 88%     Patient will complete further assessment of cognitive-linguistic skills (MoCA, EM, etc.) to determine additional therapy goals as indicated.   Status: Not indicated  Progress: DC goal    Subjective:  Patient reports she feels like a new dimension has been added. Spontaneous speech appears more fluid and natural.    Outpatient Education:  Adult Outpatient Education  Individual(s) Educated: Patient, Spouse  Risk and Benefits Discussed with Patient/Caregiver/Other: yes  Patient/Caregiver Demonstrated Understanding: yes  Plan of Care Discussed and Agreed Upon: yes  Patient Response to Education: Patient/Caregiver Verbalized Understanding of Information

## 2024-02-29 ENCOUNTER — TREATMENT (OUTPATIENT)
Dept: OCCUPATIONAL THERAPY | Facility: CLINIC | Age: 69
End: 2024-02-29
Payer: COMMERCIAL

## 2024-02-29 ENCOUNTER — TREATMENT (OUTPATIENT)
Dept: SPEECH THERAPY | Facility: CLINIC | Age: 69
End: 2024-02-29
Payer: COMMERCIAL

## 2024-02-29 DIAGNOSIS — I69.320 APHASIA AS LATE EFFECT OF CEREBROVASCULAR ACCIDENT (CVA): Primary | ICD-10-CM

## 2024-02-29 DIAGNOSIS — R27.8 COORDINATION ABNORMAL: ICD-10-CM

## 2024-02-29 DIAGNOSIS — I69.390 APRAXIA FOLLOWING CEREBROVASCULAR ACCIDENT: ICD-10-CM

## 2024-02-29 DIAGNOSIS — R53.1 WEAKNESS GENERALIZED: Primary | ICD-10-CM

## 2024-02-29 DIAGNOSIS — S42.295D OTHER CLOSED NONDISPLACED FRACTURE OF PROXIMAL END OF LEFT HUMERUS WITH ROUTINE HEALING, SUBSEQUENT ENCOUNTER: ICD-10-CM

## 2024-02-29 PROCEDURE — 97530 THERAPEUTIC ACTIVITIES: CPT | Mod: GO | Performed by: OCCUPATIONAL THERAPIST

## 2024-02-29 PROCEDURE — 92507 TX SP LANG VOICE COMM INDIV: CPT | Mod: GN | Performed by: STUDENT IN AN ORGANIZED HEALTH CARE EDUCATION/TRAINING PROGRAM

## 2024-02-29 ASSESSMENT — PAIN SCALES - GENERAL: PAINLEVEL_OUTOF10: 0 - NO PAIN

## 2024-02-29 ASSESSMENT — PAIN - FUNCTIONAL ASSESSMENT: PAIN_FUNCTIONAL_ASSESSMENT: 0-10

## 2024-02-29 NOTE — PROGRESS NOTES
Speech-Language Pathology    Outpatient Speech-Language Pathology Progress Note     Patient Name: Fadia Bolden  MRN: [id number]  Today's Date: 2024  Time Calculation  Start Time: 1115  Stop Time: 1200  Time Calculation (min): 45 min       Current Problem:   1. Aphasia as late effect of cerebrovascular accident (CVA) [I69.320]            SLP Assessment:  SLP TX Intervention Outcome: Making Progress Towards Goals  Prognosis: Good    The patient was actively engaged in therapeutic tasks targeting expressive language skills. She demonstrated difficulty with with word finding across all tasks at levels slightly below levels before surgery but with increased use of gestures as a compensatory strategy. The patient may continue to benefit from skilled speech therapy services in order to improve her ability to express wants, needs and opinions and engage in meaningful communication in the home environment    General Visit Information:  Certification Period Start Date: 24  Certification Period End Date: 24     Total Number of Visits : 17    Pain:  Pain Assessment  Pain Assessment: 0-10  Pain Score: 0 - No pain     Plan:  Plan  Inpatient/Swing Bed or Outpatient: Outpatient  SLP Plan: Skilled SLP  SLP Frequency: 2x per week  Duration: Other (Comment) (8 weeks)  Discussed POC: Patient, Caregiver/family  Discussed Risks/Benefits: Yes  Patient/Caregiver Agreeable: Yes    Goals: Updated 24  Short-term goals:   Patient will complete naming activities (including picture naming, generative naming, verb naming, descriptive naming, synonyms/antonyms, etc.) with 80% accuracy given minimal-moderate cues  Status: Goal Status: Progressing toward goal  Progress: See objective data below   -Confrontational namin% with min cues   -Verb namin%    Previous  -Correcting illogical sentences  -80%, improved to 100% with phonemic cue  -100% indep   -Confrontational naming   -75% with phonemic cue  -60%, improved to 80%  "with comp strats  -100%  -90% with min cues   -Naming to description   - 90%, 100%, 67%, 90% (100% w/ cues), 70%  -Generative naming, 2 min, goal of 10 items:   -65%  -Fill in the blanks   -100% indep  -Exclusion task; med difficulty, FO4 words+pics:  -96%, previous 76%  -Name the category:   -93% (concrete), 90% (abstract), 84%                -Responsive naming, general information:     -71% (15/21), 85%             -Verb naming:   -100% (16/16), indep              -Complete the sentence (1 word): 95% (19/20)  -synonyms - 60% (3/5)  -antonyms - 100%                        Patient will complete targeted expressive language tasks (including stating personal information, phrase/sentence completion, open-ended questions) with 80% accuracy given minimal-moderate cues.  Status: Goal Status: Progressing toward goal  Progress: See objective data below           -WH questions: 67%   -Open ended questions: 2 trials attempted which resulted in notable word finding difficulty made it difficult to produce a complete sentence; 50%   -Additional probes completed to state verbs/actions associated with common objects. 50% over 2 trials   -Story retelling: patient asked to describe a memorable birthday; response was simple yet effective when talking about childhood birthdays and her 39th (\"I'm 39 and I don't like being 39\")    Previous  -Describing common objects (s/p surgery)  -50% (ltd trials)  Before surgery  -80%, 67% with min cues, 67%  -100% with extended time and min cues   -Patient was able to retell how her daughter, Shikha, and her  moved to [place], the name of his city and what they have been busy doing (citizenship). The patient was unable to state the country her [place] is from   - Cookie [employer] picture description: 4 utterances, no change from initial evaluation which was also 4 utterances   -Improvement noted from writing probes compared to initial evaluation; not a priority goal at this time   - questions - 100%    "  -Patient was able to state her full name, address and the names of her children and their spouses. She required extra time and one phonemic cue for her son in law  -Semantic analysis task - 88% accuracy to provide relevant and accurate features of the target item  -naming immediate family members and friends, 82% (14/17) to name children, their spouses and close friends and siblings     Patient will complete targeted receptive language tasks (including complex yes/no questions, functional reading, paragraph level comprehension) with 80% accuracy given minimal-moderate cues.  Status: Goal Status: Progressing toward goal  Progress: Goal not addressed this date      Previous:  -Illogical sentences - 90% acc to independently read and comprehend sentences in order to identify what word/words to substitute  -Paragraph with missing words; patient accuracy was 100% with min cues to ensure all words were read correctly which enabled her to generate an appropriate word to fill in the blanks  -Reading comprehension (121 and 136 word passages) - when given FO4 choices the patient answered comprehension questions with 100% accuracy. She referred back to the passage in 1/6 questions. Patient required extended time  -Complex Y/N, comparisons - 89% (40/45)  -Functional reading (CT ismael lvl 1/3) - 88%     Subjective:  The patients son, Wild, was in town from [place] and joined the session. The patient was emotional today. SLP provided support and encouragement. SLP said that perhaps AAC may be something to consider if progress is limited.    Outpatient Education:  Adult Outpatient Education  Individual(s) Educated: Patient, Spouse  Risk and Benefits Discussed with Patient/Caregiver/Other: yes  Patient/Caregiver Demonstrated Understanding: yes  Plan of Care Discussed and Agreed Upon: yes  Patient Response to Education: Patient/Caregiver Verbalized Understanding of Information

## 2024-02-29 NOTE — LETTER
February 29, 2024    Latha Echavarria    Patient: Fadia Bolden   YOB: 1955   Date of Visit: 2/29/2024       Dear No referring provider defined for this encounter.    The attached plan of care is being sent to you because your patient’s medical reimbursement requires that you certify the plan of care. Your signature is required to allow uninterrupted insurance coverage.      You may indicate your approval by signing below and faxing this form back to us at Dept Fax: [phone].    Please call Dept: [phone] with any questions or concerns.    Thank you for this referral,        Sanjuana Fields OT  [facility] [address] [address]  [address] [facility] [id number]    Payer: Payor: [employer] / Plan: [employer] / Product Type: *No Product type* /                                                                         Date:     Dear Sanjuana Fields OT,     Re: Ms. Fadia Bolden, MRN:[id number]    I certify that I have reviewed the attached plan of care and it is medically necessary for Ms. Fadia Bolden (1955) who is under my care.          ______________________________________                    _________________  Provider name and credentials                                           Date and time                                                                                           Plan of Care 3/14/24   Effective from: 3/14/2024  Effective to: 5/29/2024    Plan ID: [id number]            Participants as of Finalize on 2/29/2024    Name Type Comments Contact Info    Latha Echavarria Consulting Physician      Sanjuana Fields OT Occupational Therapist  [phone]       Last Plan Note     Author: Sanjuana Fields OT Status: Incomplete Last edited: 2/29/2024  9:15 AM       Occupational Therapy    Reassessment     Patient Name: Fadia Bolden  MRN: [id number]  Today's Date: 2/29/2024           Assessment:  Patient returns to OT for first treatment since cranioplasty on  2/19.  Patient and spouse report that she is doing well since surgery.  Patient and spouse note continued cognitive changes and difficulty with motor planning (incorrectly using objects.  However expressed significant improvements with LUE ROM and R visual field cut.  Standardized assessments note improved BUE strength.  Pt continues to demonstrate R UE coordination deficits according to standardized assessments.  Pt would benefit from continued skilled services to further optimize independence.    Plan:   Interventions: Self Care/ADL, neuromuscular reeducation, therapeutic exercise   Duration: 1-2x/wk for 8 visits total       Subjective  Current Problem:  1. Weakness generalized        2. Other closed nondisplaced fracture of proximal end of left humerus with routine healing, subsequent encounter        3. Coordination abnormal        4. Apraxia following cerebrovascular accident          Patient arrives for first treatment since cranioplasty on 2/19.  Patient reports she is doing well since surgery.  Pt reports mild memory changes     Pain:   Pt reports 0/10 pain     Objective  Cognition:   Expressive aphasia  -difficulty with word finding     Self reports difficulty with memory      Home Living:   Pt lives with spouse and daughter     Prior Function:   Independent     Vision:  Improved R visual field cut since procedure     ROM:   LUE near WNL  -slight limitation with abduction     RUE - WNL    Motor planning:   Apraxia (ideational and ideomotor)  -Pt's spouse reports that pt has difficulty with motor planning.  For example, using a knife for a fork or reaching in cabinet for water     Strength:   R  :   45#, 34# ,34#   L  : 40#, 39#, 39#     Lateral Pinch:   R-  14  L -12#     Tripod Pinch   L-13# R-13#    L shoulder- 2/5     L elbow- 4/5     R UE grossly 4/5 throughout     Coordination:   9 Hole Peg:   L- 27.17 R-40.35    Box and Blocks:  L-41 R- 37    Paul Peg Test:  R-4:19  L- 2:42     R finger  to nose test- impaired   R digit opposition- impaired      OP EDUCATION:    Treatment: Self Care/ADL:   OT reviewed goals and plan of care with patient.  Patient's plan of care updated to reflect progress. Patient and spouse in agreement     Goals:  Pt will demo independence with HEP  Pt will demo and verbalize use of energy conservation/work simplification techniques to increase activity tolerance will completing ADL/IADL tasks.   Pt will increase B  strength by 5-10# to increase independence with ADLs/IADLs (opening jars, medicine caps, etcs)  Pt will demo increased B UE function, as indicated by the QuickDash score, current score is 43.18%  Pt will demo increased R FMC by decreasing 5 seconds on the 9 Hole Peg test, to maximize independence with ADLs/IADLs  Pt will demonstrated improved R UE motor planning by using 3 utensils with correct function with no verbal cues   Pt will demonstrate improved executive functional by participating in functional cognitive activity with no verbal cues          Current Participants as of 2/29/2024    Name Type Comments Contact Info    Latha Echavarria Consulting Physician      Signature pending    Sanjuana Fields OT Occupational Therapist  [phone]    Signature pending

## 2024-02-29 NOTE — PROGRESS NOTES
Occupational Therapy    Reassessment     Patient Name: Fadia Bolden  MRN: [id number]  Today's Date: 2/29/2024           Assessment:  Patient returns to OT for first treatment since cranioplasty on 2/19.  Patient and spouse report that she is doing well since surgery.  Patient and spouse note continued cognitive changes and difficulty with motor planning (incorrectly using objects.  However expressed significant improvements with LUE ROM and R visual field cut.  Standardized assessments note improved BUE strength.  Pt continues to demonstrate R UE coordination deficits according to standardized assessments.  Pt would benefit from continued skilled services to further optimize independence.    Plan:   Interventions: Self Care/ADL, neuromuscular reeducation, therapeutic exercise   Duration: 1-2x/wk for 8 visits total       Subjective   Current Problem:  1. Weakness generalized        2. Other closed nondisplaced fracture of proximal end of left humerus with routine healing, subsequent encounter        3. Coordination abnormal        4. Apraxia following cerebrovascular accident          Patient arrives for first treatment since cranioplasty on 2/19.  Patient reports she is doing well since surgery.  Pt reports mild memory changes     Pain:   Pt reports 0/10 pain     Objective   Cognition:   Expressive aphasia  -difficulty with word finding     Self reports difficulty with memory      Home Living:   Pt lives with spouse and daughter     Prior Function:   Independent     Vision:  Improved R visual field cut since procedure     ROM:   LUE near WNL  -slight limitation with abduction     RUE - WNL    Motor planning:   Apraxia (ideational and ideomotor)  -Pt's spouse reports that pt has difficulty with motor planning.  For example, using a knife for a fork or reaching in cabinet for water     Strength:   R  :   45#, 34# ,34#   L  : 40#, 39#, 39#     Lateral Pinch:   R-  14  L -12#     Tripod Pinch   L-13# R-13#    L  shoulder- 2/5     L elbow- 4/5     R UE grossly 4/5 throughout     Coordination:   9 Hole Peg:   L- 27.17 R-40.35    Box and Blocks:  L-41 R- 37    [place] Peg Test:  R-4:19  L- 2:42     R finger to nose test- impaired   R digit opposition- impaired      OP EDUCATION:    Treatment: Self Care/ADL:   OT reviewed goals and plan of care with patient.  Patient's plan of care updated to reflect progress. Patient and spouse in agreement     Goals:  Pt will demo independence with HEP  Pt will demo and verbalize use of energy conservation/work simplification techniques to increase activity tolerance will completing ADL/IADL tasks.   Pt will increase B  strength by 5-10# to increase independence with ADLs/IADLs (opening jars, medicine caps, etcs)  Pt will demo increased B UE function, as indicated by the QuickDash score, current score is 43.18%  Pt will demo increased R FMC by decreasing 5 seconds on the 9 Hole Peg test, to maximize independence with ADLs/IADLs  Pt will demonstrated improved R UE motor planning by using 3 utensils with correct function with no verbal cues   Pt will demonstrate improved executive functional by participating in functional cognitive activity with no verbal cues

## 2024-03-04 ENCOUNTER — APPOINTMENT (OUTPATIENT)
Dept: CARDIOLOGY | Facility: HOSPITAL | Age: 69
End: 2024-03-04
Payer: COMMERCIAL

## 2024-03-05 ENCOUNTER — TREATMENT (OUTPATIENT)
Dept: SPEECH THERAPY | Facility: CLINIC | Age: 69
End: 2024-03-05
Payer: COMMERCIAL

## 2024-03-05 DIAGNOSIS — R47.01 APHASIA: Primary | ICD-10-CM

## 2024-03-05 PROCEDURE — 92507 TX SP LANG VOICE COMM INDIV: CPT | Mod: GN | Performed by: STUDENT IN AN ORGANIZED HEALTH CARE EDUCATION/TRAINING PROGRAM

## 2024-03-05 ASSESSMENT — PAIN SCALES - GENERAL: PAINLEVEL_OUTOF10: 0 - NO PAIN

## 2024-03-05 ASSESSMENT — PAIN - FUNCTIONAL ASSESSMENT: PAIN_FUNCTIONAL_ASSESSMENT: 0-10

## 2024-03-07 ENCOUNTER — TREATMENT (OUTPATIENT)
Dept: SPEECH THERAPY | Facility: CLINIC | Age: 69
End: 2024-03-07
Payer: COMMERCIAL

## 2024-03-07 DIAGNOSIS — I69.320 APHASIA AS LATE EFFECT OF CEREBROVASCULAR ACCIDENT (CVA): Primary | ICD-10-CM

## 2024-03-07 PROCEDURE — 92507 TX SP LANG VOICE COMM INDIV: CPT | Mod: GN | Performed by: STUDENT IN AN ORGANIZED HEALTH CARE EDUCATION/TRAINING PROGRAM

## 2024-03-07 ASSESSMENT — PAIN SCALES - GENERAL: PAINLEVEL_OUTOF10: 0 - NO PAIN

## 2024-03-07 ASSESSMENT — PAIN - FUNCTIONAL ASSESSMENT: PAIN_FUNCTIONAL_ASSESSMENT: 0-10

## 2024-03-07 NOTE — PROGRESS NOTES
Speech-Language Pathology    Outpatient Speech-Language Pathology Progress Note     Patient Name: Fadia Bolden  MRN: [id number]  Today's Date: 3/7/2024  Time Calculation  Start Time: 1115  Stop Time: 1200  Time Calculation (min): 45 min       Current Problem:   1. Aphasia as late effect of cerebrovascular accident (CVA)          SLP Assessment:  SLP TX Intervention Outcome: Making Progress Towards Goals  Prognosis: Good    The patient was actively engaged in therapeutic tasks targeting expressive and receptive language skills. She demonstrated improvement with reading comprehension at the sentence level and word finding. She demonstrated difficulty writing the intended letters despite correct verbal spelling of 5 letter words. The patient may continue to benefit from skilled speech therapy services in order to improve communication skills in the home, social and clinical settings.    General Visit Information:  Certification Period Start Date: 02/27/24  Certification Period End Date: 05/27/24     Total Number of Visits : 19    Pain:  Pain Assessment  Pain Assessment: 0-10  Pain Score: 0 - No pain     Plan:  Plan  Inpatient/Swing Bed or Outpatient: Outpatient  SLP Frequency: 2x per week  Duration: Other (Comment) (8 weeks)  Discussed POC: Patient, Caregiver/family  Discussed Risks/Benefits: Yes  Patient/Caregiver Agreeable: Yes    Goals: Updated 2/27/24  Short-term goals:   Patient will complete naming activities (including picture naming, generative naming, verb naming, descriptive naming, synonyms/antonyms, etc.) with 80% accuracy given minimal-moderate cues  Status: Goal Status: Progressing toward goal  Progress: See objective data below   -Naming to description (word deduction task) - 75% indep, occasional errors writing the intended letter    Previous  -Correcting illogical sentences  -80%, improved to 100% with phonemic cue  -100% indep   -Confrontational naming   -85% improved to 100% with phonemic cues  -70%  "with min cues  -75% with phonemic cue  -60%, improved to 80% with comp strats  -100%  -90% with min cues   -Naming to description   -80% improved to 100% with cues  - 90%, 100%, 67%, 90% (100% w/ cues), 70%  -Generative naming, 2 min, goal of 10 items:   -65%  -Fill in the blanks   -100% indep  -Exclusion task; med difficulty, FO4 words+pics:  -96%, previous 76%  -Name the category:   -93% (concrete), 90% (abstract), 84%                -Responsive naming, general information:     -71% (15/21), 85%             -Verb naming:   -Verb namin%  -100% (16/16), indep              -Complete the sentence (1 word): 95% (19/20)  -synonyms - 60% (3/5)  -antonyms - 100%                        Patient will complete targeted expressive language tasks (including stating personal information, phrase/sentence completion, open-ended questions) with 80% accuracy given minimal-moderate cues.  Status: Goal Status: Progressing toward goal  Progress: See objective data below           -Describing common objects: 100% with min cues to provide more comprehensive descriptions, descriptions initially provided were accurate    Previous  -WH questions: 67%   -Open ended questions: 2 trials attempted which resulted in notable word finding difficulty made it difficult to produce a complete sentence; 50%   -Additional probes completed to state verbs/actions associated with common objects. 50% over 2 trials   -Story retelling: patient asked to describe a memorable birthday; response was simple yet effective when talking about childhood birthdays and her 39th (\"I'm 39 and I don't like being 39\")  -Describing common objects (s/p surgery)  -50% (ltd trials)  Before surgery  -80%, 67% with min cues, 67%  -100% with extended time and min cues   -Patient was able to retell how her daughter, Shikha, and her  moved to [place], the name of his city and what they have been busy doing (citizenship). The patient was unable to state the country her [place] is " from   - Cookie therandi picture description: 4 utterances, no change from initial evaluation which was also 4 utterances   -WH questions - 100%     -Patient was able to state her full name, address and the names of her children and their spouses. She required extra time and one phonemic cue for her son in law  -Semantic analysis task - 88% accuracy to provide relevant and accurate features of the target item  -naming immediate family members and friends, 82% (14/17) to name children, their spouses and close friends and siblings     Patient will complete targeted receptive language tasks (including complex yes/no questions, functional reading, paragraph level comprehension) with 80% accuracy given minimal-moderate cues.  Status: Goal Status: Progressing toward goal  Progress: See objective data below    `-Sentence comprehension, FO4 choices to match picture: 88% indep    Previous:  -Illogical sentences - 90% acc to independently read and comprehend sentences in order to identify what word/words to substitute  -Paragraph with missing words; patient accuracy was 100% with min cues to ensure all words were read correctly which enabled her to generate an appropriate word to fill in the blanks  -Reading comprehension (121 and 136 word passages) - when given FO4 choices the patient answered comprehension questions with 100% accuracy. She referred back to the passage in 1/6 questions. Patient required extended time  -Complex Y/N, comparisons - 89% (40/45)  -Functional reading (CT ismael lvl 1/3) - 88%     Subjective:  Patient reports no new or worsening symptoms. After the last session the patient reported she thought the work felt easier. Patient given link for [email or web address]    Outpatient Education:  Adult Outpatient Education  Individual(s) Educated: Patient, Spouse  Risk and Benefits Discussed with Patient/Caregiver/Other: yes  Patient/Caregiver Demonstrated Understanding: yes  Plan of Care Discussed and Agreed  Upon: yes  Patient Response to Education: Patient/Caregiver Verbalized Understanding of Information

## 2024-03-08 ENCOUNTER — APPOINTMENT (OUTPATIENT)
Dept: NEUROLOGY | Facility: HOSPITAL | Age: 69
End: 2024-03-08
Payer: COMMERCIAL

## 2024-03-11 ENCOUNTER — OFFICE VISIT (OUTPATIENT)
Dept: NEUROSURGERY | Facility: HOSPITAL | Age: 69
End: 2024-03-11
Payer: COMMERCIAL

## 2024-03-11 VITALS
RESPIRATION RATE: 17 BRPM | BODY MASS INDEX: 18.66 KG/M2 | HEIGHT: 62 IN | HEART RATE: 63 BPM | DIASTOLIC BLOOD PRESSURE: 68 MMHG | SYSTOLIC BLOOD PRESSURE: 140 MMHG | TEMPERATURE: 98.1 F | WEIGHT: 101.41 LBS

## 2024-03-11 DIAGNOSIS — S06.5XAA SDH (SUBDURAL HEMATOMA) (MULTI): ICD-10-CM

## 2024-03-11 DIAGNOSIS — M95.2 ACQUIRED SKULL DEFECT: Primary | ICD-10-CM

## 2024-03-11 PROCEDURE — 1036F TOBACCO NON-USER: CPT | Performed by: NURSE PRACTITIONER

## 2024-03-11 PROCEDURE — 3078F DIAST BP <80 MM HG: CPT | Performed by: NURSE PRACTITIONER

## 2024-03-11 PROCEDURE — 99024 POSTOP FOLLOW-UP VISIT: CPT | Performed by: NURSE PRACTITIONER

## 2024-03-11 PROCEDURE — 1126F AMNT PAIN NOTED NONE PRSNT: CPT | Performed by: NURSE PRACTITIONER

## 2024-03-11 PROCEDURE — 3077F SYST BP >= 140 MM HG: CPT | Performed by: NURSE PRACTITIONER

## 2024-03-11 PROCEDURE — 1111F DSCHRG MED/CURRENT MED MERGE: CPT | Performed by: NURSE PRACTITIONER

## 2024-03-11 PROCEDURE — 1123F ACP DISCUSS/DSCN MKR DOCD: CPT | Performed by: NURSE PRACTITIONER

## 2024-03-11 PROCEDURE — 1159F MED LIST DOCD IN RCRD: CPT | Performed by: NURSE PRACTITIONER

## 2024-03-11 PROCEDURE — 1160F RVW MEDS BY RX/DR IN RCRD: CPT | Performed by: NURSE PRACTITIONER

## 2024-03-11 NOTE — PROGRESS NOTES
"[facility]  Neurosurgery    History of Present Illness      Fadia Bolden is a 68-year-old female with a PMH significant for DAIJA on CPAP, hypothyroidism, breast CA, esophageal stricture, L occipital ischemic stroke (on ASA) with hemorrhagic conversion on 10/25/23 s/p L hemicraniotomy in [place]. Patient is now s/p L cranioplasty with [facility] on 02/19/24. Postoperative course uncomplicated. Patient was discharged to home and presents to clinic for her 3 week POV.     Denies any headaches. She was discharged from physical therapy. Continues to see OT once per week with a projected discharge at the end of April. Working with  she does have some WFD but improving since her initial surgery. Does have a R peripheral field cut so when she is walking if she does not turn her head can bump into objects. Not currently following with ophthalmology. No incisional drainage and has been washing her hair with baby shampoo.  Only taking lisinopril as well as Vitamin D and C. Had some leg and feet swelling on her statin therapy and the dose was cut in half by their PCP but they have weaned it to off.                   Objective      Vitals:   /68   Pulse 63   Temp 36.7 °C (98.1 °F)   Resp 17   Ht 1.575 m (5' 2\")   Wt 46 kg (101 lb 6.6 oz)   BMI 18.55 kg/m²         Physical Exam:    A&Ox3 with WFD - improving per family   Fluent speech   EOMI; FC x 4   GILES; strength 5/5; no drift   SILT   Face and shoulder shrug symmetrical   Tongue midline   Incision C/D/I       Relevant Results:    CTH 03/11/24 with postsurgical changes from L hemicraniectomy and cranioplasty. Continued L occipital lobe encephalomalacia / gliosis.         Assessment & Plan      Diagnosis:  Diagnoses and all orders for this visit:  Acquired skull defect  SDH (subdural hematoma) (CMS/[facility])        Provider Impressions:    Patient is a 68-year-old female presenting for postoperative evaluation L cranioplasty with [facility] on 02/19/24. " Postoperatively patient continues to do well. She is working with [email or web address] for [employer] which per patient and family has been improving. Completed PT and should be discharged from OT soon. Sutures were removed without difficulties. Incision C/D/I. CTH with postoperative changes and stable encephalomalacia. Pending neurology follow up and has scheduled appointment.     - RTC in 3 weeks   - Continue with activity restriction with no heavy lifting > 10lbs or bending forward   - Ok to shower 24 hours post suture removal; dry thoroughly   - Continue with statin and ASA therapy per neurology           Medical History     Past Medical History:   Diagnosis Date    Breast cancer (CMS/HCC)     bilateral    HTN (hypertension)     Hypothyroidism     DAIJA (obstructive sleep apnea)     Other chest pain 02/10/2021    Chest tightness    Other specified health status 12/31/2018    No pertinent past medical history    Peripheral vision loss, right     Personal history of malignant neoplasm of breast 06/27/2022    History of malignant neoplasm of breast    Stroke (CMS/HCC)      Past Surgical History:   Procedure Laterality Date    MR HEAD ANGIO W AND WO IV CONTRAST  10/25/2023    MR HEAD ANGIO W AND WO IV CONTRAST 10/25/2023    MR HEAD ANGIO WO IV CONTRAST  1/5/2024    MR HEAD ANGIO WO IV CONTRAST 1/5/2024 [facility] [id number] MRI    OTHER SURGICAL HISTORY  11/22/2021    Tonsillectomy     Social History     Tobacco Use    Smoking status: Never    Smokeless tobacco: Never   Vaping Use    Vaping Use: Never used   Substance Use Topics    Alcohol use: Not Currently    Drug use: Never     Family History   Problem Relation Name Age of Onset    Thyroid disease Mother      Parkinsonism Father      Parkinsonism Sister      Other (HTN) Sister      Breast cancer Father's Sister      Breast cancer Paternal Grandmother       Allergies   Allergen Reactions    Fish Derived Rash     Current Outpatient Medications   Medication Instructions    ascorbic acid, vitamin C,  1,500 mg ER tablet 1 tablet, oral, Daily    atorvastatin (LIPITOR) 80 mg, oral, Daily    cholecalciferol (Vitamin D-3) 50 MCG (2000 UT) tablet 1 tablet, oral, Daily    levETIRAcetam (KEPPRA) 500 mg, oral, 2 times daily    lisinopril 20 mg, oral, Daily RT    omeprazole (PRILOSEC) 40 mg, oral, Daily before breakfast, Do not crush or chew.

## 2024-03-12 ENCOUNTER — DOCUMENTATION (OUTPATIENT)
Dept: SPEECH THERAPY | Facility: CLINIC | Age: 69
End: 2024-03-12
Payer: COMMERCIAL

## 2024-03-12 ENCOUNTER — APPOINTMENT (OUTPATIENT)
Dept: NEUROSURGERY | Facility: HOSPITAL | Age: 69
End: 2024-03-12
Payer: COMMERCIAL

## 2024-03-12 ENCOUNTER — APPOINTMENT (OUTPATIENT)
Dept: SPEECH THERAPY | Facility: CLINIC | Age: 69
End: 2024-03-12
Payer: COMMERCIAL

## 2024-03-12 NOTE — PROGRESS NOTES
Speech-Language Pathology                 Therapy Communication Note    Patient Name: Fadia Bolden  MRN: [id number]  Today's Date: 3/12/2024     Discipline: Speech Language Pathology    Missed Visit Reason:      Missed Time: Cancel    Comment:   The patient had to cancel her appointment today due to car trouble. She is aware of her next appointment this Thursday.

## 2024-03-14 ENCOUNTER — TREATMENT (OUTPATIENT)
Dept: SPEECH THERAPY | Facility: CLINIC | Age: 69
End: 2024-03-14
Payer: COMMERCIAL

## 2024-03-14 DIAGNOSIS — I69.320 APHASIA AS LATE EFFECT OF CEREBROVASCULAR ACCIDENT (CVA): Primary | ICD-10-CM

## 2024-03-14 PROCEDURE — 92507 TX SP LANG VOICE COMM INDIV: CPT | Mod: GN | Performed by: STUDENT IN AN ORGANIZED HEALTH CARE EDUCATION/TRAINING PROGRAM

## 2024-03-14 ASSESSMENT — PAIN SCALES - GENERAL: PAINLEVEL_OUTOF10: 0 - NO PAIN

## 2024-03-14 ASSESSMENT — PAIN - FUNCTIONAL ASSESSMENT: PAIN_FUNCTIONAL_ASSESSMENT: 0-10

## 2024-03-14 NOTE — PROGRESS NOTES
Speech-Language Pathology    Outpatient Speech-Language Pathology Progress Note     Patient Name: Fadia Bolden  MRN: [id number]  Today's Date: 3/14/2024  Time Calculation  Start Time: 1440  Stop Time: 1525  Time Calculation (min): 45 min       Current Problem:   1. 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 She demonstrated difficulty with word finding during a semantic analysis task and when communicating things about items their family makes such as soaps and kefir. Her  provided min-mod cueing during that task. The patient may continue to benefit from skilled speech therapy services in order to improve communication skills in the home, social and clinical settings.    General Visit Information:  Certification Period Start Date: 02/27/24  Certification Period End Date: 05/27/24     Total Number of Visits : 20    Pain:  Pain Assessment  Pain Assessment: 0-10  Pain Score: 0 - No pain     Plan:  Plan  Inpatient/Swing Bed or Outpatient: Outpatient  SLP TX Plan: Continue Plan of Care  SLP Plan: Skilled SLP  SLP Frequency: 2x per week  Duration:  (8 weeks)  Discussed POC: Patient, Caregiver/family  Discussed Risks/Benefits: Yes  Patient/Caregiver Agreeable: Yes    Goals: Updated 2/27/24  Short-term goals:   Patient will complete naming activities (including picture naming, generative naming, verb naming, descriptive naming, synonyms/antonyms, etc.) with 80% accuracy given minimal-moderate cues  Status: Goal Status: Progressing toward goal  Progress: Goal not addressed this date     Previous  -Naming to description (word deduction task) - 75% indep, occasional errors writing the intended letter  -Correcting illogical sentences  -80%, improved to 100% with phonemic cue  -100% indep   -Confrontational naming    -85% improved to 100% with phonemic cues  -70% with min cues  -75% with phonemic cue  -60%, improved to 80% with comp strats  -100%  -90% with min cues   -Naming to description   -80% improved to 100% with cues  - 90%, 100%, 67%, 90% (100% w/ cues), 70%  -Generative naming, 2 min, goal of 10 items:   -65%  -Fill in the blanks   -100% indep  -Exclusion task; med difficulty, FO4 words+pics:  -96%, previous 76%  -Name the category:   -93% (concrete), 90% (abstract), 84%                -Responsive naming, general information:     -71% (15/21), 85%             -Verb naming:   -Verb namin%  -100% (16/16), indep              -Complete the sentence (1 word): 95% (19/20)  -synonyms - 60% (3/5)  -antonyms - 100%                        Patient will complete targeted expressive language tasks (including stating personal information, phrase/sentence completion, open-ended questions) with 80% accuracy given minimal-moderate cues.  Status: Goal Status: Progressing toward goal  Progress: See objective data below           -SLP facilitated semantic analysis of common objects, patient required cues to identify key attributes/features of the items beyond basic description. Clinical interpretation of accuracy was 65-70% independently produced language   -SLP facilitated open ended questions/conversation regarding items the patient/spouse make in their home (woven demin rugs, kefir, soaps) and how the patient met her spouse. The spouse actively provided min cues while the patient struggled to find the appropriate words to accurately describe ingredients, processes and their courtship. Based on the level of cueing required, accuracy considered 60-65% indep.    Previous  -Describing common objects: 100% with min cues to provide more comprehensive descriptions, descriptions initially provided were accurate  - WH questions: 67%   -Open ended questions: 2 trials attempted which resulted in notable word finding difficulty made it  "difficult to produce a complete sentence; 50%   -Additional probes completed to state verbs/actions associated with common objects. 50% over 2 trials   -Story retelling: patient asked to describe a memorable birthday; response was simple yet effective when talking about childhood birthdays and her 39th (\"I'm 39 and I don't like being 39\")  -Describing common objects (s/p surgery)  -50% (ltd trials)  Before surgery  -80%, 67% with min cues, 67%  -100% with extended time and min cues   -Patient was able to retell how her daughter, Shikha, and her  moved to [place], the name of his city and what they have been busy doing (citizenship). The patient was unable to state the country her [place] is from   - Cookie [employer] picture description: 4 utterances, no change from initial evaluation which was also 4 utterances   -WH questions - 100%     -Patient was able to state her full name, address and the names of her children and their spouses. She required extra time and one phonemic cue for her son in law  -Semantic analysis task - 88% accuracy to provide relevant and accurate features of the target item  -naming immediate family members and friends, 82% (14/17) to name children, their spouses and close friends and siblings     Patient will complete targeted receptive language tasks (including complex yes/no questions, functional reading, paragraph level comprehension) with 80% accuracy given minimal-moderate cues.  Status: Goal Status: Progressing toward goal  Progress: Goal not addressed this date; patient is highly focused on expressive language   `  Previous:  -Sentence comprehension, FO4 choices to match picture: 88% indep  -Illogical sentences - 90% acc to independently read and comprehend sentences in order to identify what word/words to substitute  -Paragraph with missing words; patient accuracy was 100% with min cues to ensure all words were read correctly which enabled her to generate an appropriate word to fill in the " blanks  -Reading comprehension (121 and 136 word passages) - when given FO4 choices the patient answered comprehension questions with 100% accuracy. She referred back to the passage in 1/6 questions. Patient required extended time  -Complex Y/N, comparisons - 89% (40/45)  -Functional reading (CT ismael lvl 1/3) - 88%     Subjective:  Patient reports no new or worsening symptoms. Patient felt [facility] was too easy.    Outpatient Education:  Adult Outpatient Education  Individual(s) Educated: Patient, Spouse  Risk and Benefits Discussed with Patient/Caregiver/Other: yes  Patient/Caregiver Demonstrated Understanding: yes  Plan of Care Discussed and Agreed Upon: yes  Patient Response to Education: Patient/Caregiver Verbalized Understanding of Information

## 2024-03-15 ENCOUNTER — OFFICE VISIT (OUTPATIENT)
Dept: PRIMARY CARE | Facility: CLINIC | Age: 69
End: 2024-03-15
Payer: COMMERCIAL

## 2024-03-15 VITALS
SYSTOLIC BLOOD PRESSURE: 126 MMHG | TEMPERATURE: 98.1 F | RESPIRATION RATE: 16 BRPM | DIASTOLIC BLOOD PRESSURE: 68 MMHG | HEIGHT: 62 IN | WEIGHT: 101 LBS | OXYGEN SATURATION: 100 % | BODY MASS INDEX: 18.58 KG/M2 | HEART RATE: 68 BPM

## 2024-03-15 DIAGNOSIS — I10 PRIMARY HYPERTENSION: ICD-10-CM

## 2024-03-15 DIAGNOSIS — M95.2 ACQUIRED SKULL DEFECT: ICD-10-CM

## 2024-03-15 DIAGNOSIS — S06.5XAA SDH (SUBDURAL HEMATOMA) (MULTI): ICD-10-CM

## 2024-03-15 DIAGNOSIS — E78.2 MIXED HYPERLIPIDEMIA: Primary | ICD-10-CM

## 2024-03-15 PROCEDURE — 1159F MED LIST DOCD IN RCRD: CPT | Performed by: INTERNAL MEDICINE

## 2024-03-15 PROCEDURE — 1123F ACP DISCUSS/DSCN MKR DOCD: CPT | Performed by: INTERNAL MEDICINE

## 2024-03-15 PROCEDURE — 1160F RVW MEDS BY RX/DR IN RCRD: CPT | Performed by: INTERNAL MEDICINE

## 2024-03-15 PROCEDURE — 3074F SYST BP LT 130 MM HG: CPT | Performed by: INTERNAL MEDICINE

## 2024-03-15 PROCEDURE — 1036F TOBACCO NON-USER: CPT | Performed by: INTERNAL MEDICINE

## 2024-03-15 PROCEDURE — 3078F DIAST BP <80 MM HG: CPT | Performed by: INTERNAL MEDICINE

## 2024-03-15 PROCEDURE — 1111F DSCHRG MED/CURRENT MED MERGE: CPT | Performed by: INTERNAL MEDICINE

## 2024-03-15 PROCEDURE — 99214 OFFICE O/P EST MOD 30 MIN: CPT | Performed by: INTERNAL MEDICINE

## 2024-03-15 ASSESSMENT — ENCOUNTER SYMPTOMS
COUGH: 0
FEVER: 0
FATIGUE: 0
SHORTNESS OF BREATH: 0

## 2024-03-15 NOTE — PROGRESS NOTES
"Subjective   Fadia Bolden is a 68 y.o. female who presents for Follow-up.    HPI   Had recent cranioplasty.  Rehabbing well.  Continues OT, ST.  Doing well.    Reports improvement in BLE edema  Has not been taking Atorvastatin.  Edema improved after stopping.  Denies chest pain/SOB, vision changes, headaches.    GERD symptoms have resolved.  Took Pepto w/improvement.    Review of Systems   Constitutional:  Negative for fatigue and fever.   Respiratory:  Negative for cough and shortness of breath.    Cardiovascular:  Negative for chest pain and leg swelling.   All other systems reviewed and are negative.      Health Maintenance Due   Topic Date Due    Bone Density Scan  Never done    COVID-19 Vaccine (1) Never done    Hepatitis C Screening  Never done    Zoster Vaccines (1 of 2) Never done    Hepatitis B Vaccines (2 of 3 - 19+ 3-dose series) 08/18/2005    Pneumococcal Vaccine: 65+ Years (1 - PCV) Never done    Influenza Vaccine (1) Never done       Objective   /68   Pulse 68   Temp 36.7 °C (98.1 °F)   Resp 16   Ht 1.575 m (5' 2\")   Wt 45.8 kg (101 lb)   SpO2 100%   BMI 18.47 kg/m²     Physical Exam  Vitals and nursing note reviewed.   Constitutional:       Appearance: Normal appearance.   HENT:      Head: Normocephalic.   Eyes:      Conjunctiva/sclera: Conjunctivae normal.      Pupils: Pupils are equal, round, and reactive to light.   Cardiovascular:      Rate and Rhythm: Normal rate and regular rhythm.      Pulses: Normal pulses.      Heart sounds: Normal heart sounds.   Pulmonary:      Effort: Pulmonary effort is normal.      Breath sounds: Normal breath sounds.   Musculoskeletal:         General: No swelling.      Cervical back: Neck supple.   Skin:     General: Skin is warm and dry.   Neurological:      General: No focal deficit present.      Mental Status: She is oriented to person, place, and time.         Assessment/Plan   Problem List Items Addressed This Visit       Mixed hyperlipidemia - " Primary    Primary hypertension    SDH (subdural hematoma) (CMS/HCC)    Acquired skull defect     Reviewed records  Cont meds  Check labs  Rec cont lipitor  Recheck in 6 months  Cont thereapy  Consider driving therapy when ready  Stable based on symptoms and exam.  Continue established treatment plan and follow up at least yearly.

## 2024-03-18 ENCOUNTER — APPOINTMENT (OUTPATIENT)
Dept: NEUROSURGERY | Facility: HOSPITAL | Age: 69
End: 2024-03-18
Payer: COMMERCIAL

## 2024-03-19 ENCOUNTER — TREATMENT (OUTPATIENT)
Dept: SPEECH THERAPY | Facility: CLINIC | Age: 69
End: 2024-03-19
Payer: COMMERCIAL

## 2024-03-19 DIAGNOSIS — I69.320 APHASIA AS LATE EFFECT OF CEREBROVASCULAR ACCIDENT (CVA): Primary | ICD-10-CM

## 2024-03-19 PROCEDURE — 92507 TX SP LANG VOICE COMM INDIV: CPT | Mod: GN | Performed by: STUDENT IN AN ORGANIZED HEALTH CARE EDUCATION/TRAINING PROGRAM

## 2024-03-19 ASSESSMENT — PAIN - FUNCTIONAL ASSESSMENT: PAIN_FUNCTIONAL_ASSESSMENT: 0-10

## 2024-03-19 ASSESSMENT — PAIN SCALES - GENERAL: PAINLEVEL_OUTOF10: 0 - NO PAIN

## 2024-03-19 NOTE — PROGRESS NOTES
Speech-Language Pathology    Outpatient Speech-Language Pathology Progress Note     Patient Name: Fadia Bolden  MRN: [id number]  Today's Date: 3/19/2024  Time Calculation  Start Time: 1115  Stop Time: 1200  Time Calculation (min): 45 min       Current Problem:   1. 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 She demonstrated difficulty with word finding when talking about personal/family information. Her  provided fewer cues and she required extensive time to formulate sentences. Some clarifying questions were required to ensure accuracy of what she was describing but overall the gist of the story was communicated. The patient may continue to benefit from skilled speech therapy services in order to improve communication skills in the home, social and clinical settings.    General Visit Information:  Certification Period Start Date: 02/27/24  Certification Period End Date: 05/27/24     Total Number of Visits : 21    Pain:  Pain Assessment  Pain Assessment: 0-10  Pain Score: 0 - No pain     Plan:  Plan  Inpatient/Swing Bed or Outpatient: Outpatient  SLP TX Plan: Continue Plan of Care  SLP Plan: Skilled SLP  SLP Frequency: 2x per week  Discussed POC: Patient, Caregiver/family  Discussed Risks/Benefits: Yes  Patient/Caregiver Agreeable: Yes    Goals: Updated 2/27/24  Short-term goals:   Patient will complete naming activities (including picture naming, generative naming, verb naming, descriptive naming, synonyms/antonyms, etc.) with 80% accuracy given minimal-moderate cues  Status: Goal Status: Progressing toward goal  Progress: Goal not addressed this date    Previous  -Naming to description (word deduction task) - 75% indep, occasional errors writing the intended letter  -Correcting illogical  sentences  -80%, improved to 100% with phonemic cue  -100% indep   -Confrontational naming   -85% improved to 100% with phonemic cues  -70% with min cues  -75% with phonemic cue  -60%, improved to 80% with comp strats  -100%  -90% with min cues   -Naming to description   -80% improved to 100% with cues  - 90%, 100%, 67%, 90% (100% w/ cues), 70%  -Generative naming, 2 min, goal of 10 items:   -65%  -Fill in the blanks   -100% indep  -Exclusion task; med difficulty, FO4 words+pics:  -96%, previous 76%  -Name the category:   -93% (concrete), 90% (abstract), 84%                -Responsive naming, general information:     -71% (15/21), 85%             -Verb naming:   -Verb namin%  -100% (16/16), indep              -Complete the sentence (1 word): 95% (19/20)  -synonyms - 60% (3/5)  -antonyms - 100%                        Patient will complete targeted expressive language tasks (including stating personal information, phrase/sentence completion, open-ended questions) with 80% accuracy given minimal-moderate cues.  Status: Goal Status: Progressing toward goal  Progress: See objective data below   -Sentence creation given 2 words: 80% indep; extra time required  -Patient initiated a description of her parents and how they moved from [place] to [place] and the progression of her father, a , and his progression of being in charge of forecasting for the state of [place] through the [employer]. She discussed him being friends with Steven Rios and her trip to [place] for 9 weeks when she was 11. Additional topics of discussion included family pets and the acclimation of her Jose R son in law to [place] weather. The patient's spouse did not provide as much cueing as the last discussion although there were word finding errors and long pauses as she formulated the sentences. The SLP asked clarifying questions to ensure the correct information was communicated correctly. Overall accuracy clinically  "interpreted as 75-80% accuracy independently.        Previous  -SLP facilitated semantic analysis of common objects, patient required cues to identify key attributes/features of the items beyond basic description. Clinical interpretation of accuracy was 65-70% independently produced language   -SLP facilitated open ended questions/conversation regarding items the patient/spouse make in their home (woven demin rugs, kefir, soaps) and how the patient met her spouse. The spouse actively provided min cues while the patient struggled to find the appropriate words to accurately describe ingredients, processes and their courtship. Based on the level of cueing required, accuracy considered 60-65% indep.  -Describing common objects: 100% with min cues to provide more comprehensive descriptions, descriptions initially provided were accurate  -WH questions: 67%   -Open ended questions: 2 trials attempted which resulted in notable word finding difficulty made it difficult to produce a complete sentence; 50%   -Additional probes completed to state verbs/actions associated with common objects. 50% over 2 trials   -Story retelling: patient asked to describe a memorable birthday; response was simple yet effective when talking about childhood birthdays and her 40th (\"I'm 39 and I don't like being 39\")  -Describing common objects (s/p surgery)  -50% (ltd trials)  Before surgery  -80%, 67% with min cues, 67%  -100% with extended time and min cues   -Patient was able to retell how her daughter, Shikha, and her  moved to [place], the name of his city and what they have been busy doing (citizenship). The patient was unable to state the country her [place] is from   - Cookie [employer] picture description: 4 utterances, no change from initial evaluation which was also 4 utterances   -WH questions - 100%     -Patient was able to state her full name, address and the names of her children and their spouses. She required extra time and one phonemic " cue for her son in law  -Semantic analysis task - 88% accuracy to provide relevant and accurate features of the target item  -naming immediate family members and friends, 82% (14/17) to name children, their spouses and close friends and siblings     Patient will complete targeted receptive language tasks (including complex yes/no questions, functional reading, paragraph level comprehension) with 80% accuracy given minimal-moderate cues.  Status: Goal Status: Progressing toward goal  Progress: Goal not addressed this date; patient is highly focused on expressive language   `  Previous:  -Sentence comprehension, FO4 choices to match picture: 88% indep  -Illogical sentences - 90% acc to independently read and comprehend sentences in order to identify what word/words to substitute  -Paragraph with missing words; patient accuracy was 100% with min cues to ensure all words were read correctly which enabled her to generate an appropriate word to fill in the blanks  -Reading comprehension (121 and 136 word passages) - when given FO4 choices the patient answered comprehension questions with 100% accuracy. She referred back to the passage in 1/6 questions. Patient required extended time  -Complex Y/N, comparisons - 89% (40/45)  -Functional reading (CT ismael lvl 1/3) - 88%     Subjective:  Patient reports no new or worsening symptoms.    Outpatient Education:  Adult Outpatient Education  Individual(s) Educated: Patient, Spouse  Risk and Benefits Discussed with Patient/Caregiver/Other: yes  Patient/Caregiver Demonstrated Understanding: yes  Plan of Care Discussed and Agreed Upon: yes  Patient Response to Education: Patient/Caregiver Verbalized Understanding of Information

## 2024-03-21 ENCOUNTER — TREATMENT (OUTPATIENT)
Dept: SPEECH THERAPY | Facility: CLINIC | Age: 69
End: 2024-03-21
Payer: COMMERCIAL

## 2024-03-21 DIAGNOSIS — I69.320 APHASIA AS LATE EFFECT OF CEREBROVASCULAR ACCIDENT (CVA): Primary | ICD-10-CM

## 2024-03-21 PROCEDURE — 92507 TX SP LANG VOICE COMM INDIV: CPT | Mod: GN | Performed by: STUDENT IN AN ORGANIZED HEALTH CARE EDUCATION/TRAINING PROGRAM

## 2024-03-21 ASSESSMENT — PAIN SCALES - GENERAL: PAINLEVEL_OUTOF10: 0 - NO PAIN

## 2024-03-21 ASSESSMENT — PAIN - FUNCTIONAL ASSESSMENT: PAIN_FUNCTIONAL_ASSESSMENT: 0-10

## 2024-03-21 NOTE — PROGRESS NOTES
Speech-Language Pathology    Outpatient Speech-Language Pathology Progress Note     Patient Name: Fadia Bolden  MRN: [id number]  Today's Date: 3/21/2024  Time Calculation  Start Time: 1115  Stop Time: 1207  Time Calculation (min): 52 min       Current Problem:   1. 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 She demonstrated difficulty with word finding when talking about personal/family information and recalling family member names. She had difficulty with confrontational naming. SLP introduced the concept of AAC as a consideration down the road. Frequency of visits will be dropped to 1x/wk to maximize authorized visits. The patient may continue to benefit from skilled speech therapy services in order to improve communication skills in the home, social and clinical settings.    General Visit Information:  Certification Period Start Date: 24  Certification Period End Date: 24     Total Number of Visits : 22    Pain:  Pain Assessment  Pain Assessment: 0-10  Pain Score: 0 - No pain     Plan:  Plan  Inpatient/Swing Bed or Outpatient: Outpatient  SLP TX Plan: Continue Plan of Care  SLP Plan: Skilled SLP  SLP Frequency: 1x per week  Discussed POC: Patient, Caregiver/family  Discussed Risks/Benefits: Yes  Patient/Caregiver Agreeable: Yes    Goals: Updated 24  Short-term goals:   Patient will complete naming activities (including picture naming, generative naming, verb naming, descriptive naming, synonyms/antonyms, etc.) with 80% accuracy given minimal-moderate cues  Status: Goal Status: Progressing toward goal  Progress: See objective data below   - Confrontational namin% improved to 78% with cues    Previous  -Naming to description (word deduction task) - 75% indep, occasional errors writing  the intended letter  -Correcting illogical sentences  -80%, improved to 100% with phonemic cue  -100% indep   -Confrontational naming   -85% improved to 100% with phonemic cues  -70% with min cues  -75% with phonemic cue  -60%, improved to 80% with comp strats  -100%  -90% with min cues   -Naming to description   -80% improved to 100% with cues  - 90%, 100%, 67%, 90% (100% w/ cues), 70%  -Generative naming, 2 min, goal of 10 items:   -65%  -Fill in the blanks   -100% indep  -Exclusion task; med difficulty, FO4 words+pics:  -96%, previous 76%  -Name the category:   -93% (concrete), 90% (abstract), 84%                -Responsive naming, general information:     -71% (15/21), 85%             -Verb naming:   -Verb namin%  -100% (16/16), indep              -Complete the sentence (1 word): 95% (19/20)  -synonyms - 60% (3/5)  -antonyms - 100%                        Patient will complete targeted expressive language tasks (including stating personal information, phrase/sentence completion, open-ended questions) with 80% accuracy given minimal-moderate cues.  Status: Goal Status: Progressing toward goal  Progress: See objective data below   -Personal picture description: general information about the picture was provided with 75% accuracy. Cues were required to clarify/identify the trip as a family reunion (patient simply stated family) and activities they do when they're there. Immediate family member names were spoken with 58% accuracy. Patient had difficulty with her own children and grandchildren's names.    Previous  -Sentence creation given 2 words: 80% indep; extra time required  -Patient initiated a description of her parents and how they moved from [place] to [place] and the progression of her father, a , and his progression of being in charge of forecasting for the state of [place] through the [employer]. She discussed him being friends with Steven Rios and her trip to [place] for 9 weeks  "when she was 11. Additional topics of discussion included family pets and the acclimation of her [place] son in law to [place] weather. The patient's spouse did not provide as much cueing as the last discussion although there were word finding errors and long pauses as she formulated the sentences. The SLP asked clarifying questions to ensure the correct information was communicated correctly. Overall accuracy clinically interpreted as 75-80% accuracy independently.      -SLP facilitated semantic analysis of common objects, patient required cues to identify key attributes/features of the items beyond basic description. Clinical interpretation of accuracy was 65-70% independently produced language   -SLP facilitated open ended questions/conversation regarding items the patient/spouse make in their home (woven demin rugs, kefir, soaps) and how the patient met her spouse. The spouse actively provided min cues while the patient struggled to find the appropriate words to accurately describe ingredients, processes and their courtship. Based on the level of cueing required, accuracy considered 60-65% indep.  -Describing common objects: 100% with min cues to provide more comprehensive descriptions, descriptions initially provided were accurate  -WH questions: 67%   -Open ended questions: 2 trials attempted which resulted in notable word finding difficulty made it difficult to produce a complete sentence; 50%   -Additional probes completed to state verbs/actions associated with common objects. 50% over 2 trials   -Story retelling: patient asked to describe a memorable birthday; response was simple yet effective when talking about childhood birthdays and her 40th (\"I'm 39 and I don't like being 40\")  -Describing common objects (s/p surgery)  -50% (ltd trials)  Before surgery  -80%, 67% with min cues, 67%  -100% with extended time and min cues   -Patient was able to retell how her daughter, Shikha, and her  moved to [place], " the name of his city and what they have been busy doing (citizenship). The patient was unable to state the country her [place] is from   - Cookie theft picture description: 4 utterances, no change from initial evaluation which was also 4 utterances   -WH questions - 100%     -Patient was able to state her full name, address and the names of her children and their spouses. She required extra time and one phonemic cue for her son in law  -Semantic analysis task - 88% accuracy to provide relevant and accurate features of the target item  -naming immediate family members and friends, 82% (14/17) to name children, their spouses and close friends and siblings     Patient will complete targeted receptive language tasks (including complex yes/no questions, functional reading, paragraph level comprehension) with 80% accuracy given minimal-moderate cues.  Status: Goal Status: Progressing toward goal  Progress: Goal not addressed this date; patient is highly focused on expressive language   `  Previous:  -Sentence comprehension, FO4 choices to match picture: 88% indep  -Illogical sentences - 90% acc to independently read and comprehend sentences in order to identify what word/words to substitute  -Paragraph with missing words; patient accuracy was 100% with min cues to ensure all words were read correctly which enabled her to generate an appropriate word to fill in the blanks  -Reading comprehension (121 and 136 word passages) - when given FO4 choices the patient answered comprehension questions with 100% accuracy. She referred back to the passage in 1/6 questions. Patient required extended time  -Complex Y/N, comparisons - 89% (40/45)  -Functional reading (CT ismael lvl 1/3) - 88%     Subjective:  Patient reports concern with inconsistency of her word finding. Her spouse reports she talks to her sisters on the telephone OK but had trouble when talking to someone from her insurance company. Patient/spouse endorse ongoing compliance  with HEP, especially with flashcards that were provided at the start of therapy. The patient asked what she should do if she's not improving. SLP introduced the concept of AAC and if this was something she wanted to consider being evaluated for. Patient is encouraged to consider Speak Easy again.    Outpatient Education:  Adult Outpatient Education  Individual(s) Educated: Patient, Spouse  Risk and Benefits Discussed with Patient/Caregiver/Other: yes  Patient/Caregiver Demonstrated Understanding: yes  Plan of Care Discussed and Agreed Upon: yes  Patient Response to Education: Patient/Caregiver Verbalized Understanding of Information

## 2024-03-25 ENCOUNTER — TELEPHONE (OUTPATIENT)
Dept: PRIMARY CARE | Facility: CLINIC | Age: 69
End: 2024-03-25

## 2024-03-25 ENCOUNTER — HOSPITAL ENCOUNTER (OUTPATIENT)
Dept: RADIOLOGY | Facility: HOSPITAL | Age: 69
Discharge: HOME | End: 2024-03-25
Payer: COMMERCIAL

## 2024-03-25 ENCOUNTER — OFFICE VISIT (OUTPATIENT)
Dept: SURGICAL ONCOLOGY | Facility: HOSPITAL | Age: 69
End: 2024-03-25
Payer: COMMERCIAL

## 2024-03-25 VITALS — WEIGHT: 101 LBS | HEIGHT: 64 IN | BODY MASS INDEX: 17.24 KG/M2

## 2024-03-25 VITALS
BODY MASS INDEX: 17.24 KG/M2 | HEART RATE: 58 BPM | HEIGHT: 64 IN | WEIGHT: 101 LBS | SYSTOLIC BLOOD PRESSURE: 102 MMHG | DIASTOLIC BLOOD PRESSURE: 78 MMHG

## 2024-03-25 DIAGNOSIS — R92.8 OTHER ABNORMAL AND INCONCLUSIVE FINDINGS ON DIAGNOSTIC IMAGING OF BREAST: ICD-10-CM

## 2024-03-25 DIAGNOSIS — C50.419 MALIGNANT NEOPLASM OF UPPER-OUTER QUADRANT OF BREAST IN FEMALE, ESTROGEN RECEPTOR POSITIVE, UNSPECIFIED LATERALITY (MULTI): Primary | ICD-10-CM

## 2024-03-25 DIAGNOSIS — R92.8 ABNORMAL FINDING ON BREAST IMAGING: ICD-10-CM

## 2024-03-25 DIAGNOSIS — Z17.0 MALIGNANT NEOPLASM OF UPPER-OUTER QUADRANT OF BREAST IN FEMALE, ESTROGEN RECEPTOR POSITIVE, UNSPECIFIED LATERALITY (MULTI): Primary | ICD-10-CM

## 2024-03-25 PROCEDURE — 3078F DIAST BP <80 MM HG: CPT | Performed by: SURGERY

## 2024-03-25 PROCEDURE — 76982 USE 1ST TARGET LESION: CPT | Mod: RT

## 2024-03-25 PROCEDURE — 76642 ULTRASOUND BREAST LIMITED: CPT | Mod: RIGHT SIDE | Performed by: STUDENT IN AN ORGANIZED HEALTH CARE EDUCATION/TRAINING PROGRAM

## 2024-03-25 PROCEDURE — 77066 DX MAMMO INCL CAD BI: CPT | Mod: RIGHT SIDE | Performed by: STUDENT IN AN ORGANIZED HEALTH CARE EDUCATION/TRAINING PROGRAM

## 2024-03-25 PROCEDURE — 1123F ACP DISCUSS/DSCN MKR DOCD: CPT | Performed by: SURGERY

## 2024-03-25 PROCEDURE — 1159F MED LIST DOCD IN RCRD: CPT | Performed by: SURGERY

## 2024-03-25 PROCEDURE — 3074F SYST BP LT 130 MM HG: CPT | Performed by: SURGERY

## 2024-03-25 PROCEDURE — G0279 TOMOSYNTHESIS, MAMMO: HCPCS | Mod: RIGHT SIDE | Performed by: STUDENT IN AN ORGANIZED HEALTH CARE EDUCATION/TRAINING PROGRAM

## 2024-03-25 PROCEDURE — 1160F RVW MEDS BY RX/DR IN RCRD: CPT | Performed by: SURGERY

## 2024-03-25 PROCEDURE — 1036F TOBACCO NON-USER: CPT | Performed by: SURGERY

## 2024-03-25 PROCEDURE — 76642 ULTRASOUND BREAST LIMITED: CPT | Mod: RT

## 2024-03-25 PROCEDURE — 99213 OFFICE O/P EST LOW 20 MIN: CPT | Performed by: SURGERY

## 2024-03-25 PROCEDURE — 77062 BREAST TOMOSYNTHESIS BI: CPT

## 2024-03-25 NOTE — TELEPHONE ENCOUNTER
Jalen from [facility] (phone: [phone] ext. [phone]) called because she was informed by pt's  that she had 4 days left of lisinopril 20 mg tablet and then he said she will stop the medication. [facility] wanted us to be aware and to educate pt if needed.

## 2024-03-25 NOTE — PROGRESS NOTES
BREAST SURGICAL ONCOLOGY FOLLOW UP     Assessment/Plan     1. right breast ILC g2 ER 95% PER- HER2- at 11- 11:30 7cmFN measuring 2.2cm on final pathology  -[id number]   -declined xrt and med/onc referral     2. history of left breast cancer s/p lumpectomy and XRT     3. right breast nodule in lumpectomy bed  -1mm increase in size with continued recommendation for biopsy.  If biopsy not pursued short interval follow up U/S in 3 months  -Fadia is recovering from  a brain bleed and craniectomy.  She prefers to be conservative and would like to follow up with imaging in 3 months.      Subjective   Fadia Bolden is a 68 y.o. female presents today for follow up carcinoma of the bilateral breast.     Treatment history  Surgery: 4/23/2021 right MS PM SLNB (0/5) tumor 2.2cm, margins negative.   Radiation: deferred  Systemic therapy: deferred    3/21/2023 bilateral mammogram and right breast U/S: there is a possible small mass in the UOQ between the axillary scar and the lumpectomy bed. This is indeterminate and may represent post surgical changes. Technically, biopsy of this area would be difficult with the paucity of breast tissue in this area. This was discussed with Fadia who is adverse to needle biopsies and instead would prefer short interval follow up imaging. On exam, there is no suspicious palpable mass in the area of concern.     6/13/2023 follow up right breast U/S- stable mass, cat 4. If biopsy deferred continued 3 month follow up.     9/14/2023 follow up right breast U/S: size unchanged, no flow. Recommendations stands for biopsy but if no biopsy pursued 6 month follow up U/S at time of mammogram.    Suffered a brain bleed while on vacation in [place].  Working with speech therapy.    Bilateral diagnostic mammogram and right U/S today: 1-2mm increase in area of concern.  Standing recommendation for biopsy.  If not biopsied, short interval follow up imaging recommended.    Review of Systems   Constitutional  symptoms: Denies generalized fatigue.  Denies weight change, fevers/chills, difficulty sleeping   Eyes: Denies double vision, glaucoma, cataracts.  Ear/nose/throat/mouth: Denies hearing changes, sore throat, sinus problems.  Cardiovascular: No chest pain.  Denies irregular heartbeat.  Denies ankle swelling.  Respiratory: No wheezing, cough, or shortness of breath.  Gastrointestinal: No abdominal pain,  No nausea/vomiting.  No indigestion/heartburn.  No change in bowel habits.  No constipation or diarrhea.   Genitourinary: No urinary incontinence.  No urinary frequency.  No painful urination.  Musculoskeletal: No bone pain, no muscle pain, no joint pain.   Integumentary: No rash. No masses.  No changes in moles.  No easy bruising.  Neurological: No headaches.  No tremors. No numbness/tingling.  Psychiatric: No anxiety. No depression.  Endocrine: No excessive thirst.  Not too hot or too cold.  Not tired or fatigued.    Hematological/lymphatic: No swollen glands or blood clotting problems.  No bruising.    Objective   Physical Exam  General: Alert and oriented x 3.  Mood and affect are appropriate.  HEENT: EOMI, PERRLA.   Neck: supple, no masses, no cervical adenopathy.  Cardiovascular: no lower extremity edema.  Pulmonary: breathing non labored on room air.  GI: Abdomen soft, no masses. No hepatomegaly or splenomegaly.  Lymph nodes: No supraclavicular or axillary adenopathy bilaterally.  Musculoskeletal: Full range of motion in the upper extremities bilaterally.  Neuro: denies dizziness, tremors    Breasts: The breasts were examined in both the seated and supine positions.    RIGHT: The nipple is everted without nipple discharge.  There are no skin changes, skin thickening, or dimpling. Right axillary incision is well healed.  No significant change to the area of concern just inferior to the surgical scar.    LEFT: The nipple is everted without nipple discharge.  There are no skin changes, skin thickening, or  dimpling. There are no masses palpated in the LEFT breast. Left UOQ and axillary incisions well healed.      Radiology review: All images and reports were personally reviewed.         Delisa Rascon, DO

## 2024-03-26 ENCOUNTER — TREATMENT (OUTPATIENT)
Dept: OCCUPATIONAL THERAPY | Facility: CLINIC | Age: 69
End: 2024-03-26
Payer: COMMERCIAL

## 2024-03-26 ENCOUNTER — TREATMENT (OUTPATIENT)
Dept: SPEECH THERAPY | Facility: CLINIC | Age: 69
End: 2024-03-26
Payer: COMMERCIAL

## 2024-03-26 DIAGNOSIS — R27.8 COORDINATION ABNORMAL: ICD-10-CM

## 2024-03-26 DIAGNOSIS — I69.390 APRAXIA FOLLOWING CEREBROVASCULAR ACCIDENT: Primary | ICD-10-CM

## 2024-03-26 DIAGNOSIS — I69.320 APHASIA AS LATE EFFECT OF CEREBROVASCULAR ACCIDENT (CVA): Primary | ICD-10-CM

## 2024-03-26 PROCEDURE — 92507 TX SP LANG VOICE COMM INDIV: CPT | Mod: GN | Performed by: STUDENT IN AN ORGANIZED HEALTH CARE EDUCATION/TRAINING PROGRAM

## 2024-03-26 PROCEDURE — 97112 NEUROMUSCULAR REEDUCATION: CPT | Mod: GO,CO

## 2024-03-26 ASSESSMENT — PAIN - FUNCTIONAL ASSESSMENT: PAIN_FUNCTIONAL_ASSESSMENT: 0-10

## 2024-03-26 ASSESSMENT — PAIN SCALES - GENERAL: PAINLEVEL_OUTOF10: 0 - NO PAIN

## 2024-03-26 NOTE — TELEPHONE ENCOUNTER
Patient calling back regarding message.  States she don't believe she needs refills, however had appointment with breast health surgeon and BP was running low.   states 97/68, 94/60 (something), then final reading 102/60 something.  Not sure what to do and concerned.

## 2024-03-26 NOTE — PROGRESS NOTES
Speech-Language Pathology    Outpatient Speech-Language Pathology Progress Note     Patient Name: Fadia Bolden  MRN: [id number]  Today's Date: 3/26/2024  Time Calculation  Start Time: 1115  Stop Time: 1200  Time Calculation (min): 45 min       Current Problem:   1. 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 She demonstrated difficulty with word finding when talking about personal/family information. She had more difficulty naming to description but with cues was at target accuracy. She showed improvement with confrontational naming. Overall response time was faster. The patient may continue to benefit from skilled speech therapy services in order to improve communication skills in the home, social and clinical settings.    General Visit Information:  Certification Period Start Date: 02/27/24  Certification Period End Date: 05/27/24     Total Number of Visits : 23    Pain:  Pain Assessment  Pain Assessment: 0-10  Pain Score: 0 - No pain     Plan:  Plan  Inpatient/Swing Bed or Outpatient: Outpatient  SLP TX Plan: Continue Plan of Care  SLP Plan: Skilled SLP  SLP Frequency: 1x per week  Discussed POC: Patient, Caregiver/family  Discussed Risks/Benefits: Yes  Patient/Caregiver Agreeable: Yes    Goals: Updated 2/27/24  Short-term goals:   Patient will complete naming activities (including picture naming, generative naming, verb naming, descriptive naming, synonyms/antonyms, etc.) with 80% accuracy given minimal-moderate cues  Status: Goal Status: Progressing toward goal  Progress: See objective data below   -Naming to description   -83% improved to 96% with cues  -Confrontational naming:   -90% indep, improved to 100% w/ phon cue  Previous  -Correcting illogical sentences  -80%, improved to 100% with phonemic  cue  -100% indep   -Confrontational naming   -67% improved to 78% with cues  -85% improved to 100% with phonemic cues  -70% with min cues  -75% with phonemic cue  -60%, improved to 80% with comp strats  -100%  -90% with min cues   -Naming to description   -75% indep  .-80% improved to 100% with cues  - 90%, 100%, 67%, 90% (100% w/ cues), 70%  -Generative naming, 2 min, goal of 10 items:   -65%  -Fill in the blanks   -100% indep  -Exclusion task; med difficulty, FO4 words+pics:  -96%, previous 76%  -Name the category:   -93% (concrete), 90% (abstract), 84%                -Responsive naming, general information:     -71% (15/21), 85%             -Verb naming:   -Verb namin%  -100% (16/16), indep              -Complete the sentence (1 word): 95% (19/20)  -synonyms - 60% (3/5)  -antonyms - 100%                        Patient will complete targeted expressive language tasks (including stating personal information, phrase/sentence completion, open-ended questions) with 80% accuracy given minimal-moderate cues.  Status: Goal Status: Progressing toward goal  Progress: See objective data below   -Pt attempted to describe a recent car purchase, cues required to recall previous make/model and newly purchase vehicle. She was able to state the mileage only. 20% acc indep    Previous  -Personal picture description: general information about the picture was provided with 75% accuracy. Cues were required to clarify/identify the trip as a family reunion (patient simply stated family) and activities they do when they're there. Immediate family member names were spoken with 58% accuracy. Patient had difficulty with her own children and grandchildren's names.  -Sentence creation given 2 words: 80% indep; extra time required  -Patient initiated a description of her parents and how they moved from [place] to [place] and the progression of her father, a , and his progression of being in charge of forecasting for the state of  "[place] through the [employer]. The patient's spouse did not provide as much cueing as the last discussion although there were word finding errors and long pauses as she formulated the sentences. The SLP asked clarifying questions to ensure the correct information was communicated correctly. Overall accuracy clinically interpreted as 75-80% accuracy independently.      -SLP facilitated semantic analysis of common objects, patient required cues to identify key attributes/features of the items beyond basic description. Clinical interpretation of accuracy was 65-70% independently produced language   -SLP facilitated open ended questions/conversation regarding items the patient/spouse make in their home (woven demin rugs, kefir, soaps) and how the patient met her spouse. The spouse actively provided min cues while the patient struggled to find the appropriate words to accurately describe ingredients, processes and their courtship. Based on the level of cueing required, accuracy considered 60-65% indep.  -Describing common objects: 100% with min cues to provide more comprehensive descriptions, descriptions initially provided were accurate  -WH questions: 67%   -Open ended questions: 2 trials attempted which resulted in notable word finding difficulty made it difficult to produce a complete sentence; 50%   -Additional probes completed to state verbs/actions associated with common objects. 50% over 2 trials   -Story retelling: patient asked to describe a memorable birthday; response was simple yet effective when talking about childhood birthdays and her 40th (\"I'm 39 and I don't like being 39\")  -Describing common objects (s/p surgery)  -50% (ltd trials)  Before surgery  -80%, 67% with min cues, 67%  -100% with extended time and min cues   -Patient was able to retell how her daughter, Shikha, and her  moved to [place], the name of his city and what they have been busy doing (citizenship). The patient was " unable to state the country her [place] is from   - Cookie theft picture description: 4 utterances, no change from initial evaluation which was also 4 utterances   -WH questions - 100%     -Patient was able to state her full name, address and the names of her children and their spouses. She required extra time and one phonemic cue for her son in law  -Semantic analysis task - 88% accuracy to provide relevant and accurate features of the target item  -naming immediate family members and friends, 82% (14/17) to name children, their spouses and close friends and siblings     Patient will complete targeted receptive language tasks (including complex yes/no questions, functional reading, paragraph level comprehension) with 80% accuracy given minimal-moderate cues.  Status: Goal Status: Progressing toward goal  Progress: Goal not addressed this date; patient is highly focused on expressive language   `  Previous:  -Sentence comprehension, FO4 choices to match picture: 88% indep  -Illogical sentences - 90% acc to independently read and comprehend sentences in order to identify what word/words to substitute  -Paragraph with missing words; patient accuracy was 100% with min cues to ensure all words were read correctly which enabled her to generate an appropriate word to fill in the blanks  -Reading comprehension (121 and 136 word passages) - when given FO4 choices the patient answered comprehension questions with 100% accuracy. She referred back to the passage in 1/6 questions. Patient required extended time  -Complex Y/N, comparisons - 89% (40/45)  -Functional reading (CT ismael lvl 1/3) - 88%     Subjective:  Patient reports no new or worsening symptoms.    Outpatient Education:  Adult Outpatient Education  Individual(s) Educated: Patient, Spouse  Risk and Benefits Discussed with Patient/Caregiver/Other: yes  Patient/Caregiver Demonstrated Understanding: yes  Plan of Care Discussed and Agreed Upon: yes  Patient Response to  Education: Patient/Caregiver Verbalized Understanding of Information

## 2024-03-26 NOTE — PROGRESS NOTES
Occupational Therapy    Reassessment     Patient Name: Fadia Bolden  MRN: [id number]  Today's Date: 3/26/2024      Time Calculation  Start Time: 0930  Stop Time: 1015  Time Calculation (min): 45 min    Assessment:  Pt appears to tolerate treatment well without complaints of pain. Rest breaks taken as needed. Increased fine motor control both hands. Pt continues to make forward progress towards goals.     Plan:   Interventions: Self Care/ADL, neuromuscular reeducation, therapeutic exercise   Duration: 1-2x/wk for 8 visits total       Subjective   Current Problem:  1. Apraxia following cerebrovascular accident        2. Coordination abnormal              Patient arrives for first treatment since cranioplasty on 2/19.  Patient reports she is doing well since surgery.  Pt reports mild memory changes     Pain:   Pt reports 0/10 pain     Objective     Treatment:    Neuro  Pt completes 5 different AB patterns with peg board as instructed by JOEL. MALDONADO instructs pt to place pegs by alternating hands, when removing pegs pt grabs as many as possible with one hand and releases into bin one at a time for in hand manipulation and coordination.    Pt completes blazepods task to promote B UE coordination, visual scanning, stand balance while reaching with UE. 1x30 seconds right hand, 1x30 seconds left hand, 1x30 seconds alternating hands, 2x30 crossing midline using left hand for pods on right side and right hand for pods on left side.      Pt completes fine motor task to ease functional ADL and IADL tasks. Pt uses tweezers to grasp one parlor bead at a time, cross midline and drop onto target, 2x10 each hand.  Pt repeats fine motor activity, MALDONADO prompts pt to simultaneously complete A-B-C game, naming an animals that starts with each letter of the alphabet. Pt completes A-Q in given time frame, verbal cues for 2-3 letters.

## 2024-04-01 ENCOUNTER — APPOINTMENT (OUTPATIENT)
Dept: NEUROSURGERY | Facility: HOSPITAL | Age: 69
End: 2024-04-01
Payer: COMMERCIAL

## 2024-04-02 ENCOUNTER — OFFICE VISIT (OUTPATIENT)
Dept: NEUROSURGERY | Facility: HOSPITAL | Age: 69
End: 2024-04-02
Payer: COMMERCIAL

## 2024-04-02 ENCOUNTER — TREATMENT (OUTPATIENT)
Dept: OCCUPATIONAL THERAPY | Facility: CLINIC | Age: 69
End: 2024-04-02
Payer: COMMERCIAL

## 2024-04-02 ENCOUNTER — TREATMENT (OUTPATIENT)
Dept: SPEECH THERAPY | Facility: CLINIC | Age: 69
End: 2024-04-02
Payer: COMMERCIAL

## 2024-04-02 VITALS — HEART RATE: 70 BPM | DIASTOLIC BLOOD PRESSURE: 84 MMHG | SYSTOLIC BLOOD PRESSURE: 159 MMHG

## 2024-04-02 DIAGNOSIS — R27.8 COORDINATION ABNORMAL: ICD-10-CM

## 2024-04-02 DIAGNOSIS — M95.2 ACQUIRED SKULL DEFECT: Primary | ICD-10-CM

## 2024-04-02 DIAGNOSIS — I69.390 APRAXIA FOLLOWING CEREBROVASCULAR ACCIDENT: Primary | ICD-10-CM

## 2024-04-02 DIAGNOSIS — R53.1 WEAKNESS GENERALIZED: ICD-10-CM

## 2024-04-02 DIAGNOSIS — I69.320 APHASIA AS LATE EFFECT OF CEREBROVASCULAR ACCIDENT (CVA): Primary | ICD-10-CM

## 2024-04-02 PROCEDURE — 3077F SYST BP >= 140 MM HG: CPT | Performed by: NEUROLOGICAL SURGERY

## 2024-04-02 PROCEDURE — 3079F DIAST BP 80-89 MM HG: CPT | Performed by: NEUROLOGICAL SURGERY

## 2024-04-02 PROCEDURE — 1159F MED LIST DOCD IN RCRD: CPT | Performed by: NEUROLOGICAL SURGERY

## 2024-04-02 PROCEDURE — 1160F RVW MEDS BY RX/DR IN RCRD: CPT | Performed by: NEUROLOGICAL SURGERY

## 2024-04-02 PROCEDURE — 1123F ACP DISCUSS/DSCN MKR DOCD: CPT | Performed by: NEUROLOGICAL SURGERY

## 2024-04-02 PROCEDURE — 92507 TX SP LANG VOICE COMM INDIV: CPT | Mod: GN | Performed by: STUDENT IN AN ORGANIZED HEALTH CARE EDUCATION/TRAINING PROGRAM

## 2024-04-02 PROCEDURE — 97112 NEUROMUSCULAR REEDUCATION: CPT | Mod: GO,CO

## 2024-04-02 PROCEDURE — 99024 POSTOP FOLLOW-UP VISIT: CPT | Performed by: NEUROLOGICAL SURGERY

## 2024-04-02 ASSESSMENT — COLUMBIA-SUICIDE SEVERITY RATING SCALE - C-SSRS
2. HAVE YOU ACTUALLY HAD ANY THOUGHTS OF KILLING YOURSELF?: NO
1. IN THE PAST MONTH, HAVE YOU WISHED YOU WERE DEAD OR WISHED YOU COULD GO TO SLEEP AND NOT WAKE UP?: NO
6. HAVE YOU EVER DONE ANYTHING, STARTED TO DO ANYTHING, OR PREPARED TO DO ANYTHING TO END YOUR LIFE?: NO

## 2024-04-02 ASSESSMENT — ENCOUNTER SYMPTOMS
LOSS OF SENSATION IN FEET: 0
DEPRESSION: 0
OCCASIONAL FEELINGS OF UNSTEADINESS: 0

## 2024-04-02 ASSESSMENT — PATIENT HEALTH QUESTIONNAIRE - PHQ9
2. FEELING DOWN, DEPRESSED OR HOPELESS: NOT AT ALL
SUM OF ALL RESPONSES TO PHQ9 QUESTIONS 1 AND 2: 0
1. LITTLE INTEREST OR PLEASURE IN DOING THINGS: NOT AT ALL

## 2024-04-02 ASSESSMENT — PAIN SCALES - GENERAL: PAINLEVEL_OUTOF10: 0 - NO PAIN

## 2024-04-02 ASSESSMENT — PAIN - FUNCTIONAL ASSESSMENT: PAIN_FUNCTIONAL_ASSESSMENT: 0-10

## 2024-04-02 NOTE — PROGRESS NOTES
[facility]  Neurosurgery    History of Present Illness      Fadia Bolden is a 68-year-old female with a PMH significant for DAIJA on CPAP, hypothyroidism, breast CA, esophageal stricture, L occipital ischemic stroke (on ASA) with hemorrhagic conversion on 10/25/23 s/p L hemicraniotomy in [place]. Patient is now s/p L cranioplasty with [facility] on 02/19/24. Postoperative course uncomplicated. Patient was seen for her 3 week POV and suture removal at which time incision was C/D/I. Patient was continuing to work with ST and OT. Continued R peripheral field vision cut that is only bothersome if she does not turn her head completely when walking and can bump into objects.         Objective      Vitals:   /84   Pulse 70         Physical Exam:    Awake and alert  Some occasional word finding difficulty  Incision well healed - a small region of absorbable suture at most anterior aspect of incision trimmed  Full strength in all extremities          Relevant Results:  No new imaging, Prior CT 3/11/24 with postop changes           Assessment & Plan      Diagnosis:   S/p hemicraniectomy        Provider Impression:     Doing well s/p cranioplasty  May resume ASA  Advised to follow-up with neurology and PCP, especially given elevated BP on visit today.  Follow-up with neurosurgery prn          Medical History     Past Medical History:   Diagnosis Date    Breast cancer (CMS/HCC)     bilateral    HTN (hypertension)     Hypothyroidism     DAIJA (obstructive sleep apnea)     Other chest pain 02/10/2021    Chest tightness    Other specified health status 12/31/2018    No pertinent past medical history    Peripheral vision loss, right     Personal history of irradiation     Personal history of malignant neoplasm of breast 06/27/2022    History of malignant neoplasm of breast    Stroke (CMS/HCC)      Past Surgical History:   Procedure Laterality Date    MR HEAD ANGIO W AND WO IV CONTRAST  10/25/2023    MR HEAD ANGIO W AND  WO IV CONTRAST 10/25/2023    MR HEAD ANGIO WO IV CONTRAST  1/5/2024    MR HEAD ANGIO WO IV CONTRAST 1/5/2024 [facility] [id number] MRI    OTHER SURGICAL HISTORY  11/22/2021    Tonsillectomy     Social History     Tobacco Use    Smoking status: Never    Smokeless tobacco: Never   Vaping Use    Vaping Use: Never used   Substance Use Topics    Alcohol use: Not Currently    Drug use: Never     Family History   Problem Relation Name Age of Onset    Thyroid disease Mother      Parkinsonism Father      Parkinsonism Sister      Other (HTN) Sister      Breast cancer Father's Sister      Breast cancer Paternal Grandmother       Allergies   Allergen Reactions    Fish Derived Rash     Current Outpatient Medications   Medication Instructions    ascorbic acid, vitamin C, 1,500 mg ER tablet 1 tablet, oral, Daily    atorvastatin (LIPITOR) 80 mg, oral, Daily    cholecalciferol (Vitamin D-3) 50 MCG (2000 UT) tablet 1 tablet, oral, Daily    lisinopril 20 mg, oral, Daily RT

## 2024-04-02 NOTE — PROGRESS NOTES
Occupational Therapy    Reassessment     Patient Name: Fadia Bolden  MRN: [id number]  Today's Date: 4/2/2024      Time Calculation  Start Time: 1000  Stop Time: 1046  Time Calculation (min): 46 min    Assessment:  Pt appears to tolerate treatment well without complaints of pain. Rest breaks taken as needed. Increased fine motor control both hands. Pt continues to make forward progress towards goals.     Plan:   Interventions: Self Care/ADL, neuromuscular reeducation, therapeutic exercise   Duration: 1-2x/wk for 8 visits total       Subjective   Pt reports no changes since last session    Current Problem:  1. Apraxia following cerebrovascular accident        2. Coordination abnormal        3. Weakness generalized            Pain:   Pt reports 0/10 pain     Objective     Treatment: 46    Neuro 46    B UE scitfit arm bike 5 minutes, alternating forward and backward petal every minute.     Functional balance activities to promote increased safety during distal reach and dynamic throwing activities.    Standing on blue balance pad pt completes AROM arch, maintains balance throughout, pt states left shoulder feels like a stretch but not painful.   Dynamic standing on blue balance pad pt crosses midline with R UE, followed by L UE, to  one bean bag at a time and throw underhand and overhand at target approx 10 feet away. Pt maintains balance throughout. Pt is able to retrieve all bean bags safely after throwing, maintains balance while bending to  bean bags.     Functional fine motor activities while standing on blue balance pad. Pt completes placing kwesi rods, 2x20 each hand. Pt then places small parlor beads over each [place], 2x20 each hand. Pt removes rods one at a time, holding as many in hand as possible before releasing. Pt repeats with parlor beads.

## 2024-04-02 NOTE — PROGRESS NOTES
Speech-Language Pathology    Outpatient Speech-Language Pathology Progress Note     Patient Name: Fadia Bolden  MRN: [id number]  Today's Date: 4/2/2024  Time Calculation  Start Time: 1115  Stop Time: 1200  Time Calculation (min): 45 min       Current Problem:   1. 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 She demonstrated difficulty with word finding when talking about personal/family information. She had more difficulty naming to description but with cues was at target accuracy. She showed improvement with confrontational naming. Overall response time was faster. The patient may continue to benefit from skilled speech therapy services in order to improve communication skills in the home, social and clinical settings.    General Visit Information:  Certification Period Start Date: 02/27/24  Certification Period End Date: 05/27/24     Total Number of Visits : 24    Pain:  Pain Assessment  Pain Assessment: 0-10  Pain Score: 0 - No pain     Plan:  Plan  Inpatient/Swing Bed or Outpatient: Outpatient  SLP TX Plan: Continue Plan of Care  SLP Plan: Skilled SLP  SLP Frequency: 1x per week  Discussed POC: Patient, Caregiver/family  Discussed Risks/Benefits: Yes  Patient/Caregiver Agreeable: Yes    Goals: Updated 2/27/24  Short-term goals:   Patient will complete naming activities (including picture naming, generative naming, verb naming, descriptive naming, synonyms/antonyms, etc.) with 80% accuracy given minimal-moderate cues  Status: Goal Status: Progressing toward goal  Progress: See objective data below   -Verbal reasoning/word finding: adding vowels ([facility] 8), 76% increased to 88% with min cues   -Carrying one letter to form words ([facility] 8) - 90% improved to 100% with min cue    Previous  -Correcting illogical  sentences  -80%, improved to 100% with phonemic cue  -100% indep   -Confrontational naming   -90% indep, improved to 100% w/ phon cue  -67% improved to 78% with cues  -85% improved to 100% with phonemic cues  -70% with min cues  -75% with phonemic cue  -60%, improved to 80% with comp strats  -100%  -90% with min cues   -Naming to description   -83% improved to 96% with cues  -75% indep  .-80% improved to 100% with cues  - 90%, 100%, 67%, 90% (100% w/ cues), 70%  -Generative naming, 2 min, goal of 10 items:   -65%  -Fill in the blanks   -100% indep  -Exclusion task; med difficulty, FO4 words+pics:  -96%, previous 76%  -Name the category:   -93% (concrete), 90% (abstract), 84%                -Responsive naming, general information:     -71% (15/21), 85%             -Verb naming:   -Verb namin%  -100% (16/16), indep              -Complete the sentence (1 word): 95% (19/20)  -synonyms - 60% (3/5)  -antonyms - 100%                        Patient will complete targeted expressive language tasks (including stating personal information, phrase/sentence completion, open-ended questions) with 80% accuracy given minimal-moderate cues.  Status: Goal Status: Progressing toward goal  Progress: See objective data below   -Patient had a photo book from a family vacation to [place]. The patient had difficulty recalling the country and names of her grandsons. She could name the city and the insect that bit their legs when they were soaking their feet. Patient and spouse confirm inconsistency with regard to family member names and other key pieces of information.     Previous  -Pt attempted to describe a recent car purchase, cues required to recall previous make/model and newly purchase vehicle. She was able to state the mileage only. 20% acc indep  -Personal picture description:  75% accuracy; Immediate family member names were spoken with 58% accuracy. Patient had difficulty with her own children and grandchildren's  names.  -Sentence creation given 2 words: 80% indep; extra time required  -Patient initiated a description of her parents and how they moved from [place] to [place]. The SLP asked clarifying questions to ensure the correct information was communicated correctly. Overall accuracy interpreted as 75-80% independently.     -Semantic analysis,  65-70% indep  -Open ended questions/conversation regarding items the patient/spouse make in their home; accuracy considered 60-65% indep.  -Describing common objects: 100% with min cues -WH questions: 67%   -Open ended questions: 2 trials attempted which resulted in notable word finding difficulty made it difficult to produce a complete sentence; 50%   -Additional probes completed to state verbs/actions associated with common objects. 50% over 2 trials   -Describing common objects (s/p surgery)  -50% (ltd trials)  Before surgery  -80%, 67% with min cues, 67%  -100% with extended time and min cues   -Patient was able to retell how her daughter, Shikha, and her  moved to [place], the name of his city and what they have been busy doing (citizenship). The patient was unable to state the country her [place] is from   - Cookie theft picture description: 4 utterances, no change from initial evaluation which was also 4 utterances   -WH questions - 100%     -Patient was able to state her full name, address and the names of her children and their spouses. She required extra time and one phonemic cue for her son in law  -Semantic analysis task - 88% accuracy to provide relevant and accurate features of the target item  -naming immediate family members and friends, 82% (14/17) to name children, their spouses and close friends and siblings     Patient will complete targeted receptive language tasks (including complex yes/no questions, functional reading, paragraph level comprehension) with 80% accuracy given minimal-moderate cues.  Status: Goal Status: Progressing toward goal  Progress: Goal not  addressed this date; patient is highly focused on expressive language. The patient is reading more at home.   `  Previous:  -Sentence comprehension, FO4 choices to match picture: 88% indep  -Illogical sentences - 90% acc to independently read and comprehend sentences in order to identify what word/words to substitute  -Paragraph with missing words; patient accuracy was 100% with min cues to ensure all words were read correctly which enabled her to generate an appropriate word to fill in the blanks  -Reading comprehension (121 and 136 word passages) - when given FO4 choices the patient answered comprehension questions with 100% accuracy. She referred back to the passage in 1/6 questions. Patient required extended time  -Complex Y/N, comparisons - 89% (40/45)  -Functional reading (CT ismael lvl 1/3) - 88%     Subjective:  Patient reports she wants to do more.     Outpatient Education:  Adult Outpatient Education  Individual(s) Educated: Patient, Spouse  Risk and Benefits Discussed with Patient/Caregiver/Other: yes  Patient/Caregiver Demonstrated Understanding: yes  Plan of Care Discussed and Agreed Upon: yes  Patient Response to Education: Patient/Caregiver Verbalized Understanding of Information

## 2024-04-04 ENCOUNTER — APPOINTMENT (OUTPATIENT)
Dept: SPEECH THERAPY | Facility: CLINIC | Age: 69
End: 2024-04-04
Payer: COMMERCIAL

## 2024-04-09 ENCOUNTER — TREATMENT (OUTPATIENT)
Dept: OCCUPATIONAL THERAPY | Facility: CLINIC | Age: 69
End: 2024-04-09
Payer: COMMERCIAL

## 2024-04-09 ENCOUNTER — TREATMENT (OUTPATIENT)
Dept: SPEECH THERAPY | Facility: CLINIC | Age: 69
End: 2024-04-09
Payer: COMMERCIAL

## 2024-04-09 DIAGNOSIS — R53.1 WEAKNESS GENERALIZED: ICD-10-CM

## 2024-04-09 DIAGNOSIS — I69.390 APRAXIA FOLLOWING CEREBROVASCULAR ACCIDENT: Primary | ICD-10-CM

## 2024-04-09 DIAGNOSIS — I69.320 APHASIA AS LATE EFFECT OF CEREBROVASCULAR ACCIDENT (CVA): Primary | ICD-10-CM

## 2024-04-09 DIAGNOSIS — S42.295D OTHER CLOSED NONDISPLACED FRACTURE OF PROXIMAL END OF LEFT HUMERUS WITH ROUTINE HEALING, SUBSEQUENT ENCOUNTER: ICD-10-CM

## 2024-04-09 DIAGNOSIS — R27.8 COORDINATION ABNORMAL: ICD-10-CM

## 2024-04-09 PROCEDURE — 92507 TX SP LANG VOICE COMM INDIV: CPT | Mod: GN | Performed by: STUDENT IN AN ORGANIZED HEALTH CARE EDUCATION/TRAINING PROGRAM

## 2024-04-09 PROCEDURE — 97112 NEUROMUSCULAR REEDUCATION: CPT | Mod: GO | Performed by: OCCUPATIONAL THERAPIST

## 2024-04-09 ASSESSMENT — PAIN - FUNCTIONAL ASSESSMENT: PAIN_FUNCTIONAL_ASSESSMENT: 0-10

## 2024-04-09 ASSESSMENT — PAIN SCALES - GENERAL: PAINLEVEL_OUTOF10: 0 - NO PAIN

## 2024-04-09 NOTE — PROGRESS NOTES
"Occupational Therapy    Reassessment     Patient Name: Fadia Bolden  MRN: [id number]  Today's Date: 4/9/2024           Assessment:  Pt with noted improvement in executive functioning tasks on today's date.  Pt continues to require verbal cues and task grading for multitasking and word finding but is improving.     Plan:   Interventions: Self Care/ADL, neuromuscular reeducation, therapeutic exercise   Duration: 1-2x/wk for 8 visits total       Subjective   Pt reports she is getting back into the swings well.   She is getting faster with her daily routines.       Current Problem:  1. Apraxia following cerebrovascular accident        2. Coordination abnormal        3. Weakness generalized        4. Other closed nondisplaced fracture of proximal end of left humerus with routine healing, subsequent encounter              Pain:   Pt reports 0/10 pain     Objective   Pt with noted increased difficulty with therapeutic activities once multitasking component added   Pt demonstrated improvement with task with repetition and task grading   Continues to demonstrate difficulty with word finding     Treatment: 43    Neuro 43    Concentration and memory addressed by having patient complete \"concentration\" memory card game while standing at tabletop on blue foam pad - completed in 4:14     *OT upgraded by having patient repeat task, but name states while completing task.   - completed in 8:20     Dynamic reaching/standing balance, multitasking, and GMC addressed with the following:  OT placed four colored post its on standing mirror in front of patient and four colored dots on floor in front of patient.  OT provided commands for patient to perform contra and ipsilateral movements.  For example, \"Left arm pink post it and right foot green dot.\" Pt to then reach and tap each dot and post it as instructed.    *Pt initially demonstrated difficulty with dual multitasking.  OT downgraded task to have pt complete with BUEs only then BLEs " only.  OT then added both together

## 2024-04-09 NOTE — PROGRESS NOTES
Speech-Language Pathology    Outpatient Speech-Language Pathology Progress Note     Patient Name: Fadia Bolden  MRN: [id number]  Today's Date: 4/9/2024  Time Calculation  Start Time: 1115  Stop Time: 1200  Time Calculation (min): 45 min       Current Problem:   1. Aphasia as late effect of cerebrovascular accident (CVA)            SLP Assessment:  SLP TX Intervention Outcome: Making Progress Towards Goals  Prognosis: Good    The patient was actively engaged in therapeutic tasks targeting expressive language skills. She demonstrated difficulty generating rhyming words but had more difficulty with divergent naming. The patient may continue to benefit from skilled speech therapy services in order to improve communication skills in the home, social and clinical settings.    General Visit Information:  Certification Period Start Date: 02/27/24  Certification Period End Date: 05/27/24     Total Number of Visits : 25    Pain:  Pain Assessment  Pain Assessment: 0-10  Pain Score: 0 - No pain     Plan:  Plan  Inpatient/Swing Bed or Outpatient: Outpatient  SLP TX Plan: Continue Plan of Care  SLP Plan: Skilled SLP  SLP Frequency: 1x per week  Discussed POC: Patient, Caregiver/family  Discussed Risks/Benefits: Yes  Patient/Caregiver Agreeable: Yes    Goals: Updated 2/27/24  Short-term goals:   Patient will complete naming activities (including picture naming, generative naming, verb naming, descriptive naming, synonyms/antonyms, etc.) with 80% accuracy given minimal-moderate cues  Status: Goal Status: Progressing toward goal  Progress: See objective data below  -Generative naming, 2 min, goal of 10 items:  Improved function for generating rhyming words - (96%) patient had difficulty with divergent naming of categories (30%) and words starting with a given letter (60%)    Previous  -Verbal reasoning/word finding: adding vowels ([facility] 8), 76% increased to 88% with min cues   -Carrying one letter to form words ([facility] 8) - 90% improved  to 100% with min cue  -Correcting illogical sentences  -80%, improved to 100% with phonemic cue  -100% indep   -Confrontational naming   -90% indep, improved to 100% w/ phon cue  -67% improved to 78% with cues  -85% improved to 100% with phonemic cues  -70% with min cues  -75% with phonemic cue  -60%, improved to 80% with comp strats  -100%  -90% with min cues   -Naming to description   -83% improved to 96% with cues  -75% indep  .-80% improved to 100% with cues  - 90%, 100%, 67%, 90% (100% w/ cues), 70%  -Generative naming, 2 min, goal of 10 items:   -65%  -Fill in the blanks   -100% indep  -Exclusion task; med difficulty, FO4 words+pics:  -96%, previous 76%  -Name the category:   -93% (concrete), 90% (abstract), 84%                -Responsive naming, general information:     -71% (15/21), 85%             -Verb naming:   -Verb namin%  -100% (16/16), indep              -Complete the sentence (1 word): 95% (19/20)  -synonyms - 60% (3/5)  -antonyms - 100%                        Patient will complete targeted expressive language tasks (including stating personal information, phrase/sentence completion, open-ended questions) with 80% accuracy given minimal-moderate cues.  Status: Goal Status: Progressing toward goal  Progress: Goal not addressed this date     Previous  -Patient had a photo book from a family vacation to [place]. The patient had difficulty recalling the country and names of her grandsons. She could name the city and the insect that bit their legs when they were soaking their feet. Patient and spouse confirm inconsistency with regard to family member names and other key pieces of information.   -Pt attempted to describe a recent car purchase, cues required to recall previous make/model and newly purchase vehicle. She was able to state the mileage only. 20% acc indep  -Personal picture description:  75% accuracy; Immediate family member names were spoken with 58% accuracy. Patient had difficulty with her  own children and grandchildren's names.  -Sentence creation given 2 words: 80% indep; extra time required  -Patient initiated a description of her parents and how they moved from [place] to [place]. The SLP asked clarifying questions to ensure the correct information was communicated correctly. Overall accuracy interpreted as 75-80% independently.     -Semantic analysis,  65-70% indep  -Open ended questions/conversation regarding items the patient/spouse make in their home; accuracy considered 60-65% indep.  -Describing common objects: 100% with min cues -WH questions: 67%   -Open ended questions: 2 trials attempted which resulted in notable word finding difficulty made it difficult to produce a complete sentence; 50%   -Additional probes completed to state verbs/actions associated with common objects. 50% over 2 trials   -Describing common objects (s/p surgery)  -50% (ltd trials)  Before surgery  -80%, 67% with min cues, 67%  -100% with extended time and min cues   -Patient was able to retell how her daughter, Shikha, and her  moved to [place], the name of his city and what they have been busy doing (citizenship). The patient was unable to state the country her [place] is from   - Cookie theft picture description: 4 utterances, no change from initial evaluation which was also 4 utterances   -WH questions - 100%     -Patient was able to state her full name, address and the names of her children and their spouses. She required extra time and one phonemic cue for her son in law  -Semantic analysis task - 88% accuracy to provide relevant and accurate features of the target item  -naming immediate family members and friends, 82% (14/17) to name children, their spouses and close friends and siblings     Patient will complete targeted receptive language tasks (including complex yes/no questions, functional reading, paragraph level comprehension) with 80% accuracy given minimal-moderate cues.  Status: Goal Status: Progressing  toward goal  Progress: Goal not addressed this date;    `  Previous:  -Sentence comprehension, FO4 choices to match picture: 88% indep  -Illogical sentences - 90% acc to independently read and comprehend sentences in order to identify what word/words to substitute  -Paragraph with missing words; patient accuracy was 100% with min cues to ensure all words were read correctly which enabled her to generate an appropriate word to fill in the blanks  -Reading comprehension (121 and 136 word passages) - when given FO4 choices the patient answered comprehension questions with 100% accuracy. She referred back to the passage in 1/6 questions. Patient required extended time  -Complex Y/N, comparisons - 89% (40/45)  -Functional reading (CT ismael lvl 1/3) - 88%     Subjective:  Patient's sister joined for the session. She reports the patient is conversing more. The patient agrees she is communicating with others more but does not feel she is always able to participate in meaningful conversation. She did join her monthly [employer] women's group one time.    Outpatient Education:  Adult Outpatient Education  Individual(s) Educated: Patient, Spouse  Risk and Benefits Discussed with Patient/Caregiver/Other: yes  Patient/Caregiver Demonstrated Understanding: yes  Plan of Care Discussed and Agreed Upon: yes  Patient Response to Education: Patient/Caregiver Verbalized Understanding of Information

## 2024-04-10 ENCOUNTER — APPOINTMENT (OUTPATIENT)
Dept: RADIOLOGY | Facility: HOSPITAL | Age: 69
DRG: 101 | End: 2024-04-10
Payer: COMMERCIAL

## 2024-04-10 ENCOUNTER — OFFICE VISIT (OUTPATIENT)
Dept: NEUROLOGY | Facility: CLINIC | Age: 69
End: 2024-04-10
Payer: COMMERCIAL

## 2024-04-10 ENCOUNTER — TELEPHONE (OUTPATIENT)
Dept: NEUROLOGY | Facility: CLINIC | Age: 69
End: 2024-04-10

## 2024-04-10 ENCOUNTER — LAB (OUTPATIENT)
Dept: LAB | Facility: LAB | Age: 69
End: 2024-04-10
Payer: COMMERCIAL

## 2024-04-10 ENCOUNTER — HOSPITAL ENCOUNTER (INPATIENT)
Facility: HOSPITAL | Age: 69
LOS: 1 days | Discharge: HOME | DRG: 101 | End: 2024-04-12
Attending: EMERGENCY MEDICINE | Admitting: HOSPITALIST
Payer: COMMERCIAL

## 2024-04-10 ENCOUNTER — APPOINTMENT (OUTPATIENT)
Dept: CARDIOLOGY | Facility: HOSPITAL | Age: 69
DRG: 101 | End: 2024-04-10
Payer: COMMERCIAL

## 2024-04-10 VITALS
TEMPERATURE: 97.1 F | HEART RATE: 58 BPM | BODY MASS INDEX: 17.38 KG/M2 | DIASTOLIC BLOOD PRESSURE: 78 MMHG | WEIGHT: 101.8 LBS | SYSTOLIC BLOOD PRESSURE: 126 MMHG | HEIGHT: 64 IN

## 2024-04-10 DIAGNOSIS — G47.33 OSA (OBSTRUCTIVE SLEEP APNEA): ICD-10-CM

## 2024-04-10 DIAGNOSIS — R29.90 STROKE-LIKE SYMPTOMS: ICD-10-CM

## 2024-04-10 DIAGNOSIS — R56.9 SEIZURE-LIKE ACTIVITY (MULTI): Primary | ICD-10-CM

## 2024-04-10 DIAGNOSIS — I61.2 NONTRAUMATIC HEMORRHAGE OF LEFT CEREBRAL HEMISPHERE (MULTI): ICD-10-CM

## 2024-04-10 DIAGNOSIS — I63.89 CEREBROVASCULAR ACCIDENT (CVA) DUE TO OTHER MECHANISM (MULTI): Primary | ICD-10-CM

## 2024-04-10 DIAGNOSIS — I63.89 CEREBROVASCULAR ACCIDENT (CVA) DUE TO OTHER MECHANISM (MULTI): ICD-10-CM

## 2024-04-10 DIAGNOSIS — I61.1 NONTRAUMATIC CORTICAL HEMORRHAGE OF LEFT CEREBRAL HEMISPHERE (MULTI): ICD-10-CM

## 2024-04-10 PROBLEM — F41.9 ANXIETY: Status: ACTIVE | Noted: 2024-04-10

## 2024-04-10 PROBLEM — H53.47 HEMIANOPSIA: Status: ACTIVE | Noted: 2023-10-27

## 2024-04-10 LAB
ALBUMIN SERPL BCP-MCNC: 4.4 G/DL (ref 3.4–5)
ALP SERPL-CCNC: 83 U/L (ref 33–136)
ALT SERPL W P-5'-P-CCNC: 17 U/L (ref 7–45)
ANION GAP SERPL CALC-SCNC: 12 MMOL/L (ref 10–20)
APPEARANCE UR: CLEAR
APTT PPP: 32 SECONDS (ref 27–38)
AST SERPL W P-5'-P-CCNC: 18 U/L (ref 9–39)
ATRIAL RATE: 277 BPM
B2 GLYCOPROT1 IGA SER-ACNC: <0.6 U/ML
B2 GLYCOPROT1 IGG SER-ACNC: <1.4 U/ML
B2 GLYCOPROT1 IGM SER-ACNC: 0.2 U/ML
BASOPHILS # BLD AUTO: 0.04 X10*3/UL (ref 0–0.1)
BASOPHILS NFR BLD AUTO: 0.7 %
BILIRUB SERPL-MCNC: 0.7 MG/DL (ref 0–1.2)
BILIRUB UR STRIP.AUTO-MCNC: NEGATIVE MG/DL
BUN SERPL-MCNC: 22 MG/DL (ref 6–23)
CALCIUM SERPL-MCNC: 9.3 MG/DL (ref 8.6–10.3)
CARDIAC TROPONIN I PNL SERPL HS: 5 NG/L (ref 0–13)
CARDIOLIPIN IGA SERPL-ACNC: <0.5 APL U/ML
CARDIOLIPIN IGG SER IA-ACNC: <1.6 GPL U/ML
CARDIOLIPIN IGM SER IA-ACNC: 0.3 MPL U/ML
CHLORIDE SERPL-SCNC: 105 MMOL/L (ref 98–107)
CO2 SERPL-SCNC: 27 MMOL/L (ref 21–32)
COLOR UR: COLORLESS
CREAT SERPL-MCNC: 0.77 MG/DL (ref 0.5–1.05)
EGFRCR SERPLBLD CKD-EPI 2021: 84 ML/MIN/1.73M*2
EOSINOPHIL # BLD AUTO: 0.2 X10*3/UL (ref 0–0.7)
EOSINOPHIL NFR BLD AUTO: 3.4 %
ERYTHROCYTE [DISTWIDTH] IN BLOOD BY AUTOMATED COUNT: 12.4 % (ref 11.5–14.5)
GLUCOSE BLD MANUAL STRIP-MCNC: 106 MG/DL (ref 74–99)
GLUCOSE BLD MANUAL STRIP-MCNC: 109 MG/DL (ref 74–99)
GLUCOSE SERPL-MCNC: 111 MG/DL (ref 74–99)
GLUCOSE UR STRIP.AUTO-MCNC: NEGATIVE MG/DL
HCT VFR BLD AUTO: 40.1 % (ref 36–46)
HGB BLD-MCNC: 13.4 G/DL (ref 12–16)
HOLD SPECIMEN: NORMAL
IMM GRANULOCYTES # BLD AUTO: 0.01 X10*3/UL (ref 0–0.7)
IMM GRANULOCYTES NFR BLD AUTO: 0.2 % (ref 0–0.9)
INR PPP: 1 (ref 0.9–1.1)
KETONES UR STRIP.AUTO-MCNC: NEGATIVE MG/DL
LEUKOCYTE ESTERASE UR QL STRIP.AUTO: NEGATIVE
LYMPHOCYTES # BLD AUTO: 2.32 X10*3/UL (ref 1.2–4.8)
LYMPHOCYTES NFR BLD AUTO: 39 %
MCH RBC QN AUTO: 30 PG (ref 26–34)
MCHC RBC AUTO-ENTMCNC: 33.4 G/DL (ref 32–36)
MCV RBC AUTO: 90 FL (ref 80–100)
MONOCYTES # BLD AUTO: 0.84 X10*3/UL (ref 0.1–1)
MONOCYTES NFR BLD AUTO: 14.1 %
NEUTROPHILS # BLD AUTO: 2.54 X10*3/UL (ref 1.2–7.7)
NEUTROPHILS NFR BLD AUTO: 42.6 %
NITRITE UR QL STRIP.AUTO: NEGATIVE
NRBC BLD-RTO: 0 /100 WBCS (ref 0–0)
P AXIS: 75 DEGREES
P OFFSET: 206 MS
P ONSET: 158 MS
PH UR STRIP.AUTO: 6 [PH]
PLATELET # BLD AUTO: 189 X10*3/UL (ref 150–450)
POTASSIUM SERPL-SCNC: 3.7 MMOL/L (ref 3.5–5.3)
PROT SERPL-MCNC: 7.5 G/DL (ref 6.4–8.2)
PROT UR STRIP.AUTO-MCNC: NEGATIVE MG/DL
PROTHROMBIN TIME: 11.5 SECONDS (ref 9.8–12.8)
Q ONSET: 222 MS
QRS COUNT: 12 BEATS
QRS DURATION: 76 MS
QT INTERVAL: 398 MS
QTC CALCULATION(BAZETT): 441 MS
QTC FREDERICIA: 427 MS
R AXIS: 72 DEGREES
RBC # BLD AUTO: 4.46 X10*6/UL (ref 4–5.2)
RBC # UR STRIP.AUTO: NEGATIVE /UL
SODIUM SERPL-SCNC: 140 MMOL/L (ref 136–145)
SP GR UR STRIP.AUTO: 1.03
T AXIS: 59 DEGREES
T OFFSET: 421 MS
UROBILINOGEN UR STRIP.AUTO-MCNC: <2 MG/DL
VENTRICULAR RATE: 74 BPM
WBC # BLD AUTO: 6 X10*3/UL (ref 4.4–11.3)

## 2024-04-10 PROCEDURE — 99223 1ST HOSP IP/OBS HIGH 75: CPT | Performed by: STUDENT IN AN ORGANIZED HEALTH CARE EDUCATION/TRAINING PROGRAM

## 2024-04-10 PROCEDURE — 2500000004 HC RX 250 GENERAL PHARMACY W/ HCPCS (ALT 636 FOR OP/ED): Performed by: EMERGENCY MEDICINE

## 2024-04-10 PROCEDURE — 2500000001 HC RX 250 WO HCPCS SELF ADMINISTERED DRUGS (ALT 637 FOR MEDICARE OP): Performed by: EMERGENCY MEDICINE

## 2024-04-10 PROCEDURE — 96374 THER/PROPH/DIAG INJ IV PUSH: CPT

## 2024-04-10 PROCEDURE — 3074F SYST BP LT 130 MM HG: CPT | Performed by: STUDENT IN AN ORGANIZED HEALTH CARE EDUCATION/TRAINING PROGRAM

## 2024-04-10 PROCEDURE — 71045 X-RAY EXAM CHEST 1 VIEW: CPT | Performed by: RADIOLOGY

## 2024-04-10 PROCEDURE — 1160F RVW MEDS BY RX/DR IN RCRD: CPT | Performed by: STUDENT IN AN ORGANIZED HEALTH CARE EDUCATION/TRAINING PROGRAM

## 2024-04-10 PROCEDURE — 86147 CARDIOLIPIN ANTIBODY EA IG: CPT

## 2024-04-10 PROCEDURE — 36415 COLL VENOUS BLD VENIPUNCTURE: CPT | Performed by: EMERGENCY MEDICINE

## 2024-04-10 PROCEDURE — 70498 CT ANGIOGRAPHY NECK: CPT

## 2024-04-10 PROCEDURE — 70450 CT HEAD/BRAIN W/O DYE: CPT

## 2024-04-10 PROCEDURE — 3078F DIAST BP <80 MM HG: CPT | Performed by: STUDENT IN AN ORGANIZED HEALTH CARE EDUCATION/TRAINING PROGRAM

## 2024-04-10 PROCEDURE — 85730 THROMBOPLASTIN TIME PARTIAL: CPT | Performed by: EMERGENCY MEDICINE

## 2024-04-10 PROCEDURE — 85610 PROTHROMBIN TIME: CPT | Performed by: EMERGENCY MEDICINE

## 2024-04-10 PROCEDURE — 96375 TX/PRO/DX INJ NEW DRUG ADDON: CPT

## 2024-04-10 PROCEDURE — 84484 ASSAY OF TROPONIN QUANT: CPT | Performed by: EMERGENCY MEDICINE

## 2024-04-10 PROCEDURE — 99215 OFFICE O/P EST HI 40 MIN: CPT | Performed by: STUDENT IN AN ORGANIZED HEALTH CARE EDUCATION/TRAINING PROGRAM

## 2024-04-10 PROCEDURE — 81003 URINALYSIS AUTO W/O SCOPE: CPT | Performed by: EMERGENCY MEDICINE

## 2024-04-10 PROCEDURE — 93005 ELECTROCARDIOGRAM TRACING: CPT

## 2024-04-10 PROCEDURE — 2550000001 HC RX 255 CONTRASTS: Performed by: EMERGENCY MEDICINE

## 2024-04-10 PROCEDURE — 82947 ASSAY GLUCOSE BLOOD QUANT: CPT

## 2024-04-10 PROCEDURE — 1123F ACP DISCUSS/DSCN MKR DOCD: CPT | Performed by: STUDENT IN AN ORGANIZED HEALTH CARE EDUCATION/TRAINING PROGRAM

## 2024-04-10 PROCEDURE — 1036F TOBACCO NON-USER: CPT | Performed by: STUDENT IN AN ORGANIZED HEALTH CARE EDUCATION/TRAINING PROGRAM

## 2024-04-10 PROCEDURE — 1159F MED LIST DOCD IN RCRD: CPT | Performed by: STUDENT IN AN ORGANIZED HEALTH CARE EDUCATION/TRAINING PROGRAM

## 2024-04-10 PROCEDURE — G0378 HOSPITAL OBSERVATION PER HR: HCPCS

## 2024-04-10 PROCEDURE — 71045 X-RAY EXAM CHEST 1 VIEW: CPT

## 2024-04-10 PROCEDURE — 85025 COMPLETE CBC W/AUTO DIFF WBC: CPT | Performed by: EMERGENCY MEDICINE

## 2024-04-10 PROCEDURE — 82565 ASSAY OF CREATININE: CPT | Performed by: EMERGENCY MEDICINE

## 2024-04-10 PROCEDURE — 99291 CRITICAL CARE FIRST HOUR: CPT | Performed by: EMERGENCY MEDICINE

## 2024-04-10 PROCEDURE — 86146 BETA-2 GLYCOPROTEIN ANTIBODY: CPT

## 2024-04-10 PROCEDURE — 85613 RUSSELL VIPER VENOM DILUTED: CPT

## 2024-04-10 RX ORDER — NAPROXEN SODIUM 220 MG/1
324 TABLET, FILM COATED ORAL ONCE
Status: COMPLETED | OUTPATIENT
Start: 2024-04-10 | End: 2024-04-10

## 2024-04-10 RX ORDER — ATORVASTATIN CALCIUM 80 MG/1
80 TABLET, FILM COATED ORAL NIGHTLY
Status: DISCONTINUED | OUTPATIENT
Start: 2024-04-10 | End: 2024-04-12 | Stop reason: HOSPADM

## 2024-04-10 RX ORDER — POLYETHYLENE GLYCOL 3350 17 G/17G
17 POWDER, FOR SOLUTION ORAL DAILY
Status: DISCONTINUED | OUTPATIENT
Start: 2024-04-11 | End: 2024-04-12 | Stop reason: HOSPADM

## 2024-04-10 RX ORDER — LEVETIRACETAM 10 MG/ML
1000 INJECTION INTRAVASCULAR ONCE
Status: COMPLETED | OUTPATIENT
Start: 2024-04-10 | End: 2024-04-10

## 2024-04-10 RX ORDER — LABETALOL HYDROCHLORIDE 5 MG/ML
10 INJECTION, SOLUTION INTRAVENOUS EVERY 10 MIN PRN
Status: DISCONTINUED | OUTPATIENT
Start: 2024-04-10 | End: 2024-04-12 | Stop reason: HOSPADM

## 2024-04-10 RX ORDER — HYDRALAZINE HYDROCHLORIDE 20 MG/ML
10 INJECTION INTRAMUSCULAR; INTRAVENOUS
Status: DISCONTINUED | OUTPATIENT
Start: 2024-04-10 | End: 2024-04-12 | Stop reason: HOSPADM

## 2024-04-10 RX ORDER — NAPROXEN SODIUM 220 MG/1
81 TABLET, FILM COATED ORAL DAILY
Status: DISCONTINUED | OUTPATIENT
Start: 2024-04-11 | End: 2024-04-12 | Stop reason: HOSPADM

## 2024-04-10 RX ORDER — LORAZEPAM 2 MG/ML
0.5 INJECTION INTRAMUSCULAR ONCE
Status: COMPLETED | OUTPATIENT
Start: 2024-04-10 | End: 2024-04-10

## 2024-04-10 RX ORDER — HYDRALAZINE HYDROCHLORIDE 25 MG/1
25 TABLET, FILM COATED ORAL EVERY 6 HOURS PRN
Status: DISCONTINUED | OUTPATIENT
Start: 2024-04-12 | End: 2024-04-12 | Stop reason: HOSPADM

## 2024-04-10 RX ADMIN — LORAZEPAM 0.5 MG: 2 INJECTION INTRAMUSCULAR; INTRAVENOUS at 18:03

## 2024-04-10 RX ADMIN — SODIUM CHLORIDE 500 ML: 9 INJECTION, SOLUTION INTRAVENOUS at 17:55

## 2024-04-10 RX ADMIN — IOHEXOL 75 ML: 350 INJECTION, SOLUTION INTRAVENOUS at 17:36

## 2024-04-10 RX ADMIN — ASPIRIN 81 MG CHEWABLE TABLET 324 MG: 81 TABLET CHEWABLE at 17:55

## 2024-04-10 RX ADMIN — LEVETIRACETAM 1000 MG: 10 INJECTION INTRAVENOUS at 18:01

## 2024-04-10 RX ADMIN — LORAZEPAM 0.5 MG: 2 INJECTION INTRAMUSCULAR; INTRAVENOUS at 17:55

## 2024-04-10 ASSESSMENT — ACTIVITIES OF DAILY LIVING (ADL)
HEARING - LEFT EAR: FUNCTIONAL
FEEDING YOURSELF: INDEPENDENT
GROOMING: INDEPENDENT
HEARING - RIGHT EAR: FUNCTIONAL
TOILETING: INDEPENDENT
BATHING: INDEPENDENT
LACK_OF_TRANSPORTATION: NO
PATIENT'S MEMORY ADEQUATE TO SAFELY COMPLETE DAILY ACTIVITIES?: YES
ADEQUATE_TO_COMPLETE_ADL: YES
WALKS IN HOME: INDEPENDENT
JUDGMENT_ADEQUATE_SAFELY_COMPLETE_DAILY_ACTIVITIES: YES
DRESSING YOURSELF: INDEPENDENT
ASSISTIVE_DEVICE: EYEGLASSES

## 2024-04-10 ASSESSMENT — COGNITIVE AND FUNCTIONAL STATUS - GENERAL
PATIENT BASELINE BEDBOUND: NO
MOBILITY SCORE: 24
DAILY ACTIVITIY SCORE: 24

## 2024-04-10 ASSESSMENT — COLUMBIA-SUICIDE SEVERITY RATING SCALE - C-SSRS
1. IN THE PAST MONTH, HAVE YOU WISHED YOU WERE DEAD OR WISHED YOU COULD GO TO SLEEP AND NOT WAKE UP?: NO
6. HAVE YOU EVER DONE ANYTHING, STARTED TO DO ANYTHING, OR PREPARED TO DO ANYTHING TO END YOUR LIFE?: NO
2. HAVE YOU ACTUALLY HAD ANY THOUGHTS OF KILLING YOURSELF?: NO

## 2024-04-10 ASSESSMENT — LIFESTYLE VARIABLES
HOW OFTEN DO YOU HAVE SIX OR MORE DRINKS ON ONE OCCASION: NEVER
AUDIT-C TOTAL SCORE: 0
TOTAL SCORE: 0
HOW MANY STANDARD DRINKS CONTAINING ALCOHOL DO YOU HAVE ON A TYPICAL DAY: PATIENT DOES NOT DRINK
EVER HAD A DRINK FIRST THING IN THE MORNING TO STEADY YOUR NERVES TO GET RID OF A HANGOVER: NO
SKIP TO QUESTIONS 9-10: 1
HAVE YOU EVER FELT YOU SHOULD CUT DOWN ON YOUR DRINKING: NO
HOW OFTEN DO YOU HAVE A DRINK CONTAINING ALCOHOL: NEVER
HAVE PEOPLE ANNOYED YOU BY CRITICIZING YOUR DRINKING: NO
EVER FELT BAD OR GUILTY ABOUT YOUR DRINKING: NO

## 2024-04-10 ASSESSMENT — PAIN SCALES - GENERAL
PAINLEVEL_OUTOF10: 0 - NO PAIN
PAINLEVEL_OUTOF10: 4

## 2024-04-10 ASSESSMENT — PATIENT HEALTH QUESTIONNAIRE - PHQ9
1. LITTLE INTEREST OR PLEASURE IN DOING THINGS: NOT AT ALL
2. FEELING DOWN, DEPRESSED OR HOPELESS: NOT AT ALL
SUM OF ALL RESPONSES TO PHQ9 QUESTIONS 1 & 2: 0

## 2024-04-10 ASSESSMENT — PAIN - FUNCTIONAL ASSESSMENT: PAIN_FUNCTIONAL_ASSESSMENT: 0-10

## 2024-04-10 NOTE — TELEPHONE ENCOUNTER
Patient spouse Toni called in stating that Fadia is supposed to have an MRI. He would like to know what type of MRI she is having and what it is for    [phone]

## 2024-04-10 NOTE — ED PROVIDER NOTES
68-year-old female presents emergency department via private vehicle accompanied by her daughter with report of strokelike symptoms.  Patient is activated as a stroke alert in triage.  Patient is aphasic and reporting right-sided weakness and shaking.  Patient's daughter states that she has history of previous stroke affecting her right side, but has been doing well.  She states that she saw her mother around 4 PM after getting home and she was at her baseline, but while at the store today she began stating she did not feel well and they noticed shaking and weakness of her right side worse than usual.  In addition, daughter states that the patient's speech was worse than usual as well.  History is limited due to the patient's aphasia.  Patient's  arrived shortly after her and aided in providing further history.  He states he last saw her her regular self around 3 PM.  He reports she has history of stroke on the left side of her brain with hemorrhage.  He states that she has had surgery for this and has been doing really well.  No reported fevers, coughing, or congestion.  No reported chest or abdominal pain.  No nausea or vomiting, dysuria, diarrhea, constipation, black or bloody stools. Chart review shows significant past medical history of hypothyroidism, thyroid nodules, obstructive sleep apnea, aphasia, breast cancer, CVA, cerebral edema and nontraumatic hemorrhage status post hemicraniotomy, right-sided weakness, hyperlipidemia, hypertension, GERD, and apraxia.  No history of tobacco use, illicit drug use, or regular alcohol use.  Patient is not currently on any anticoagulants.  No reported head injury or trauma.      History provided by:  Patient, relative and spouse   used: No           ------------------------------------------------------------------------------------------------------------------------------------------    VS: As documented in the triage note and EMR flowsheet  from this visit were reviewed.    Review of Systems  Unable to obtain thorough review of systems due to aphasia    [facility]  Nursing notes reviewed and confirmed by me.  Chart review performed including medications, allergies, and medical, surgical, and family history  Visit Vitals  /59   Pulse 54   Temp 36.4 °C (97.5 °F) (Temporal)   Resp 16     Physical Exam  Vitals and nursing note reviewed.   Constitutional:       General: She is not in acute distress.     Appearance: Normal appearance. She is not ill-appearing.   HENT:      Head: Normocephalic and atraumatic.      Left Ear: External ear normal.      Nose: Nose normal. No congestion or rhinorrhea.      Mouth/Throat:      Mouth: Mucous membranes are moist.      Pharynx: No oropharyngeal exudate or posterior oropharyngeal erythema.   Eyes:      Extraocular Movements: Extraocular movements intact.      Conjunctiva/sclera: Conjunctivae normal.      Pupils: Pupils are equal, round, and reactive to light.   Cardiovascular:      Rate and Rhythm: Regular rhythm. Tachycardia present.      Pulses: Normal pulses.      Heart sounds: Normal heart sounds.   Pulmonary:      Effort: Pulmonary effort is normal. No respiratory distress.      Breath sounds: Normal breath sounds. No stridor. No wheezing, rhonchi or rales.   Abdominal:      General: There is no distension.      Palpations: Abdomen is soft.      Tenderness: There is no abdominal tenderness. There is no guarding or rebound.   Musculoskeletal:         General: No swelling or deformity.      Cervical back: Normal range of motion and neck supple. No rigidity.      Right lower leg: No edema.      Left lower leg: No edema.      Comments: No calf tenderness    Skin:     General: Skin is warm and dry.      Capillary Refill: Capillary refill takes less than 2 seconds.      Coloration: Skin is not jaundiced.      Findings: No rash.   Neurological:      Mental Status: She is alert.      Comments: Patient is appreciated to  have aphasia which she has at baseline, but per family this is worse than usual.  She has drift in the right leg and only effort against gravity in the right arm.  Patient is also appreciated to have rhythmic tremoring of the right arm.  She has decreased sensation on the right side along with some neglect.  Please see NIH stroke scale.  Patient is alert and interactive.  She is able to follow some commands but not all.   Psychiatric:         Mood and Affect: Mood normal.         Behavior: Behavior normal.        Past Medical History:   Diagnosis Date    Breast cancer (CMS/HCC)     bilateral    HTN (hypertension)     Hypothyroidism     DAIJA (obstructive sleep apnea)     Other chest pain 02/10/2021    Chest tightness    Other specified health status 12/31/2018    No pertinent past medical history    Peripheral vision loss, right     Personal history of irradiation     Personal history of malignant neoplasm of breast 06/27/2022    History of malignant neoplasm of breast    Stroke (CMS/HCC)       Past Surgical History:   Procedure Laterality Date    MR HEAD ANGIO W AND WO IV CONTRAST  10/25/2023    MR HEAD ANGIO W AND WO IV CONTRAST 10/25/2023    MR HEAD ANGIO WO IV CONTRAST  1/5/2024    MR HEAD ANGIO WO IV CONTRAST 1/5/2024 [facility] [id number] MRI    OTHER SURGICAL HISTORY  11/22/2021    Tonsillectomy      Social History     Socioeconomic History    Marital status:      Spouse name: Not on file    Number of children: Not on file    Years of education: Not on file    Highest education level: Not on file   Occupational History    Not on file   Tobacco Use    Smoking status: Never    Smokeless tobacco: Never   Vaping Use    Vaping status: Never Used   Substance and Sexual Activity    Alcohol use: Not Currently    Drug use: Never    Sexual activity: Defer   Other Topics Concern    Not on file   Social History Narrative    Not on file     Social Determinants of Health     Financial Resource Strain: Not on file   Food  Insecurity: Not on file   Transportation Needs: Not on file   Physical Activity: Not on file   Stress: Not on file   Social Connections: Not on file   Intimate Partner Violence: Not on file   Housing Stability: Not on file      ------------------------------------------------------------------------------------------------------------------------------------------  XR chest 1 view   Final Result   No acute cardiopulmonary disease.        Signed by: Riki Whitten 4/10/2024 6:32 PM   Dictation workstation:   [id number]      CT brain attack angio head and neck W and WO IV contrast   Final Result   1. No evidence for hemodynamically significant stenosis of the   cervical vessels.        2. No evidence for hemodynamically stenosis or large branch vessel   occlusion of the intracranial vessels.        MACRO:   None        Signed by: Riki Whitten 4/10/2024 6:03 PM   Dictation workstation:   [id number]      CT brain attack head wo IV contrast   Final Result   No CT evidence for acute intracranial pathology. Stable old infarct   and chronic changes with no significant interval change from   03/11/2024             Findings discussed with Doug Eugene of the emergency room at 5:42   p.m. on 04/10/2024.        Signed by: Riki Whitten 4/10/2024 5:49 PM   Dictation workstation:   [id number]         Labs Reviewed   COMPREHENSIVE METABOLIC PANEL - Abnormal       Result Value    Glucose 111 (*)     Sodium 140      Potassium 3.7      Chloride 105      Bicarbonate 27      Anion Gap 12      Urea Nitrogen 22      Creatinine 0.77      eGFR 84      Calcium 9.3      Albumin 4.4      Alkaline Phosphatase 83      Total Protein 7.5      AST 18      Bilirubin, Total 0.7      ALT 17     POCT GLUCOSE - Abnormal    POCT Glucose 109 (*)    TROPONIN I, HIGH SENSITIVITY - Normal    Troponin I, High Sensitivity 5      Narrative:     Less than 99th percentile of normal range cutoff-  Female and children under 18 years old <14 ng/L;  Male <21 ng/L: Negative  Repeat testing should be performed if clinically indicated.     Female and children under 18 years old 14-50 ng/L; Male 21-50 ng/L:  Consistent with possible cardiac damage and possible increased clinical   risk. Serial measurements may help to assess extent of myocardial damage.     >50 ng/L: Consistent with cardiac damage, increased clinical risk and  myocardial infarction. Serial measurements may help assess extent of   myocardial damage.      NOTE: Children less than 1 year old may have higher baseline troponin   levels and results should be interpreted in conjunction with the overall   clinical context.     NOTE: Troponin I testing is performed using a different   testing methodology at [facility] than at other   [facility]. Direct result comparisons should only   be made within the same method.   PROTIME-INR - Normal    Protime 11.5      INR 1.0     APTT - Normal    aPTT 32      Narrative:     The APTT is no longer used for monitoring Unfractionated Heparin Therapy. For monitoring Heparin Therapy, use the Heparin Assay.   CBC WITH AUTO DIFFERENTIAL    WBC 6.0      nRBC 0.0      RBC 4.46      Hemoglobin 13.4      Hematocrit 40.1      MCV 90      MCH 30.0      MCHC 33.4      RDW 12.4      Platelets 189      Neutrophils % 42.6      Immature Granulocytes %, Automated 0.2      Lymphocytes % 39.0      Monocytes % 14.1      Eosinophils % 3.4      Basophils % 0.7      Neutrophils Absolute 2.54      Immature Granulocytes Absolute, Automated 0.01      Lymphocytes Absolute 2.32      Monocytes Absolute 0.84      Eosinophils Absolute 0.20      Basophils Absolute 0.04     GRAY TOP    Extra Tube Hold for add-ons.     URINALYSIS WITH REFLEX CULTURE AND MICROSCOPIC    Narrative:     The following orders were created for panel order Urinalysis with Reflex Culture and Microscopic.  Procedure                               Abnormality         Status                     ---------                                -----------         ------                     Urinalysis with Reflex C...[[id number]]                                                 Extra Urine Gray Tube[[id number]]                                                         Please view results for these tests on the individual orders.   URINALYSIS WITH REFLEX CULTURE AND MICROSCOPIC   EXTRA URINE GRAY TUBE   POCT GLUCOSE METER        Medical Decision Making  EKG interpreted by ED physician: Normal sinus rhythm with artifact rate of 74.  IN, QRS, QTc intervals are within normal limits.  No significant ST elevations or depressions.  No significant Q waves.  Normal axis.  EKG is not changed from previous 2021  Last Known Well Time: 1600  Interval: Baseline  Time: 5:41 PM  Person Administering Scale: Doug Eugene DO     1a  Level of consciousness: 0=alert; keenly responsive  1b. LOC questions:  1=Performs one task correctly  1c. LOC commands: 1=Performs one task correctly  2.  Best Gaze: 0=normal  3. Visual: 0=No visual loss (has baseline visual field deficit from previous stroke)  4. Facial Palsy: 0=Normal symmetric movement  5a. Motor left arm: 0=No drift, limb holds 90 (or 45) degrees for full 10 seconds  5b.  Motor right arm: 2=Some effort against gravity, limb cannot get to or maintain (if cured) 90 (or 45) degrees, drifts down to bed, but has some effort against gravity  6a. motor left le=No drift, limb holds 90 (or 45) degrees for full 10 seconds  6b  Motor right le=Drift, limb holds 90 (or 45) degrees but drifts down before full 10 seconds: does not hit bed  7. Limb Ataxia: 2=Present in two limbs  8.  Sensory: 1=Mild to moderate sensory loss; patient feels pinprick is less sharp or is dull on the affected side; there is a loss of superficial pain with pinprick but patient is aware She is being touched  9. Best Language:  1=Mild to moderate aphasia; some obvious loss of fluency or facility of comprehension without significant  limitation on ideas expressed or form of expression.  10. Dysarthria: 0=Normal  11. Extinction and Inattention: 1=Visual, tactile, auditory, spatial or personal inattention or extinction to bilateral simultaneous stimulation in one of the sensory modalities    Total:   10        VAN: VAN: Positive    68-year-old female presents emergency department via private vehicle with report of leg symptoms.  Patient was activated as a stroke alert in triage.  There was some delay getting her to CT scan as someone was currently getting a CT when stroke alert was called.  On my exam she is appreciated to have aphasia and right-sided deficits.  She is also appreciated to have rhythmic tremoring of the right arm that is concerning for possible focal seizure.  Patient was taken directly to CAT scan and I did review CT scan myself and did not appreciate obvious hemorrhage.  Patient was Van positive and therefore I ordered CT angio to rule out large vessel occlusion.  I did consult stroke neurology downtown and spoke with Dr. Saucedo who agrees that the patient is not a TNKase recent intracranial.  She also reviewed CT angio did not appreciate she is agreeable with aspirin after bedside swallow given after surgery is back.   at bedside also states that patient's neurologist who she saw earlier today was recommending she be started on baby aspirin.  Therefore aspirin given aspirin after bedside swallow.  We also treated patient with 2 doses of Ativan and loaded her with Keppra as I was concerned for focal seizures.  Stroke neurology did review that this could be a possible cause of her symptoms today.  Lab workup obtained is overall unremarkable EKG did not show acute ACS.  CBC did not show findings of sepsis or significant anemia.  CMP did not show significant metabolic abnormalities.  Point-of-care glucose on patient's arrival was within normal range.  Head CT did not show any acute intracranial process but showed stable  old infarct and chronic changes from previous surgery.  CT angio did not appreciate large vessel occlusion.  Chest x-ray showed no acute cardiopulmonary process such as pneumonia, pleural effusion, or pulmonary edema.  I discussed findings and recommendations with patient and family.  Advised them how she is not a candidate for TNK or clot retrieval procedure.  We also discussed possibility of seizure.  I recommended admission for further evaluation and treatment.  Patient is agreeable with this plan.  Case is discussed with hospitalist on-call.  Patient's initial blood pressures were significantly elevated, but this improved without intervention here in the ED.  After treatment with Ativan and Keppra the tremoring in the patient's right upper extremity was appreciated to stop and she did have improved function of her right side.  I also appreciated the patient's aphasia to be improved.               Diagnoses as of 04/10/24 2008   Stroke-like symptoms   Seizure-like activity (CMS/HCC)      1. Seizure-like activity (CMS/HCC)        2. Stroke-like symptoms           Critical Care    Performed by: Doug Eugene DO  Authorized by: Doug Eugene DO    Critical care provider statement:     Critical care time (minutes):  32    Critical care time was exclusive of:  Separately billable procedures and treating other patients    Critical care was necessary to treat or prevent imminent or life-threatening deterioration of the following conditions:  CNS failure or compromise (stroke and seizure like activity)    Critical care was time spent personally by me on the following activities:  Development of treatment plan with patient or surrogate, discussions with consultants, evaluation of patient's response to treatment, examination of patient, re-evaluation of patient's condition, pulse oximetry, ordering and review of radiographic studies, ordering and review of laboratory studies and review of old charts    Care discussed  with: admitting provider         This note was dictated using dragon software and may contain errors related to dictation interpretation errors.      Doug Eugene,   04/10/24 2008

## 2024-04-10 NOTE — PROGRESS NOTES
Subjective     Fadia Bolden is a 68 y.o. year old female for follow-up of stroke.     She was last seen 1/31/2024. She has had a cranioplasty 2/19/2024. She feels she is improving but feels slower with communicating. She notices some memory deficits. She is working with speech therapy and OT. She has no motor deficits, complete PT.     She completed cardiac monitoring x14 days, was normal with no afib.     She has not restarted the aspirin 81mg yet. Her neurosurgeon stated she could resume now that she has the cranioplasty.     Patient Active Problem List   Diagnosis    Acquired hypothyroidism    Esophageal stricture    History of breast cancer    Nontoxic multinodular goiter    DAIJA on CPAP    Vitamin D deficiency    Protein-calorie malnutrition, unspecified severity (CMS/HCC)    Decreased mobility and endurance    Balance problem    Weakness generalized    Closed fracture of proximal end of left humerus with routine healing    Aphasia as late effect of cerebrovascular accident (CVA)    Malignant neoplasm of upper-outer quadrant of breast in female, estrogen receptor positive, unspecified laterality (CMS/HCC)    Cerebrovascular accident (CVA) (CMS/HCC)    Aphasia    Anisocoria    Cerebral edema (CMS/HCC)    Impaired gait and mobility    Nontraumatic hemorrhage of left cerebral hemisphere (CMS/HCC)    Right sided weakness    Mixed hyperlipidemia    Primary hypertension    Coordination abnormal    SDH (subdural hematoma) (CMS/HCC)    Acquired skull defect    Gastroesophageal reflux disease    Vision loss    Apraxia following cerebrovascular accident    Anxiety    Hemianopsia       Objective   Neurological Exam  Mental Status  Awake, alert and oriented to person, place and time. Speech is normal. Able to name objects and repeat. Follows one-step commands.  Difficulty with R-L , can do 1  step commands  Trouble identifying her ear   With prompting was able to figure out a cross body 2 step command .    Cranial  Nerves  CN II: Right homonymous hemianopsia. Left normal visual field.  CN III, IV, VI: Extraocular movements intact bilaterally.  CN V: Facial sensation is normal.  CN VII: Full and symmetric facial movement.  CN VIII: Hearing is normal.  CN IX, X: Palate elevates symmetrically  CN XI: Shoulder shrug strength is normal.  CN XII: Tongue midline without atrophy or fasciculations.    Motor   Strength is 5/5 throughout all four extremities.    Sensory  Light touch is normal in upper and lower extremities.     Coordination  Right: Finger-to-nose normal.Left: Finger-to-nose normal.    Physical Exam  Eyes:      Extraocular Movements: Extraocular movements intact.   Neurological:      Motor: Motor strength is normal.  Psychiatric:         Speech: Speech normal.     Assessment/Plan   Diagnoses and all orders for this visit:  Cerebrovascular accident (CVA) due to other mechanism (CMS/HCC)  Nontraumatic cortical hemorrhage of left cerebral hemisphere (CMS/HCC)    History of intracerebral hemorrhage, L parietoccipital lobe: etiology concerning for ischemic stroke with hemorrhagic transformation as there was a wedge shaped embolic appearing infarcts on the initial MRI. However, she also had one microhemorrhage in the R occipital lobe making amyloid angiopathy remain on the differential, as well as hemorrhagic mets given her history of breast cancer. Will repeat MRI brain with and without contrast now that it has been 6 months (there was residual hematoma on the 3 month MRI). If again consistent with ischemic stroke with hemorrhagic transformation, will start aspirin 81mg daily. Will also check arterial hypercoagulable testing as her embolic workup has been unremarkable so far.     DAIJA: Unable to tolerate CPAP machine even before her stroke due to the pressure from the strap around her head. Referral to sleep medicine     Follow-up in July as previously scheduled

## 2024-04-11 ENCOUNTER — APPOINTMENT (OUTPATIENT)
Dept: NEUROLOGY | Facility: HOSPITAL | Age: 69
DRG: 101 | End: 2024-04-11
Payer: COMMERCIAL

## 2024-04-11 ENCOUNTER — APPOINTMENT (OUTPATIENT)
Dept: RADIOLOGY | Facility: HOSPITAL | Age: 69
DRG: 101 | End: 2024-04-11
Payer: COMMERCIAL

## 2024-04-11 PROBLEM — R56.9 SEIZURE-LIKE ACTIVITY (MULTI): Status: ACTIVE | Noted: 2024-04-11

## 2024-04-11 LAB
ANION GAP SERPL CALC-SCNC: 11 MMOL/L (ref 10–20)
BASOPHILS # BLD AUTO: 0.04 X10*3/UL (ref 0–0.1)
BASOPHILS NFR BLD AUTO: 0.8 %
BUN SERPL-MCNC: 19 MG/DL (ref 6–23)
CALCIUM SERPL-MCNC: 8.8 MG/DL (ref 8.6–10.3)
CHLORIDE SERPL-SCNC: 108 MMOL/L (ref 98–107)
CO2 SERPL-SCNC: 28 MMOL/L (ref 21–32)
CREAT SERPL-MCNC: 0.7 MG/DL (ref 0.5–1.05)
DRVVT SCREEN TO CONFIRM RATIO: 0.96 RATIO
DRVVT/DRVVT CFM NRMLZD PPP-RTO: 1.12 RATIO
DRVVT/DRVVT CFM P DOAC NEUT NORM PPP-RTO: 0.86 RATIO
EGFRCR SERPLBLD CKD-EPI 2021: >90 ML/MIN/1.73M*2
EOSINOPHIL # BLD AUTO: 0.16 X10*3/UL (ref 0–0.7)
EOSINOPHIL NFR BLD AUTO: 3.2 %
ERYTHROCYTE [DISTWIDTH] IN BLOOD BY AUTOMATED COUNT: 12.5 % (ref 11.5–14.5)
GLUCOSE BLD MANUAL STRIP-MCNC: 89 MG/DL (ref 74–99)
GLUCOSE BLD MANUAL STRIP-MCNC: 93 MG/DL (ref 74–99)
GLUCOSE SERPL-MCNC: 82 MG/DL (ref 74–99)
HCT VFR BLD AUTO: 34.5 % (ref 36–46)
HGB BLD-MCNC: 11.4 G/DL (ref 12–16)
HOLD SPECIMEN: NORMAL
HOLD SPECIMEN: NORMAL
IMM GRANULOCYTES # BLD AUTO: 0.01 X10*3/UL (ref 0–0.7)
IMM GRANULOCYTES NFR BLD AUTO: 0.2 % (ref 0–0.9)
LYMPHOCYTES # BLD AUTO: 1.73 X10*3/UL (ref 1.2–4.8)
LYMPHOCYTES NFR BLD AUTO: 34.9 %
MCH RBC QN AUTO: 29.7 PG (ref 26–34)
MCHC RBC AUTO-ENTMCNC: 33 G/DL (ref 32–36)
MCV RBC AUTO: 90 FL (ref 80–100)
MONOCYTES # BLD AUTO: 0.74 X10*3/UL (ref 0.1–1)
MONOCYTES NFR BLD AUTO: 14.9 %
NEUTROPHILS # BLD AUTO: 2.27 X10*3/UL (ref 1.2–7.7)
NEUTROPHILS NFR BLD AUTO: 46 %
NRBC BLD-RTO: 0 /100 WBCS (ref 0–0)
PLATELET # BLD AUTO: 169 X10*3/UL (ref 150–450)
POTASSIUM SERPL-SCNC: 3.9 MMOL/L (ref 3.5–5.3)
RBC # BLD AUTO: 3.84 X10*6/UL (ref 4–5.2)
SODIUM SERPL-SCNC: 143 MMOL/L (ref 136–145)
WBC # BLD AUTO: 5 X10*3/UL (ref 4.4–11.3)

## 2024-04-11 PROCEDURE — A9575 INJ GADOTERATE MEGLUMI 0.1ML: HCPCS | Performed by: STUDENT IN AN ORGANIZED HEALTH CARE EDUCATION/TRAINING PROGRAM

## 2024-04-11 PROCEDURE — 97165 OT EVAL LOW COMPLEX 30 MIN: CPT | Mod: GO

## 2024-04-11 PROCEDURE — 95816 EEG AWAKE AND DROWSY: CPT | Performed by: PSYCHIATRY & NEUROLOGY

## 2024-04-11 PROCEDURE — 2500000001 HC RX 250 WO HCPCS SELF ADMINISTERED DRUGS (ALT 637 FOR MEDICARE OP)

## 2024-04-11 PROCEDURE — 95816 EEG AWAKE AND DROWSY: CPT

## 2024-04-11 PROCEDURE — 97161 PT EVAL LOW COMPLEX 20 MIN: CPT | Mod: GP | Performed by: PHYSICAL THERAPIST

## 2024-04-11 PROCEDURE — 80048 BASIC METABOLIC PNL TOTAL CA: CPT | Performed by: STUDENT IN AN ORGANIZED HEALTH CARE EDUCATION/TRAINING PROGRAM

## 2024-04-11 PROCEDURE — 99232 SBSQ HOSP IP/OBS MODERATE 35: CPT | Performed by: HOSPITALIST

## 2024-04-11 PROCEDURE — 36415 COLL VENOUS BLD VENIPUNCTURE: CPT | Performed by: STUDENT IN AN ORGANIZED HEALTH CARE EDUCATION/TRAINING PROGRAM

## 2024-04-11 PROCEDURE — 82947 ASSAY GLUCOSE BLOOD QUANT: CPT

## 2024-04-11 PROCEDURE — 70553 MRI BRAIN STEM W/O & W/DYE: CPT | Performed by: RADIOLOGY

## 2024-04-11 PROCEDURE — 85025 COMPLETE CBC W/AUTO DIFF WBC: CPT | Performed by: STUDENT IN AN ORGANIZED HEALTH CARE EDUCATION/TRAINING PROGRAM

## 2024-04-11 PROCEDURE — 99223 1ST HOSP IP/OBS HIGH 75: CPT | Performed by: PSYCHIATRY & NEUROLOGY

## 2024-04-11 PROCEDURE — 2500000001 HC RX 250 WO HCPCS SELF ADMINISTERED DRUGS (ALT 637 FOR MEDICARE OP): Performed by: STUDENT IN AN ORGANIZED HEALTH CARE EDUCATION/TRAINING PROGRAM

## 2024-04-11 PROCEDURE — 70553 MRI BRAIN STEM W/O & W/DYE: CPT

## 2024-04-11 PROCEDURE — 2550000001 HC RX 255 CONTRASTS: Performed by: STUDENT IN AN ORGANIZED HEALTH CARE EDUCATION/TRAINING PROGRAM

## 2024-04-11 PROCEDURE — 1200000002 HC GENERAL ROOM WITH TELEMETRY DAILY

## 2024-04-11 RX ORDER — NAPROXEN SODIUM 220 MG/1
81 TABLET, FILM COATED ORAL DAILY
COMMUNITY

## 2024-04-11 RX ORDER — GADOTERATE MEGLUMINE 376.9 MG/ML
0.2 INJECTION INTRAVENOUS
Status: COMPLETED | OUTPATIENT
Start: 2024-04-11 | End: 2024-04-11

## 2024-04-11 RX ORDER — CYANOCOBALAMIN (VITAMIN B-12) 50 MCG
1 TABLET ORAL DAILY
COMMUNITY

## 2024-04-11 RX ORDER — LEVETIRACETAM 500 MG/1
500 TABLET ORAL 2 TIMES DAILY
Status: DISCONTINUED | OUTPATIENT
Start: 2024-04-11 | End: 2024-04-12 | Stop reason: HOSPADM

## 2024-04-11 RX ADMIN — ASPIRIN 81 MG: 81 TABLET, CHEWABLE ORAL at 09:59

## 2024-04-11 RX ADMIN — LEVETIRACETAM 500 MG: 500 TABLET, FILM COATED ORAL at 14:02

## 2024-04-11 RX ADMIN — ATORVASTATIN CALCIUM 80 MG: 80 TABLET, FILM COATED ORAL at 20:18

## 2024-04-11 RX ADMIN — LEVETIRACETAM 500 MG: 500 TABLET, FILM COATED ORAL at 20:18

## 2024-04-11 RX ADMIN — GADOTERATE MEGLUMINE 9 ML: 376.9 INJECTION INTRAVENOUS at 08:47

## 2024-04-11 ASSESSMENT — COGNITIVE AND FUNCTIONAL STATUS - GENERAL
WALKING IN HOSPITAL ROOM: A LOT
HELP NEEDED FOR BATHING: A LITTLE
CLIMB 3 TO 5 STEPS WITH RAILING: A LITTLE
DRESSING REGULAR UPPER BODY CLOTHING: A LITTLE
MOVING TO AND FROM BED TO CHAIR: A LITTLE
DAILY ACTIVITIY SCORE: 22
PERSONAL GROOMING: A LITTLE
MOVING TO AND FROM BED TO CHAIR: A LITTLE
MOBILITY SCORE: 20
MOVING FROM LYING ON BACK TO SITTING ON SIDE OF FLAT BED WITH BEDRAILS: A LITTLE
STANDING UP FROM CHAIR USING ARMS: A LITTLE
TOILETING: A LITTLE
TURNING FROM BACK TO SIDE WHILE IN FLAT BAD: A LITTLE
DAILY ACTIVITIY SCORE: 16
MOBILITY SCORE: 16
STANDING UP FROM CHAIR USING ARMS: A LITTLE
MOBILITY SCORE: 20
EATING MEALS: A LITTLE
WALKING IN HOSPITAL ROOM: A LITTLE
HELP NEEDED FOR BATHING: A LOT
CLIMB 3 TO 5 STEPS WITH RAILING: A LITTLE
DRESSING REGULAR LOWER BODY CLOTHING: A LOT
STANDING UP FROM CHAIR USING ARMS: A LITTLE
CLIMB 3 TO 5 STEPS WITH RAILING: A LOT
DRESSING REGULAR UPPER BODY CLOTHING: A LITTLE
WALKING IN HOSPITAL ROOM: A LITTLE
MOVING TO AND FROM BED TO CHAIR: A LITTLE

## 2024-04-11 ASSESSMENT — PAIN SCALES - GENERAL
PAINLEVEL_OUTOF10: 0 - NO PAIN

## 2024-04-11 ASSESSMENT — PAIN - FUNCTIONAL ASSESSMENT
PAIN_FUNCTIONAL_ASSESSMENT: 0-10
PAIN_FUNCTIONAL_ASSESSMENT: 0-10

## 2024-04-11 ASSESSMENT — ACTIVITIES OF DAILY LIVING (ADL): BATHING_ASSISTANCE: MINIMAL

## 2024-04-11 ASSESSMENT — VISUAL ACUITY: METHOD_CF: 1

## 2024-04-11 NOTE — CONSULTS
"Nutrition Initial Assessment:   Nutrition Assessment    Reason for Assessment: Admission nursing screening    Patient is a 68 y.o. female presenting with right sided weakness. Pt with past medical history of L parietoccipital CVA with hemorrhagic conversion s/p L hemicraniectomy/SDH evacuation (10/2023) and L cranioplasty (2/2024) with residual right-sided weakness and aphasia, hypertension, breast cancer thought to be in remission       Nutrition History:  Energy Intake:  (no meal intakes recorded at this time)  Food and Nutrient History: RDN consult for BMI less then 18.5. Met with pt, family present. Pt with hx aphasia which is improving per chart, family assisting with history. Pt denies recent wt changes, stated  lbs. Pt reports good appetite,  confirms this, states they eat a healthy diet. Daughter reports pt had been on Ensure in the past but not using at home. Pt denies any difficulty chewing or swallowing. Pt also denies N/V/C/D at this time. Family questioning need for cardiac diet restrictions - gave rational for help to reduce further stroke risk - family requesting regular diet - will update order. Will continue to monitor.  Food Allergies/Intolerances:   fish  GI Symptoms: None  Oral Problems: None       Anthropometrics:  Height: 165.1 cm (5' 5\")   Weight: 49.7 kg (109 lb 9.6 oz)   BMI (Calculated): 18.24  IBW/kg (Dietitian Calculated): 56.8 kg  Percent of IBW: 87 %       Weight History:   Wt Readings from Last 10 Encounters:   04/10/24 49.7 kg (109 lb 9.6 oz)   04/10/24 46.2 kg (101 lb 12.8 oz)   03/25/24 45.8 kg (101 lb)   03/25/24 45.8 kg (101 lb)   03/15/24 45.8 kg (101 lb)   03/11/24 46 kg (101 lb 6.6 oz)   02/20/24 46.3 kg (102 lb)   02/08/24 47.2 kg (104 lb)   01/31/24 49.4 kg (109 lb)   01/08/24 47.6 kg (105 lb)      Weight Change %:  Weight History / % Weight Change: Current wt 8 lb above stated UBW of 101 lbs, also has had a 8 lb wt gain in 1 month, no edema noted.  Significant " Weight Loss: No    Nutrition Focused Physical Exam Findings:    Subcutaneous Fat Loss:   Orbital Fat Pads: Well nourished (slightly bulging fat pads)  Buccal Fat Pads: Well nourished (full, rounded cheeks)  Triceps: Well nourished (ample fat tissue)  Muscle Wasting:  Temporalis: Well nourished (well-defined muscle)  Pectoralis (Clavicular Region): Well nourished (clavicle not visible)  Deltoid/Trapezius: Well nourished (rounded appearance at arm, shoulder, neck)  Edema:  Edema: none  Physical Findings:  Skin: Positive (incision left head)    Nutrition Significant Labs:  BMP Trend:   Results from last 7 days   Lab Units 04/11/24  0530 04/10/24  1735   GLUCOSE mg/dL 82 111*   CALCIUM mg/dL 8.8 9.3   SODIUM mmol/L 143 140   POTASSIUM mmol/L 3.9 3.7   CO2 mmol/L 28 27   CHLORIDE mmol/L 108* 105   BUN mg/dL 19 22   CREATININE mg/dL 0.70 0.77    , BG POCT trend:   Results from last 7 days   Lab Units 04/11/24  1049 04/11/24  0611 04/10/24  2331 04/10/24  1723   POCT GLUCOSE mg/dL 93 89 106* 109*    , Renal Lab Trend:   Results from last 7 days   Lab Units 04/11/24  0530 04/10/24  1735   POTASSIUM mmol/L 3.9 3.7   SODIUM mmol/L 143 140   EGFR mL/min/1.73m*2 >90 84   BUN mg/dL 19 22   CREATININE mg/dL 0.70 0.77        Nutrition Specific Medications:  Reviewed     I/O:    ;      Dietary Orders (From admission, onward)       Start     Ordered    04/11/24 0913  Adult diet Cardiac; 70 gm fat; 2 - 3 grams Sodium  Diet effective now        Question Answer Comment   Diet type Cardiac    Fat restriction: 70 gm fat    Sodium restriction: 2 - 3 grams Sodium        04/11/24 0912    04/10/24 2119  May Participate in Room Service  Once        Question:  .  Answer:  Yes    04/10/24 2119                     Estimated Needs:   Total Energy Estimated Needs (kCal): 1500 kCal  Method for Estimating Needs: 30 kcal/kg ABW  Total Protein Estimated Needs (g): 60 g  Method for Estimating Needs: 1.2 g/kg ABW  Total Fluid Estimated Needs (mL): 1500  mL  Method for Estimating Needs: 1 ml/kcal or per MD        Nutrition Diagnosis   Malnutrition Diagnosis  Patient has Malnutrition Diagnosis: No    Nutrition Diagnosis  Patient has Nutrition Diagnosis: Yes  Diagnosis Status (1): New  Nutrition Diagnosis 1: Underweight  Related to (1): L parietoccipital CVA with hemorrhagic conversion s/p L hemicraniectomy/SDH evacuation (10/2023) and L cranioplasty (2/2024)  As Evidenced by (1): BMI of 18.24       Nutrition Interventions/Recommendations         Nutrition Prescription:  Individualized Nutrition Prescription Provided for : Change to Regular diet, monitor need for ONS        Nutrition Interventions:   Interventions: Meals and snacks  Meals and Snacks: General healthful diet  Goal: Consumes 3 meals per day         Nutrition Education:   No needs at this time       Nutrition Monitoring and Evaluation   Food/Nutrient Related History Monitoring  Monitoring and Evaluation Plan: Energy intake, Amount of food, Fluid intake  Energy Intake: Estimated energy intake  Criteria: Pt meets >75% of estimated energy needs  Fluid Intake: Estimated fluid intake  Criteria: Meets >75% of estimated fluid needs  Amount of Food: Estimated amout of food  Criteria: Pt consumes >75% of meals    Body Composition/Growth/Weight History  Monitoring and Evaluation Plan: Weight  Weight: Measured weight  Criteria: Maintains stable weight    Biochemical Data, Medical Tests and Procedures  Monitoring and Evaluation Plan: Glucose/endocrine profile, Electrolyte/renal panel  Electrolyte and Renal Panel: Sodium, Potassium, Phosphorus  Criteria: Electrolytes WNL  Glucose/Endocrine Profile: Glucose, casual  Criteria: BG within desirable range            Time Spent/Follow-up Reminder:   Time Spent (min): 30 minutes  Last Date of Nutrition Visit: 04/11/24  Nutrition Follow-Up Needed?: 3-5 days  Follow up Comment: change to Regular diet

## 2024-04-11 NOTE — PROGRESS NOTES
Physical Therapy    Physical Therapy Evaluation    Patient Name: Fadia Bolden  MRN: [id number]  Today's Date: 4/11/2024   Time Calculation  Start Time: 0920  Stop Time: 0944  Time Calculation (min): 24 min    Assessment/Plan   PT Assessment  PT Assessment Results: Decreased strength, Decreased endurance, Decreased mobility, Impaired balance, Decreased safety awareness, Impaired judgement, Impaired vision  Rehab Prognosis: Good  Evaluation/Treatment Tolerance: Patient tolerated treatment well  End of Session Communication: Care Coordinator  Assessment Comment: Patient will benefit from additional PT to increase balance, coordination, safety awareness, safe use of ww  End of Session Patient Position: Up in chair, Alarm on  IP OR SWING BED PT PLAN  Inpatient or Swing Bed: Inpatient  PT Plan  Treatment/Interventions: Bed mobility, Transfer training, Gait training, Stair training, Endurance training, Therapeutic exercise, Therapeutic activity, Balance training, Neuromuscular re-education  PT Plan: Skilled PT  PT Frequency: 4 times per week  PT Discharge Recommendations:  (Patient will benefit from high intensity and high frequency multi rehab facility)  Equipment Recommended upon Discharge: Wheeled walker  PT Recommended Transfer Status: Assist x1  PT - OK to Discharge: (with continued PT in 24/7 facility at high intensity and high frequency)      Subjective   General Visit Information:  General  Reason for Referral: Impaired mobiltiy  Referred By: PT/OT 4/10/24 Massimo  Past Medical History Relevant to Rehab: CPAP, DAIJA, dyslipidemia, HTN, CVA, anxiety, GERD, hypothyroidism, aphasia, L humeral fx, subdural hematoma, breast CA, [place], R hemianopsia, craniotomy 10/23, cranioplasty 2/19/24, anosocoria  Prior to Session Communication: Bedside nurse  Patient Position Received:  (Patient at EOB with student nurse, Wants to go to bathroom.)  General Comment: To ED 4/10 /24 with aphasia, Right  sided weakness, shaking. Receved    ativan and keppra in ED, stopped shaking. CT brain 4/10/24 encephalomalacia, L parietal and occipital lobe, (-) acute  Home Living:  Home Living  Home Living Comments: Patient resides with spouse and daughter in 1 story 2 steps to enter with handrail Walk in shower with seat . States she has equipment at home.  Prior Level of Function:  Prior Function Per Pt/Caregiver Report  Prior Function Comments: Per patient report independent in mobility, ADLs and starting to perfomr more IADLs  without assistive device. Doens not drive. Denies falls. Assistnce for medication management. Attending OPOT and speech. PT had signed off.  Precautions:  Precautions  Hearing/Visual Limitations: R hemianopsia  Medical Precautions: Fall precautions       Objective   Pain:  Pain Assessment  Pain Score: 0 - No pain  Cognition:  Cognition  Overall Cognitive Status:  (Note decreased processing,)  Orientation Level: Oriented X4  Following Commands:  (follows one step directions with 50% accuracy. Requires repetition of instruction)  Safety Judgment:  (Decreased awareness of need for assistance)  Attention:  (Distractible)  Insight: Moderate  Impulsive: Moderately    General Assessments:  General Observation  General Observation: Patient getting up with student nurse to use bathroom. Agreable to PT/OT       Activity Tolerance  Endurance:  (Fair)    Sensation  Light Touch: No apparent deficits    Coordination  Finger to Nose: Bradykinesia (Dysmetria)  Alternating Toe Taps: Intact  Heel to Delcid: Intact  Finger to Target: Bradykinesia  Coordination Comment: Pt demos decrease speed with coordination tasks, dysmetria in R UE noted    Static Sitting Balance  Static Sitting-Comment/Number of Minutes: Good  Dynamic Sitting Balance  Dynamic Sitting-Comments: Fair    Static Standing Balance  Static Standing-Comment/Number of Minutes: Fair  Dynamic Standing Balance  Dynamic Standing-Comments: Poor  Functional Assessments:  Bed Mobility  Bed Mobility:   "(Supine > sit with HOB 30 degrees CGA)    Transfers  Transfer:  (Sit > stand at bed/chair level CGA, vc for hand position with poor carrythrough. Stand > sit with vc to reach back with fair carrythrough)    Ambulation/Gait Training  Ambulation/Gait Training Performed:  (Attempted to ambulate without assistive device 10' noted severe lateral veer with scissoring gait. Poor awareness of incoordination and unsteadiness. Provided ww for support and noted some improvement but continues to benefit from physical assist and constant cueing. Patient demonstrates limited carry through.  Extremity/Trunk Assessments:  RUE   RUE : Within Functional Limits  LUE   LUE: Within Functional Limits  RLE   RLE :  (AROM Hip/knee/ankle WFL MMT 4/5 grossly)  LLE   LLE :  (AROM Hip/knee/ankle WFL MMT 4/5 grossly)  Outcome Measures:  [facility] Basic Mobility  Turning from your back to your side while in a flat bed without using bedrails: A little  Moving from lying on your back to sitting on the side of a flat bed without using bedrails: A little  Moving to and from bed to chair (including a wheelchair): A little  Standing up from a chair using your arms (e.g. wheelchair or bedside chair): A little  To walk in hospital room: A lot  Climbing 3-5 steps with railing: A lot  Basic Mobility - Total Score: 16    Encounter Problems       Encounter Problems (Active)       PT Problem       Transfers sit <> stand with ww supervised (Progressing)       Start:  04/11/24    Expected End:  04/25/24            Patient to ambulate with ww 50' supervised without lateral veer or scissor ambulation  (Progressing)       Start:  04/11/24    Expected End:  04/25/24            Patient to demonstrate reach > 2 \" out of MARY without UE support supervised  (Not Progressing)       Start:  04/11/24    Expected End:  04/25/24                   Education Documentation  Mobility Training, taught by Fadia Lopez, PT at 4/11/2024  1:49 PM.  Learner: Patient  Readiness: " Acceptance  Method: Demonstration, Explanation  Response: Needs Reinforcement

## 2024-04-11 NOTE — CONSULTS
"Inpatient consult to Neurology  Consult performed by: DESHAWN Santiago-CNP  Consult ordered by: Presley Keys MD          History Of Present Illness  Fadia Bolden is a 68 y.o. female presenting with worsening right sided weakness, aphasia, RUE tremors. History obtained from patient and dtr. 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 At her baseline she does have right sided residual weakness and aphasia. She had seen her Neurologist, Dr. Mann yesterday am and she was feeling ok, except for some trouble communicating and memory deficits. Dr. Mann had ordered MRI w/o (concern for mets s/p breast CA) and some hypercoagulable testing when embolic w/u was unremarkable. After appt. was shopping at [employer] when she \"felt funny\" and noticed tremor starting in right hand that moved up right arm. No LOC, no bowel/bladder incontinence, no tongue biting. She was fully aware. Dtr states the tremor continued in ED. With worsening right sided and aphasia-there was concern for new CVA, although no new symptoms occurred. Either way, she was not a candidate for TNK due to CVA with hemorrhagic conversion and recent neurosurgical procedures, this was discussed with [facility] stroke team. ED Course: NIHSS 10 EKG NST /94 93 18 95% CTH/CTA unremarkable. She was started on aspirin and given Keppra in ED, which resolved tremors and aphasia. MRI brain did not show any new infarct, but redemonstration of large area of encephalomalacia and gliosis primarily involving the left PCA distribution with evolving parenchymal hemorrhage. Aspirin help, EEG and repeat Cth ordered.  Review of systems are negative unless otherwise specified in HPI.   Past Medical History  Past Medical History:   Diagnosis Date    Breast cancer (CMS/HCC)     bilateral    HTN (hypertension)     Hypothyroidism     DAIJA (obstructive sleep apnea)     Other chest pain 02/10/2021    Chest tightness    Other specified health status 12/31/2018    No pertinent " past medical history    Peripheral vision loss, right     Personal history of irradiation     Personal history of malignant neoplasm of breast 06/27/2022    History of malignant neoplasm of breast    Stroke (CMS/HCC)      Surgical History  Past Surgical History:   Procedure Laterality Date    MR HEAD ANGIO W AND WO IV CONTRAST  10/25/2023    MR HEAD ANGIO W AND WO IV CONTRAST 10/25/2023    MR HEAD ANGIO WO IV CONTRAST  1/5/2024    MR HEAD ANGIO WO IV CONTRAST 1/5/2024 [facility] [id number] MRI    OTHER SURGICAL HISTORY  11/22/2021    Tonsillectomy     Social History  Social History     Tobacco Use    Smoking status: Never    Smokeless tobacco: Never   Vaping Use    Vaping status: Never Used   Substance Use Topics    Alcohol use: Not Currently    Drug use: Never     Allergies  Fish derived  Medications Prior to Admission   Medication Sig Dispense Refill Last Dose    ascorbic acid, vitamin C, 1,500 mg ER tablet Take 1 tablet (1,500 mg) by mouth once daily.       atorvastatin (Lipitor) 80 mg tablet Take 1 tablet (80 mg) by mouth once daily. 30 tablet 11     cholecalciferol (Vitamin D-3) 50 MCG (2000 UT) tablet Take 1 tablet (2,000 Units) by mouth once daily.       lisinopril 20 mg tablet Take 1 tablet (20 mg) by mouth once daily. 30 tablet 11        Review of Systems  Neurological Exam  Mental Status  Awake, alert and oriented to person, place and time. Recent and remote memory are intact. Speech is normal. Language is fluent with no aphasia. Attention and concentration are normal.    Cranial Nerves  CN II: Right sided visual deficit at baseline. Left visual acuity: Counts fingers. Right normal visual field. Left normal visual field.  CN III, IV, VI: Extraocular movements intact bilaterally. No nystagmus.   Right pupil: 3 mm. Round. Reactive to light. Reactive to accommodation.   Left pupil: 3 mm. Round. Reactive to light. Reactive to accommodation.  CN V:  Right: Facial sensation is normal.  Left: Facial sensation is normal  "on the left.  CN VII:  Right: There is no facial weakness.  Left: There is no facial weakness.  CN VIII:  Right: Hearing is normal.  Left: Hearing is normal.  CN IX, X:  Right: Palate is normal.  Left: Palate is normal.  CN XI:  Right: Trapezius strength is normal.  Left: Trapezius strength is normal.  CN XII: Tongue midline without atrophy or fasciculations.    Motor  Normal muscle bulk throughout. Normal muscle tone. Strength is 5/5 throughout all four extremities.    Sensory  Light touch is normal in upper and lower extremities.     Reflexes  Deep tendon reflexes are 2+ and symmetric in all four extremities.    Coordination  Right: Finger-to-nose normal.    Gait  Casual gait is normal including stance, stride, and arm swing.    Physical Exam  HENT:      Right Ear: Hearing normal.      Left Ear: Hearing normal.   Eyes:      Extraocular Movements: EOM normal. No nystagmus.   Neurological:      Motor: Motor strength is normal.     Deep Tendon Reflexes: Reflexes are normal and symmetric.   Psychiatric:         Speech: Speech normal.         Last Recorded Vitals  Blood pressure 169/79, pulse 72, temperature 36.9 °C (98.4 °F), temperature source Temporal, resp. rate 17, height 1.651 m (5' 5\"), weight 49.7 kg (109 lb 9.6 oz), SpO2 96%.    Relevant Results        NIH Stroke Scale  1A. Level of Consciousness: Alert, Keenly Responsive  1B. Ask Month and Age: No Questions Right  1C. Blink Eyes & Squeeze Hands: Performs Both Tasks  2. Best Gaze: Normal  3. Visual: No Visual Loss  4. Facial Palsy: Normal Symmetrical Movements  5A. Motor - Left Arm: No Drift  5B. Motor - Right Arm: No Drift  6A. Motor - Left Leg: No Drift  6B. Motor - Right Leg: No Drift  7. Limb Ataxia: Absent  8. Sensory Loss: Normal  9. Best Language: Mild-to-Moderate Aphasia  10. Dysarthria: Normal  11. Extinction and Inattention: No Abnormality  NIH Stroke Scale: 3           Jackie Coma Scale  Best Eye Response: Spontaneous  Best Verbal Response: " Confused  Best Motor Response: Follows commands  Jackie Coma Scale Score: 14                 I have personally reviewed the following imaging results MR brain w and wo IV contrast    Result Date: 4/11/2024  Interpreted By:  Diana Rubio, STUDY: MR BRAIN W AND WO IV CONTRAST;  4/11/2024 9:16 am   INDICATION: Signs/Symptoms:right sided weakness, prior CVA w/ hemorrhagic conversion s/p craniotomy.   COMPARISON: CT April 10, 2024 and MRI January 5, 2024   ACCESSION NUMBER(S): [id number]   ORDERING CLINICIAN: BECKY LAZO   TECHNIQUE: Axial T2, FLAIR, DWI, gradient echo T2 and  T1 weighted images of brain were acquired. Post contrast T1 weighted images were acquired after administration of 9 mL Dotarem gadolinium based intravenous contrast.   FINDINGS: There are again postoperative changes from left-sided craniectomy. Compared with prior MRI there has been interval cranioplasty. There is nonspecific dural thickening and enhancement deep to the cranioplasty and overlying the left cerebral hemisphere which is likely postoperative in nature, at least in part. Additionally, there is an extra-axial collection deep to the cranioplasty that is somewhat mixed in signal measuring approximately 4-5 mm in thickness. There is little to no associated mass effect.   There is a focus of encephalomalacia and gliosis in the left parietal, occipital, and posterior temporal lobes. There is extensive susceptibility artifact within and surrounding the area of encephalomalacia on gradient echo T2 weighted imaging which may be due to blood products and/or mineralization, relatively similar from the previous MRI. There are nodular and curvilinear areas of increased signal on diffusion weighted imaging overall less pronounced than seen previously and favored to be due largely to artifact. No additional areas of diffusion restriction suggestive of acute ischemic injury are identified. There are gyriform areas of bright signal on  precontrast T1 weighted imaging within the area of encephalomalacia likely related to laminar necrosis. Some degree of subtle superimposed gyriform enhancement is difficult to entirely exclude.   There is ex vacuo dilatation of the occipital horn, atrium, posterior body, and temporal horn of the left lateral ventricle superimposed on more diffuse parenchymal volume loss. There is again prominence of the extra-axial CSF space along the posteromedial aspect of the left occipital lobe, unchanged from the prior study and potentially representing an arachnoid cyst.   There are scattered small hyperintensities on FLAIR and T2 weighted imaging more diffusely in the white matter which could reflect small-vessel ischemic disease in a patient of this age.   There is trace mucosal thickening within scattered paranasal sinuses. The mastoid air cells are clear.       1. Postoperative changes from left lateral craniectomy with cranioplasty. 2. Redemonstration of large area of encephalomalacia and gliosis primarily involving the left PCA distribution with evolving parenchymal hemorrhage. No evidence of new acute ischemic injury.       MACRO: None   Signed by: Diana Rubio 4/11/2024 9:26 AM Dictation workstation:   [id number]    XR chest 1 view    Result Date: 4/10/2024  Interpreted By:  Riki Whitten, STUDY: XR CHEST 1 VIEW; 4/10/2024 6:16 pm   INDICATION: Signs/Symptoms:stroke symptoms.   COMPARISON: None   ACCESSION NUMBER(S): [id number]   ORDERING CLINICIAN: LIANNE OSEI   TECHNIQUE: 1 view of the chest was performed.   FINDINGS: The lungs are adequately inflated. No acute consolidation. No pleural effusion. No pneumothorax.  The cardiomediastinal silhouette is within normal limits.       No acute cardiopulmonary disease.   Signed by: Riki Whitten 4/10/2024 6:32 PM Dictation workstation:   [id number]    ECG 12 lead    Result Date: 4/10/2024  Atrial flutter Nonspecific ST abnormality Abnormal ECG When compared  with ECG of 20-APR-2021 13:20, Atrial flutter has replaced Sinus rhythm See ED provider note for full interpretation and clinical correlation Confirmed by Silvia Chinchilla ([id number]) on 4/10/2024 6:24:09 PM    CT brain attack angio head and neck W and WO IV contrast    Result Date: 4/10/2024  Interpreted By:  Riki Whitten, STUDY: CT BRAIN ATTACK ANGIO HEAD AND NECK W AND WO IV CONTRAST;  4/10/2024 5:35 pm   INDICATION: Signs/Symptoms:Stroke like symptoms.   COMPARISON: MR angiogram dated 01/05/2024   ACCESSION NUMBER(S): [id number]   ORDERING CLINICIAN: LIANNE OSEI   TECHNIQUE: Unenhanced CT images of the head were obtained. Subsequently,   was administered intravenously and axial images of the head and neck were acquired.  Coronal, sagittal, and 3-D reconstructions were provided for review.   FINDINGS:     CTA HEAD FINDINGS:   Anterior circulation: The bilateral intracranial internal carotid arteries, bilateral carotid terminals, bilateral proximal anterior and middle cerebral arteries are patent without hemodynamically significant stenosis.   Hypoplastic posterior communicating arteries are present and patent.   Posterior circulation: Bilateral intracranial vertebral arteries, vertebrobasilar junction, basilar artery and proximal posterior cerebral arteries are patent without hemodynamically significant stenosis.   There is no evidence for aneurysm or vascular malformation.     CTA NECK FINDINGS:   Right carotid vessels: The common carotid artery is normal. The carotid bifurcation is patent. The internal carotid artery in the neck is patent without hemodynamically significant stenosis.   Left carotid vessels: The common carotid artery is normal. The carotid bifurcation is patent. The internal carotid artery in the neck is patent without hemodynamically significant stenosis.   Vertebral vessels:  The visualized segments of the cervical vertebral arteries are normal in caliber.         1. No evidence for  hemodynamically significant stenosis of the cervical vessels.   2. No evidence for hemodynamically stenosis or large branch vessel occlusion of the intracranial vessels.   MACRO: None   Signed by: Riki Whitten 4/10/2024 6:03 PM Dictation workstation:   [id number]    CT brain attack head wo IV contrast    Result Date: 4/10/2024  Interpreted By:  Riki Whitten, STUDY: DOUG OSEI; 4/10/2024 5:30 pm   INDICATION: Signs/Symptoms:Stroke Evaluation; altered mental status, aphasia   COMPARISON: 03/11/2024   ACCESSION NUMBER(S): [id number]   ORDERING CLINICIAN: DOUG OSEI   TECHNIQUE: Contiguous axial images were acquired from the vertex through the posterior fossa without IV contrast. All CT examinations are performed with 1 or more of the following dose reduction techniques: Automated exposure control, adjustment of mA and/or kv according to patient's size, or use of iterative reconstruction techniques.   FINDINGS: Prior left-sided craniectomy and cranioplasty is again noted. Again noted is prominent encephalomalacia of the left parietal and occipital lobe extending slightly into the left temporal lobe, unchanged. No focal mass effect or midline shift is identified. No definitive new area of hypodensity to suggest an acute infarct. The ventricles and sulci are symmetric and appropriate for the patient's age.   The gray white matter differentiation is preserved.   No acute intracranial hemorrhage is seen. No intra-axial or extra-axial fluid collection is seen.   The visualized paranasal sinuses and mastoid air cells are clear.       No CT evidence for acute intracranial pathology. Stable old infarct and chronic changes with no significant interval change from 03/11/2024     Findings discussed with Doug Osei of the emergency room at 5:42 p.m. on 04/10/2024.   Signed by: Riki Whitten 4/10/2024 5:49 PM Dictation workstation:   [id number]    BI mammo bilateral diagnostic tomosynthesis    Result Date:  3/25/2024  Interpreted By:  Tomás Scherer, STUDY: BI US BREAST LIMITED RIGHT; BI MAMMO BILATERAL DIAGNOSTIC TOMOSYNTHESIS;  3/25/2024 10:47 am; 3/25/2024 10:34 am   ACCESSION NUMBER(S): [id number]; [id number]   ORDERING CLINICIAN: SANTY ROOT   INDICATION: Annual mammogram and 1 year follow-up of an indeterminate right breast mass which was previously recommended for biopsy. Right breast cancer status post lumpectomy. Left breast cancer status post lumpectomy and radiation. Two benign right breast core biopsies. Patient has a family history of breast cancer in her paternal grandmother.   COMPARISON: 09/14/2023, 06/13/2023, 03/21/2023, 03/16/2022, 04/14/2021, 03/12/2021.   FINDINGS: MAMMOGRAPHY: 2D and tomosynthesis images were reviewed at 1 mm slice thickness.   Density:  The breast tissue is heterogeneously dense, which may obscure small masses.   A stable focal asymmetry is seen in the superior right breast. It localizes laterally on 3D imaging. Postlumpectomy scarring with surgical clips is partially seen in the superolateral right breast at posterior depth and superolateral left breast at posterior depth. Mild diffuse left breast skin and trabecular thickening are consistent with postradiation changes. No suspicious masses or calcifications are identified in the left breast.   ULTRASOUND:  A targeted ultrasound of the right breast was performed by a registered sonographer using elastography.   An irregular hypoechoic mass with indistinct margins is again seen at the 11 o'clock position 6 cm from the nipple. The mass measures 0.7 x 0.4 x 0.7 cm compared to 0.6 x 0.3 x 0.5 cm on breast ultrasound 09/14/2023. It is avascular and demonstrates intermediate stiffness on elastography.       1. Minimal increase in the indeterminate right breast mass. The recommendation stands for an ultrasound-guided biopsy. If the patient declines tissue sampling, a three-month follow-up ultrasound is recommended to  document stability. 2. No mammographic evidence of malignancy in the left breast.   BI-RADS CATEGORY:   BI-RADS Category:  4 Suspicious. Recommendation:  Surgical Consultation and Biopsy. Recommended Date:  Immediate. Laterality:  Right.   For any future breast imaging appointments, please call [phone]).     MACRO: None   Signed by: Tomás Scherer 3/25/2024 12:17 PM Dictation workstation:   [id number]    BI US breast limited right    Result Date: 3/25/2024  Interpreted By:  Tomás Scherer, STUDY: BI US BREAST LIMITED RIGHT; BI MAMMO BILATERAL DIAGNOSTIC TOMOSYNTHESIS;  3/25/2024 10:47 am; 3/25/2024 10:34 am   ACCESSION NUMBER(S): [id number]; [id number]   ORDERING CLINICIAN: SANTY ROOT   INDICATION: Annual mammogram and 1 year follow-up of an indeterminate right breast mass which was previously recommended for biopsy. Right breast cancer status post lumpectomy. Left breast cancer status post lumpectomy and radiation. Two benign right breast core biopsies. Patient has a family history of breast cancer in her paternal grandmother.   COMPARISON: 09/14/2023, 06/13/2023, 03/21/2023, 03/16/2022, 04/14/2021, 03/12/2021.   FINDINGS: MAMMOGRAPHY: 2D and tomosynthesis images were reviewed at 1 mm slice thickness.   Density:  The breast tissue is heterogeneously dense, which may obscure small masses.   A stable focal asymmetry is seen in the superior right breast. It localizes laterally on 3D imaging. Postlumpectomy scarring with surgical clips is partially seen in the superolateral right breast at posterior depth and superolateral left breast at posterior depth. Mild diffuse left breast skin and trabecular thickening are consistent with postradiation changes. No suspicious masses or calcifications are identified in the left breast.   ULTRASOUND:  A targeted ultrasound of the right breast was performed by a registered sonographer using elastography.   An irregular hypoechoic mass with indistinct  margins is again seen at the 11 o'clock position 6 cm from the nipple. The mass measures 0.7 x 0.4 x 0.7 cm compared to 0.6 x 0.3 x 0.5 cm on breast ultrasound 09/14/2023. It is avascular and demonstrates intermediate stiffness on elastography.       1. Minimal increase in the indeterminate right breast mass. The recommendation stands for an ultrasound-guided biopsy. If the patient declines tissue sampling, a three-month follow-up ultrasound is recommended to document stability. 2. No mammographic evidence of malignancy in the left breast.   BI-RADS CATEGORY:   BI-RADS Category:  4 Suspicious. Recommendation:  Surgical Consultation and Biopsy. Recommended Date:  Immediate. Laterality:  Right.   For any future breast imaging appointments, please call [phone] ([phone]).     MACRO: None   Signed by: Tomás Scherer 3/25/2024 12:17 PM Dictation workstation:   [id number]  .      Assessment/Plan   Principal Problem:    Right sided weakness      Impression:  Focal aware seizure s/p left CVA with HT/crani  Baseline right sided weakness and aphasia  History of HTN, HLD, breast CA  Plan:   Ok to keep baby aspirin per Dr. Mccormack  EEG ordered  Start Keppra 500mg BID, Valtoco for oupt.   Repeat CTH if becomes symptomatic (worsening headache, change in MS, focal deficits)  Dr. Mccormack to see this afternoon          TATIANNA Santiago    Patient seen and examined.  Had a long discussion about the patient's history as well as a history for seizures and patient has been having seizures since 7 years and has tried multiple medications and most of the time.  With the first of the second dose patient is complaining of side effects.  Patient was discharged home after the last seizure few months back on Keppra and Trileptal which patient did not take.  Patient was on phenobarb which was weaned about a month ago and patient stopped it abruptly.  Today patient's physical and neurological exams are nonfocal I think the patient's seizures  are triggered by generalized anxiety and I would like to start patient on Lexapro 10 mg daily and add Fycompa 4 mg at bedtime and continue Keppra the way patient is taking but okay to discharge on Valtoco 10 mg intranasal spray as needed for breakthrough seizures and can follow-up with her physician as an outpatient in 6 to 8 weeks.  Seizure risk and the precautions were discussed and will observe overnight for any breakthrough seizures.    Due to technical limitations of voice recognition and human error, this note may not accurately reflect the care of the patient.    Chuck sage md/neurology.

## 2024-04-11 NOTE — H&P
Medical Group History and Physical      ASSESSMENT & PLAN:     #.  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  History of L parietoccipital CVA with hemorrhagic conversion s/p L hemicraniectomy/SDH evacuation (10/2023) and L cranioplasty (2/2024)  -Residual deficits include right-sided weakness and aphasia, which are improving per family  -CT head showed stable infarct with no significant interval change, CTA negative for LVO  -Now presenting with right upper extremity shaking, she was aware during these episodes per family  -Unable to perform neurologic exam secondary to sedation due to Ativan  -Symptoms seem to be consistent with focal aware seizure    Plan:  -Neurology consult  -MRI brain with and without contrast as per neurology recommendations  -Daily aspirin, statin  -Permissive hypertension for now until MRI rules out new CVA  -Not a candidate for thrombolytics given prior CVA with hemorrhagic conversion and recent neurosurgical procedures, this was discussed with [facility] [place] [facility] stroke team  -NIH stroke scale and neurochecks per order set  -Seizure precautions  -Loaded with Keppra in ED, defer to neurology for further antiepileptics  -Ativan as needed for additional seizure activity overnight  -N.p.o. for now until Ativan wears off and she passes swallow study  -PT/OT/TCC consult    VTE PPX: SCDs only given recent hemorrhagic CVA      Presley Keys MD    --Of note, this documentation is completed using the Dragon Dictation system (voice recognition software). There may be spelling and/or grammatical errors that were not corrected prior to final submission.--    HISTORY OF PRESENT ILLNESS:   Chief Complaint: Right upper extremity shaking    Fadia Bolden is a 68 y.o. female with a history of L parietoccipital CVA with hemorrhagic conversion s/p L hemicraniectomy/SDH evacuation (10/2023) and L cranioplasty (2/2024) with residual right-sided weakness and aphasia, hypertension, breast cancer thought to  be in remission presenting with right upper extremity shaking episodes.  Patient received Ativan for seizure activity in the ED and is unable to contribute to history.  Family at bedside who provide history.  They state that she has been doing well and her residual deficits from her prior CVA seem to be improving over the last few weeks.  She was just seen by neurology this morning with plans for repeat MRI brain with and without contrast.  They were at the store earlier when the patient said she did not feel well and they noticed right upper extremity shaking.  They then brought her to the ED where she had an additional episode of the same thing.  She was reportedly aware and talking during both of the episodes.  She was given Ativan and loaded with Keppra.  CT head was obtained which showed stable infarct with no significant interval change.  CTA was negative for large vessel occlusion or significant stenosis.  She was also given aspirin.       ROS  10 point review of systems negative except per HPI     PAST HISTORIES:     Past Medical History  See HPI    Surgical History  Left hemicraniectomy  Left subdural hematoma evacuation  Left cranioplasty     Social History  She reports that she has never smoked. She has never used smokeless tobacco. She reports that she does not currently use alcohol. She reports that she does not use drugs.    Family History  Family History   Problem Relation Name Age of Onset   • Thyroid disease Mother     • Parkinsonism Father     • Parkinsonism Sister     • Other (HTN) Sister     • Breast cancer Father's Sister     • Breast cancer Paternal Grandmother         Allergies:  Fish derived      OBJECTIVE:      Last Recorded Vitals  /72   Pulse 61   Temp 36.2 °C (97.2 °F)   Resp 16   Wt (!) 41 kg (90 lb 6.2 oz)   SpO2 100%     Last I/O:  No intake/output data recorded.    Physical Exam   Gen: Lethargic, briefly awakens to voice and sternal rub  HEENT: EOM, MMM  CV: RRR, no murmurs  rubs or gallops  Resp: Clear to auscultation bilaterally, normal effort  Abdomen: soft, NT,+BS  LE: No edema, no deformity  Neuro: A&Ox0, unable to perform neurologic exam secondary to lethargy in the setting of Ativan    LABS AND IMAGING:       Relevant Results  Labs Reviewed   COMPREHENSIVE METABOLIC PANEL - Abnormal       Result Value    Glucose 111 (*)     Sodium 140      Potassium 3.7      Chloride 105      Bicarbonate 27      Anion Gap 12      Urea Nitrogen 22      Creatinine 0.77      eGFR 84      Calcium 9.3      Albumin 4.4      Alkaline Phosphatase 83      Total Protein 7.5      AST 18      Bilirubin, Total 0.7      ALT 17     URINALYSIS WITH REFLEX CULTURE AND MICROSCOPIC - Abnormal    Color, Urine Colorless (*)     Appearance, Urine Clear      Specific Gravity, Urine 1.027      pH, Urine 6.0      Protein, Urine NEGATIVE      Glucose, Urine NEGATIVE      Blood, Urine NEGATIVE      Ketones, Urine NEGATIVE      Bilirubin, Urine NEGATIVE      Urobilinogen, Urine <2.0      Nitrite, Urine NEGATIVE      Leukocyte Esterase, Urine NEGATIVE     POCT GLUCOSE - Abnormal    POCT Glucose 109 (*)    TROPONIN I, HIGH SENSITIVITY - Normal    Troponin I, High Sensitivity 5      Narrative:     Less than 99th percentile of normal range cutoff-  Female and children under 18 years old <14 ng/L; Male <21 ng/L: Negative  Repeat testing should be performed if clinically indicated.     Female and children under 18 years old 14-50 ng/L; Male 21-50 ng/L:  Consistent with possible cardiac damage and possible increased clinical   risk. Serial measurements may help to assess extent of myocardial damage.     >50 ng/L: Consistent with cardiac damage, increased clinical risk and  myocardial infarction. Serial measurements may help assess extent of   myocardial damage.      NOTE: Children less than 1 year old may have higher baseline troponin   levels and results should be interpreted in conjunction with the overall   clinical context.      NOTE: Troponin I testing is performed using a different   testing methodology at [facility] than at other   [facility] hospitals. Direct result comparisons should only   be made within the same method.   PROTIME-INR - Normal    Protime 11.5      INR 1.0     APTT - Normal    aPTT 32      Narrative:     The APTT is no longer used for monitoring Unfractionated Heparin Therapy. For monitoring Heparin Therapy, use the Heparin Assay.   CBC WITH AUTO DIFFERENTIAL    WBC 6.0      nRBC 0.0      RBC 4.46      Hemoglobin 13.4      Hematocrit 40.1      MCV 90      MCH 30.0      MCHC 33.4      RDW 12.4      Platelets 189      Neutrophils % 42.6      Immature Granulocytes %, Automated 0.2      Lymphocytes % 39.0      Monocytes % 14.1      Eosinophils % 3.4      Basophils % 0.7      Neutrophils Absolute 2.54      Immature Granulocytes Absolute, Automated 0.01      Lymphocytes Absolute 2.32      Monocytes Absolute 0.84      Eosinophils Absolute 0.20      Basophils Absolute 0.04     GRAY TOP    Extra Tube Hold for add-ons.     URINALYSIS WITH REFLEX CULTURE AND MICROSCOPIC    Narrative:     The following orders were created for panel order Urinalysis with Reflex Culture and Microscopic.  Procedure                               Abnormality         Status                     ---------                               -----------         ------                     Urinalysis with Reflex C...[[id number]]  Abnormal            Final result               Extra Urine Gray Tube[[id number]]                            In process                   Please view results for these tests on the individual orders.   EXTRA URINE GRAY TUBE   BASIC METABOLIC PANEL   CBC WITH AUTO DIFFERENTIAL   POCT GLUCOSE METER   POCT GLUCOSE METER     XR chest 1 view   Final Result   No acute cardiopulmonary disease.        Signed by: Riki Whitten 4/10/2024 6:32 PM   Dictation workstation:   [id number]      CT brain attack angio head and neck W and WO  IV contrast   Final Result   1. No evidence for hemodynamically significant stenosis of the   cervical vessels.        2. No evidence for hemodynamically stenosis or large branch vessel   occlusion of the intracranial vessels.        MACRO:   None        Signed by: Riki Whitten 4/10/2024 6:03 PM   Dictation workstation:   [id number]      CT brain attack head wo IV contrast   Final Result   No CT evidence for acute intracranial pathology. Stable old infarct   and chronic changes with no significant interval change from   03/11/2024             Findings discussed with Doug Eugene of the emergency room at 5:42   p.m. on 04/10/2024.        Signed by: Riki Whitten 4/10/2024 5:49 PM   Dictation workstation:   [id number]      MR brain w and wo IV contrast    (Results Pending)

## 2024-04-11 NOTE — PROGRESS NOTES
Occupational Therapy    Evaluation/Treatment    Patient Name: Fadia Bolden  MRN: [id number]  : 1955  Today's Date: 24  Time Calculation  Start Time: 921  Stop Time: 944  Time Calculation (min): 23 min       Assessment:  OT Assessment: decreased safety awareness/insight, impaired standing balance. Min-mod A for ADLS, impacted by R hemianopsia  End of Session Patient Position: Up in chair, Alarm on (call light in reach, family present)  OT Assessment Results: Decreased ADL status, Decreased cognition, Decreased functional mobility, Visual deficit    Plan:  Treatment Interventions: ADL retraining, Functional transfer training, Endurance training, Patient/family training, Fine motor coordination activities (R hemianopsia)  OT Frequency: 2 times per week  OT Discharge Recommendations: High intensity level of continued care  OT Recommended Transfer Status: Minimal assist  OT - OK to Discharge: Yes  Treatment Interventions: ADL retraining, Functional transfer training, Endurance training, Patient/family training, Fine motor coordination activities (R hemianopsia)  Subjective               General:   OT Received On: 24  General  Reason for Referral: ADL impairment  Referred By: Massimo PT/OT 4/10/24  Past Medical History Relevant to Rehab: CPAP, DAIJA, dyslipidemia, HTN, CVA, anxiety, GERD, hypothyroidism, aphasia, L humeral fx, subdural hematoma, breast CA, [place], R hemianopsia, craniotomy 10/23, cranioplasty 24  Family/Caregiver Present: Yes  Prior to Session Communication: Bedside nurse  Patient Position Received:  (sitting at EOB wanting to void)  General Comment: pt to ED 4/10 with aphasia, R sided weakness, shaking. Tx with ativan and keppra in ED, stopped shaking. CT brain 4/10 encephalomalacia, L parietal and occipital lobe, negative acute    Precautions:  Hearing/Visual Limitations: R hemianopsia  Medical Precautions: Fall precautions           Pain:  Pain Assessment  Pain Assessment:  0-10  Pain Score: 0 - No pain  Objective     Cognition:  Overall Cognitive Status: Impaired  Orientation Level: Oriented X4  Following Commands:  (follows one step directions with 50% accuracy. Requires repetition of instruction)  Safety Judgment: Decreased awareness of need for assistance  Problem Solving:  (impaired)  Attention:  (easily distracted)  Insight:  (poor insight into balance impairments, decreased safety)  Impulsive: Moderately             Home Living:  Home Living Comments: lives with spouse and dtr, 1 story, 2 JOB no HR. Walk in shower with seat. Owns ww    Prior Function:  Prior Function Comments: pt indep with ADLS, assist with IADLS and assist with med mgmt. Does not drive, denies falls           Activities of Daily Living:   Eating Assistance: Stand by  Eating Deficit:  (vc's to find food on R side of tray)  Grooming Assistance: Minimal  Bathing Assistance: Minimal  UE Dressing Assistance: Stand by  LE Dressing Assistance: Moderate  LE Dressing Deficit: Don/doff R sock  Toileting Assistance with Device: Moderate  Functional Assistance:  (pt ambulated 20' without AD wiht mod A, ambulated 40' with ww with min A)                         Activity Tolerance:  Endurance: Decreased tolerance for upright activites           Bed Mobility/Transfers: Bed Mobility  Bed Mobility:  (sup to sit CGA)  Transfers  Transfer:  (pt stood from EOB with min A, fair - dyn std balance)                Balance:  Static Sitting: good  Dynamic Sitting: fair -  Static Standing: fair -  Dynamic Standing: poor         Vision:Vision - Basic Assessment  Patient Visual Report:  (R hemianopsia)        Sensation:  Light Touch: No apparent deficits    Strength:  Strength Comments: B UE 4/5 throughout           Coordination:  Movements are Fluid and Coordinated: No  Finger to Nose: Bradykinesia  Rapid Alternating Movements:  (R side > L)       Extremities: RUE   RUE : Within Functional Limits and LUE   LUE: Within Functional  Limits    Outcome Measures: [facility] Daily Activity  Putting on and taking off regular lower body clothing: A lot  Bathing (including washing, rinsing, drying): A lot  Putting on and taking off regular upper body clothing: A little  Toileting, which includes using toilet, bedpan or urinal: A little  Taking care of personal grooming such as brushing teeth: A little  Eating Meals: A little  Daily Activity - Total Score: 16    Education Documentation  ADL Training, taught by Nel Myers, OT at 4/11/2024 12:12 PM.  Learner: Family, Patient  Readiness: Acceptance  Method: Explanation  Response: Verbalizes Understanding, Needs Reinforcement                 Goals:  Encounter Problems       Encounter Problems (Active)       OT Goals       Pt will transfer to bed, chair, toilet with SBA (Progressing)       Start:  04/11/24    Expected End:  04/25/24            Pt will demo fair + dyn std balance with ADLS (Progressing)       Start:  04/11/24    Expected End:  04/25/24            Pt will accommodate for hemianopsia by spontaneously turning head to assist in locating items on tray/in room (Progressing)       Start:  04/11/24    Expected End:  04/25/24

## 2024-04-11 NOTE — CARE PLAN
Problem: Skin  Goal: Decreased wound size/increased tissue granulation at next dressing change  Outcome: Progressing  Goal: Participates in plan/prevention/treatment measures  Outcome: Progressing  Goal: Prevent/manage excess moisture  Outcome: Progressing  Goal: Prevent/minimize sheer/friction injuries  Outcome: Progressing  Goal: Promote/optimize nutrition  Outcome: Progressing  Goal: Promote skin healing  Outcome: Progressing     Problem: Pain  Goal: Takes deep breaths with improved pain control throughout the shift  Outcome: Progressing  Goal: Turns in bed with improved pain control throughout the shift  Outcome: Progressing  Goal: Walks with improved pain control throughout the shift  Outcome: Progressing  Goal: Performs ADL's with improved pain control throughout shift  Outcome: Progressing  Goal: Participates in PT with improved pain control throughout the shift  Outcome: Progressing  Goal: Free from opioid side effects throughout the shift  Outcome: Progressing  Goal: Free from acute confusion related to pain meds throughout the shift  Outcome: Progressing     Problem: Acute Head Injury  Goal: Neuro status stable or improved  Outcome: Progressing  Goal: No signs of respiratory compromise  Outcome: Progressing  Goal: Stabilization of hemodynamic status  Outcome: Progressing     Problem: Seizures  Goal: Absence or minimized seizure activity  Outcome: Progressing  Goal: Freedom from injury  Outcome: Progressing     Problem: Recent hospitalization or complication while living with CVA  Goal: Patient will effectively self-manage their CHF disease process  Outcome: Progressing   e patient's goals for the shift include be safe    The clinical goals for the shift include maintain  current level of activity    Over the shift, the patient did not make progress toward the following goals. Barriers to progression include ommendations to address these barriers include .

## 2024-04-11 NOTE — PROGRESS NOTES
Hospitalist Progress Note    Fadia Bolden  1955  68 y.o.  [id number]    04/11/24  8:54 AM    Chief Complaint: Follow-up for left parieto-occipital stroke with hemorrhagic transformation status post left hemicraniectomy, subdural hematoma evacuation on 10/2023, cranioplasty on 2/2024.    Subjective:  Patient seen and examined.  She is awake, alert and noted x 3.  She denies any complaints.  No headache, denied vision change.  Denies significant focal arm or leg weakness.  Her blood pressure has been intermittently elevated and she has been on IV hydralazine as needed, other vital signs stable.    Medications:    Current Facility-Administered Medications:     aspirin chewable tablet 81 mg, 81 mg, oral, Daily, Presley Keys MD    atorvastatin (Lipitor) tablet 80 mg, 80 mg, oral, Nightly, Presley Keys MD    hydrALAZINE (Apresoline) injection 10 mg, 10 mg, intravenous, q20 min PRN **FOLLOWED BY** [START ON 4/12/2024] hydrALAZINE (Apresoline) tablet 25 mg, 25 mg, oral, q6h PRN, Presley Keys MD    labetaloL (Normodyne,Trandate) injection 10 mg, 10 mg, intravenous, q10 min PRN, Presley Keys MD    oxygen (O2) therapy, , inhalation, Continuous PRN - O2/gases, Presley Keys MD    polyethylene glycol (Glycolax, Miralax) packet 17 g, 17 g, oral, Daily, Presley Keys MD    Vital signs in last 24 hours:  Temp:  [36.2 °C (97.1 °F)-36.9 °C (98.4 °F)] 36.9 °C (98.4 °F)  Heart Rate:  [] 72  Resp:  [15-18] 17  BP: (122-216)/() 169/79    Physical Exam:  General: Awake, alert, oriented x3, no distress, cooperative.  HEENT: EOM intact, PERRLA.  Neck: Supple, no JVD, no masses, no lymphadenopathy.  Chest: Normal breath sounds bilateral, good chest expansion, no wheezes, no crackles, no rhonchi.  Heart: Regular rate and rhythm, S1-S2 normal, no murmur, no gallops.  Abdomen: Soft, nontender, no organomegaly, no ascites, no guarding or rigidity.  Neurological: Alert and oriented x3, cranial  nerves are intact, normal power and tone of 4 limbs.  Skin: No lesions, no skin rash.  Warm and dry.  Musculoskeletal: Normal, atraumatic, no obvious deformities.  Legs: No leg edema, no clubbing or cyanosis.  Psych: Appropriate mood and behavior.    LABS:  Lab Results   Component Value Date    WBC 5.0 04/11/2024    HGB 11.4 (L) 04/11/2024    HCT 34.5 (L) 04/11/2024    MCV 90 04/11/2024     04/11/2024     Lab Results   Component Value Date    GLUCOSE 82 04/11/2024    CALCIUM 8.8 04/11/2024     04/11/2024    K 3.9 04/11/2024    CO2 28 04/11/2024     (H) 04/11/2024    BUN 19 04/11/2024    CREATININE 0.70 04/11/2024     Imaging:  MR brain w and wo IV contrast [[id number]] Collected: 04/11/24 0927   Order Status: Completed Updated: 04/11/24 0928   Narrative:     Interpreted By:  Diana Rubio,  STUDY:  MR BRAIN W AND WO IV CONTRAST;  4/11/2024 9:16 am      INDICATION:  Signs/Symptoms:right sided weakness, prior CVA w/ hemorrhagic  conversion s/p craniotomy.      COMPARISON:  CT April 10, 2024 and MRI January 5, 2024      ACCESSION NUMBER(S):  [id number]      ORDERING CLINICIAN:  BECKY LAZO      TECHNIQUE:  Axial T2, FLAIR, DWI, gradient echo T2 and  T1 weighted images of  brain were acquired. Post contrast T1 weighted images were acquired  after administration of 9 mL Dotarem gadolinium based intravenous  contrast.      FINDINGS:  There are again postoperative changes from left-sided craniectomy.  Compared with prior MRI there has been interval cranioplasty. There  is nonspecific dural thickening and enhancement deep to the  cranioplasty and overlying the left cerebral hemisphere which is  likely postoperative in nature, at least in part. Additionally, there  is an extra-axial collection deep to the cranioplasty that is  somewhat mixed in signal measuring approximately 4-5 mm in thickness.  There is little to no associated mass effect.  There is a focus of encephalomalacia and gliosis in the  left  parietal, occipital, and posterior temporal lobes. There is extensive  susceptibility artifact within and surrounding the area of  encephalomalacia on gradient echo T2 weighted imaging which may be  due to blood products and/or mineralization, relatively similar from  the previous MRI. There are nodular and curvilinear areas of  increased signal on diffusion weighted imaging overall less  pronounced than seen previously and favored to be due largely to  artifact. No additional areas of diffusion restriction suggestive of  acute ischemic injury are identified. There are gyriform areas of  bright signal on precontrast T1 weighted imaging within the area of  encephalomalacia likely related to laminar necrosis. Some degree of  subtle superimposed gyriform enhancement is difficult to entirely  exclude.      There is ex vacuo dilatation of the occipital horn, atrium, posterior  body, and temporal horn of the left lateral ventricle superimposed on  more diffuse parenchymal volume loss. There is again prominence of  the extra-axial CSF space along the posteromedial aspect of the left  occipital lobe, unchanged from the prior study and potentially  representing an arachnoid cyst.      There are scattered small hyperintensities on FLAIR and T2 weighted  imaging more diffusely in the white matter which could reflect  small-vessel ischemic disease in a patient of this age.      There is trace mucosal thickening within scattered paranasal sinuses.  The mastoid air cells are clear.       Impression:     1. Postoperative changes from left lateral craniectomy with  cranioplasty.  2. Redemonstration of large area of encephalomalacia and gliosis  primarily involving the left PCA distribution with evolving  parenchymal hemorrhage. No evidence of new acute ischemic injury.              MACRO:  None      Signed by: Diana Rubio 4/11/2024 9:26 AM  Dictation workstation:   [id number]        Assessment and Plan:    #.  DODIE  shaking episodes - likely focal aware seizure  #.  History of L parietoccipital CVA with hemorrhagic conversion s/p L hemicraniectomy/SDH evacuation (10/2023) and L cranioplasty (2/2024)  -Residual deficits include right-sided weakness and aphasia, which are improving per family  -CT head showed stable infarct with no significant interval change, CTA negative for LVO  -Now presenting with right upper extremity shaking, she was aware during these episodes per family  -Unable to perform neurologic exam secondary to sedation due to Ativan  -Symptoms seem to be consistent with focal aware seizure     Plan:  -Neurology consult  -MRI brain with and without contrast as per neurology recommendations  -Daily aspirin, statin  -Permissive hypertension for now until MRI rules out new CVA  -Not a candidate for thrombolytics given prior CVA with hemorrhagic conversion and recent neurosurgical procedures, this was discussed with [facility] [place] [facility] stroke team  -NIH stroke scale and neurochecks per order set  -Seizure precautions  -Loaded with Keppra in ED, defer to neurology for further antiepileptics  -Ativan as needed for additional seizure activity overnight  -N.p.o. for now until Ativan wears off and she passes swallow study  -PT/OT/TCC consult     VTE PPX: SCDs only given recent hemorrhagic CVA.    Daily progress/update:  4/11/2024:  Blood pressure has been intermittently elevated, afebrile, heart rate stable, no obvious focal deficits, today CBC and BMP reviewed as above, neurology is following, MRI brain with and without contrast done today, reviewed, showed evolving parenchymal hemorrhage of the left PCA distribution, neurology recommended to do EEG and to hold aspirin for now, continue other treatments.      MDM: Moderate complexity, time spent is 47 minutes.    ELIN BECK MD.    04/11/24  11:11 AM

## 2024-04-11 NOTE — PROGRESS NOTES
04/11/24 1812   Discharge Planning   Living Arrangements Spouse/significant other   Support Systems Spouse/significant other   Assistance Needed attends OT/spch tx as an outpt.   Type of Residence Private residence   Do you have animals or pets at home? Yes   Type of Animals or Pets cats   Who is requesting discharge planning? Provider   Home or Post Acute Services Other (Comment)  (TBD)   Patient expects to be discharged to: home to resume outpt tx  ( OT/Spch.    )? add PTx   Does the patient need discharge transport arranged? No     Tbd- pt/ot to see pt early 4/12. Prefers home and outpt services. [ [facility]- no referral  was made. At this time]. Awaiting pt/ot 4/12 for dispo plan

## 2024-04-12 ENCOUNTER — APPOINTMENT (OUTPATIENT)
Dept: RADIOLOGY | Facility: HOSPITAL | Age: 69
DRG: 101 | End: 2024-04-12
Payer: COMMERCIAL

## 2024-04-12 VITALS
OXYGEN SATURATION: 100 % | SYSTOLIC BLOOD PRESSURE: 122 MMHG | HEIGHT: 65 IN | BODY MASS INDEX: 18.26 KG/M2 | WEIGHT: 109.6 LBS | HEART RATE: 72 BPM | RESPIRATION RATE: 20 BRPM | TEMPERATURE: 99.5 F | DIASTOLIC BLOOD PRESSURE: 69 MMHG

## 2024-04-12 LAB
ANION GAP SERPL CALC-SCNC: 9 MMOL/L (ref 10–20)
BASOPHILS # BLD AUTO: 0.04 X10*3/UL (ref 0–0.1)
BASOPHILS NFR BLD AUTO: 1 %
BUN SERPL-MCNC: 21 MG/DL (ref 6–23)
CALCIUM SERPL-MCNC: 8.9 MG/DL (ref 8.6–10.3)
CHLORIDE SERPL-SCNC: 106 MMOL/L (ref 98–107)
CO2 SERPL-SCNC: 30 MMOL/L (ref 21–32)
CREAT SERPL-MCNC: 0.85 MG/DL (ref 0.5–1.05)
EGFRCR SERPLBLD CKD-EPI 2021: 74 ML/MIN/1.73M*2
EOSINOPHIL # BLD AUTO: 0.19 X10*3/UL (ref 0–0.7)
EOSINOPHIL NFR BLD AUTO: 4.8 %
ERYTHROCYTE [DISTWIDTH] IN BLOOD BY AUTOMATED COUNT: 12.3 % (ref 11.5–14.5)
GLUCOSE SERPL-MCNC: 91 MG/DL (ref 74–99)
HCT VFR BLD AUTO: 35.2 % (ref 36–46)
HGB BLD-MCNC: 11.8 G/DL (ref 12–16)
HOLD SPECIMEN: NORMAL
HOLD SPECIMEN: NORMAL
IMM GRANULOCYTES # BLD AUTO: 0 X10*3/UL (ref 0–0.7)
IMM GRANULOCYTES NFR BLD AUTO: 0 % (ref 0–0.9)
LYMPHOCYTES # BLD AUTO: 1.71 X10*3/UL (ref 1.2–4.8)
LYMPHOCYTES NFR BLD AUTO: 43.5 %
MCH RBC QN AUTO: 29.9 PG (ref 26–34)
MCHC RBC AUTO-ENTMCNC: 33.5 G/DL (ref 32–36)
MCV RBC AUTO: 89 FL (ref 80–100)
MONOCYTES # BLD AUTO: 0.61 X10*3/UL (ref 0.1–1)
MONOCYTES NFR BLD AUTO: 15.5 %
NEUTROPHILS # BLD AUTO: 1.38 X10*3/UL (ref 1.2–7.7)
NEUTROPHILS NFR BLD AUTO: 35.2 %
NRBC BLD-RTO: 0 /100 WBCS (ref 0–0)
PLATELET # BLD AUTO: 176 X10*3/UL (ref 150–450)
POTASSIUM SERPL-SCNC: 4 MMOL/L (ref 3.5–5.3)
RBC # BLD AUTO: 3.94 X10*6/UL (ref 4–5.2)
SODIUM SERPL-SCNC: 141 MMOL/L (ref 136–145)
WBC # BLD AUTO: 3.9 X10*3/UL (ref 4.4–11.3)

## 2024-04-12 PROCEDURE — 36415 COLL VENOUS BLD VENIPUNCTURE: CPT | Performed by: STUDENT IN AN ORGANIZED HEALTH CARE EDUCATION/TRAINING PROGRAM

## 2024-04-12 PROCEDURE — 70450 CT HEAD/BRAIN W/O DYE: CPT | Performed by: RADIOLOGY

## 2024-04-12 PROCEDURE — 70450 CT HEAD/BRAIN W/O DYE: CPT

## 2024-04-12 PROCEDURE — 80048 BASIC METABOLIC PNL TOTAL CA: CPT | Performed by: STUDENT IN AN ORGANIZED HEALTH CARE EDUCATION/TRAINING PROGRAM

## 2024-04-12 PROCEDURE — 99239 HOSP IP/OBS DSCHRG MGMT >30: CPT | Performed by: HOSPITALIST

## 2024-04-12 PROCEDURE — 97530 THERAPEUTIC ACTIVITIES: CPT | Mod: GP,CQ

## 2024-04-12 PROCEDURE — 85025 COMPLETE CBC W/AUTO DIFF WBC: CPT | Performed by: STUDENT IN AN ORGANIZED HEALTH CARE EDUCATION/TRAINING PROGRAM

## 2024-04-12 PROCEDURE — 2500000001 HC RX 250 WO HCPCS SELF ADMINISTERED DRUGS (ALT 637 FOR MEDICARE OP)

## 2024-04-12 RX ORDER — LEVETIRACETAM 500 MG/1
500 TABLET ORAL 2 TIMES DAILY
Qty: 90 TABLET | Refills: 0 | Status: SHIPPED | OUTPATIENT
Start: 2024-04-12 | End: 2024-05-25 | Stop reason: SDUPTHER

## 2024-04-12 RX ADMIN — ASPIRIN 81 MG: 81 TABLET, CHEWABLE ORAL at 09:01

## 2024-04-12 RX ADMIN — LEVETIRACETAM 500 MG: 500 TABLET, FILM COATED ORAL at 09:01

## 2024-04-12 SDOH — SOCIAL STABILITY: SOCIAL INSECURITY: ABUSE: ADULT

## 2024-04-12 SDOH — SOCIAL STABILITY: SOCIAL INSECURITY: HAS ANYONE EVER THREATENED TO HURT YOUR FAMILY OR YOUR PETS?: NO

## 2024-04-12 SDOH — SOCIAL STABILITY: SOCIAL INSECURITY: WERE YOU ABLE TO COMPLETE ALL THE BEHAVIORAL HEALTH SCREENINGS?: YES

## 2024-04-12 SDOH — SOCIAL STABILITY: SOCIAL INSECURITY: DO YOU FEEL UNSAFE GOING BACK TO THE PLACE WHERE YOU ARE LIVING?: NO

## 2024-04-12 SDOH — SOCIAL STABILITY: SOCIAL INSECURITY: ARE YOU OR HAVE YOU BEEN THREATENED OR ABUSED PHYSICALLY, EMOTIONALLY, OR SEXUALLY BY ANYONE?: NO

## 2024-04-12 SDOH — SOCIAL STABILITY: SOCIAL INSECURITY: ARE THERE ANY APPARENT SIGNS OF INJURIES/BEHAVIORS THAT COULD BE RELATED TO ABUSE/NEGLECT?: NO

## 2024-04-12 SDOH — SOCIAL STABILITY: SOCIAL INSECURITY: DO YOU FEEL ANYONE HAS EXPLOITED OR TAKEN ADVANTAGE OF YOU FINANCIALLY OR OF YOUR PERSONAL PROPERTY?: NO

## 2024-04-12 SDOH — SOCIAL STABILITY: SOCIAL INSECURITY: DOES ANYONE TRY TO KEEP YOU FROM HAVING/CONTACTING OTHER FRIENDS OR DOING THINGS OUTSIDE YOUR HOME?: NO

## 2024-04-12 SDOH — SOCIAL STABILITY: SOCIAL INSECURITY: HAVE YOU HAD THOUGHTS OF HARMING ANYONE ELSE?: NO

## 2024-04-12 ASSESSMENT — PAIN SCALES - GENERAL: PAINLEVEL_OUTOF10: 0 - NO PAIN

## 2024-04-12 ASSESSMENT — LIFESTYLE VARIABLES
HOW OFTEN DO YOU HAVE 6 OR MORE DRINKS ON ONE OCCASION: NEVER
PRESCIPTION_ABUSE_PAST_12_MONTHS: NO
HOW MANY STANDARD DRINKS CONTAINING ALCOHOL DO YOU HAVE ON A TYPICAL DAY: PATIENT DOES NOT DRINK
HOW OFTEN DO YOU HAVE 6 OR MORE DRINKS ON ONE OCCASION: NEVER
SUBSTANCE_ABUSE_PAST_12_MONTHS: NO
AUDIT-C TOTAL SCORE: 0
SUBSTANCE_ABUSE_PAST_12_MONTHS: NO
AUDIT-C TOTAL SCORE: 0
SKIP TO QUESTIONS 9-10: 1
HOW OFTEN DO YOU HAVE A DRINK CONTAINING ALCOHOL: NEVER
PRESCIPTION_ABUSE_PAST_12_MONTHS: NO
AUDIT-C TOTAL SCORE: 0
SKIP TO QUESTIONS 9-10: 1
HOW MANY STANDARD DRINKS CONTAINING ALCOHOL DO YOU HAVE ON A TYPICAL DAY: PATIENT DOES NOT DRINK
AUDIT-C TOTAL SCORE: 0
HOW OFTEN DO YOU HAVE A DRINK CONTAINING ALCOHOL: NEVER

## 2024-04-12 ASSESSMENT — PATIENT HEALTH QUESTIONNAIRE - PHQ9
SUM OF ALL RESPONSES TO PHQ9 QUESTIONS 1 & 2: 0
2. FEELING DOWN, DEPRESSED OR HOPELESS: NOT AT ALL
2. FEELING DOWN, DEPRESSED OR HOPELESS: NOT AT ALL
1. LITTLE INTEREST OR PLEASURE IN DOING THINGS: NOT AT ALL
1. LITTLE INTEREST OR PLEASURE IN DOING THINGS: NOT AT ALL
SUM OF ALL RESPONSES TO PHQ9 QUESTIONS 1 & 2: 0

## 2024-04-12 ASSESSMENT — PAIN - FUNCTIONAL ASSESSMENT: PAIN_FUNCTIONAL_ASSESSMENT: 0-10

## 2024-04-12 ASSESSMENT — COGNITIVE AND FUNCTIONAL STATUS - GENERAL
TURNING FROM BACK TO SIDE WHILE IN FLAT BAD: A LITTLE
MOVING FROM LYING ON BACK TO SITTING ON SIDE OF FLAT BED WITH BEDRAILS: A LITTLE
CLIMB 3 TO 5 STEPS WITH RAILING: A LOT
STANDING UP FROM CHAIR USING ARMS: A LITTLE
MOVING TO AND FROM BED TO CHAIR: A LITTLE
MOBILITY SCORE: 17
WALKING IN HOSPITAL ROOM: A LITTLE

## 2024-04-12 NOTE — PROGRESS NOTES
04/12/24 1419   Discharge Planning   Living Arrangements Spouse/significant other   Support Systems Spouse/significant other     REMOTE COVERAGE- several attempts made to reach pt by room, personal and  phone for discharge planning. Left message requesting return call. Plan- home, resume out pt services vs high intensity, high frequency therapy at [facility] in [place]. CT will follow.

## 2024-04-12 NOTE — PROGRESS NOTES
Physical Therapy    Physical Therapy Treatment    Patient Name: Fadia Bolden  MRN: [id number]  Today's Date: 4/12/2024  Time Calculation  Start Time: 0928  Stop Time: 0953  Time Calculation (min): 25 min       Assessment/Plan   End of Session Patient Position:  (Patient sitting up in chair after treatment (Cleared by RN as patient is on seizure precautions).  Call light and tray in reach.  Chair alarm on.  Family at bedside)    PT Plan  Inpatient/Swing Bed or Outpatient: Inpatient  Treatment/Interventions: Bed mobility, Transfer training, Gait training, Stair training, Endurance training, Therapeutic exercise, Therapeutic activity, Balance training, Neuromuscular re-education  PT Plan: Skilled PT  PT Frequency: 4 times per week  PT Discharge Recommendations:  (Patient will benefit from high intensity and high frequency multi rehab facility)  Equipment Recommended upon Discharge: Wheeled walker PT Recommended Transfer Status: Assist x1    General Visit Information:   PT  Visit  PT Received On: 04/12/24    General  Family/Caregiver Present:  (Daughter present and supportive of care)  Prior to Session Communication:  (Cleared by RN for PT)  Patient Position Received:  (Patient presents in bed, agreeable to PT)    General Comment:  (patient admitted with right sided weakness and aphasia)    General Observations:   General Observation:  (tele, IV, alarm, seizure pads on bed)    Precautions:  Precautions  Hearing/Visual Limitations: R hemianopsia  Medical Precautions: Fall precautions, Seizure precautions      Pain:  Pain Assessment  Pain Assessment: 0-10  Pain Score: 0 - No pain    Cognition:  Cognition  Overall Cognitive Status:  (Decreased processing)  Following Commands:  (Follows ~50% of commands with repeitition)  Insight: Moderate  Impulsive: Moderately        Balance:   Dynamic Sitting Balance  Dynamic Sitting-Comments: Fair    Static Standing Balance  Static Standing-Comment: Fair -    Dynamic Standing  Balance  Dynamic Standing-Comments: Fair-      Activity Tolerance:  Activity Tolerance  Endurance: Endurance does not limit participation in activity      Therapeutic Exercise  Therapeutic Exercise Performed:  (seated LE ther-ex: AP, LAQ, seated marching, hip add (pillow squeeze) x10)     Balance/Neuromuscular Re-Education  Balance/Neuromuscular Re-Education Activity Performed:  (Patient performed unsupported reaching in both seated and standing position.  She also performed unsupported standing marching in place with min x1 for balance)    Bed Mobility  Bed Mobility:  (supine-->sit: SBAx1  HOB raised 20 degrees. Patient denied dizziness with change in position.)    Ambulation/Gait Training  Ambulation/Gait Training Performed:  (Patient amb 200' with ww and CGAx1.  Decreased step length, some lateral veering towards right side.  Assist needed to manuever ww around obstacles secondary to visuial deficits.  No sciossoring gait this date.)    Transfers  Transfer:  (sit-->stand: CGAx1  Three stands performed with ww.  Cueing needed for hand placement.)          Outcome Measures:  [facility] Basic Mobility  Turning from your back to your side while in a flat bed without using bedrails: A little  Moving from lying on your back to sitting on the side of a flat bed without using bedrails: A little  Moving to and from bed to chair (including a wheelchair): A little  Standing up from a chair using your arms (e.g. wheelchair or bedside chair): A little  To walk in hospital room: A little  Climbing 3-5 steps with railing: A lot  Basic Mobility - Total Score: 17    Education Documentation  Precautions, taught by Christine Rivera PTA at 4/12/2024 11:10 AM.  Learner: Patient  Readiness: Acceptance  Method: Demonstration, Explanation  Response: Needs Reinforcement    Mobility Training, taught by Christine Rivera PTA at 4/12/2024 11:10 AM.  Learner: Patient  Readiness: Acceptance  Method: Demonstration, Explanation  Response: Needs  "Reinforcement    Education Comments  Individual(s) Educated: Patient, Child  Equipment:  (Recommend ww for ambulation at this time.  Daughter does not think there is a walker at home.  Made her aware that one can be dispensed while the patient is here if needed.  She will let therapy know if she would like one.)    Encounter Problems       Encounter Problems (Active)       PT Problem       Transfers sit <> stand with ww supervised (Progressing)       Start:  04/11/24    Expected End:  04/25/24            Patient to ambulate with ww 50' supervised without lateral veer or scissor ambulation  (Progressing)       Start:  04/11/24    Expected End:  04/25/24            Patient to demonstrate reach > 2 \" out of MARY without UE support supervised  (Progressing)       Start:  04/11/24    Expected End:  04/25/24                     "

## 2024-04-12 NOTE — PROGRESS NOTES
Hospitalist Progress Note    Fadia Bolden  1955  68 y.o.  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    Subjective:  Patient seen and examined.  Today, patient is doing better.  She has no new complaints.  Denied headache or vision change.  Blood pressure is improving.  No reported seizure overnight.  Her vital signs are stable, afebrile.    Medications:    Current Facility-Administered Medications:     aspirin chewable tablet 81 mg, 81 mg, oral, Daily, DESHAWN Santiago-CNP, 81 mg at 04/11/24 0959    atorvastatin (Lipitor) tablet 80 mg, 80 mg, oral, Nightly, Presley Keys MD, 80 mg at 04/11/24 2018    hydrALAZINE (Apresoline) injection 10 mg, 10 mg, intravenous, q20 min PRN **FOLLOWED BY** hydrALAZINE (Apresoline) tablet 25 mg, 25 mg, oral, q6h PRN, Presley Keys MD    labetaloL (Normodyne,Trandate) injection 10 mg, 10 mg, intravenous, q10 min PRN, Presley Keys MD    levETIRAcetam (Keppra) tablet 500 mg, 500 mg, oral, BID, Gris Mast APRN-CNP, 500 mg at 04/11/24 2018    oxygen (O2) therapy, , inhalation, Continuous PRN - O2/gases, Presley Keys MD    polyethylene glycol (Glycolax, Miralax) packet 17 g, 17 g, oral, Daily, Presley Keys MD    Vital signs in last 24 hours:  Temp:  [36.3 °C (97.3 °F)-36.9 °C (98.4 °F)] 36.3 °C (97.3 °F)  Heart Rate:  [55-76] 73  Resp:  [14-16] 16  BP: (140-179)/(70-86) 140/86    Physical Exam:  General: Awake, alert, oriented x3, no distress, cooperative.  HEENT: EOM intact, PERRLA.  Neck: Supple, no JVD, no masses, no lymphadenopathy.  Chest: Normal breath sounds bilateral, good chest expansion, no wheezes, no crackles, no rhonchi.  Heart: Regular rate and rhythm, S1-S2 normal, no murmur, no gallops.  Abdomen: Soft, nontender, no organomegaly, no ascites, no guarding or  rigidity.  Neurological: Alert and oriented x3, cranial nerves are intact, normal power and tone of 4 limbs.  Skin: No lesions, no skin rash.  Warm and dry.  Musculoskeletal: Normal, atraumatic, no obvious deformities.  Legs: No leg edema, no clubbing or cyanosis.  Psych: Appropriate mood and behavior.    LABS:  Lab Results   Component Value Date    WBC 3.9 (L) 04/12/2024    HGB 11.8 (L) 04/12/2024    HCT 35.2 (L) 04/12/2024    MCV 89 04/12/2024     04/12/2024     Lab Results   Component Value Date    GLUCOSE 91 04/12/2024    CALCIUM 8.9 04/12/2024     04/12/2024    K 4.0 04/12/2024    CO2 30 04/12/2024     04/12/2024    BUN 21 04/12/2024    CREATININE 0.85 04/12/2024     Imaging:  MR brain w and wo IV contrast [[id number]] Collected: 04/11/24 0927   Order Status: Completed Updated: 04/11/24 0928   Narrative:     Interpreted By:  Diana Rubio,  STUDY:  MR BRAIN W AND WO IV CONTRAST;  4/11/2024 9:16 am      INDICATION:  Signs/Symptoms:right sided weakness, prior CVA w/ hemorrhagic  conversion s/p craniotomy.      COMPARISON:  CT April 10, 2024 and MRI January 5, 2024      ACCESSION NUMBER(S):  [id number]      ORDERING CLINICIAN:  BECKY LAZO      TECHNIQUE:  Axial T2, FLAIR, DWI, gradient echo T2 and  T1 weighted images of  brain were acquired. Post contrast T1 weighted images were acquired  after administration of 9 mL Dotarem gadolinium based intravenous  contrast.      FINDINGS:  There are again postoperative changes from left-sided craniectomy.  Compared with prior MRI there has been interval cranioplasty. There  is nonspecific dural thickening and enhancement deep to the  cranioplasty and overlying the left cerebral hemisphere which is  likely postoperative in nature, at least in part. Additionally, there  is an extra-axial collection deep to the cranioplasty that is  somewhat mixed in signal measuring approximately 4-5 mm in thickness.  There is little to no associated mass  effect.  There is a focus of encephalomalacia and gliosis in the left  parietal, occipital, and posterior temporal lobes. There is extensive  susceptibility artifact within and surrounding the area of  encephalomalacia on gradient echo T2 weighted imaging which may be  due to blood products and/or mineralization, relatively similar from  the previous MRI. There are nodular and curvilinear areas of  increased signal on diffusion weighted imaging overall less  pronounced than seen previously and favored to be due largely to  artifact. No additional areas of diffusion restriction suggestive of  acute ischemic injury are identified. There are gyriform areas of  bright signal on precontrast T1 weighted imaging within the area of  encephalomalacia likely related to laminar necrosis. Some degree of  subtle superimposed gyriform enhancement is difficult to entirely  exclude.      There is ex vacuo dilatation of the occipital horn, atrium, posterior  body, and temporal horn of the left lateral ventricle superimposed on  more diffuse parenchymal volume loss. There is again prominence of  the extra-axial CSF space along the posteromedial aspect of the left  occipital lobe, unchanged from the prior study and potentially  representing an arachnoid cyst.      There are scattered small hyperintensities on FLAIR and T2 weighted  imaging more diffusely in the white matter which could reflect  small-vessel ischemic disease in a patient of this age.      There is trace mucosal thickening within scattered paranasal sinuses.  The mastoid air cells are clear.       Impression:     1. Postoperative changes from left lateral craniectomy with  cranioplasty.  2. Redemonstration of large area of encephalomalacia and gliosis  primarily involving the left PCA distribution with evolving  parenchymal hemorrhage. No evidence of new acute ischemic injury.              MACRO:  None      Signed by: Diana Rubio 4/11/2024 9:26 AM  Dictation  workstation:   [id number]        Assessment and Plan:    #.  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  History of L parietoccipital CVA with hemorrhagic conversion s/p L hemicraniectomy/SDH evacuation (10/2023) and L cranioplasty (2/2024)  -Residual deficits include right-sided weakness and aphasia, which are improving per family  -CT head showed stable infarct with no significant interval change, CTA negative for LVO  -Now presenting with right upper extremity shaking, she was aware during these episodes per family  -Unable to perform neurologic exam secondary to sedation due to Ativan  -Symptoms seem to be consistent with focal aware seizure     Plan:  -Neurology consult  -MRI brain with and without contrast as per neurology recommendations  -Daily aspirin, statin  -Permissive hypertension for now until MRI rules out new CVA  -Not a candidate for thrombolytics given prior CVA with hemorrhagic conversion and recent neurosurgical procedures, this was [place] [facility] [place]  -NIH stroke scale and neurochecks per order set  -Seizure precautions  -Loaded with Keppra in ED, defer to neurology for further antiepileptics  -Ativan as needed for additional seizure activity overnight  -N.p.o. for now until Ativan wears off and she passes swallow study  -PT/OT/TCC consult     VTE PPX: SCDs only given recent hemorrhagic CVA.    Daily progress/update:  4/11/2024:  Blood pressure has been intermittently elevated, afebrile, heart rate stable, no obvious focal deficits, today CBC and BMP reviewed as above, neurology is following, MRI brain with and without contrast done today, reviewed, showed evolving parenchymal hemorrhage of the left PCA distribution, neurology recommended to do EEG and to hold aspirin for now, continue other treatments.    4/12/2024:  Hemodynamically stable, afebrile, blood pressure improved, no new symptoms, started on Keppra by neurology, started back on aspirin, neurology is  following, EEG was done and results are pending, plan to wait for neurology recommendations and discharge instructions.    MDM: Moderate complexity, time spent is 42 minutes.    ELIN BECK MD.    04/12/24  10:28 AM

## 2024-04-12 NOTE — NURSING NOTE
All discharge paperwork reviewed with patient and family. All questions answered at this time. IV discontinued and personal belongings gathered

## 2024-04-12 NOTE — DISCHARGE SUMMARY
Discharge summary.    Fadia Bolden  68 y.o.  1955  [id number]    Date of admission: 4/10/2024   Date of discharge:  4/12/2024.    Discharge Diagnosis:  #1 right sided weakness.  #2 focal aware seizure.  #3 recent left parieto-occipital CVA with hemorrhagic transformation, status post left hemicraniectomy, subdural hematoma evacuation and left cranioplasty.  #4 evolving parenchymal hemorrhage of the left PCA distribution.    Problem list:  Patient Active Problem List    Diagnosis Date Noted    Seizure-like activity (Multi) 04/11/2024    Anxiety 04/10/2024    Apraxia following cerebrovascular accident 02/29/2024    Gastroesophageal reflux disease 02/19/2024    Vision loss 02/19/2024    SDH (subdural hematoma) (Multi) 01/08/2024    Coordination abnormal 12/14/2023    Malignant neoplasm of upper-outer quadrant of breast in female, estrogen receptor positive, unspecified laterality (Multi) 12/12/2023    Mixed hyperlipidemia 12/12/2023    Primary hypertension 12/12/2023    Decreased mobility and endurance 12/05/2023    Balance problem 12/05/2023    Weakness generalized 12/05/2023    Closed fracture of proximal end of left humerus with routine healing 12/05/2023    Aphasia as late effect of cerebrovascular accident (CVA) 12/05/2023    Cerebrovascular accident (CVA) (Multi) 11/08/2023    Anisocoria 10/30/2023    Cerebral edema (Multi) 10/27/2023    Right sided weakness 10/27/2023    Hemianopsia 10/27/2023    Aphasia 10/25/2023    Impaired gait and mobility 10/25/2023    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) 10/24/2023    Acquired hypothyroidism 07/12/2023    Esophageal stricture 07/12/2023    History of breast cancer 07/12/2023    Nontoxic multinodular goiter 07/12/2023    DAIJA on CPAP 07/12/2023     Vitamin D deficiency 07/12/2023    Protein-calorie malnutrition, unspecified severity (Multi) 07/12/2023    Acquired skull defect 02/02/2024     Discharge medications:     Your medication list        START taking these medications        Instructions Last Dose Given Next Dose Due   levETIRAcetam 500 mg tablet  Commonly known as: Keppra      Take 1 tablet (500 mg) by mouth 2 times a day.              CONTINUE taking these medications        Instructions Last Dose Given Next Dose Due   aspirin 81 mg chewable tablet           atorvastatin 80 mg tablet  Commonly known as: Lipitor      Take 1 tablet (80 mg) by mouth once daily.       cholecalciferol 50 MCG (2000 UT) tablet  Commonly known as: Vitamin D-3           Amelia-C with Bioflavonoids 500-200 mg tablet  Generic drug: ascorbate calcium-bioflavonoid           lisinopril 20 mg tablet      Take 1 tablet (20 mg) by mouth once daily.                 Where to Get Your Medications        These medications were sent to [id number] - [address] AT [facility] & [address] [address]      Hours: 24-hours Phone: [phone]   levETIRAcetam 500 mg tablet       Hospital Course  This is a 68 years old female patient presented to the emergency room because of right upper extremity shaking episodes.  She had a recent history of left parieto-occipital CVA with hemorrhagic transformation, status post left hemicraniectomy, subdural hematoma status postevacuation and left cranioplasty that was done on February 2024.  In the emergency department, she was given Ativan for questionable seizure activity.  Patient was admitted to the floor and neurology was consulted.  She was on aspirin which was held for 1 day.  Patient had no significant focal deficit on physical exam.  MRI brain done on 4/11/2024 that showed postoperative changes of the left lateral craniotomy with cranioplasty, redemonstration of large area  "of encephalomalacia and gliosis primarily involving the left PCA distribution with evolving parenchymal hemorrhage, no evidence of new acute ischemic injury.  Neuro recommended to start patient on Keppra 500 mg twice a day and to resume aspirin.  EEG was done which was indicative of mild to moderate encephalopathy, no epileptiform discharges or lateralizing signs.  On 4/12/2024, CT scan brain repeated and showed postoperative changes and there was no new changes compared to MRI brain that was done on 4/11/2024.  Neurology recommended to discharge patient on Keppra twice a day and to resume aspirin.  Patient discharged home in stable medical condition, discharged on Keppra 500 mg twice a day, discharged on aspirin 81 mg daily as per neurology recommendations, recommended follow-up with PCP in 1 week and follow-up with neurology in 2 weeks.    Vital signs:  Visit Vitals  /69 (Patient Position: Sitting)   Pulse 72   Temp 37.5 °C (99.5 °F)   Resp 20   Ht 1.651 m (5' 5\")   Wt 49.7 kg (109 lb 9.6 oz)   SpO2 100%   BMI 18.24 kg/m²   OB Status Postmenopausal   Smoking Status Never   BSA 1.51 m²     Discharge physical exam:  Physical Exam:  General: Awake, alert, oriented x3, no distress, cooperative.  HEENT: EOM intact, PERRLA.  Neck: Supple, no JVD, no masses, no lymphadenopathy.  Chest: Normal breath sounds bilateral, good chest expansion, no wheezes, no crackles, no rhonchi.  Heart: Regular rate and rhythm, S1-S2 normal, no murmur, no gallops.  Abdomen: Soft, nontender, no organomegaly, no ascites, no guarding or rigidity.  Neurological: Alert and oriented x3, cranial nerves are intact, normal power and tone of 4 limbs.  Skin: No lesions, no skin rash.  Warm and dry.  Musculoskeletal: Normal, atraumatic, no obvious deformities.  Legs: No leg edema, no clubbing or cyanosis.  Psych: Appropriate mood and behavior.    Labs:  Lab Results   Component Value Date    WBC 3.9 (L) 04/12/2024    HGB 11.8 (L) 04/12/2024    HCT " 35.2 (L) 04/12/2024    MCV 89 04/12/2024     04/12/2024     Lab Results   Component Value Date    GLUCOSE 91 04/12/2024    CALCIUM 8.9 04/12/2024     04/12/2024    K 4.0 04/12/2024    CO2 30 04/12/2024     04/12/2024    BUN 21 04/12/2024    CREATININE 0.85 04/12/2024     Imaging studies:  Procedure Component Value Units Date/Time   CT head wo IV contrast [[id number]] Collected: 04/12/24 0919   Order Status: Completed Updated: 04/12/24 0919   Narrative:     Interpreted By:  Jasen Sifuentes,  STUDY:  CT HEAD WO IV CONTRAST;  4/12/2024 8:47 am      INDICATION:  Signs/Symptoms: Evolving hemorrhage.      COMPARISON:  MRI dated 04/11/2024      ACCESSION NUMBER(S):  [id number]      ORDERING CLINICIAN:  QIAN HERNANDEZ      TECHNIQUE:  Axial CT images of the head were obtained without intravenous  contrast administration.      FINDINGS:  Postoperative changes are again identified compatible with a previous  left hemicraniectomy.      There is a small amount of dural thickening and/or extra-axial  intermediate to low-attenuation fluid along the left craniotomy site  mildly more pronounced anteriorly.      An area of encephalomalacia/gliosis is again identified within the  left parietal lobe, left occipital lobe, posterior left temporal  lobe, and posterior left frontal lobe with compensatory dilatation of  the adjacent left lateral ventricle which given differences in  imaging technique appears similar when compared with the prior recent  MRI dated 04/11/2024.      The above findings are superimposed upon moderate brain parenchymal  volume loss.      Additional mild nonspecific white matter changes are noted within  cerebral hemispheres bilaterally which while nonspecific, given the  patient's age, likely represent sequelae of small-vessel ischemic  change.      No hyperdense acute hemorrhage is noted.      There is no midline shift.      The paranasal sinuses and mastoid air cells are clear.            Impression:     Postoperative changes are again identified compatible with a previous  left hemicraniectomy. An area of encephalomalacia/gliosis is again  identified within the left parietal lobe, left occipital lobe,  posterior left temporal lobe, and posterior left frontal lobe with  compensatory dilatation of the adjacent left lateral ventricle which,  given differences in imaging technique, appears similar when compared  with the prior recent MRI dated 04/11/2024.      The above findings are superimposed upon moderate brain parenchymal  volume loss.      Additional mild nonspecific white matter changes are noted within  cerebral hemispheres bilaterally which while nonspecific, given the  patient's age, likely represent sequelae of small-vessel ischemic  change.      MACRO:  None.      Signed by: Jasen Sifuentes 4/12/2024 9:18 AM  Dictation workstation:   [id number]     MR brain w and wo IV contrast [[id number]] Collected: 04/11/24 0927   Order Status: Completed Updated: 04/11/24 0928   Narrative:     Interpreted By:  Diana Rubio,  STUDY:  MR BRAIN W AND WO IV CONTRAST;  4/11/2024 9:16 am      INDICATION:  Signs/Symptoms:right sided weakness, prior CVA w/ hemorrhagic  conversion s/p craniotomy.      COMPARISON:  CT April 10, 2024 and MRI January 5, 2024      ACCESSION NUMBER(S):  [id number]      ORDERING CLINICIAN:  BECKY LAZO      TECHNIQUE:  Axial T2, FLAIR, DWI, gradient echo T2 and  T1 weighted images of  brain were acquired. Post contrast T1 weighted images were acquired  after administration of 9 mL Dotarem gadolinium based intravenous  contrast.      FINDINGS:  There are again postoperative changes from left-sided craniectomy.  Compared with prior MRI there has been interval cranioplasty. There  is nonspecific dural thickening and enhancement deep to the  cranioplasty and overlying the left cerebral hemisphere which is  likely postoperative in nature, at least in part. Additionally, there  is  an extra-axial collection deep to the cranioplasty that is  somewhat mixed in signal measuring approximately 4-5 mm in thickness.  There is little to no associated mass effect.      There is a focus of encephalomalacia and gliosis in the left  parietal, occipital, and posterior temporal lobes. There is extensive  susceptibility artifact within and surrounding the area of  encephalomalacia on gradient echo T2 weighted imaging which may be  due to blood products and/or mineralization, relatively similar from  the previous MRI. There are nodular and curvilinear areas of  increased signal on diffusion weighted imaging overall less  pronounced than seen previously and favored to be due largely to  artifact. No additional areas of diffusion restriction suggestive of  acute ischemic injury are identified. There are gyriform areas of  bright signal on precontrast T1 weighted imaging within the area of  encephalomalacia likely related to laminar necrosis. Some degree of  subtle superimposed gyriform enhancement is difficult to entirely  exclude.      There is ex vacuo dilatation of the occipital horn, atrium, posterior  body, and temporal horn of the left lateral ventricle superimposed on  more diffuse parenchymal volume loss. There is again prominence of  the extra-axial CSF space along the posteromedial aspect of the left  occipital lobe, unchanged from the prior study and potentially  representing an arachnoid cyst.      There are scattered small hyperintensities on FLAIR and T2 weighted  imaging more diffusely in the white matter which could reflect  small-vessel ischemic disease in a patient of this age.      There is trace mucosal thickening within scattered paranasal sinuses.  The mastoid air cells are clear.       Impression:     1. Postoperative changes from left lateral craniectomy with  cranioplasty.  2. Redemonstration of large area of encephalomalacia and gliosis  primarily involving the left PCA distribution  with evolving  parenchymal hemorrhage. No evidence of new acute ischemic injury.              MACRO:  None      Signed by: Diana Rubio 4/11/2024 9:26 AM  Dictation workstation:   [id number]     Disposition: Home self-care.    Time spent on discharge and discharge summary is 35 minutes.      Ck Leo MD  04/12/24  3:41 PM

## 2024-04-12 NOTE — PROGRESS NOTES
Advised patient of Dr. Gildardo Rodriguez note (see result note from today). Patient verbalized understanding  Patient states she has not yet started Cipro. Patient states she will  script from pharmacy today.    Per patient, she still has some urinary urgen Chart reviewed PT saw patient today am-pac 17. Writer spoke with patient and spouse discussed discharge. She plans to return home with outpatient OT and Speech to continue. Declined HHC. Feels comfortable resuming previous outpatient. Care Transition team to follow and assist as needed. NEEMA Plunkett

## 2024-04-12 NOTE — CARE PLAN
The patient's goals for the shift include be safe    The clinical goals for the shift include mainbtain baseline neuro status

## 2024-04-12 NOTE — CARE PLAN
No reports of seizure activity overnight. Continues with Keppra 500 BID. EEG read is still pending.

## 2024-04-15 ENCOUNTER — PATIENT OUTREACH (OUTPATIENT)
Dept: CARE COORDINATION | Facility: CLINIC | Age: 69
End: 2024-04-15
Payer: COMMERCIAL

## 2024-04-15 ENCOUNTER — APPOINTMENT (OUTPATIENT)
Dept: NEUROSURGERY | Facility: HOSPITAL | Age: 69
End: 2024-04-15
Payer: COMMERCIAL

## 2024-04-16 ENCOUNTER — APPOINTMENT (OUTPATIENT)
Dept: SPEECH THERAPY | Facility: CLINIC | Age: 69
End: 2024-04-16
Payer: COMMERCIAL

## 2024-04-16 ENCOUNTER — TREATMENT (OUTPATIENT)
Dept: OCCUPATIONAL THERAPY | Facility: CLINIC | Age: 69
End: 2024-04-16
Payer: COMMERCIAL

## 2024-04-16 ENCOUNTER — DOCUMENTATION (OUTPATIENT)
Dept: SPEECH THERAPY | Facility: CLINIC | Age: 69
End: 2024-04-16

## 2024-04-16 DIAGNOSIS — R27.8 COORDINATION ABNORMAL: ICD-10-CM

## 2024-04-16 DIAGNOSIS — R53.1 WEAKNESS GENERALIZED: ICD-10-CM

## 2024-04-16 DIAGNOSIS — I69.390 APRAXIA FOLLOWING CEREBROVASCULAR ACCIDENT: Primary | ICD-10-CM

## 2024-04-16 PROCEDURE — 97112 NEUROMUSCULAR REEDUCATION: CPT | Mod: GO,CO

## 2024-04-16 NOTE — PROGRESS NOTES
"Occupational Therapy      Patient Name: Fadia Bolden  MRN: [id number]  Today's Date: 4/16/2024      Time Calculation  Start Time: 1002  Stop Time: 1045  Time Calculation (min): 43 min    Assessment:   During functional activity pt states she feels pressure in head, with seated rest break pt states it goes away mostly. After completing functional task pt states pressure comes back slightly. MALDONADO, pt and pt spouse discuss symptoms of pressure in head and slightly raised systolic BP, when to seek medical care if needed, pt and spouse verbalize understanding and are agreeable.     Plan:   Interventions: Self Care/ADL, neuromuscular reeducation, therapeutic exercise   Duration: 1-2x/wk for 8 visits total       Subjective   Pt and spouse present for session. Pt  states pt was grocery shopping with daughter last week, stated she didn't feel well, and experienced a focal point seizure, right arm. Pt daughter took pt to hospital, pt was admitted for 2 days and is now on anti-seizure medication. Pt spouse states MRI did not show evidence of an event.     Current Problem:  1. Apraxia following cerebrovascular accident        2. Coordination abnormal        3. Weakness generalized              Pain:   Pt reports 0/10 pain     Objective   Pt with noted increased difficulty with therapeutic activities once multitasking component added   Pt demonstrated improvement with task with repetition and task grading   Continues to demonstrate difficulty with word finding     Treatment: 43    Neuro 43      Dynamic reaching/standing balance, multitasking, and GMC addressed with the following:     In parellel bars pt stands on blue balance pad, follows MALDONADO cues to step right onto river rock, or left over 2\" barrier. As pt completes she states she feels \"pressure\", using hands indicates her whole head. MALDONADO instructs pt to take seated rest break, takes BP, 155-71. Pt spouse states top number is high for pt, usually in 120s. After rest " break pt agrees to continue.   Pt repeats activity while holding cone upside down with ball balanced on cone, instructed not to drop ball if possible.  Pt completes contralateral and ipsilateral movements on blue balance pad, 2x10 each.

## 2024-04-16 NOTE — PROGRESS NOTES
Speech-Language Pathology                 Therapy Communication Note    Patient Name: Fadia Bolden  MRN: [id number]  Today's Date: 4/16/2024     Discipline: Speech Language Pathology    Missed Visit Reason:      Missed Time: Cancel    Comment:   Patient determined that she did not feel like she could participate in speech therapy following PT today. She was recently hospitalized for seizure activity (4/10-4/12). Today's appointment was canceled.

## 2024-04-18 ENCOUNTER — OFFICE VISIT (OUTPATIENT)
Dept: SLEEP MEDICINE | Facility: CLINIC | Age: 69
End: 2024-04-18
Payer: COMMERCIAL

## 2024-04-18 VITALS
DIASTOLIC BLOOD PRESSURE: 85 MMHG | SYSTOLIC BLOOD PRESSURE: 155 MMHG | RESPIRATION RATE: 18 BRPM | OXYGEN SATURATION: 98 % | HEART RATE: 65 BPM | WEIGHT: 101.1 LBS | BODY MASS INDEX: 17.26 KG/M2 | HEIGHT: 64 IN

## 2024-04-18 DIAGNOSIS — G47.33 OSA (OBSTRUCTIVE SLEEP APNEA): Primary | ICD-10-CM

## 2024-04-18 PROCEDURE — 1160F RVW MEDS BY RX/DR IN RCRD: CPT | Performed by: GENERAL PRACTICE

## 2024-04-18 PROCEDURE — 1123F ACP DISCUSS/DSCN MKR DOCD: CPT | Performed by: GENERAL PRACTICE

## 2024-04-18 PROCEDURE — 1036F TOBACCO NON-USER: CPT | Performed by: GENERAL PRACTICE

## 2024-04-18 PROCEDURE — 1159F MED LIST DOCD IN RCRD: CPT | Performed by: GENERAL PRACTICE

## 2024-04-18 PROCEDURE — 99215 OFFICE O/P EST HI 40 MIN: CPT | Performed by: GENERAL PRACTICE

## 2024-04-18 PROCEDURE — 3079F DIAST BP 80-89 MM HG: CPT | Performed by: GENERAL PRACTICE

## 2024-04-18 PROCEDURE — 3077F SYST BP >= 140 MM HG: CPT | Performed by: GENERAL PRACTICE

## 2024-04-18 PROCEDURE — 1111F DSCHRG MED/CURRENT MED MERGE: CPT | Performed by: GENERAL PRACTICE

## 2024-04-18 ASSESSMENT — SLEEP AND FATIGUE QUESTIONNAIRES
HOW LIKELY ARE YOU TO NOD OFF OR FALL ASLEEP WHEN YOU ARE A PASSENGER IN A CAR FOR AN HOUR WITHOUT A BREAK: WOULD NEVER DOZE
HOW LIKELY ARE YOU TO NOD OFF OR FALL ASLEEP IN A CAR, WHILE STOPPED FOR A FEW MINUTES IN TRAFFIC: WOULD NEVER DOZE
HOW LIKELY ARE YOU TO NOD OFF OR FALL ASLEEP WHILE SITTING QUIETLY AFTER LUNCH WITHOUT ALCOHOL: WOULD NEVER DOZE
HOW LIKELY ARE YOU TO NOD OFF OR FALL ASLEEP WHILE LYING DOWN TO REST IN THE AFTERNOON WHEN CIRCUMSTANCES PERMIT: HIGH CHANCE OF DOZING
HOW LIKELY ARE YOU TO NOD OFF OR FALL ASLEEP WHILE SITTING AND TALKING TO SOMEONE: WOULD NEVER DOZE
SITING INACTIVE IN A PUBLIC PLACE LIKE A CLASS ROOM OR A MOVIE THEATER: WOULD NEVER DOZE
ESS-CHAD TOTAL SCORE: 3
HOW LIKELY ARE YOU TO NOD OFF OR FALL ASLEEP WHILE WATCHING TV: WOULD NEVER DOZE
HOW LIKELY ARE YOU TO NOD OFF OR FALL ASLEEP WHILE SITTING AND READING: WOULD NEVER DOZE

## 2024-04-18 ASSESSMENT — ENCOUNTER SYMPTOMS
LOSS OF SENSATION IN FEET: 0
DEPRESSION: 0
OCCASIONAL FEELINGS OF UNSTEADINESS: 0

## 2024-04-18 ASSESSMENT — COLUMBIA-SUICIDE SEVERITY RATING SCALE - C-SSRS
1. IN THE PAST MONTH, HAVE YOU WISHED YOU WERE DEAD OR WISHED YOU COULD GO TO SLEEP AND NOT WAKE UP?: NO
2. HAVE YOU ACTUALLY HAD ANY THOUGHTS OF KILLING YOURSELF?: NO
6. HAVE YOU EVER DONE ANYTHING, STARTED TO DO ANYTHING, OR PREPARED TO DO ANYTHING TO END YOUR LIFE?: NO

## 2024-04-18 NOTE — PROGRESS NOTES
Patient: Fadia Bolden    [id number]  : 1955 -- AGE 68 y.o.    Provider: Vaughn Vazquez DO     [facility]   Service Date: 2024              [facility]  Follow  Visit Note        HISTORY OF PRESENT ILLNESS     The patient's referring provider is: Annabel Mann MD    HISTORY OF PRESENT ILLNESS   Fadia Bolden is a 68 y.o. female who presents to a [facility] for a sleep medicine evaluation with concerns of Sleep Apnea (Hasn't been able to use it for about over a year. The pressure is too much for her even though the pressure was adjusted and lowered. ).     The patient  has a past medical history of Breast cancer (Multi), HTN (hypertension), Hypothyroidism, DAIJA (obstructive sleep apnea), Other chest pain (02/10/2021), Other specified health status (2018), Peripheral vision loss, right, Personal history of irradiation, Personal history of malignant neoplasm of breast (2022), and Stroke (Multi)..    PAST SLEEP HISTORY     F with pmhx including GERD, history of breast cancer.      Patient has been snoring since around  and had a sleep study done.   HST 21 --> AHI 21.5. moderate sleep apnea.   patient was started on pap therapy at that time but she has been struggling with a mask leak, air is coming out from the sides of the mouth. Hence, she is not using pap therapy regularly.   she has decreased the max pressure to 14.6 as she could not tolerate higher pressure.       CURRENT HISTORY    On today's visit, 2024, the patient reports that she had a stroke in 2023?.   She has not been using pap therapy regularly.       PAP Related Problems: leak perceived.   Perceived Benefits of PAP Therapy: decreased snoring/ choking/ gasping.     Sleep schedule  on weekdays / work days:  Usual Bedtime: 10:30pm  Sleep latency: 30min  Wake time : 7:30am  Total sleep time average/day: 6  hours/day  Awakenings: 1-2x per night, nocturia, variable length. Laying in bed.   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 works during the day.     Preferred sleeping position: SLEEP POSITION: supine    Sleep-related ROS:    Snoring:  y  Witnessed apneas:  y      Gasping/ choking: sometimes      Am Dry mouth:   y          Nasal congestion:  n      am headaches: n    Sleep is described as unrefreshing mostly.     Daytime sleepiness: y  Fatigue or decreased energy: y    Drowsy driving: does not drive now due to stroke / seizures.       RLS screen:  RLSSCREEN: - Sensations: Patient has unusual sensations in their extremities that cause an urge to move them   1x per month. Improves with movement.     Sleep-related behaviors: DENIES    ESS: 3       REVIEW OF SYSTEMS     REVIEW OF SYSTEMS  Review of Systems   All other systems reviewed and are negative.      ALLERGIES AND MEDICATIONS     ALLERGIES  Allergies   Allergen Reactions    Fish Derived Rash       MEDICATIONS  Current Outpatient Medications   Medication Sig Dispense Refill    ascorbate calcium-bioflavonoid (Amelia-C with Bioflavonoids) 500-200 mg tablet Take 1 tablet by mouth once daily.      aspirin 81 mg chewable tablet Chew 1 tablet (81 mg) once daily.      atorvastatin (Lipitor) 80 mg tablet Take 1 tablet (80 mg) by mouth once daily. 30 tablet 11    cholecalciferol (Vitamin D-3) 50 MCG (2000 UT) tablet Take 1 tablet (2,000 Units) by mouth once daily.      levETIRAcetam (Keppra) 500 mg tablet Take 1 tablet (500 mg) by mouth 2 times a day. 90 tablet 0    lisinopril 20 mg tablet Take 1 tablet (20 mg) by mouth once daily. 30 tablet 11     No current facility-administered medications for this visit.         PAST HISTORY     PAST MEDICAL HISTORY  She  " has a past medical history of Breast cancer (Multi), HTN (hypertension), Hypothyroidism, DAIJA (obstructive sleep apnea), Other chest pain (02/10/2021), Other specified health status (12/31/2018), Peripheral vision loss, right, Personal history of irradiation, Personal history of malignant neoplasm of breast (06/27/2022), and Stroke (Multi).      PAST SURGICAL HISTORY:  Past Surgical History:   Procedure Laterality Date    MR HEAD ANGIO W AND WO IV CONTRAST  10/25/2023    MR HEAD ANGIO W AND WO IV CONTRAST 10/25/2023    MR HEAD ANGIO WO IV CONTRAST  1/5/2024    MR HEAD ANGIO WO IV CONTRAST 1/5/2024 [facility] [id number] MRI    OTHER SURGICAL HISTORY  11/22/2021    Tonsillectomy       FAMILY HISTORY  Family History   Problem Relation Name Age of Onset    Thyroid disease Mother      Parkinsonism Father      Parkinsonism Sister      Other (HTN) Sister      Breast cancer Father's Sister      Breast cancer Paternal Grandmother           SOCIAL HISTORY  She  reports that she has never smoked. She has never used smokeless tobacco. She reports that she does not currently use alcohol. She reports that she does not use drugs.     Caffeine consumption: n      PHYSICAL EXAM     VITAL SIGNS: /85   Pulse 65   Resp 18   Ht 1.626 m (5' 4\")   Wt 45.9 kg (101 lb 1.6 oz)   SpO2 98%   BMI 17.35 kg/m²      PREVIOUS WEIGHTS:  Wt Readings from Last 3 Encounters:   04/18/24 45.9 kg (101 lb 1.6 oz)   04/10/24 49.7 kg (109 lb 9.6 oz)   04/10/24 46.2 kg (101 lb 12.8 oz)       Physical Exam  Constitutional: Alert cooperative, no obvious distress.   psychiatric: appropriate mood and affect.      RESULTS/DATA         ASSESSMENT/PLAN     Ms. Bolden is a 68 y.o. female and  has a past medical history of Breast cancer (Multi), HTN (hypertension), Hypothyroidism, DAIJA (obstructive sleep apnea), Other chest pain (02/10/2021), Other specified health status (12/31/2018), Peripheral vision loss, right, Personal history of irradiation, Personal history " of malignant neoplasm of breast (06/27/2022), and Stroke (Multi). She is following up on sleep apnea.     Problem List Items Addressed This Visit    None  Visit Diagnoses       DAIJA (obstructive sleep apnea)                Problem List and Orders    F with pmhx including GERD, history of breast cancer, stroke in oct. 2023.      1- DAIJA  HST 5/13/21 --> AHI 21.5. moderate sleep apnea.      Reviewed and discussed the above sleep study results and management options in details. All questions answered, patient verbalizes understanding.      patient is struggling with a leak thus is not using pap regularly.   she has decreased the max pressure to 14.6 as she could not tolerate higher pressure--> will hold off on changing pressure until leak is well controlled.    -was on Autopap 4-14.6, rAHI 6.7 in 2021. Then she manually changed it to 4-8.4, rAHI 12.2, median pressure 6.9, max pressure avg 8.3.     -adjusted pressure setting today to Autopap 4-15cwp, will make further adjustments if needed based on download. Also adjusted ramp from 5min to 45min to start at CPAP 4cwp. Adjustment made in clinic but also sending orders to [facility] as her insurance changed and is not accepted by [facility].   -offered titration study especially with hx of recent stroke, but patient will let us know when she is ready for one, not currently interested.     -ordered a mask refitting session. Also provided sample mask in clinic.    Most adults need 7-9 hrs of sleep per day, please try to obtain that much sleep. Please keep a sleep log.   -counseled on the importance of compliance.      -do not drive or operate heavy machinery if drowsy.  -avoid sleeping on your back.   -avoid sedating substances/ medication, alcohol, illicit drugs and tobacco.  -counseled on the importance of compliance.      f/u 3 months or sooner as needed.

## 2024-04-18 NOTE — PATIENT INSTRUCTIONS
[facility] Sleep Medicine   [facility]  [address] [address]  [phone]       NAME: Fadia Bolden   DATE: 4/18/2024     Your Sleep Provider Today: Vaughn Vazquez DO  Your Primary Care Physician: Michelle Martinez DO   Your Referring Provider: Annabel Mann MD    DIAGNOSIS:   1. DAIJA (obstructive sleep apnea)  Referral to Adult Sleep Medicine          Thank you for coming to the Sleep Medicine Clinic today! Your sleep medicine provider today was: Vaughn Vazquez DO Below is a summary of your treatment plan, other important information, and our contact numbers:      TREATMENT PLAN     Follow up in 3 months or sooner as needed.     Instructions - Common DAIJA Recs: - For your sleep apnea, continue to use your PAP every night and use it whenever you are sleeping.   - Avoid alcohol or sedatives several hours prior to sleeping.   - Get additional supplies for your PAP (e.g., mask, hose, filters) every 3 months or as your insurance allows from your [employer] company. Replacement cushions for your PAP mask can be requested monthly if airseals are an issue.  - Remember to clean your mask, tubings, and water chamber regularly as instructed.  - Avoid driving or operating heavy machinery when drowsy. A person driving while sleepy is five (5) times more likely to have an accident. If you feel sleepy, pull over and take a short power nap (sleep for less than 30 minutes). Otherwise, ask somebody to drive you.        IMPORTANT INFORMATION     Call 911 for medical emergencies.  Our offices are generally open from Monday-Friday, 9 am - 5 pm.  If you need to get in touch with me, you may either call me and my team(number is below) or you can use [facility].  If a referral for a test, for CPAP, or for another specialist was made, and you have not heard about scheduling this within a week, please call scheduling at [phone] ([phone]).  If you are unable to make your  appointment for clinic or an overnight study, kindly call the office at least 48 hours in advance to cancel and reschedule.  If you are on CPAP, please bring your device's card or the device to each clinic appointment.   There are no supporting services by either the sleep doctors or their staff on weekends and Holidays, or after 5 PM on weekdays.   If you have been asked to come to a sleep study, make sure you bring toiletries, a comfy pillow, and any nighttime medications that you may regularly take. Also be sure to eat dinner before you arrive. We generally do not provide meals.      PRESCRIPTIONS     We require 7 days advanced notice for prescription refills. If we do not receive the request in this time, we cannot guarantee that your medication will be refilled in time.      IMPORTANT PHONE NUMBERS     Sleep Medicine Clinic Fax: [phone]  Appointments (for Pediatric Sleep Clinic): [phone] - option 1  Appointments (for Adult Sleep Clinic): [phone] - option 2  Appointments (For Sleep Studies): [phone] - option 3  Behavioral Sleep Medicine: [phone]  Sleep Surgery: [phone]  ENT (Otolaryngology): [phone]  Headache Clinic (Neurology): [phone]  Neurology: [phone]  Psychiatry: [phone]  Pulmonary Function Testing (PFT) Center: [phone]  Pulmonary Medicine: [phone]  [facility] (DME): [phone]  [facility] (DME): [phone]  [facility] ([facility]): [phone]      OUR ADULT SLEEP MEDICINE TEAM   Please do not hesitate to call the office or sleep nurse with any questions between appointments:    Adult Sleep Nurses (Bessy Beaver, RN and Gill Holguin RN):  For clinical questions and refilling prescriptions: [phone]  Email sleep diaries and other documents at: [email or web address]    Adult Sleep Medicine Secretaries:  Pilar Emerson (For Elzbieta/Lauren/Claudio/Erick/Alex/Yonatan):   P: [phone]   F: [phone]  Ni Nguyen (For [place]arton/[place]gen[place]iller): P: [phone]  Fax: [phone]  Adase Singh (For Jurcevic/Blank): P: [phone]  F: [phone]  Mara Malone (For [place]): P: [phone]  F: [phone]  Kathy Bo (For Kecia/Caesar/Zakhary): P: [phone]  F: [phone]  Terri Alonso (For Sean/Perry): P: [phone]  F: [phone]     Adult Sleep Medicine Advanced Practice Providers:  Tony Felix ([place], [place])  Nenita Maynard ([facility])  Marita Curiel CNP ([place], [place], [place])  Starr Snyder CNP ([place], [place], [place])  Nuha Osborne ([place], [place], [place])  Adrian Perry CNP ([place], [place])        OUR SLEEP TESTING LOCATIONS     Our team will contact you to schedule your sleep study, however, you can contact us as follow:  Main Phone Line (scheduling only): [phone] ([phone]), option 3  Adult and Pediatric Locations  [facility] (6 years and older): [facility] - 4th floor ([address] [place] [address]) After hours line: [phone]  [facility] (Main campus: All ages): [facility], 6th floor. After hours line: [phone]   [place] (5 years and older; younger considered on case-by-case basis): [address]; [address], [address]. Scheduling  After hours line: [phone]   [place] (6 years and older): [address]; [address]; [address]   [place] (6 years and older): [address] [place], [address]  After hours line: [phone]   [place] (13 years and older) in [place]: [address], 2nd floor  After hours line: [phone]  [facility] (13 year and older): [address]  After hours line: [phone]     Adult Only Locations:   [place] (18 years and older): [address], 2nd floor   [place] (18 years and older): [address] [facility] [address]; 4th floor  After hours line: [phone]  [facility]  "[place] (18 years and older) at [place]: [address] [place] [address]  After hours line: [phone]          CONTACTING YOUR SLEEP MEDICINE PROVIDER     Send a message directly to your provider through \"My Chart\", which is the email service through your  Records Account: [email or web address]   Call [phone] and leave a message. One of the administrative assistants will forward the message to your sleep medicine provider through \"My Chart\" and/or email.     Your sleep medicine provider for this visit was: Vaughn Vazquez DO        "

## 2024-04-23 ENCOUNTER — TREATMENT (OUTPATIENT)
Dept: OCCUPATIONAL THERAPY | Facility: CLINIC | Age: 69
End: 2024-04-23
Payer: COMMERCIAL

## 2024-04-23 ENCOUNTER — TREATMENT (OUTPATIENT)
Dept: SPEECH THERAPY | Facility: CLINIC | Age: 69
End: 2024-04-23
Payer: COMMERCIAL

## 2024-04-23 DIAGNOSIS — I69.390 APRAXIA FOLLOWING CEREBROVASCULAR ACCIDENT: ICD-10-CM

## 2024-04-23 DIAGNOSIS — R27.8 COORDINATION ABNORMAL: Primary | ICD-10-CM

## 2024-04-23 DIAGNOSIS — I69.320 APHASIA AS LATE EFFECT OF CEREBROVASCULAR ACCIDENT (CVA): Primary | ICD-10-CM

## 2024-04-23 DIAGNOSIS — S42.295D OTHER CLOSED NONDISPLACED FRACTURE OF PROXIMAL END OF LEFT HUMERUS WITH ROUTINE HEALING, SUBSEQUENT ENCOUNTER: ICD-10-CM

## 2024-04-23 DIAGNOSIS — R47.01 APHASIA: ICD-10-CM

## 2024-04-23 PROCEDURE — 92507 TX SP LANG VOICE COMM INDIV: CPT | Mod: GN | Performed by: STUDENT IN AN ORGANIZED HEALTH CARE EDUCATION/TRAINING PROGRAM

## 2024-04-23 PROCEDURE — 97112 NEUROMUSCULAR REEDUCATION: CPT | Mod: GO | Performed by: OCCUPATIONAL THERAPIST

## 2024-04-23 ASSESSMENT — PAIN - FUNCTIONAL ASSESSMENT: PAIN_FUNCTIONAL_ASSESSMENT: 0-10

## 2024-04-23 ASSESSMENT — PAIN SCALES - GENERAL: PAINLEVEL_OUTOF10: 0 - NO PAIN

## 2024-04-23 NOTE — PROGRESS NOTES
Occupational Therapy      Patient Name: Fadia Bolden  MRN: [id number]  Today's Date: 4/23/2024           Assessment:  Patient participated well in treatment on today's date.  Patient demonstrated improved word finding, GMC and multitasking on today's date.  Pt required decreased verbal cues for accuracy in executive functioning therapeutic activities.     Plan:   Interventions: Self Care/ADL, neuromuscular reeducation, therapeutic exercise   Duration: 1-2x/wk for 8 visits total       Subjective   Pt reports she is doing more in the kitchen     Current Problem:  1. Coordination abnormal        2. Apraxia following cerebrovascular accident        3. Aphasia        4. Other closed nondisplaced fracture of proximal end of left humerus with routine healing, subsequent encounter                Pain:   Pt reports 0/10 pain     Objective   Pt with noted increased accuracy and efficiency with word finding during Roberto 99 game   Pt with noted improved multitasking and GMC in high level GMC activity     Treatment: 47    Neuro 47    Problem solving and word finding addressed with completion of [employer] game  -completed while standing on foam balance pad     OT addressed GMC, dynamic standing balance and multitasking with the following task:   -OT sat out 4 colored dots; each dot had a number written on it.  As OT called out the number, patient to step on the colored dot with written number and vice versa.  As patient stepped on dot, pt to toss ball in upside down cone with L arm.    OT addressed visual processing and focus by having pt read colors on color worksheet:   -worksheet contains written words in different colored font on the page  -OT challenged pt to read aloud the color in which the word is written rather than the written word on the page.
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
## 2024-04-29 ENCOUNTER — HOSPITAL ENCOUNTER (OUTPATIENT)
Dept: RADIOLOGY | Facility: HOSPITAL | Age: 69
Discharge: HOME | End: 2024-04-29
Payer: COMMERCIAL

## 2024-04-29 ENCOUNTER — PATIENT OUTREACH (OUTPATIENT)
Dept: CARE COORDINATION | Facility: CLINIC | Age: 69
End: 2024-04-29

## 2024-04-29 ENCOUNTER — APPOINTMENT (OUTPATIENT)
Dept: NEUROLOGY | Facility: CLINIC | Age: 69
End: 2024-04-29
Payer: COMMERCIAL

## 2024-04-29 ENCOUNTER — TELEPHONE (OUTPATIENT)
Dept: NEUROLOGY | Facility: CLINIC | Age: 69
End: 2024-04-29

## 2024-04-29 DIAGNOSIS — I63.89 CEREBROVASCULAR ACCIDENT (CVA) DUE TO OTHER MECHANISM (MULTI): ICD-10-CM

## 2024-04-29 NOTE — PROGRESS NOTES
Unable to reach patient for hospital discharge follow up call.  Left voicemail with call back number for patient to call if needed   If no voicemail available call attempts x 2 were made to contact the patient to assist with any questions or concerns patient may have.

## 2024-04-29 NOTE — TELEPHONE ENCOUNTER
Fadia had an MRI brain after she was in the hospital recently. She wanted to know if she needed to get the one you ordered as well done.    Please advise,  Thanks!

## 2024-04-30 ENCOUNTER — APPOINTMENT (OUTPATIENT)
Dept: OCCUPATIONAL THERAPY | Facility: CLINIC | Age: 69
End: 2024-04-30
Payer: COMMERCIAL

## 2024-04-30 ENCOUNTER — TREATMENT (OUTPATIENT)
Dept: SPEECH THERAPY | Facility: CLINIC | Age: 69
End: 2024-04-30
Payer: COMMERCIAL

## 2024-04-30 DIAGNOSIS — I69.320 APHASIA AS LATE EFFECT OF CEREBROVASCULAR ACCIDENT (CVA): Primary | ICD-10-CM

## 2024-04-30 PROCEDURE — 92507 TX SP LANG VOICE COMM INDIV: CPT | Mod: GN | Performed by: STUDENT IN AN ORGANIZED HEALTH CARE EDUCATION/TRAINING PROGRAM

## 2024-04-30 ASSESSMENT — PAIN SCALES - GENERAL: PAINLEVEL_OUTOF10: 0 - NO PAIN

## 2024-04-30 ASSESSMENT — PAIN - FUNCTIONAL ASSESSMENT: PAIN_FUNCTIONAL_ASSESSMENT: 0-10

## 2024-04-30 NOTE — PROGRESS NOTES
Speech-Language Pathology    Outpatient Speech-Language Pathology Progress Note & Discharge Summary     Patient Name: Fadia Bolden  MRN: [id number]  Today's Date: 4/30/2024  Time Calculation  Start Time: 1115  Stop Time: 1200  Time Calculation (min): 45 min       Current Problem:   1. Aphasia as late effect of cerebrovascular accident (CVA)          SLP Assessment:  SLP TX Intervention Outcome: Making Progress Towards Goals  Prognosis: Good    Ms. Bolden has been actively participating in speech therapy since December 2024. She has been making progress but continues to struggle with anomia. The patient may require additional time or a phonemic cue to assist with word finding but she is still able to communicate wants, needs and preferences. Ms. Bolden does not avoid conversation but expresses frustration with the amount of effort and time it takes to do so. The patient has met (or largely met) all goals but instead of updating the plan of care to increase difficulty or probe writing skills in the form of a re-assessment, the patient expressed her wishes to take a break from speech therapy. On this date the patient was able to participate in conversation with the SLP and demonstrated improvement with word retrieval as far as producing more key/target words and/or finding them more quickly. The patient has an excellent support network and opportunities for communication exchanges. Should the patient experience a decline in function or want to address concerns with her communication skills she is welcome to reach out to her provider for a new speech therapy referral. Recommend discharge at this time.    General Visit Information:  Certification Period Start Date: 02/27/24  Certification Period End Date: 05/27/24  Number of Authorized Treatments : 20  Total Number of Visits : 21    Pain:  Pain Assessment  Pain Assessment: 0-10  Pain Score: 0 - No pain     Plan:  Plan  Inpatient/Swing Bed or Outpatient: Outpatient  SLP TX  Plan: Discharge from Speech Therapy  SLP Plan: No skilled SLP  No Skilled SLP:  (Patient's wishes)  SLP Discharge Recommendations: Home with no further SLP  Discussed POC: Patient, Caregiver/family  Discussed Risks/Benefits: Yes  Patient/Caregiver Agreeable: Yes  SLP - OK to Discharge: Yes    Goals: Updated 24  Short-term goals:   Patient will complete naming activities (including picture naming, generative naming, verb naming, descriptive naming, synonyms/antonyms, etc.) with 80% accuracy given minimal-moderate cues  Status: Goal Status: Progressing toward goal  Progress: Goal met  -Confrontational naming: set of 10 objects for repeat task: 80%, improved to 100% w/ phon cues    Previous  -Confrontational naming: set of 10 objects for repeat task: 50%, improved to 100% w/ phon cues  -Generative naming, 2 min, goal of 10 items:  Improved function for generating rhyming words - (96%) patient had difficulty with divergent naming of categories (30%) and words starting with a given letter (60%)  -Verbal reasoning/word finding: adding vowels ([facility] 8), 76% increased to 88% with min cues   -Carry one letter to form words - 90% improved to 100% with min cue  -Correcting illogical sentences  -80%, improved to 100% w/ phon cue  -100% indep   -Confrontational namin% w/ phon cue, 78% w/ cues, 100% w/ phon cues, 70% w/ min cues, 75% w/ phon cue, -80% w/ comp strats, -100%, -90% w/ min cues   -Naming to description   -83% improved to 96% with cues  -75% indep  .-80% improved to 100% with cues  - 90%, 100%, 67%, 90% (100% w/ cues), 70%  -Generative naming, 2 min, goal of 10 items:   -65%  -Fill in the blanks   -100% indep  -Exclusion task; med difficulty, FO4 words+pics:  -96%, previous 76%  -Name the category:   -93% (concrete), 90% (abstract), 84%                -Responsive naming, general information:     -71% (15/21), 85%             -Verb naming:   -Verb namin%  -100% (16/16), indep              -Complete the  sentence (1 word): 95% (19/20)  -synonyms - 60% (3/5)  -antonyms - 100%                        Patient will complete targeted expressive language tasks (including stating personal information, phrase/sentence completion, open-ended questions) with 80% accuracy given minimal-moderate cues.  Status: Goal Status: Progressing toward goal  Progress: Goal partially met   -During facilitated conversation, the patient was able to actively participate in the exchange. Improvement noted with prosody and ability to find key words when communicating. This is improved in that other exchanges included much lengthier pauses or reliance on the listener to infer more of the message than today. The patient also agreed that she noticed the improvement as well. Clinical interpretation was within target accuracy.    Previous  -Describing common objects: 80% indep  -Patient had a photo book from a family vacation to [place]. The patient had difficulty recalling the country and names of her grandsons. She could name the city and the insect that bit their legs when they were soaking their feet. Patient and spouse confirm inconsistency with regard to family member names and other key pieces of information. 60%  -Pt attempted to describe a recent car purchase, cues required to recall previous make/model and newly purchase vehicle. She was able to state the mileage only. 20% acc indep  -Personal picture description:  75% accuracy; Immediate family member names were spoken with 58% accuracy. Patient had difficulty with her own children and grandchildren's names.  -Sentence creation given 2 words: 80% indep; extra time required  -Patient initiated a description of her parents and how they moved from [place] to [place]. The SLP asked clarifying questions to ensure the correct information was communicated correctly. Overall accuracy interpreted as 75-80% independently.     -Semantic analysis,  65-70% indep  -Open ended questions/conversation regarding  "items the patient/spouse make in their home; accuracy considered 60-65% indep.  -Describing common objects: 100% with min cues -WH questions: 67%   -Open ended questions: 2 trials attempted which resulted in notable word finding difficulty made it difficult to produce a complete sentence; 50%   -Additional probes completed to state verbs/actions associated with common objects. 50% over 2 trials   -Describing common objects (s/p surgery)  -50% (ltd trials)     Patient will complete targeted receptive language tasks (including complex yes/no questions, functional reading, paragraph level comprehension) with 80% accuracy given minimal-moderate cues.  Status: Goal Status: Progressing toward goal  Progress: Goal met;    `  Previous:  -Sentence comprehension, FO4 choices to match picture: 88% indep  -Illogical sentences - 90% acc to independently read and comprehend sentences in order to identify what word/words to substitute  -Paragraph with missing words; patient accuracy was 100% with min cues to ensure all words were read correctly which enabled her to generate an appropriate word to fill in the blanks  -Reading comprehension (121 and 136 word passages) - when given FO4 choices the patient answered comprehension questions with 100% accuracy. She referred back to the passage in 1/6 questions. Patient required extended time  -Complex Y/N, comparisons - 89% (40/45)  -Functional reading (CT ismael lvl 1/3) - 88%     Subjective:  Patient arrived with her daughter, Liat. Patient is finishing with OT next week. Just this morning she reported feeling \"different\" (in a positive way) regarding her visual field. She appeared to believe that with her speech as well.    Outpatient Education:  Adult Outpatient Education  Individual(s) Educated: Patient, Spouse  Risk and Benefits Discussed with Patient/Caregiver/Other: yes  Patient/Caregiver Demonstrated Understanding: yes  Plan of Care Discussed and Agreed Upon: yes  Patient Response " to Education: Patient/Caregiver Verbalized Understanding of Information

## 2024-05-06 ENCOUNTER — TREATMENT (OUTPATIENT)
Dept: OCCUPATIONAL THERAPY | Facility: CLINIC | Age: 69
End: 2024-05-06
Payer: COMMERCIAL

## 2024-05-06 DIAGNOSIS — R47.01 APHASIA: ICD-10-CM

## 2024-05-06 DIAGNOSIS — I69.390 APRAXIA FOLLOWING CEREBROVASCULAR ACCIDENT: Primary | ICD-10-CM

## 2024-05-06 DIAGNOSIS — R27.8 COORDINATION ABNORMAL: ICD-10-CM

## 2024-05-06 PROCEDURE — 97112 NEUROMUSCULAR REEDUCATION: CPT | Mod: GO,CO

## 2024-05-06 NOTE — PROGRESS NOTES
Occupational Therapy      Patient Name: Fadia Bolden  MRN: [id number]  Today's Date: 5/6/2024      Time Calculation  Start Time: 0900  Stop Time: 0945  Time Calculation (min): 45 min    Assessment:  Pt appears to tolerate treatment well with no complaints of pain. Verbal and tactile cues for right side scanning during tasks, good follow through.     Plan:   Interventions: Self Care/ADL, neuromuscular reeducation, therapeutic exercise   Duration: 1-2x/wk for 8 visits total       Subjective   Pt reports no changes since last visit. Pt and daughter present for session, pt states she     Current Problem:  1. Apraxia following cerebrovascular accident        2. Coordination abnormal        3. Aphasia              Pain:   Pt reports 0/10 pain     Objective   Pt with noted increased accuracy and efficiency with word finding during [employer] game   Pt with noted improved multitasking and GMC in high level GMC activity     Treatment: 45    Neuro 45  L 9 hold peg - 25 seconds  R 9 hole peg - 37.9    L  40, 44, 41  R  - 43, 46, 45    9 hole peg and  strength improved since initial evaluation    This MALDONADO discusses plan of care with pt and daughter, pt has met goals at this time with exception of increased right side awareness. Goals to be upgraded to address right side neglect.     Pt completes functional obstacle course to promote increased right visual scanning, looks for cones placed on right side of her while completing functional mobility.     Scifit B UE bike 5 minutes, visual scanning activity towards right side to identify colors of cones and how many fingers MALDONADO holds up.

## 2024-05-07 ENCOUNTER — APPOINTMENT (OUTPATIENT)
Dept: SPEECH THERAPY | Facility: CLINIC | Age: 69
End: 2024-05-07
Payer: COMMERCIAL

## 2024-05-14 ENCOUNTER — APPOINTMENT (OUTPATIENT)
Dept: SPEECH THERAPY | Facility: CLINIC | Age: 69
End: 2024-05-14
Payer: COMMERCIAL

## 2024-05-21 ENCOUNTER — APPOINTMENT (OUTPATIENT)
Dept: NEUROLOGY | Facility: CLINIC | Age: 69
End: 2024-05-21
Payer: COMMERCIAL

## 2024-05-23 ENCOUNTER — TELEPHONE (OUTPATIENT)
Dept: NEUROLOGY | Facility: CLINIC | Age: 69
End: 2024-05-23
Payer: COMMERCIAL

## 2024-05-23 DIAGNOSIS — I48.92 ATRIAL FLUTTER, UNSPECIFIED TYPE (MULTI): ICD-10-CM

## 2024-05-23 DIAGNOSIS — I63.89 CEREBROVASCULAR ACCIDENT (CVA) DUE TO OTHER MECHANISM (MULTI): Primary | ICD-10-CM

## 2024-05-23 NOTE — TELEPHONE ENCOUNTER
Patient's daughter Liat called in stating that Fadia has been experiencing more confusion and wanting to know if this could be a side effect of the Keppra. Please advise.

## 2024-05-25 DIAGNOSIS — I61.2 NONTRAUMATIC HEMORRHAGE OF LEFT CEREBRAL HEMISPHERE (MULTI): ICD-10-CM

## 2024-05-25 RX ORDER — LEVETIRACETAM 500 MG/1
500 TABLET ORAL 2 TIMES DAILY
Qty: 30 TABLET | Refills: 0 | Status: SHIPPED | OUTPATIENT
Start: 2024-05-25

## 2024-05-25 NOTE — PROGRESS NOTES
I spoke to the patient's daughter.  The patient needs a refill for Keppra.  Refill sent to her pharmacy.

## 2024-05-28 NOTE — TELEPHONE ENCOUNTER
Returned phone call to patient's daughter.   4/10/2024 admitted to the hospital with concern for seizure activity. R sided involuntary shaking and worsening weakness. Episode stopped after loaded with Keppra   Discharged on Keppra 500mg BID  No further episodes.   Noted to have atrial flutter on EKG. PA interval not able to be calculated. Unclear if this is artifact from her shaking during it. Will repeat EKG   If ok, then will start Vimpat 50mg BID

## 2024-05-30 ENCOUNTER — TELEPHONE (OUTPATIENT)
Dept: NEUROLOGY | Facility: CLINIC | Age: 69
End: 2024-05-30
Payer: COMMERCIAL

## 2024-05-30 NOTE — TELEPHONE ENCOUNTER
Pts daughter Liat called and would like  to give her a call back in regards to her mother Fadia. They are wondering if the Keppra should be lowered due to worsening confusion. They do not want to change the medication entirely but they want to see if it can be reduced. They also want to know if Fadia still needs the EKG you wanted her to get? Please review and advise. Thanks

## 2024-06-04 ENCOUNTER — OFFICE VISIT (OUTPATIENT)
Dept: PRIMARY CARE | Facility: CLINIC | Age: 69
End: 2024-06-04
Payer: COMMERCIAL

## 2024-06-04 VITALS
WEIGHT: 104 LBS | BODY MASS INDEX: 17.75 KG/M2 | HEART RATE: 64 BPM | HEIGHT: 64 IN | TEMPERATURE: 97.9 F | OXYGEN SATURATION: 100 % | SYSTOLIC BLOOD PRESSURE: 158 MMHG | DIASTOLIC BLOOD PRESSURE: 82 MMHG | RESPIRATION RATE: 16 BRPM

## 2024-06-04 DIAGNOSIS — E55.9 VITAMIN D DEFICIENCY: ICD-10-CM

## 2024-06-04 DIAGNOSIS — I10 PRIMARY HYPERTENSION: Primary | ICD-10-CM

## 2024-06-04 DIAGNOSIS — R56.9 SEIZURE-LIKE ACTIVITY (MULTI): ICD-10-CM

## 2024-06-04 DIAGNOSIS — E78.2 MIXED HYPERLIPIDEMIA: ICD-10-CM

## 2024-06-04 DIAGNOSIS — S06.5XAA SDH (SUBDURAL HEMATOMA) (MULTI): ICD-10-CM

## 2024-06-04 DIAGNOSIS — Z85.3 HISTORY OF BREAST CANCER: ICD-10-CM

## 2024-06-04 DIAGNOSIS — E03.9 ACQUIRED HYPOTHYROIDISM: ICD-10-CM

## 2024-06-04 DIAGNOSIS — Z79.899 MEDICATION MANAGEMENT: ICD-10-CM

## 2024-06-04 PROCEDURE — 1159F MED LIST DOCD IN RCRD: CPT | Performed by: INTERNAL MEDICINE

## 2024-06-04 PROCEDURE — 3077F SYST BP >= 140 MM HG: CPT | Performed by: INTERNAL MEDICINE

## 2024-06-04 PROCEDURE — 93000 ELECTROCARDIOGRAM COMPLETE: CPT | Performed by: INTERNAL MEDICINE

## 2024-06-04 PROCEDURE — 1170F FXNL STATUS ASSESSED: CPT | Performed by: INTERNAL MEDICINE

## 2024-06-04 PROCEDURE — 99214 OFFICE O/P EST MOD 30 MIN: CPT | Performed by: INTERNAL MEDICINE

## 2024-06-04 PROCEDURE — G0439 PPPS, SUBSEQ VISIT: HCPCS | Performed by: INTERNAL MEDICINE

## 2024-06-04 PROCEDURE — 3079F DIAST BP 80-89 MM HG: CPT | Performed by: INTERNAL MEDICINE

## 2024-06-04 PROCEDURE — 1160F RVW MEDS BY RX/DR IN RCRD: CPT | Performed by: INTERNAL MEDICINE

## 2024-06-04 PROCEDURE — 1123F ACP DISCUSS/DSCN MKR DOCD: CPT | Performed by: INTERNAL MEDICINE

## 2024-06-04 PROCEDURE — 1036F TOBACCO NON-USER: CPT | Performed by: INTERNAL MEDICINE

## 2024-06-04 ASSESSMENT — ACTIVITIES OF DAILY LIVING (ADL)
MANAGING_FINANCES: NEEDS ASSISTANCE
GROCERY_SHOPPING: INDEPENDENT
TAKING_MEDICATION: INDEPENDENT
DRESSING: INDEPENDENT
BATHING: INDEPENDENT
DOING_HOUSEWORK: INDEPENDENT

## 2024-06-04 ASSESSMENT — PATIENT HEALTH QUESTIONNAIRE - PHQ9
SUM OF ALL RESPONSES TO PHQ9 QUESTIONS 1 AND 2: 0
1. LITTLE INTEREST OR PLEASURE IN DOING THINGS: NOT AT ALL
2. FEELING DOWN, DEPRESSED OR HOPELESS: NOT AT ALL

## 2024-06-04 ASSESSMENT — ENCOUNTER SYMPTOMS
DEPRESSION: 0
LOSS OF SENSATION IN FEET: 0
OCCASIONAL FEELINGS OF UNSTEADINESS: 0

## 2024-06-04 NOTE — PROGRESS NOTES
"Subjective   Reason for Visit: Fadia Bolden is an 69 y.o. female here for a Medicare Wellness visit.     Past Medical, Surgical, and Family History reviewed and updated in chart.    Reviewed all medications by prescribing practitioner or clinical pharmacist (such as prescriptions, OTCs, herbal therapies and supplements) and documented in the medical record.    HPI  Influenza declines  Covid declines   PCV20 declines  Shingrix declines  Tdap 2019  Mammogram 2024  Dexa declines  Colonoscopy 2021  Adv Dir No  Bmi 17  Eye exam due   Fall risk 24  Depression screen 24      Hospitalized [facility] 4/10/24 - 4/1/24  Dx: R sided weakness, Seizure like activity.  Labs,  CT brain, CXR, MRI brain, CT head.  Med changes: Keppra  Follow up: PCP, Neuro    Cognitive issues with keppra  Stopped taking lisinopril asa atorvastatin      Patient Care Team:  Michelle Martinez DO as PCP - General  Patricia Tracy DO as PCP - [facility] [employer] PCP  Tammie Blevins MD as Surgeon (Neurosurgery)     Review of Systems    Objective   Vitals:  /82   Pulse 64   Temp 36.6 °C (97.9 °F)   Resp 16   Ht 1.626 m (5' 4\")   Wt 47.2 kg (104 lb)   SpO2 100%   BMI 17.85 kg/m²       Physical Exam    Assessment/Plan   Problem List Items Addressed This Visit       Acquired hypothyroidism    History of breast cancer    Vitamin D deficiency    Relevant Orders    Vitamin D 25-Hydroxy,Total (for eval of Vitamin D levels)    Vitamin B12    Mixed hyperlipidemia    Relevant Orders    Lipid Panel    Comprehensive Metabolic Panel    Thyroid Stimulating Hormone    Primary hypertension - Primary    Relevant Orders    CBC    SDH (subdural hematoma) (Multi)    Seizure-like activity (Multi)     Other Visit Diagnoses       Medication management        Relevant Orders    Levetiracetam    ECG 12 lead (Clinic Performed) (Completed)          Reivewed records  Cont meds  Discussed and recommended that asa lisinopril atorvastatin are all   To " decrease her risk of future stroke  Aggressive risk factor mod, I recommend she take  I looked at old EKG, I do not feel this is a flutter  EKG today nsr  Update preventive  Cont meds  Check labs  Stable based on symptoms and exam.  Continue established treatment plan and follow up at least yearly.     Patient was identified as a fall risk. Risk prevention instructions provided.

## 2024-06-05 ENCOUNTER — TREATMENT (OUTPATIENT)
Dept: OCCUPATIONAL THERAPY | Facility: CLINIC | Age: 69
End: 2024-06-05
Payer: COMMERCIAL

## 2024-06-05 DIAGNOSIS — R53.1 WEAKNESS GENERALIZED: ICD-10-CM

## 2024-06-05 DIAGNOSIS — I69.390 APRAXIA FOLLOWING CEREBROVASCULAR ACCIDENT: Primary | ICD-10-CM

## 2024-06-05 DIAGNOSIS — R27.8 COORDINATION ABNORMAL: ICD-10-CM

## 2024-06-05 PROCEDURE — 97112 NEUROMUSCULAR REEDUCATION: CPT | Mod: GO,CO

## 2024-06-05 NOTE — PROGRESS NOTES
Occupational Therapy      Patient Name: Fadia Bolden  MRN: [id number]  Today's Date: 6/5/2024      Time Calculation  Start Time: 0815  Stop Time: 0859  Time Calculation (min): 44 min    Assessment:  Pt appears to tolerate treatment well with no complaints of pain. Verbal cues for right side scanning with good follow through.     Plan:   Interventions: Self Care/ADL, neuromuscular reeducation, therapeutic exercise   Duration: 1-2x/wk for 8 visits total       Subjective   Pt reports no changes since last visit. Pt and daughter present for session, pt states she     Current Problem:  1. Apraxia following cerebrovascular accident        2. Coordination abnormal        3. Weakness generalized              Pain:   Pt reports 0/10 pain     Objective   Pt with noted increased accuracy and efficiency with word finding during [employer] game   Pt with noted improved multitasking and GMC in high level GMC activity     Treatment: 44    Neuro 44  Scifit B UE bike 5 minutes, pt looks forward then to right to identify playing card. Pt completes 52 cards in deck.    Functional obstacle course to promote increased safety while scanning towards right side and walking on uneven ground/over obstacles. Pt is able to maintain balance throughout, correctly identifies 5/5 color cones on right side, 3/3 items outside window on right side. Verbal cues for right side scanning with good follow through.    Pt completes memory recall game, identifies 4/4 and 5/5 items correctly.

## 2024-06-13 ENCOUNTER — APPOINTMENT (OUTPATIENT)
Dept: OCCUPATIONAL THERAPY | Facility: CLINIC | Age: 69
End: 2024-06-13
Payer: COMMERCIAL

## 2024-06-13 DIAGNOSIS — R53.1 WEAKNESS GENERALIZED: ICD-10-CM

## 2024-06-13 DIAGNOSIS — R27.8 COORDINATION ABNORMAL: Primary | ICD-10-CM

## 2024-06-13 DIAGNOSIS — I69.390 APRAXIA FOLLOWING CEREBROVASCULAR ACCIDENT: ICD-10-CM

## 2024-06-13 DIAGNOSIS — I61.2 NONTRAUMATIC HEMORRHAGE OF LEFT CEREBRAL HEMISPHERE (MULTI): ICD-10-CM

## 2024-06-13 PROCEDURE — 97112 NEUROMUSCULAR REEDUCATION: CPT | Mod: GO | Performed by: OCCUPATIONAL THERAPIST

## 2024-06-13 RX ORDER — LEVETIRACETAM 500 MG/1
500 TABLET ORAL 2 TIMES DAILY
Qty: 180 TABLET | Refills: 3 | Status: SHIPPED | OUTPATIENT
Start: 2024-06-13 | End: 2025-06-13

## 2024-06-13 NOTE — PROGRESS NOTES
Pts daughter is calling in and states she only got a 2 wk script for her mother and needs another refill sent in she states she runs out of the medication today so really needs this sent in as soon as possible.     **pharmacy verified**

## 2024-06-13 NOTE — PROGRESS NOTES
Occupational Therapy      Patient Name: Fadia Bolden  MRN: [id number]  Today's Date: 6/13/2024           Assessment:  Patient participated in treatment well with no complaints of pain.  Pt took intermittent rest breaks intermittently throughout due to fatigue.  Patient demonstrated improved GMC, multitasking and word finding during therapeutic activites.     Plan:   Interventions: Self Care/ADL, neuromuscular reeducation, therapeutic exercise   Duration: 1-2x/wk for 8 visits total       Subjective   Pt reports that she was able to play [employer] outside and she did pretty well.     Current Problem:  1. Coordination abnormal        2. Apraxia following cerebrovascular accident        3. Weakness generalized            Pain:   Pt reports 0/10 pain     Objective   Word retention activity worksheet - 6/7  Required mild verbal cues to attend to her right side   Pt with mild difficulty with multitasking/ GMC task; improved sequencing with repetition    Treatment: 41    Neuro 41    Scifit B UE bike for 7:30 minutes at workload 2.0; pt to answer word retention questions     GMC and multitasking addressed by having patient count back and forth with OT (1>6.  OT upgraded activity by assigning task to each number when said allowed.  Pt to snap on 1's, stomp w/R foot on 2's, stomp w/L foot on 3's, clap on 4's, jump on 5's, and overhead clap on 6's        GMC and dynamic standing balance addressed with [employer] game   -OT to toss bean bag to patient and pt to catch bag tossed   -Pt to then toss bag at cornhole with DODIE               Spoke with pt.  Verbalized understanding.  (Done)

## 2024-06-14 ENCOUNTER — APPOINTMENT (OUTPATIENT)
Dept: LAB | Facility: LAB | Age: 69
End: 2024-06-14
Payer: COMMERCIAL

## 2024-06-15 ENCOUNTER — LAB (OUTPATIENT)
Dept: LAB | Facility: LAB | Age: 69
End: 2024-06-15
Payer: COMMERCIAL

## 2024-06-15 DIAGNOSIS — E55.9 VITAMIN D DEFICIENCY: ICD-10-CM

## 2024-06-15 DIAGNOSIS — E78.2 MIXED HYPERLIPIDEMIA: ICD-10-CM

## 2024-06-15 DIAGNOSIS — Z79.899 MEDICATION MANAGEMENT: ICD-10-CM

## 2024-06-15 DIAGNOSIS — I10 PRIMARY HYPERTENSION: ICD-10-CM

## 2024-06-15 LAB
25(OH)D3 SERPL-MCNC: 58 NG/ML (ref 30–100)
ALBUMIN SERPL BCP-MCNC: 4.3 G/DL (ref 3.4–5)
ALP SERPL-CCNC: 83 U/L (ref 33–136)
ALT SERPL W P-5'-P-CCNC: 23 U/L (ref 7–45)
ANION GAP SERPL CALC-SCNC: 11 MMOL/L (ref 10–20)
AST SERPL W P-5'-P-CCNC: 20 U/L (ref 9–39)
BILIRUB SERPL-MCNC: 1.3 MG/DL (ref 0–1.2)
BUN SERPL-MCNC: 22 MG/DL (ref 6–23)
CALCIUM SERPL-MCNC: 9.6 MG/DL (ref 8.6–10.3)
CHLORIDE SERPL-SCNC: 105 MMOL/L (ref 98–107)
CHOLEST SERPL-MCNC: 140 MG/DL (ref 0–199)
CHOLESTEROL/HDL RATIO: 2
CO2 SERPL-SCNC: 31 MMOL/L (ref 21–32)
CREAT SERPL-MCNC: 0.82 MG/DL (ref 0.5–1.05)
EGFRCR SERPLBLD CKD-EPI 2021: 77 ML/MIN/1.73M*2
ERYTHROCYTE [DISTWIDTH] IN BLOOD BY AUTOMATED COUNT: 13.7 % (ref 11.5–14.5)
GLUCOSE SERPL-MCNC: 90 MG/DL (ref 74–99)
HCT VFR BLD AUTO: 44 % (ref 36–46)
HDLC SERPL-MCNC: 69.4 MG/DL
HGB BLD-MCNC: 14.1 G/DL (ref 12–16)
LDLC SERPL CALC-MCNC: 56 MG/DL
MCH RBC QN AUTO: 29.2 PG (ref 26–34)
MCHC RBC AUTO-ENTMCNC: 32 G/DL (ref 32–36)
MCV RBC AUTO: 91 FL (ref 80–100)
NON HDL CHOLESTEROL: 71 MG/DL (ref 0–149)
NRBC BLD-RTO: 0 /100 WBCS (ref 0–0)
PLATELET # BLD AUTO: 137 X10*3/UL (ref 150–450)
POTASSIUM SERPL-SCNC: 4.2 MMOL/L (ref 3.5–5.3)
PROT SERPL-MCNC: 7.1 G/DL (ref 6.4–8.2)
RBC # BLD AUTO: 4.83 X10*6/UL (ref 4–5.2)
SODIUM SERPL-SCNC: 143 MMOL/L (ref 136–145)
TRIGL SERPL-MCNC: 75 MG/DL (ref 0–149)
TSH SERPL-ACNC: 1.6 MIU/L (ref 0.44–3.98)
VIT B12 SERPL-MCNC: 277 PG/ML (ref 211–911)
VLDL: 15 MG/DL (ref 0–40)
WBC # BLD AUTO: 4.4 X10*3/UL (ref 4.4–11.3)

## 2024-06-15 PROCEDURE — 80177 DRUG SCRN QUAN LEVETIRACETAM: CPT

## 2024-06-15 PROCEDURE — 82607 VITAMIN B-12: CPT

## 2024-06-15 PROCEDURE — 82306 VITAMIN D 25 HYDROXY: CPT

## 2024-06-15 PROCEDURE — 36415 COLL VENOUS BLD VENIPUNCTURE: CPT

## 2024-06-15 PROCEDURE — 85027 COMPLETE CBC AUTOMATED: CPT

## 2024-06-15 PROCEDURE — 80053 COMPREHEN METABOLIC PANEL: CPT

## 2024-06-15 PROCEDURE — 84443 ASSAY THYROID STIM HORMONE: CPT

## 2024-06-15 PROCEDURE — 80061 LIPID PANEL: CPT

## 2024-06-16 LAB — LEVETIRACETAM SERPL-MCNC: 15 UG/ML (ref 10–40)

## 2024-06-17 ENCOUNTER — APPOINTMENT (OUTPATIENT)
Dept: OCCUPATIONAL THERAPY | Facility: CLINIC | Age: 69
End: 2024-06-17
Payer: COMMERCIAL

## 2024-06-17 ENCOUNTER — TELEPHONE (OUTPATIENT)
Dept: PRIMARY CARE | Facility: CLINIC | Age: 69
End: 2024-06-17

## 2024-06-17 DIAGNOSIS — R53.1 WEAKNESS GENERALIZED: ICD-10-CM

## 2024-06-17 DIAGNOSIS — R27.8 COORDINATION ABNORMAL: Primary | ICD-10-CM

## 2024-06-17 DIAGNOSIS — I69.390 APRAXIA FOLLOWING CEREBROVASCULAR ACCIDENT: ICD-10-CM

## 2024-06-17 DIAGNOSIS — D69.6 THROMBOCYTOPENIA (CMS-HCC): Primary | ICD-10-CM

## 2024-06-17 PROCEDURE — 97112 NEUROMUSCULAR REEDUCATION: CPT | Mod: GO,CO

## 2024-06-17 NOTE — PROGRESS NOTES
Occupational Therapy      Patient Name: Fadia Bolden  MRN: [id number]  Today's Date: 6/17/2024      Time Calculation  Start Time: 1000  Stop Time: 1045  Time Calculation (min): 45 min    Assessment:  Rest breaks taken as needed, improvements noted on pt remaining focused on conversation in noisy therapy gym.   Pt states she would like to increase the amount of things she is doing at home. MALDONADO instructs pt to write down list of things she is currently doing at home, and list of things she would like to be doing at home for next session, will assist in building appropriate activity list with pt and .     Plan:   Interventions: Self Care/ADL, neuromuscular reeducation, therapeutic exercise   Duration: 1-2x/wk for 8 visits total       Subjective   Pt reports she feels she is making improvements in some areas.      Current Problem:  1. Coordination abnormal        2. Apraxia following cerebrovascular accident        3. Weakness generalized            Pain:   Pt reports 0/10 pain     Objective   Picture retention activity - 5/5  Required mild verbal cues to attend to her right side   Improvements noted on pt ability to focus on conversation in busy/noisy therapy gym    Treatment: 45    Neuro 45    Functional balance tasks while providing multidirectional cues, and/or memory task to improve safety during functional mobility tasks and increase multitasking abilities.  Pt standing on balance beam, instructed to step with each foot to different color or number pad, verbal cues for scanning/stepping to far right, good follow through.    Pt memorizes picture drawn by JOEL, completes functional obstacle course, re-draws picture shown by JOEL. Pt is able to successfully remember and draw 5/5 pictures. All pictures drawn on left side of paper.

## 2024-06-17 NOTE — TELEPHONE ENCOUNTER
----- Message from Michelle Martinez, DO sent at 6/17/2024  9:23 AM EDT -----  Call patient her labs look very good  Only thing is that her platelets are slightly low  I want to recheck this in 3 months  Order is in

## 2024-06-17 NOTE — RESULT ENCOUNTER NOTE
Call patient her labs look very good  Only thing is that her platelets are slightly low  I want to recheck this in 3 months  Order is in  used

## 2024-06-24 ENCOUNTER — HOSPITAL ENCOUNTER (OUTPATIENT)
Dept: RADIOLOGY | Facility: HOSPITAL | Age: 69
Discharge: HOME | End: 2024-06-24
Payer: COMMERCIAL

## 2024-06-24 ENCOUNTER — OFFICE VISIT (OUTPATIENT)
Dept: SURGICAL ONCOLOGY | Facility: HOSPITAL | Age: 69
End: 2024-06-24
Payer: COMMERCIAL

## 2024-06-24 VITALS
BODY MASS INDEX: 17.75 KG/M2 | DIASTOLIC BLOOD PRESSURE: 77 MMHG | HEIGHT: 64 IN | HEART RATE: 58 BPM | SYSTOLIC BLOOD PRESSURE: 146 MMHG | WEIGHT: 104 LBS

## 2024-06-24 DIAGNOSIS — R92.8 ABNORMAL FINDING ON BREAST IMAGING: ICD-10-CM

## 2024-06-24 PROCEDURE — 76642 ULTRASOUND BREAST LIMITED: CPT | Mod: RIGHT SIDE | Performed by: RADIOLOGY

## 2024-06-24 PROCEDURE — 99214 OFFICE O/P EST MOD 30 MIN: CPT | Performed by: SURGERY

## 2024-06-24 PROCEDURE — 3078F DIAST BP <80 MM HG: CPT | Performed by: SURGERY

## 2024-06-24 PROCEDURE — 1123F ACP DISCUSS/DSCN MKR DOCD: CPT | Performed by: SURGERY

## 2024-06-24 PROCEDURE — 1036F TOBACCO NON-USER: CPT | Performed by: SURGERY

## 2024-06-24 PROCEDURE — 76642 ULTRASOUND BREAST LIMITED: CPT | Mod: RT

## 2024-06-24 PROCEDURE — 1159F MED LIST DOCD IN RCRD: CPT | Performed by: SURGERY

## 2024-06-24 PROCEDURE — 3077F SYST BP >= 140 MM HG: CPT | Performed by: SURGERY

## 2024-06-24 PROCEDURE — 76982 USE 1ST TARGET LESION: CPT | Mod: RT

## 2024-06-24 RX ORDER — ALPRAZOLAM 0.5 MG/1
0.5 TABLET ORAL
Qty: 1 TABLET | Refills: 0 | Status: SHIPPED | OUTPATIENT
Start: 2024-06-24 | End: 2024-06-24

## 2024-06-24 NOTE — PROGRESS NOTES
BREAST SURGICAL ONCOLOGY FOLLOW UP     Assessment/Plan     1. right breast ILC g2 ER 95% PER- HER2- at 11- 11:30 7cmFN measuring 2.2cm on final pathology  -[id number]   -declined xrt and med/onc referral     2. history of left breast cancer s/p lumpectomy and XRT     3. right breast nodule in lumpectomy bed  -2mm increase in size with continued recommendation for biopsy.  Fadia had a suboptimal experience with prior biopsy.  Will premedicate prior to procedure.        We discussed the imaging results and the rationale for biopsy.   We discussed the biopsy procedure and potential outcomes includin. Benign        2. High-risk benign necessitating excision        3. The possibility of malignancy.    She will be notified of the biopsy results via phone once available. A follow-up appointment was also made for 1 week after biopsy to further discuss biopsy results and next steps of care.    All questions were answered the the patient's satisfaction. She was encouraged to return sooner with any questions or concerns.      Subjective   Fadia Bolden is a 69 y.o. female presents today for follow up carcinoma of the bilateral breast.     Treatment history  Surgery: 2021 right MS PM SLNB (0/5) tumor 2.2cm, margins negative.   Radiation: deferred  Systemic therapy: deferred    3/21/2023 bilateral mammogram and right breast U/S: there is a possible small mass in the UOQ between the axillary scar and the lumpectomy bed. This is indeterminate and may represent post surgical changes. Technically, biopsy of this area would be difficult with the paucity of breast tissue in this area. This was discussed with Fadia who is adverse to needle biopsies and instead would prefer short interval follow up imaging. On exam, there is no suspicious palpable mass in the area of concern.     2023 follow up right breast U/S- stable mass, cat 4. If biopsy deferred continued 3 month follow up.     2023 follow up right breast  U/S: size unchanged, no flow. Recommendations stands for biopsy but if no biopsy pursued 6 month follow up U/S at time of mammogram.    Suffered a brain bleed while on vacation in [place].  Working with speech therapy.    Bilateral diagnostic mammogram and right U/S today: 1-2mm increase in area of concern.  Standing recommendation for biopsy.  If not biopsied, short interval follow up imaging recommended.    6/24/2024 3 month follow up imaging: increase in size today at 0.9cm (previously 0.7cm I largest dimension).  Recommendation for biopsy.    Review of Systems   Constitutional symptoms: Denies generalized fatigue.  Denies weight change, fevers/chills, difficulty sleeping   Eyes: Denies double vision, glaucoma, cataracts.  Ear/nose/throat/mouth: Denies hearing changes, sore throat, sinus problems.  Cardiovascular: No chest pain.  Denies irregular heartbeat.  Denies ankle swelling.  Respiratory: No wheezing, cough, or shortness of breath.  Gastrointestinal: No abdominal pain,  No nausea/vomiting.  No indigestion/heartburn.  No change in bowel habits.  No constipation or diarrhea.   Genitourinary: No urinary incontinence.  No urinary frequency.  No painful urination.  Musculoskeletal: No bone pain, no muscle pain, no joint pain.   Integumentary: No rash. No masses.  No changes in moles.  No easy bruising.  Neurological: No headaches.  No tremors. No numbness/tingling.  Psychiatric: No anxiety. No depression.  Endocrine: No excessive thirst.  Not too hot or too cold.  Not tired or fatigued.    Hematological/lymphatic: No swollen glands or blood clotting problems.  No bruising.    Objective   Physical Exam  General: Alert and oriented x 3.  Mood and affect are appropriate.  HEENT: EOMI, PERRLA.   Neck: supple, no masses, no cervical adenopathy.  Cardiovascular: no lower extremity edema.  Pulmonary: breathing non labored on room air.  GI: Abdomen soft, no masses. No hepatomegaly or splenomegaly.  Lymph nodes: No  supraclavicular or axillary adenopathy bilaterally.  Musculoskeletal: Full range of motion in the upper extremities bilaterally.  Neuro: denies dizziness, tremors    Breasts: The breasts were examined in both the seated and supine positions.    RIGHT: The nipple is everted without nipple discharge.  There are no skin changes, skin thickening, or dimpling. Right axillary incision is well healed.  No significant change to the area of concern just inferior to the surgical scar.    LEFT: The nipple is everted without nipple discharge.  There are no skin changes, skin thickening, or dimpling. There are no masses palpated in the LEFT breast. Left UOQ and axillary incisions well healed.      Radiology review: All images and reports were personally reviewed.         Delisa Rascon, DO

## 2024-06-25 ENCOUNTER — APPOINTMENT (OUTPATIENT)
Dept: OCCUPATIONAL THERAPY | Facility: CLINIC | Age: 69
End: 2024-06-25
Payer: COMMERCIAL

## 2024-06-25 DIAGNOSIS — R27.8 COORDINATION ABNORMAL: ICD-10-CM

## 2024-06-25 DIAGNOSIS — R53.1 WEAKNESS GENERALIZED: ICD-10-CM

## 2024-06-25 DIAGNOSIS — I69.390 APRAXIA FOLLOWING CEREBROVASCULAR ACCIDENT: Primary | ICD-10-CM

## 2024-06-25 PROCEDURE — 97112 NEUROMUSCULAR REEDUCATION: CPT | Mod: GO | Performed by: OCCUPATIONAL THERAPIST

## 2024-06-25 NOTE — PROGRESS NOTES
Occupational Therapy      Patient Name: Fadia Bolden  MRN: [id number]  Today's Date: 6/25/2024         Assessment:  Patient tolerated treatment well and required minimal cues throughout session for balance, memory, and organization. Patient reported being tired 2/10 upon arrial; pt reported being tired 3/10 towards end of session.    Plan:   Interventions: Self Care/ADL, neuromuscular reeducation, therapeutic exercise   Duration: 1-2x/wk for 8 visits total       Subjective   Pt reports her most recent mammogram showed an increased size in breast mass. Pt will be getting a biopsy done on Monday. Patient reports being tired upon arrival; 2/10.     Current Problem:  1. Apraxia following cerebrovascular accident        2. Coordination abnormal        3. Weakness generalized            Pain:   Pt reports 0/10 pain on today's date.    Objective   Pt required verbal cues x3 due to confusion of task demands during scanning activity when alternating increasing  Pt required visual cue x1 to switch order of two letters when putting sticky notes in alphabetical order  Pt was able to find 6/6 items in gym without cues   Pt was steady on river [employer], making sure her foot was planted firm before moving on without cues  Pt able to find commonality between 4 pictures with multiple verbal cues in 3/4 attempts     Treatment:     Neuro 46 min    Scanning and R side neglect addressed with pt to scan desk full of sticky notes   - sticky notes labeled A-J and pt to put them in alphabetical order x1  - sticky notes numbers 1-10 and pt to put them in numerical order x1  - sticky notes labeled A-J and 1-10, pt to put sticky notes in alternating increasing order [id number] x1    Balance and coordination addressed with pt to walk on river rocks down and back x4; once safely off river rocks, pt was shown a card with 4 pictures and asked to find a commonality between them x4.    Working memory addressed by OT hiding 6 items in plain sight around  therapy gym; pt to see where items were hid and remember where they are. Pt to ambulate around therapy gym and find 6 items.

## 2024-06-27 NOTE — PROGRESS NOTES
Patient seen at [facility] today. Will follow up with primary as needed.   Speech-Language Pathology    Outpatient Speech-Language Pathology Progress Note     Patient Name: Fadia Bolden  MRN: [id number]  Today's Date: 3/5/2024  Time Calculation  Start Time: 1115  Stop Time: 1200  Time Calculation (min): 45 min       Current Problem:   1. Aphasia            SLP Assessment:  SLP TX Intervention Outcome: Making Progress Towards Goals  Prognosis: Good    The patient was actively engaged in therapeutic tasks targeting expressive language skills. She demonstrated improvement with confrontational naming a nd naming to description. The patient had mild difficulty with a description task this date in order to generate more comprehensive descriptions. The patient may continue to benefit from skilled speech therapy services in order to improve her ability to express wants, needs and opinions and engage in meaningful communication in the home environment    General Visit Information:  Certification Period Start Date: 24  Certification Period End Date: 24     Total Number of Visits : 18    Pain:  Pain Assessment  Pain Assessment: 0-10  Pain Score: 0 - No pain     Plan:  Plan  Inpatient/Swing Bed or Outpatient: Outpatient  SLP Plan: Skilled SLP  SLP Frequency: 2x per week  Duration: Other (Comment) (8 weeks)  Discussed POC: Patient, Caregiver/family  Discussed Risks/Benefits: Yes  Patient/Caregiver Agreeable: Yes    Goals: Updated 24  Short-term goals:   Patient will complete naming activities (including picture naming, generative naming, verb naming, descriptive naming, synonyms/antonyms, etc.) with 80% accuracy given minimal-moderate cues  Status: Goal Status: Progressing toward goal  Progress: See objective data below   -Confrontational namin% improved to 100% with phonemic cues   -Naming to description: 80% Improved to 100% with cues  Previous  -Correcting illogical sentences  -80%, improved to 100% with phonemic cue  -100% indep   -Confrontational naming   -70% with min  "cues  -75% with phonemic cue  -60%, improved to 80% with comp strats  -100%  -90% with min cues   -Naming to description   - 90%, 100%, 67%, 90% (100% w/ cues), 70%  -Generative naming, 2 min, goal of 10 items:   -65%  -Fill in the blanks   -100% indep  -Exclusion task; med difficulty, FO4 words+pics:  -96%, previous 76%  -Name the category:   -93% (concrete), 90% (abstract), 84%                -Responsive naming, general information:     -71% (15/21), 85%             -Verb naming:   -Verb namin%  -100% (16/16), indep              -Complete the sentence (1 word): 95% (19/20)  -synonyms - 60% (3/5)  -antonyms - 100%                        Patient will complete targeted expressive language tasks (including stating personal information, phrase/sentence completion, open-ended questions) with 80% accuracy given minimal-moderate cues.  Status: Goal Status: Progressing toward goal  Progress: See objective data below           -Describing common objects: 100% with min cues to provide more comprehensive descriptions, descriptions initially provided were accurate    Previous  -WH questions: 67%   -Open ended questions: 2 trials attempted which resulted in notable word finding difficulty made it difficult to produce a complete sentence; 50%   -Additional probes completed to state verbs/actions associated with common objects. 50% over 2 trials   -Story retelling: patient asked to describe a memorable birthday; response was simple yet effective when talking about childhood birthdays and her 39th (\"I'm 39 and I don't like being 39\")  -Describing common objects (s/p surgery)  -50% (ltd trials)  Before surgery  -80%, 67% with min cues, 67%  -100% with extended time and min cues   -Patient was able to retell how her daughter, Shikha, and her  moved to [place], the name of his city and what they have been busy doing (citizenship). The patient was unable to state the country her [place] is from   - Cookie theft picture description: " "4 utterances, no change from initial evaluation which was also 4 utterances   -WH questions - 100%     -Patient was able to state her full name, address and the names of her children and their spouses. She required extra time and one phonemic cue for her son in law  -Semantic analysis task - 88% accuracy to provide relevant and accurate features of the target item  -naming immediate family members and friends, 82% (14/17) to name children, their spouses and close friends and siblings     Patient will complete targeted receptive language tasks (including complex yes/no questions, functional reading, paragraph level comprehension) with 80% accuracy given minimal-moderate cues.  Status: Goal Status: Progressing toward goal  Progress: Goal not addressed this date      Previous:  -Illogical sentences - 90% acc to independently read and comprehend sentences in order to identify what word/words to substitute  -Paragraph with missing words; patient accuracy was 100% with min cues to ensure all words were read correctly which enabled her to generate an appropriate word to fill in the blanks  -Reading comprehension (121 and 136 word passages) - when given FO4 choices the patient answered comprehension questions with 100% accuracy. She referred back to the passage in 1/6 questions. Patient required extended time  -Complex Y/N, comparisons - 89% (40/45)  -Functional reading (CT ismael lvl 1/3) - 88%     Subjective:  The patient stated they are not \"go people\" and they tend to stay at home. The spouse reports he has some customers to go see and he has tried to get her to join him because she knows them. The patient does not seem receptive to this. SLP encouraged patient to take opportunities to expand communication opportunities.    Outpatient Education:  Adult Outpatient Education  Individual(s) Educated: Patient, Spouse  Risk and Benefits Discussed with Patient/Caregiver/Other: yes  Patient/Caregiver Demonstrated Understanding: " yes  Plan of Care Discussed and Agreed Upon: yes  Patient Response to Education: Patient/Caregiver Verbalized Understanding of Information

## 2024-07-01 ENCOUNTER — APPOINTMENT (OUTPATIENT)
Dept: RADIOLOGY | Facility: HOSPITAL | Age: 69
End: 2024-07-01
Payer: COMMERCIAL

## 2024-07-15 ENCOUNTER — HOSPITAL ENCOUNTER (OUTPATIENT)
Dept: RADIOLOGY | Facility: HOSPITAL | Age: 69
Discharge: HOME | End: 2024-07-15
Payer: COMMERCIAL

## 2024-07-15 VITALS — BODY MASS INDEX: 17.86 KG/M2 | WEIGHT: 104.06 LBS

## 2024-07-15 DIAGNOSIS — R92.8 ABNORMAL FINDING ON BREAST IMAGING: ICD-10-CM

## 2024-07-15 DIAGNOSIS — N64.59 OTHER SIGNS AND SYMPTOMS IN BREAST: ICD-10-CM

## 2024-07-15 DIAGNOSIS — R59.0 ENLARGED LYMPH NODES IN ARMPIT: ICD-10-CM

## 2024-07-15 PROCEDURE — 77065 DX MAMMO INCL CAD UNI: CPT | Mod: RT

## 2024-07-15 PROCEDURE — 2720000007 HC OR 272 NO HCPCS

## 2024-07-15 PROCEDURE — 76882 US LMTD JT/FCL EVL NVASC XTR: CPT | Mod: RIGHT SIDE | Performed by: RADIOLOGY

## 2024-07-15 PROCEDURE — 2780000003 HC OR 278 NO HCPCS

## 2024-07-15 PROCEDURE — 2500000005 HC RX 250 GENERAL PHARMACY W/O HCPCS: Performed by: SURGERY

## 2024-07-15 PROCEDURE — A4648 IMPLANTABLE TISSUE MARKER: HCPCS

## 2024-07-15 PROCEDURE — 88360 TUMOR IMMUNOHISTOCHEM/MANUAL: CPT | Mod: TC,STJLAB | Performed by: SURGERY

## 2024-07-15 PROCEDURE — 19083 BX BREAST 1ST LESION US IMAG: CPT | Mod: RIGHT SIDE | Performed by: RADIOLOGY

## 2024-07-15 PROCEDURE — C1819 TISSUE LOCALIZATION-EXCISION: HCPCS

## 2024-07-15 PROCEDURE — 77065 DX MAMMO INCL CAD UNI: CPT | Mod: RIGHT SIDE | Performed by: RADIOLOGY

## 2024-07-15 PROCEDURE — 19083 BX BREAST 1ST LESION US IMAG: CPT | Mod: RT

## 2024-07-15 PROCEDURE — 76642 ULTRASOUND BREAST LIMITED: CPT | Mod: RT

## 2024-07-18 LAB
LAB AP ASR DISCLAIMER: NORMAL
LABORATORY COMMENT REPORT: NORMAL
PATH REPORT.ADDENDUM SPEC: NORMAL
PATH REPORT.FINAL DX SPEC: NORMAL
PATH REPORT.GROSS SPEC: NORMAL
PATH REPORT.RELEVANT HX SPEC: NORMAL
PATH REPORT.TOTAL CANCER: NORMAL

## 2024-07-19 ENCOUNTER — TELEPHONE (OUTPATIENT)
Dept: PREOP | Facility: HOSPITAL | Age: 69
End: 2024-07-19

## 2024-07-19 ENCOUNTER — TELEPHONE (OUTPATIENT)
Dept: PRIMARY CARE | Facility: CLINIC | Age: 69
End: 2024-07-19

## 2024-07-19 ENCOUNTER — APPOINTMENT (OUTPATIENT)
Dept: OCCUPATIONAL THERAPY | Facility: CLINIC | Age: 69
End: 2024-07-19
Payer: COMMERCIAL

## 2024-07-19 DIAGNOSIS — I69.390 APRAXIA FOLLOWING CEREBROVASCULAR ACCIDENT: Primary | ICD-10-CM

## 2024-07-19 DIAGNOSIS — R53.1 WEAKNESS GENERALIZED: ICD-10-CM

## 2024-07-19 DIAGNOSIS — R27.8 COORDINATION ABNORMAL: ICD-10-CM

## 2024-07-19 PROCEDURE — 97112 NEUROMUSCULAR REEDUCATION: CPT | Mod: GO | Performed by: OCCUPATIONAL THERAPIST

## 2024-07-19 NOTE — TELEPHONE ENCOUNTER
Ana Cristina odell/ [employer]  Calling to verify Lisinopril dose.   Patient has Lisinopril 20mg. Reports she is taking 1/2 tablet. If appropriate, change to 10mg once a day  [phone]

## 2024-07-19 NOTE — PROGRESS NOTES
Occupational Therapy    Treatment   Patient Name: Fadia Bolden  MRN: [id number]  Today's Date: 7/19/2024         Assessment:  Patient tolerated treatment well without complaints of pain or fatigue. Pt did well with completing pattern block shape puzzles. Pt required verbal cues throughout session for utilizing R hand, as she favored using her L hand.    Plan:   Interventions: Self Care/ADL, neuromuscular reeducation, therapeutic exercise   Duration: 1-2x/wk for 8 visits total       Subjective   Pt reports her most recent biopsy on Monday; awaiting to discuss results with physician.   Pt reports she is feeling good today and things have been going well at home.     Current Problem:  1. Apraxia following cerebrovascular accident        2. Coordination abnormal        3. Weakness generalized            Pain:   Pt reports no pain on today's date.    Objective   Pt completed first two puzzles well; minimal puzzle piece orientation issues with first puzzle.  Pt required verbal cues throughout treatment to utilize R hand, as she favored using her L hand.  Pt required verbal and visual cues for the last difficult tangram.    Treatment:     Neuro 46 min    Executive functioning and problem solving, as well as scanning and R side neglect addressed with pt to complete the following:   - pattern block shape puzzle with color coded, shape outlined template x1  - pattern block shape puzzle with shape outlined template, no color x1  - pattern block shape puzzle (tangram) with large outline, no color or shape outline x2

## 2024-07-23 ENCOUNTER — OFFICE VISIT (OUTPATIENT)
Dept: SURGICAL ONCOLOGY | Facility: CLINIC | Age: 69
End: 2024-07-23
Payer: COMMERCIAL

## 2024-07-23 DIAGNOSIS — C50.411 MALIGNANT NEOPLASM OF UPPER-OUTER QUADRANT OF RIGHT BREAST IN FEMALE, ESTROGEN RECEPTOR NEGATIVE (MULTI): ICD-10-CM

## 2024-07-23 DIAGNOSIS — Z17.1 MALIGNANT NEOPLASM OF UPPER-OUTER QUADRANT OF RIGHT BREAST IN FEMALE, ESTROGEN RECEPTOR NEGATIVE (MULTI): ICD-10-CM

## 2024-07-23 PROCEDURE — 99215 OFFICE O/P EST HI 40 MIN: CPT | Performed by: SURGERY

## 2024-07-23 PROCEDURE — 1123F ACP DISCUSS/DSCN MKR DOCD: CPT | Performed by: SURGERY

## 2024-07-23 NOTE — PROGRESS NOTES
BREAST SURGICAL ONCOLOGY FOLLOW UP     Assessment/Plan     1. right breast ILC g2 ER 95% PER- HER2- at 11- 11:30 7cmFN measuring 2.2cm on final pathology  -[id number]   -declined xrt and med/onc referral     2. history of left breast cancer s/p lumpectomy and XRT     3. right breast nodule in lumpectomy bed biopsied revealing ILC g2 ER- UT- HER2- measuring 0.9cm   -[id number]    Discussed the pathology and treatment options with the patient and family.    First, we discussed surgical options: breast conservation vs mastectomy.      Breast conservation involves removal of the tumor with a rim of normal breast tissue called a margin.  BCT is often done in conjunction with radiation to decrease risk of recurrence.  Mastectomy involves removal of all breast tissue.  When mastectomy is recommended, referral to a plastic surgeon for reconstruction is recommended.      Given the small size of her cancer, she would be an excellent candidate for repeat breast conservation.      We then discussed the role of axillary staging. We discussed sentinel lymph node biopsy, indications for axillary node dissection and risks and benefits of each procedure.     Next, we discussed adjuvant therapies to surgery.      Radiation is usually recommended when BCT is performed.  Women 70 or greater with early stage hormone receptor positive cancer may be able to omit radiation.  Radiation is recommended after mastectomy for tumors larger than 5cm, positive lymph nodes, or positive margins.      The need for chemotherapy will be based on surgical pathology and will be decided by the medical oncologist.  The medical oncologist is also responsible for prescribing endocrine therapy for hormone receptor positive tumors.     Fadia will be scheduled for right partial mastectomy and sentinel lymph node biopsy 8/7/2024.      Subjective   Fadia Bolden is a 69 y.o. female presents today for discussion newly diagnosed right breast cancer.     Treatment  history  Surgery: 4/23/2021 right MS PM SLNB (0/5) tumor 2.2cm, margins negative.   Radiation: deferred  Systemic therapy: deferred    3/21/2023 bilateral mammogram and right breast U/S: there is a possible small mass in the UOQ between the axillary scar and the lumpectomy bed. This is indeterminate and may represent post surgical changes. Technically, biopsy of this area would be difficult with the paucity of breast tissue in this area. This was discussed with Fadia who is adverse to needle biopsies and instead would prefer short interval follow up imaging. On exam, there is no suspicious palpable mass in the area of concern.     6/13/2023 follow up right breast U/S- stable mass, cat 4. If biopsy deferred continued 3 month follow up.     9/14/2023 follow up right breast U/S: size unchanged, no flow. Recommendations stands for biopsy but if no biopsy pursued 6 month follow up U/S at time of mammogram.    Suffered a brain bleed while on vacation in [place].  Working with speech therapy.    Bilateral diagnostic mammogram and right U/S today: 1-2mm increase in area of concern.  Standing recommendation for biopsy.  If not biopsied, short interval follow up imaging recommended.    6/24/2024 3 month follow up imaging: increase in size today at 0.9cm (previously 0.7cm I largest dimension).  Recommendation for biopsy.    7/15/2024 right breast U/S biopsy 11:00 6cmFN: ILC g2 ER- RI- HER2-.  Axilla scanned and negative.    Review of Systems   Constitutional symptoms: Denies generalized fatigue.  Denies weight change, fevers/chills, difficulty sleeping   Eyes: Denies double vision, glaucoma, cataracts.  Ear/nose/throat/mouth: Denies hearing changes, sore throat, sinus problems.  Cardiovascular: No chest pain.  Denies irregular heartbeat.  Denies ankle swelling.  Respiratory: No wheezing, cough, or shortness of breath.  Gastrointestinal: No abdominal pain,  No nausea/vomiting.  No indigestion/heartburn.  No change in bowel  habits.  No constipation or diarrhea.   Genitourinary: No urinary incontinence.  No urinary frequency.  No painful urination.  Musculoskeletal: No bone pain, no muscle pain, no joint pain.   Integumentary: No rash. No masses.  No changes in moles.  No easy bruising.  Neurological: No headaches.  No tremors. No numbness/tingling.  Psychiatric: No anxiety. No depression.  Endocrine: No excessive thirst.  Not too hot or too cold.  Not tired or fatigued.    Hematological/lymphatic: No swollen glands or blood clotting problems.  No bruising.    Objective   Physical Exam  General: Alert and oriented x 3.  Mood and affect are appropriate.  HEENT: EOMI, PERRLA.   Neck: supple, no masses, no cervical adenopathy.  Cardiovascular: no lower extremity edema.  Pulmonary: breathing non labored on room air.  GI: Abdomen soft, no masses. No hepatomegaly or splenomegaly.  Lymph nodes: No supraclavicular or axillary adenopathy bilaterally.  Musculoskeletal: Full range of motion in the upper extremities bilaterally.  Neuro: denies dizziness, tremors    Breasts: The breasts were examined in both the seated and supine positions.    RIGHT: The nipple is everted without nipple discharge.  There are no skin changes, skin thickening, or dimpling. Right axillary incision is well healed.  There is a small nodule with possible tethered skin overlying this.  Paucity of tissue in this region.  LEFT: The nipple is everted without nipple discharge.  There are no skin changes, skin thickening, or dimpling. There are no masses palpated in the LEFT breast. Left UOQ and axillary incisions well healed.      Radiology review: All images and reports were personally reviewed.         Delisa Rascon DO

## 2024-07-29 ENCOUNTER — PRE-ADMISSION TESTING (OUTPATIENT)
Dept: PREADMISSION TESTING | Facility: HOSPITAL | Age: 69
End: 2024-07-29
Payer: COMMERCIAL

## 2024-07-29 ENCOUNTER — LAB (OUTPATIENT)
Dept: LAB | Facility: LAB | Age: 69
End: 2024-07-29
Payer: COMMERCIAL

## 2024-07-29 ENCOUNTER — TELEPHONE (OUTPATIENT)
Dept: NEUROLOGY | Facility: CLINIC | Age: 69
End: 2024-07-29

## 2024-07-29 VITALS
DIASTOLIC BLOOD PRESSURE: 73 MMHG | TEMPERATURE: 97.9 F | OXYGEN SATURATION: 98 % | RESPIRATION RATE: 16 BRPM | HEIGHT: 65 IN | HEART RATE: 70 BPM | BODY MASS INDEX: 17.37 KG/M2 | WEIGHT: 104.28 LBS | SYSTOLIC BLOOD PRESSURE: 176 MMHG

## 2024-07-29 DIAGNOSIS — C50.411 MALIGNANT NEOPLASM OF UPPER-OUTER QUADRANT OF RIGHT BREAST IN FEMALE, ESTROGEN RECEPTOR NEGATIVE (MULTI): ICD-10-CM

## 2024-07-29 DIAGNOSIS — Z17.1 MALIGNANT NEOPLASM OF UPPER-OUTER QUADRANT OF RIGHT BREAST IN FEMALE, ESTROGEN RECEPTOR NEGATIVE (MULTI): ICD-10-CM

## 2024-07-29 DIAGNOSIS — Z01.818 PREOP EXAMINATION: Primary | ICD-10-CM

## 2024-07-29 DIAGNOSIS — D69.6 THROMBOCYTOPENIA (CMS-HCC): ICD-10-CM

## 2024-07-29 LAB
ANION GAP SERPL CALC-SCNC: 11 MMOL/L (ref 10–20)
BUN SERPL-MCNC: 23 MG/DL (ref 6–23)
CALCIUM SERPL-MCNC: 9.3 MG/DL (ref 8.6–10.3)
CHLORIDE SERPL-SCNC: 103 MMOL/L (ref 98–107)
CO2 SERPL-SCNC: 31 MMOL/L (ref 21–32)
CREAT SERPL-MCNC: 0.74 MG/DL (ref 0.5–1.05)
EGFRCR SERPLBLD CKD-EPI 2021: 87 ML/MIN/1.73M*2
ERYTHROCYTE [DISTWIDTH] IN BLOOD BY AUTOMATED COUNT: 14.4 % (ref 11.5–14.5)
GLUCOSE SERPL-MCNC: 97 MG/DL (ref 74–99)
HCT VFR BLD AUTO: 40.9 % (ref 36–46)
HGB BLD-MCNC: 13.6 G/DL (ref 12–16)
MCH RBC QN AUTO: 30.2 PG (ref 26–34)
MCHC RBC AUTO-ENTMCNC: 33.3 G/DL (ref 32–36)
MCV RBC AUTO: 91 FL (ref 80–100)
NRBC BLD-RTO: 0 /100 WBCS (ref 0–0)
PLATELET # BLD AUTO: 149 X10*3/UL (ref 150–450)
POTASSIUM SERPL-SCNC: 4 MMOL/L (ref 3.5–5.3)
RBC # BLD AUTO: 4.5 X10*6/UL (ref 4–5.2)
SODIUM SERPL-SCNC: 141 MMOL/L (ref 136–145)
WBC # BLD AUTO: 4.6 X10*3/UL (ref 4.4–11.3)

## 2024-07-29 PROCEDURE — 36415 COLL VENOUS BLD VENIPUNCTURE: CPT

## 2024-07-29 PROCEDURE — 85027 COMPLETE CBC AUTOMATED: CPT

## 2024-07-29 PROCEDURE — 80048 BASIC METABOLIC PNL TOTAL CA: CPT

## 2024-07-29 PROCEDURE — 99202 OFFICE O/P NEW SF 15 MIN: CPT

## 2024-07-29 PROCEDURE — 93005 ELECTROCARDIOGRAM TRACING: CPT

## 2024-07-29 ASSESSMENT — DUKE ACTIVITY SCORE INDEX (DASI)
CAN YOU PARTICIPATE IN STRENOUS SPORTS LIKE SWIMMING, SINGLES TENNIS, FOOTBALL, BASKETBALL, OR SKIING: NO
CAN YOU CLIMB A FLIGHT OF STAIRS OR WALK UP A HILL: YES
CAN YOU DO HEAVY WORK AROUND THE HOUSE LIKE SCRUBBING FLOORS OR LIFTING AND MOVING HEAVY FURNITURE: NO
DASI METS SCORE: 5.3
CAN YOU HAVE SEXUAL RELATIONS: YES
CAN YOU PARTICIPATE IN MODERATE RECREATIONAL ACTIVITIES LIKE GOLF, BOWLING, DANCING, DOUBLES TENNIS OR THROWING A BASEBALL OR FOOTBALL: NO
CAN YOU DO MODERATE WORK AROUND THE HOUSE LIKE VACUUMING, SWEEPING FLOORS OR CARRYING GROCERIES: NO
CAN YOU WALK INDOORS, SUCH AS AROUND YOUR HOUSE: YES
CAN YOU WALK A BLOCK OR TWO ON LEVEL GROUND: YES
TOTAL_SCORE: 20.7
CAN YOU DO LIGHT WORK AROUND THE HOUSE LIKE DUSTING OR WASHING DISHES: YES
CAN YOU TAKE CARE OF YOURSELF (EAT, DRESS, BATHE, OR USE TOILET): YES
CAN YOU DO YARD WORK LIKE RAKING LEAVES, WEEDING OR PUSHING A MOWER: NO
CAN YOU RUN A SHORT DISTANCE: NO

## 2024-07-29 ASSESSMENT — LIFESTYLE VARIABLES: SMOKING_STATUS: NONSMOKER

## 2024-07-29 ASSESSMENT — ACTIVITIES OF DAILY LIVING (ADL): ADL_SCORE: 0

## 2024-07-29 ASSESSMENT — PAIN - FUNCTIONAL ASSESSMENT: PAIN_FUNCTIONAL_ASSESSMENT: 0-10

## 2024-07-29 ASSESSMENT — PAIN SCALES - GENERAL: PAINLEVEL_OUTOF10: 0 - NO PAIN

## 2024-07-29 NOTE — TELEPHONE ENCOUNTER
Pt is scheduled for surgery Aug 7th and was told to contact office to find out when to stop taking the asprin 81 mg tablets. Please advise.

## 2024-07-29 NOTE — CPM/PAT H&P
CPM/PAT Evaluation       Name: Fadia Bolden (Fadia Bolden)  /Age: 1955/69 y.o.     In-Person       Chief Complaint: Breast cancer     HPI  Pleasant 70 y/o female presents with right sided breast cancer. She has had multiple right breast ultrasounds since  and a mass was noted. In - the mass was noted to increase in size. She then had an U/S biopsy on . She is now scheduled for this partial mastectomy. Overall, she feels well-she is dealing with some residual deficits from her recent stroke including memory loss. Her  is present with her during her appointment. Denies recent fever/illness/chills.     Past Medical History:   Diagnosis Date    Breast cancer (Multi)     bilateral    Cerebral vascular accident (Multi)     HTN (hypertension)     Hypothyroidism     DAIJA (obstructive sleep apnea)     Other chest pain 02/10/2021    Chest tightness    Peripheral vision loss, right     Personal history of irradiation     Personal history of malignant neoplasm of breast 2022    History of malignant neoplasm of breast    Stroke (Multi)        Past Surgical History:   Procedure Laterality Date    COLONOSCOPY      CRANIOTOMY      MR HEAD ANGIO W AND WO IV CONTRAST  10/25/2023    MR HEAD ANGIO W AND WO IV CONTRAST 10/25/2023    MR HEAD ANGIO WO IV CONTRAST  2024    MR HEAD ANGIO WO IV CONTRAST 2024 [facility] [id number] MRI    OTHER SURGICAL HISTORY  2021    Tonsillectomy    OTHER SURGICAL HISTORY      x2 lumpectomy    UPPER GASTROINTESTINAL ENDOSCOPY         Patient Sexual activity questions deferred to the physician.    Family History   Problem Relation Name Age of Onset    Thyroid disease Mother      Parkinsonism Father      Parkinsonism Sister      Other (HTN) Sister      Breast cancer Father's Sister      Breast cancer Paternal Grandmother         Allergies   Allergen Reactions    Fish Derived Rash       Prior to Admission medications    Medication Sig Start Date End Date  Taking? Authorizing Provider   ALPRAZolam (Xanax) 0.5 mg tablet Take 1 tablet (0.5 mg) by mouth 1 time if needed for anxiety for up to 1 dose. 6/24/24 6/24/24  Delisa Rascon,    ascorbate calcium-bioflavonoid (Amelia-C with Bioflavonoids) 500-200 mg tablet Take 1 tablet by mouth once daily.    Historical Provider, MD   aspirin 81 mg chewable tablet Chew 1 tablet (81 mg) once daily.    Historical Provider, MD   cholecalciferol (Vitamin D-3) 50 MCG (2000 UT) tablet Take 1 tablet (2,000 Units) by mouth once daily. 2/10/21   Historical Provider, MD   levETIRAcetam (Keppra) 500 mg tablet Take 1 tablet (500 mg) by mouth 2 times a day. 6/13/24 6/13/25  Annabel Mann MD   atorvastatin (Lipitor) 80 mg tablet Take 1 tablet (80 mg) by mouth once daily.  Patient not taking: Reported on 6/4/2024 12/12/23 7/23/24  Michelle Martinez DO   lisinopril 20 mg tablet Take 1 tablet (20 mg) by mouth once daily.  Patient not taking: Reported on 6/4/2024 12/12/23 7/23/24  Michelle Martinez DO        Constitutional: Negative for fever, chills, or sweats   ENMT: Negative for nasal discharge, congestion, ear pain, mouth pain, throat pain. Positive for glasses, limited vision-right peripheral.   Respiratory: Negative for cough, wheezing, shortness of breath   Cardiac: Negative for chest pain, dyspnea on exertion, palpitations. Positive for intermittent palpitations since stroke.    Gastrointestinal: Negative for nausea, vomiting, diarrhea, constipation, abdominal pain. Positive for intermittent abdominal pain since stroke.  Genitourinary: Negative for dysuria, flank pain, frequency, hematuria   Musculoskeletal: Negative for decreased ROM, pain, swelling, weakness   Neurological: Negative for dizziness, confusion, headache. Positive for memory loss since stroke.   Psychiatric: Negative for mood changes   Skin: Negative for itching, rash, ulcer    Hematologic/Lymph: Negative for bruising, easy bleeding  Allergic/Immunologic: Negative  itching, sneezing, swelling      Physical Exam  Vitals reviewed.   Constitutional:       Appearance: Normal appearance.   HENT:      Head: Normocephalic.      Mouth/Throat:      Mouth: Mucous membranes are moist.      Pharynx: Oropharynx is clear.   Eyes:      Pupils: Pupils are equal, round, and reactive to light.   Cardiovascular:      Rate and Rhythm: Normal rate and regular rhythm.      Heart sounds: Normal heart sounds.   Pulmonary:      Effort: Pulmonary effort is normal.      Breath sounds: Normal breath sounds.   Abdominal:      General: Bowel sounds are normal.      Palpations: Abdomen is soft.   Musculoskeletal:         General: Normal range of motion.      Cervical back: Normal range of motion.   Skin:     General: Skin is warm and dry.   Neurological:      General: No focal deficit present.      Mental Status: She is alert and oriented to person, place, and time.      Comments: Slow to respond at times    Psychiatric:         Mood and Affect: Mood normal.         Behavior: Behavior normal.          PAT AIRWAY:   Airway:     Mallampati::  II    Neck ROM::  Full  normal        Visit Vitals  /73   Pulse 70   Temp 36.6 °C (97.9 °F) (Tympanic)   Resp 16       DASI Risk Score      Flowsheet Row Most Recent Value   DASI SCORE 20.7   METS Score (Will be calculated only when all the questions are answered) 5.3          Caprini DVT Assessment      Flowsheet Row Most Recent Value   DVT Score 11   Current Status Major surgery planned, including arthroscopic and laproscopic (1-2 hours)   History Prior major surgery, Stroke   Age 60-75 years   BMI 30 or less          Modified Frailty Index      Flowsheet Row Most Recent Value   Modified Frailty Index Calculator .2727          CHADS2 Stroke Risk  Current as of 37 minutes ago        N/A 3 to 100%: High Risk   2 to < 3%: Medium Risk   0 to < 2%: Low Risk     Last Change: N/A          This score determines the patient's risk of having a stroke if the patient has  atrial fibrillation.        This score is not applicable to this patient. Components are not calculated.          Revised Cardiac Risk Index    No data to display       Apfel Simplified Score      Flowsheet Row Most Recent Value   Apfel Simplified Score Calculator 2          Risk Analysis Index Results This Encounter         7/29/2024  0856             ADEN Cancer History: Patient indicates history of cancer    Total Risk Analysis Index Score Without Cancer: 27    Total Risk Analysis Index Score: 39          Stop Bang Score      Flowsheet Row Most Recent Value   Do you snore loudly? 0   Do you often feel tired or fatigued after your sleep? 0   Has anyone ever observed you stop breathing in your sleep? 0   Do you have or are you being treated for high blood pressure? 1   Recent BMI (Calculated) 17.6   Is BMI greater than 35 kg/m2? 0=No   Age older than 50 years old? 1=Yes   Is your neck circumference greater than 17 inches (Male) or 16 inches (Female)? 0   Gender - Male 0=No   STOP-BANG Total Score 2            Assessment and Plan:     Assessment and Plan:     Preop:   OR with Dr. Rascon on 8/7 for a right partial mastectomy with sentinel lymph node biopsy   Labs ordered per Dr. Rascon   EKG obtained and enclosed. NSR. Possible left atrial enlargement. Comparable EKG on file.     HEENT:   Vision changes in right eye-peripheral vision since CVA     Neuro:  CVA: L occipital ischemic stroke w hemorrhagic conversion s/p left hemicrani. H/O left cranioplasty. 2023  Subdural hematoma: S/P evacuation and left cranioplasty   Seizure-like activity: On Keppra. Did have x1 focal seizure documented.   Follows with Dr. Mann-neurology   Patient is at risk for postoperative delirium due to age greater than 65, decreased functional status and sensory impairment. Preoperative brain exercise information provided.     Cardiac:  Hypertension: Per patient-BP is well-controlled at home. Usually 120s-140s/80s. She was anxious about  "this appointment. She monitors her BP at home daily.   Hyperlipidemia: On statin.   Palpitations: None actively. Has spoken with Dr. Martinez about this and they believe it is anxiety-related. Has occurred since her CVA in 2023     Pulmonary:   DAIJA: On CPAP     Endocrine:  Acquired hypothyroidism: Last TSH was 1.6. Has never been on medications-monitored by PCP  Nontoxic multinodular goiter: Monitored by PCP     GI:  GERD: Mostly controlled-has occasional flair-ups.   Abdominal discomfort: None actively. Has spoken with her PCP about this as well and it is also been discussed as anxiety related. Has occurred since her CVA in 2023    Oncology:   Breast cancer: H/O left breat cancer s/p lumpectomy and radiation. Now presents with right sided breast cancer-reason for upcoming surgery.     Anesthesia: Patient states she \"requires more anesthesia than normal or she will feel things\".     *See risk scores as previously documented  DVT prevention education and brain exercise handouts provided to patient.   "

## 2024-07-29 NOTE — PREPROCEDURE INSTRUCTIONS
Patient and Family Education             Ways You Can Help Prevent Blood Clots             This handout explains some simple things you can do to help prevent blood clots.      Blood clots are blockages that can form in the body's veins. When a blood clot forms in your deep veins, it may be called a deep vein thrombosis, or DVT for short. Blood clots can happen in any part of the body where blood flows, but they are most common in the arms and legs. If a piece of a blood clot breaks free and travels to the lungs, it is called a pulmonary embolus (PE). A PE can be a very serious problem.         Being in the hospital or having surgery can raise your chances of getting a blood clot because you may not be well enough to move around as much as you normally do.         Ways you can help prevent blood clots in the hospital         Wearing SCDs. SCDs stands for Sequential Compression Devices.   SCDs are special sleeves that wrap around your legs  They attach to a pump that fills them with air to gently squeeze your legs every few minutes.   This helps return the blood in your legs to your heart.   SCDs should only be taken off when walking or bathing.   SCDs may not be comfortable, but they can help save your life.               Wearing compression stockings - if your doctor orders them. These special snug fitting stockings gently squeeze your legs to help blood flow.       Walking. Walking helps move the blood in your legs.   If your doctor says it is ok, try walking the halls at least   5 times a day. Ask us to help you get up, so you don't fall.      Taking any blood thinning medicines your doctor orders.        Page 1 of 2     [facility]; 3/23   Ways you can help prevent blood clots at home       Wearing compression stockings - if your doctor orders them. ? Walking - to help move the blood in your legs.       Taking any blood thinning medicines your doctor orders.      Signs of a blood clot or PE       Tell your doctor or nurse know right away if you have of the problems listed below.    If you are at home, seek medical care right away. Call 911 for chest pain or problems breathing.               Signs of a blood clot (DVT) - such as pain,  swelling, redness or warmth in your arm or leg      Signs of a pulmonary embolism (PE) - such as chest     pain or feeling short of breath

## 2024-07-29 NOTE — H&P (VIEW-ONLY)
CPM/PAT Evaluation       Name: Fadia Bolden (Fadia Bolden)  /Age: 1955/69 y.o.     In-Person       Chief Complaint: Breast cancer     HPI  Pleasant 70 y/o female presents with right sided breast cancer. She has had multiple right breast ultrasounds since  and a mass was noted. In - the mass was noted to increase in size. She then had an U/S biopsy on . She is now scheduled for this partial mastectomy. Overall, she feels well-she is dealing with some residual deficits from her recent stroke including memory loss. Her  is present with her during her appointment. Denies recent fever/illness/chills.     Past Medical History:   Diagnosis Date    Breast cancer (Multi)     bilateral    Cerebral vascular accident (Multi)     HTN (hypertension)     Hypothyroidism     DAIJA (obstructive sleep apnea)     Other chest pain 02/10/2021    Chest tightness    Peripheral vision loss, right     Personal history of irradiation     Personal history of malignant neoplasm of breast 2022    History of malignant neoplasm of breast    Stroke (Multi)        Past Surgical History:   Procedure Laterality Date    COLONOSCOPY      CRANIOTOMY      MR HEAD ANGIO W AND WO IV CONTRAST  10/25/2023    MR HEAD ANGIO W AND WO IV CONTRAST 10/25/2023    MR HEAD ANGIO WO IV CONTRAST  2024    MR HEAD ANGIO WO IV CONTRAST 2024 [facility] [id number] MRI    OTHER SURGICAL HISTORY  2021    Tonsillectomy    OTHER SURGICAL HISTORY      x2 lumpectomy    UPPER GASTROINTESTINAL ENDOSCOPY         Patient Sexual activity questions deferred to the physician.    Family History   Problem Relation Name Age of Onset    Thyroid disease Mother      Parkinsonism Father      Parkinsonism Sister      Other (HTN) Sister      Breast cancer Father's Sister      Breast cancer Paternal Grandmother         Allergies   Allergen Reactions    Fish Derived Rash       Prior to Admission medications    Medication Sig Start Date End Date  Taking? Authorizing Provider   ALPRAZolam (Xanax) 0.5 mg tablet Take 1 tablet (0.5 mg) by mouth 1 time if needed for anxiety for up to 1 dose. 6/24/24 6/24/24  Delisa Rascon,    ascorbate calcium-bioflavonoid (Amelia-C with Bioflavonoids) 500-200 mg tablet Take 1 tablet by mouth once daily.    Historical Provider, MD   aspirin 81 mg chewable tablet Chew 1 tablet (81 mg) once daily.    Historical Provider, MD   cholecalciferol (Vitamin D-3) 50 MCG (2000 UT) tablet Take 1 tablet (2,000 Units) by mouth once daily. 2/10/21   Historical Provider, MD   levETIRAcetam (Keppra) 500 mg tablet Take 1 tablet (500 mg) by mouth 2 times a day. 6/13/24 6/13/25  Annabel Mann MD   atorvastatin (Lipitor) 80 mg tablet Take 1 tablet (80 mg) by mouth once daily.  Patient not taking: Reported on 6/4/2024 12/12/23 7/23/24  Michelle Martinez DO   lisinopril 20 mg tablet Take 1 tablet (20 mg) by mouth once daily.  Patient not taking: Reported on 6/4/2024 12/12/23 7/23/24  Michelle Martinez DO        Constitutional: Negative for fever, chills, or sweats   ENMT: Negative for nasal discharge, congestion, ear pain, mouth pain, throat pain. Positive for glasses, limited vision-right peripheral.   Respiratory: Negative for cough, wheezing, shortness of breath   Cardiac: Negative for chest pain, dyspnea on exertion, palpitations. Positive for intermittent palpitations since stroke.    Gastrointestinal: Negative for nausea, vomiting, diarrhea, constipation, abdominal pain. Positive for intermittent abdominal pain since stroke.  Genitourinary: Negative for dysuria, flank pain, frequency, hematuria   Musculoskeletal: Negative for decreased ROM, pain, swelling, weakness   Neurological: Negative for dizziness, confusion, headache. Positive for memory loss since stroke.   Psychiatric: Negative for mood changes   Skin: Negative for itching, rash, ulcer    Hematologic/Lymph: Negative for bruising, easy bleeding  Allergic/Immunologic: Negative  itching, sneezing, swelling      Physical Exam  Vitals reviewed.   Constitutional:       Appearance: Normal appearance.   HENT:      Head: Normocephalic.      Mouth/Throat:      Mouth: Mucous membranes are moist.      Pharynx: Oropharynx is clear.   Eyes:      Pupils: Pupils are equal, round, and reactive to light.   Cardiovascular:      Rate and Rhythm: Normal rate and regular rhythm.      Heart sounds: Normal heart sounds.   Pulmonary:      Effort: Pulmonary effort is normal.      Breath sounds: Normal breath sounds.   Abdominal:      General: Bowel sounds are normal.      Palpations: Abdomen is soft.   Musculoskeletal:         General: Normal range of motion.      Cervical back: Normal range of motion.   Skin:     General: Skin is warm and dry.   Neurological:      General: No focal deficit present.      Mental Status: She is alert and oriented to person, place, and time.      Comments: Slow to respond at times    Psychiatric:         Mood and Affect: Mood normal.         Behavior: Behavior normal.          PAT AIRWAY:   Airway:     Mallampati::  II    Neck ROM::  Full  normal        Visit Vitals  /73   Pulse 70   Temp 36.6 °C (97.9 °F) (Tympanic)   Resp 16       DASI Risk Score      Flowsheet Row Most Recent Value   DASI SCORE 20.7   METS Score (Will be calculated only when all the questions are answered) 5.3          Caprini DVT Assessment      Flowsheet Row Most Recent Value   DVT Score 11   Current Status Major surgery planned, including arthroscopic and laproscopic (1-2 hours)   History Prior major surgery, Stroke   Age 60-75 years   BMI 30 or less          Modified Frailty Index      Flowsheet Row Most Recent Value   Modified Frailty Index Calculator .2727          CHADS2 Stroke Risk  Current as of 37 minutes ago        N/A 3 to 100%: High Risk   2 to < 3%: Medium Risk   0 to < 2%: Low Risk     Last Change: N/A          This score determines the patient's risk of having a stroke if the patient has  atrial fibrillation.        This score is not applicable to this patient. Components are not calculated.          Revised Cardiac Risk Index    No data to display       Apfel Simplified Score      Flowsheet Row Most Recent Value   Apfel Simplified Score Calculator 2          Risk Analysis Index Results This Encounter         7/29/2024  0856             ADEN Cancer History: Patient indicates history of cancer    Total Risk Analysis Index Score Without Cancer: 27    Total Risk Analysis Index Score: 39          Stop Bang Score      Flowsheet Row Most Recent Value   Do you snore loudly? 0   Do you often feel tired or fatigued after your sleep? 0   Has anyone ever observed you stop breathing in your sleep? 0   Do you have or are you being treated for high blood pressure? 1   Recent BMI (Calculated) 17.6   Is BMI greater than 35 kg/m2? 0=No   Age older than 50 years old? 1=Yes   Is your neck circumference greater than 17 inches (Male) or 16 inches (Female)? 0   Gender - Male 0=No   STOP-BANG Total Score 2            Assessment and Plan:     Assessment and Plan:     Preop:   OR with Dr. Rascon on 8/7 for a right partial mastectomy with sentinel lymph node biopsy   Labs ordered per Dr. Rascon   EKG obtained and enclosed. NSR. Possible left atrial enlargement. Comparable EKG on file.     HEENT:   Vision changes in right eye-peripheral vision since CVA     Neuro:  CVA: L occipital ischemic stroke w hemorrhagic conversion s/p left hemicrani. H/O left cranioplasty. 2023  Subdural hematoma: S/P evacuation and left cranioplasty   Seizure-like activity: On Keppra. Did have x1 focal seizure documented.   Follows with Dr. Mann-neurology   Patient is at risk for postoperative delirium due to age greater than 65, decreased functional status and sensory impairment. Preoperative brain exercise information provided.     Cardiac:  Hypertension: Per patient-BP is well-controlled at home. Usually 120s-140s/80s. She was anxious about  "this appointment. She monitors her BP at home daily.   Hyperlipidemia: On statin.   Palpitations: None actively. Has spoken with Dr. Martinez about this and they believe it is anxiety-related. Has occurred since her CVA in 2023     Pulmonary:   DAIJA: On CPAP     Endocrine:  Acquired hypothyroidism: Last TSH was 1.6. Has never been on medications-monitored by PCP  Nontoxic multinodular goiter: Monitored by PCP     GI:  GERD: Mostly controlled-has occasional flair-ups.   Abdominal discomfort: None actively. Has spoken with her PCP about this as well and it is also been discussed as anxiety related. Has occurred since her CVA in 2023    Oncology:   Breast cancer: H/O left breat cancer s/p lumpectomy and radiation. Now presents with right sided breast cancer-reason for upcoming surgery.     Anesthesia: Patient states she \"requires more anesthesia than normal or she will feel things\".     *See risk scores as previously documented  DVT prevention education and brain exercise handouts provided to patient.   "

## 2024-07-29 NOTE — PREPROCEDURE INSTRUCTIONS
Medication List            Accurate as of July 29, 2024  9:02 AM. Always use your most recent med list.                ALPRAZolam 0.5 mg tablet  Commonly known as: Xanax  Take 1 tablet (0.5 mg) by mouth 1 time if needed for anxiety for up to 1 dose.  Medication Adjustments for Surgery: Other (Comment)  Notes to patient: As needed     aspirin 81 mg chewable tablet  Notes to patient: Coordinate with prescribing provider for further instructions on this medications prior to surgery.  Please speak with Dr. Mann.      Amelia-C with Bioflavonoids 500-200 mg tablet  Generic drug: ascorbate calcium-bioflavonoid  Medication Adjustments for Surgery: Stop 7 days before surgery     levETIRAcetam 500 mg tablet  Commonly known as: Keppra  Take 1 tablet (500 mg) by mouth 2 times a day.  Medication Adjustments for Surgery: Take morning of surgery with sip of water, no other fluids                                  PRE-OPERATIVE INSTRUCTIONS    You will receive notification one business day prior to your procedure to confirm your arrival time. It is important that you answer your phone and/or check your messages during this time. If you do not hear from the surgery center by 5 pm. the day before your procedure, please call [phone].     Please enter the building through the Outpatient entrance and take the elevator off the lobby to the 2nd floor then check in at the Outpatient Surgery desk on the 2nd floor.    INSTRUCTIONS:  Talk to your surgeon for instructions if you should stop your aspirin, blood thinner, or diabetes medicines.  DO NOT take any multivitamins or over the counter supplements for 7-10 days before surgery.  If not being admitted, you must have an adult immediately available to drive you home after surgery. We also highly recommend you have someone stay with you for the entire day and night of your surgery.  For children having surgery, a parent or legal guardian must accompany them to the surgery center. If  this is not possible, please call [phone] to make additional arrangements.  For adults who are unable to consent or make medical decisions for themselves, a legal guardian or Power of  must accompany them to the surgery center. If this is not possible, please call [phone] to make additional arrangements.  Wear comfortable, loose fitting clothing.  All jewelry and piercings must be removed. If you are unable to remove an item or have a dermal piercing, please be sure to tell the nurse when you arrive for surgery.  Nail polish and make-up must be removed.  Avoid smoking or consuming alcohol for 24 hours before surgery.  To help prevent infection, please take a shower/bath and wash your hair the night before and/or morning of surgery (or follow other specific bathing instructions provided).    Preoperative Fasting Guidelines    Why must I stop eating and drinking near surgery time?  With sedation, food or liquid in your stomach can enter your lungs causing serious complications  Increases nausea and vomiting    When do I need to stop eating and drinking before my surgery?  Do not eat any solid food after midnight the night before your surgery/procedure unless otherwise instructed by your surgeon.   You may have up to 13.5 ounces of clear liquid until TWO hours before your instructed arrival time to the hospital.  This includes water, black tea/coffee, (no milk or cream) apple juice, and electrolyte drinks (Gatorade).   You may chew gum until TWO hours before your surgery/procedure      If applicable, notify your surgeons office immediately of any new skin changes that occur to the surgical limb.      If you have any questions or concerns, please call Pre-Admission Testing at [phone].       Home Preoperative Antibacterial Shower with Chlorhexidine gluconate (CHG)     What is a home preoperative antibacterial shower?  This shower is a way of cleaning the skin with a germ killing solution before  surgery. The solution contains chlorhexidine gluconate, commonly known as CHG. CHG is a skin cleanser with germ killing ability. Let your doctor know if you are allergic to chlorhexidine.    Why do I need to take a preoperative antibacterial shower?  Skin is not sterile. It is best to try to make your skin as free of germs as possible before surgery. Proper cleansing with a germ killing soap before surgery can lower the number of germs on your skin. This helps to reduce the risk of infection at the surgical site. Following the instructions listed below will help you prepare your skin for surgery.    How do I use the solution?  Begin using your CHG soap the night before and again the morning of your procedure.   Do not shave the day before or day of surgery.  Remove all jewelry until after surgery. Take off rings and take out all body-piercing jewelry.  Wash your face and hair with normal soap and shampoo before you use the CHG soap.  Apply the CHG solution to a clean wet washcloth. Move away from the water to avoid premature rinsing of the CHG soap as you are applying. Firmly lather your entire body from the neck down. Do not use CHG on your face, eyes, ears, or genitals.   Pay special attention to the area where your incisions will be located.  Do not scrub your skin too hard.  It is important to allow the CHG soap to sit on your skin for 3-5 minutes.  Rinse the solution off your body completely. Do not wash with your normal soap after the CHG soap solution.  Pat yourself dry with a clean, soft towel.  Do not apply powders, lotions or deodorants as these might block how the CHG soap works.   Dress in clean clothing.  Be sure to sleep with clean, freshly laundered sheets.  Be aware that CHG can cause stains on fabric. Rinse your washcloth and other linens that have contact with CHG completely. Use only non-chlorine detergents to launder the items used.

## 2024-07-31 ENCOUNTER — APPOINTMENT (OUTPATIENT)
Dept: NEUROLOGY | Facility: CLINIC | Age: 69
End: 2024-07-31
Payer: COMMERCIAL

## 2024-07-31 VITALS
HEART RATE: 67 BPM | WEIGHT: 102 LBS | BODY MASS INDEX: 17.42 KG/M2 | HEIGHT: 64 IN | TEMPERATURE: 97.3 F | DIASTOLIC BLOOD PRESSURE: 60 MMHG | SYSTOLIC BLOOD PRESSURE: 102 MMHG

## 2024-07-31 DIAGNOSIS — I63.89 CEREBROVASCULAR ACCIDENT (CVA) DUE TO OTHER MECHANISM (MULTI): Primary | ICD-10-CM

## 2024-07-31 DIAGNOSIS — I61.1 NONTRAUMATIC CORTICAL HEMORRHAGE OF LEFT CEREBRAL HEMISPHERE (MULTI): ICD-10-CM

## 2024-07-31 DIAGNOSIS — I69.320 APHASIA AS LATE EFFECT OF CEREBROVASCULAR ACCIDENT (CVA): ICD-10-CM

## 2024-07-31 PROCEDURE — 1123F ACP DISCUSS/DSCN MKR DOCD: CPT | Performed by: STUDENT IN AN ORGANIZED HEALTH CARE EDUCATION/TRAINING PROGRAM

## 2024-07-31 PROCEDURE — 3074F SYST BP LT 130 MM HG: CPT | Performed by: STUDENT IN AN ORGANIZED HEALTH CARE EDUCATION/TRAINING PROGRAM

## 2024-07-31 PROCEDURE — 3008F BODY MASS INDEX DOCD: CPT | Performed by: STUDENT IN AN ORGANIZED HEALTH CARE EDUCATION/TRAINING PROGRAM

## 2024-07-31 PROCEDURE — 99214 OFFICE O/P EST MOD 30 MIN: CPT | Performed by: STUDENT IN AN ORGANIZED HEALTH CARE EDUCATION/TRAINING PROGRAM

## 2024-07-31 PROCEDURE — 1160F RVW MEDS BY RX/DR IN RCRD: CPT | Performed by: STUDENT IN AN ORGANIZED HEALTH CARE EDUCATION/TRAINING PROGRAM

## 2024-07-31 PROCEDURE — 3078F DIAST BP <80 MM HG: CPT | Performed by: STUDENT IN AN ORGANIZED HEALTH CARE EDUCATION/TRAINING PROGRAM

## 2024-07-31 PROCEDURE — 1159F MED LIST DOCD IN RCRD: CPT | Performed by: STUDENT IN AN ORGANIZED HEALTH CARE EDUCATION/TRAINING PROGRAM

## 2024-07-31 ASSESSMENT — LIFESTYLE VARIABLES
HOW OFTEN DO YOU HAVE SIX OR MORE DRINKS ON ONE OCCASION: NEVER
HOW MANY STANDARD DRINKS CONTAINING ALCOHOL DO YOU HAVE ON A TYPICAL DAY: PATIENT DOES NOT DRINK
SKIP TO QUESTIONS 9-10: 1
AUDIT-C TOTAL SCORE: 0
HOW OFTEN DO YOU HAVE A DRINK CONTAINING ALCOHOL: NEVER

## 2024-07-31 ASSESSMENT — PATIENT HEALTH QUESTIONNAIRE - PHQ9
SUM OF ALL RESPONSES TO PHQ9 QUESTIONS 1 & 2: 0
1. LITTLE INTEREST OR PLEASURE IN DOING THINGS: NOT AT ALL
2. FEELING DOWN, DEPRESSED OR HOPELESS: NOT AT ALL

## 2024-07-31 NOTE — PROGRESS NOTES
"Subjective     Fadia Bolden is a 69 y.o. year old female for follow-up of stroke.     MRI Brain showed evidence of old Lt parieto-occipital infarct. No evidence     Pt is planned for breast surgery. Per Pre-admit note :She has had multiple right breast ultrasounds since 2023 and a mass was noted. In June, 2024- the mass was noted to increase in size. She then had an U/S biopsy on July 15th. She is now scheduled for this partial mastectomy\"    4/10/2024 admitted to the hospital with concern for seizure activity. R sided involuntary shaking and worsening weakness. Episode stopped after loaded with Keppra Pt was discharged on Keppra 500mg BID.     Pt currently on ASA 81 mg and Keppra 500mg BID.     had concerns about cognitive changes, said that she would be looking to pug the vacuum in a light switch, seems to be intermittently slightly confused but redirectable and aware of the confusion.    Denied any more Rt sided shaking or any sort of seizure like event since starting the Keppra in April 2024.    LDL 56   A1C 5.7    ROS is otherwise unremarkable.       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, estrogen receptor positive, unspecified laterality (Multi)    Cerebrovascular accident (CVA) (Multi)    Aphasia    Anisocoria    Cerebral edema (Multi)    Impaired gait and mobility    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)    Right sided weakness    Mixed hyperlipidemia    Primary hypertension    Coordination " abnormal    SDH (subdural hematoma) (Multi)    Acquired skull defect    Gastroesophageal reflux disease    Vision loss    Apraxia following cerebrovascular accident    Anxiety    Hemianopsia    Seizure-like activity (Multi)    Malignant neoplasm of upper-outer quadrant of right breast in female, estrogen receptor negative (Multi)       Objective   Neurological Exam  Mental Status  Awake, alert and oriented to person, place and time. Speech is normal. Able to name objects and repeat. Follows one-step commands.  Difficulty with R-L , can do 1  step commands  Trouble identifying her ear   With prompting was able to figure out a cross body 2 step command .    Cranial Nerves  CN II: Right homonymous hemianopsia. Left normal visual field.  CN III, IV, VI: Extraocular movements intact bilaterally.  CN V: Facial sensation is normal.  CN VII: Full and symmetric facial movement.  CN VIII: Hearing is normal.  CN IX, X: Palate elevates symmetrically  CN XI: Shoulder shrug strength is normal.  CN XII: Tongue midline without atrophy or fasciculations.    Motor   Strength is 5/5 throughout all four extremities.    Sensory  Light touch is normal in upper and lower extremities.     Coordination  Right: Finger-to-nose normal.Left: Finger-to-nose normal.    Physical Exam  Eyes:      Extraocular Movements: Extraocular movements intact.   Neurological:      Motor: Motor strength is normal.  Psychiatric:         Speech: Speech normal.     Assessment/Plan   There are no diagnoses linked to this encounter.    History of intracerebral hemorrhage, L parietoccipital lobe: etiology concerning for ischemic stroke with hemorrhagic transformation as there was a wedge shaped embolic appearing infarcts on the initial MRI. Ddx includes hemorrhagic mets given her history of breast cancer, but MRI did not show any evidence of mets. Patient currently on ASA 81mg Decision regarding AC vs AP pending cancer work-up (Current plan is for partial mastectomy.  If the cancer tissue is completely removed -including lymph nodes- and no Ca tissue remains, that would be considered as complete cancer treatment, and pt would not need to be on AC as complete Ca treatment would address hyeprcoag state. )        --Will continue with ASA 81mg for now.         --Patient okay to take Atorvastatin 40mg (decrease from 80mg)        --Pt will need tight BP control (SBP has been 140-160, goal is <130/80). Pt currently on Lisinopril 20 mg but stated that she sometimes takes half a pill. Counseled pt on importance of compliance with prescribed doses and risk of non-compliance.         --Okay to stop ASA 81mg for 5 days before surgery. ASA 81mg should be restarted no later than 24-48 hours if no bleeding/surgical complications or concerns are encountered.        Right focal seizures         --Continue with Keppra 500mg BID. Minimal side effects at this time with optimal control.         --Pt should take her Keppra dose on the day of surgery (despite being NPO, Okay to take it with sips of water). Okay to give IV Keppra (Keppra conversion PO: IV is 1:1)      RTC in 6 months.

## 2024-08-01 ENCOUNTER — APPOINTMENT (OUTPATIENT)
Dept: OCCUPATIONAL THERAPY | Facility: CLINIC | Age: 69
End: 2024-08-01
Payer: COMMERCIAL

## 2024-08-01 DIAGNOSIS — R53.1 WEAKNESS GENERALIZED: ICD-10-CM

## 2024-08-01 DIAGNOSIS — R27.8 COORDINATION ABNORMAL: ICD-10-CM

## 2024-08-01 DIAGNOSIS — I69.390 APRAXIA FOLLOWING CEREBROVASCULAR ACCIDENT: Primary | ICD-10-CM

## 2024-08-01 DIAGNOSIS — E78.2 MIXED HYPERLIPIDEMIA: ICD-10-CM

## 2024-08-01 PROCEDURE — 97112 NEUROMUSCULAR REEDUCATION: CPT | Mod: GO,CO

## 2024-08-01 RX ORDER — ATORVASTATIN CALCIUM 80 MG/1
80 TABLET, FILM COATED ORAL DAILY
Qty: 90 TABLET | Refills: 3 | Status: SHIPPED | OUTPATIENT
Start: 2024-08-01 | End: 2025-08-01

## 2024-08-01 NOTE — PROGRESS NOTES
Occupational Therapy    Treatment   Patient Name: Fadia Bolden  MRN: [id number]  Today's Date: 8/1/2024      Time Calculation  Start Time: 0815  Stop Time: 0901  Time Calculation (min): 46 min  Assessment:  Patient tolerated treatment well without complaints of pain or fatigue. Pt continues to progress towards goals, increased awareness of right side.     Plan:   Pt undergoing partial mastectomy next week, 8/7. After surgery pt spouse to call for follow up therapy, possible transition to lymphedema therapy for 1-2 visits if needed after partial mastectomy.      Subjective   Pt and spouse state pt is getting partial mastectomy next week with Dr Rascon.    Current Problem:  1. Apraxia following cerebrovascular accident        2. Coordination abnormal        3. Weakness generalized            Pain:  0/10    Objective       Treatment:     Neuro 46 min    Pt completes B UE Lanyrd bike for 5 minutes while identifying numbers presented by MALDONADO on right side. Pt successfully tracks MALDONADO movement with hand and identifies 15/15 numbers correctly    Functional balance tasks with bilateral reaching cognition activity to promote safety during overhead reaching and higher level thinking.  Pt picks sticky note with number written on it, steps onto 4 river rocks, places number on wall to form a clock. Pt is able to correctly place all numbers of clock, two LOB on river rock and able to correct without assist.

## 2024-08-04 LAB
ATRIAL RATE: 60 BPM
P AXIS: 76 DEGREES
P OFFSET: 204 MS
P ONSET: 156 MS
PR INTERVAL: 130 MS
Q ONSET: 221 MS
QRS COUNT: 10 BEATS
QRS DURATION: 84 MS
QT INTERVAL: 414 MS
QTC CALCULATION(BAZETT): 414 MS
QTC FREDERICIA: 414 MS
R AXIS: 56 DEGREES
T AXIS: 52 DEGREES
T OFFSET: 428 MS
VENTRICULAR RATE: 60 BPM

## 2024-08-05 ENCOUNTER — OFFICE VISIT (OUTPATIENT)
Dept: PRIMARY CARE | Facility: CLINIC | Age: 69
End: 2024-08-05
Payer: COMMERCIAL

## 2024-08-05 ENCOUNTER — LAB (OUTPATIENT)
Dept: LAB | Facility: LAB | Age: 69
End: 2024-08-05
Payer: COMMERCIAL

## 2024-08-05 ENCOUNTER — APPOINTMENT (OUTPATIENT)
Dept: SLEEP MEDICINE | Facility: CLINIC | Age: 69
End: 2024-08-05
Payer: COMMERCIAL

## 2024-08-05 VITALS
TEMPERATURE: 98.4 F | BODY MASS INDEX: 17.68 KG/M2 | DIASTOLIC BLOOD PRESSURE: 78 MMHG | SYSTOLIC BLOOD PRESSURE: 138 MMHG | RESPIRATION RATE: 16 BRPM | WEIGHT: 103 LBS | HEART RATE: 64 BPM | OXYGEN SATURATION: 95 %

## 2024-08-05 VITALS
SYSTOLIC BLOOD PRESSURE: 109 MMHG | WEIGHT: 103.5 LBS | RESPIRATION RATE: 18 BRPM | DIASTOLIC BLOOD PRESSURE: 67 MMHG | BODY MASS INDEX: 17.67 KG/M2 | HEIGHT: 64 IN | HEART RATE: 67 BPM

## 2024-08-05 DIAGNOSIS — S30.824A BLISTER OF VAGINA: ICD-10-CM

## 2024-08-05 DIAGNOSIS — S30.824A BLISTER OF VAGINA: Primary | ICD-10-CM

## 2024-08-05 DIAGNOSIS — G47.33 OSA ON CPAP: Primary | ICD-10-CM

## 2024-08-05 PROCEDURE — 3075F SYST BP GE 130 - 139MM HG: CPT | Performed by: NURSE PRACTITIONER

## 2024-08-05 PROCEDURE — 99214 OFFICE O/P EST MOD 30 MIN: CPT | Performed by: GENERAL PRACTICE

## 2024-08-05 PROCEDURE — 1036F TOBACCO NON-USER: CPT | Performed by: NURSE PRACTITIONER

## 2024-08-05 PROCEDURE — G2211 COMPLEX E/M VISIT ADD ON: HCPCS | Performed by: GENERAL PRACTICE

## 2024-08-05 PROCEDURE — 87529 HSV DNA AMP PROBE: CPT

## 2024-08-05 PROCEDURE — 1123F ACP DISCUSS/DSCN MKR DOCD: CPT | Performed by: NURSE PRACTITIONER

## 2024-08-05 PROCEDURE — 1123F ACP DISCUSS/DSCN MKR DOCD: CPT | Performed by: GENERAL PRACTICE

## 2024-08-05 PROCEDURE — 3074F SYST BP LT 130 MM HG: CPT | Performed by: GENERAL PRACTICE

## 2024-08-05 PROCEDURE — 36415 COLL VENOUS BLD VENIPUNCTURE: CPT

## 2024-08-05 PROCEDURE — 1160F RVW MEDS BY RX/DR IN RCRD: CPT | Performed by: NURSE PRACTITIONER

## 2024-08-05 PROCEDURE — 99213 OFFICE O/P EST LOW 20 MIN: CPT | Performed by: NURSE PRACTITIONER

## 2024-08-05 PROCEDURE — 1036F TOBACCO NON-USER: CPT | Performed by: GENERAL PRACTICE

## 2024-08-05 PROCEDURE — 3008F BODY MASS INDEX DOCD: CPT | Performed by: GENERAL PRACTICE

## 2024-08-05 PROCEDURE — 1159F MED LIST DOCD IN RCRD: CPT | Performed by: NURSE PRACTITIONER

## 2024-08-05 PROCEDURE — 3078F DIAST BP <80 MM HG: CPT | Performed by: NURSE PRACTITIONER

## 2024-08-05 PROCEDURE — 1159F MED LIST DOCD IN RCRD: CPT | Performed by: GENERAL PRACTICE

## 2024-08-05 PROCEDURE — 3078F DIAST BP <80 MM HG: CPT | Performed by: GENERAL PRACTICE

## 2024-08-05 RX ORDER — VALACYCLOVIR HYDROCHLORIDE 1 G/1
1000 TABLET, FILM COATED ORAL 2 TIMES DAILY
Qty: 20 TABLET | Refills: 0 | Status: SHIPPED | OUTPATIENT
Start: 2024-08-05 | End: 2024-08-07

## 2024-08-05 ASSESSMENT — PATIENT HEALTH QUESTIONNAIRE - PHQ9
1. LITTLE INTEREST OR PLEASURE IN DOING THINGS: NOT AT ALL
SUM OF ALL RESPONSES TO PHQ9 QUESTIONS 1 AND 2: 0
2. FEELING DOWN, DEPRESSED OR HOPELESS: NOT AT ALL

## 2024-08-05 ASSESSMENT — ENCOUNTER SYMPTOMS
CARDIOVASCULAR NEGATIVE: 1
CONSTITUTIONAL NEGATIVE: 1
RESPIRATORY NEGATIVE: 1
DEPRESSION: 0
LOSS OF SENSATION IN FEET: 0
OCCASIONAL FEELINGS OF UNSTEADINESS: 0
GASTROINTESTINAL NEGATIVE: 1
MUSCULOSKELETAL NEGATIVE: 1

## 2024-08-05 ASSESSMENT — SLEEP AND FATIGUE QUESTIONNAIRES
HOW LIKELY ARE YOU TO NOD OFF OR FALL ASLEEP WHILE SITTING QUIETLY AFTER LUNCH WITHOUT ALCOHOL: WOULD NEVER DOZE
HOW LIKELY ARE YOU TO NOD OFF OR FALL ASLEEP WHILE LYING DOWN TO REST IN THE AFTERNOON WHEN CIRCUMSTANCES PERMIT: HIGH CHANCE OF DOZING
HOW LIKELY ARE YOU TO NOD OFF OR FALL ASLEEP IN A CAR, WHILE STOPPED FOR A FEW MINUTES IN TRAFFIC: SLIGHT CHANCE OF DOZING
HOW LIKELY ARE YOU TO NOD OFF OR FALL ASLEEP WHILE SITTING AND READING: SLIGHT CHANCE OF DOZING
HOW LIKELY ARE YOU TO NOD OFF OR FALL ASLEEP WHEN YOU ARE A PASSENGER IN A CAR FOR AN HOUR WITHOUT A BREAK: WOULD NEVER DOZE
ESS-CHAD TOTAL SCORE: 6
HOW LIKELY ARE YOU TO NOD OFF OR FALL ASLEEP WHILE SITTING AND TALKING TO SOMEONE: WOULD NEVER DOZE
HOW LIKELY ARE YOU TO NOD OFF OR FALL ASLEEP WHILE WATCHING TV: SLIGHT CHANCE OF DOZING
SITING INACTIVE IN A PUBLIC PLACE LIKE A CLASS ROOM OR A MOVIE THEATER: WOULD NEVER DOZE

## 2024-08-05 NOTE — PATIENT INSTRUCTIONS
[facility] Sleep Medicine   [facility]  [address] [address]  [phone]       NAME: Fadia Bolden   DATE: 8/5/2024     Your Sleep Provider Today: Vaughn Vazquez DO  Your Primary Care Physician: Michelle Martinez DO     DIAGNOSIS:   1. DAIJA on CPAP  Positive Airway Pressure (PAP) Therapy    CANCELED: Positive Airway Pressure (PAP) Therapy          Thank you for coming to the Sleep Medicine Clinic today! Your sleep medicine provider today was: Vaughn Vazquez DO Below is a summary of your treatment plan, other important information, and our contact numbers:      TREATMENT PLAN     Follow up in 12 months or sooner as needed.     Instructions - Common DAIJA Recs: - For your sleep apnea, continue to use your PAP every night and use it whenever you are sleeping.   - Avoid alcohol or sedatives several hours prior to sleeping.   - Get additional supplies for your PAP (e.g., mask, hose, filters) every 3 months or as your insurance allows from your [employer] company. Replacement cushions for your PAP mask can be requested monthly if airseals are an issue.  - Remember to clean your mask, tubings, and water chamber regularly as instructed.  - Avoid driving or operating heavy machinery when drowsy. A person driving while sleepy is five (5) times more likely to have an accident. If you feel sleepy, pull over and take a short power nap (sleep for less than 30 minutes). Otherwise, ask somebody to drive you.      IMPORTANT INFORMATION     Call 911 for medical emergencies.  Our offices are generally open from Monday-Friday, 9 am - 5 pm.  If you need to get in touch with me, you may either call me and my team(number is below) or you can use [place].  If a referral for a test, for CPAP, or for another specialist was made, and you have not heard about scheduling this within a week, please call scheduling at [phone]).  If you are unable to make your appointment for  clinic or an overnight study, kindly call the office at least 48 hours in advance to cancel and reschedule.  If you are on CPAP, please bring your device's card or the device to each clinic appointment.   There are no supporting services by either the sleep doctors or their staff on weekends and Holidays, or after 5 PM on weekdays.   If you have been asked to come to a sleep study, make sure you bring toiletries, a comfy pillow, and any nighttime medications that you may regularly take. Also be sure to eat dinner before you arrive. We generally do not provide meals.      PRESCRIPTIONS     We require 7 days advanced notice for prescription refills. If we do not receive the request in this time, we cannot guarantee that your medication will be refilled in time.      IMPORTANT PHONE NUMBERS     Sleep Medicine Clinic Fax: [phone]  Appointments (for Pediatric Sleep Clinic): [phone] - option 1  Appointments (for Adult Sleep Clinic): [phone] - option 2  Appointments (For Sleep Studies): [phone] - option 3  Behavioral Sleep Medicine: [phone]  Sleep Surgery: [phone]  ENT (Otolaryngology): [phone]  Headache Clinic (Neurology): [phone]  Neurology: [phone]  Psychiatry: [phone]  Pulmonary Function Testing (PFT) Center: [phone]  Pulmonary Medicine: [phone]  [employer] (DME): [phone]  [employer] (DME): [phone]  [facility] ([facility]): [phone]      OUR ADULT SLEEP MEDICINE TEAM   Please do not hesitate to call the office or sleep nurse with any questions between appointments:    Adult Sleep Nurses (Bessy Beaver, RN and Gill Holguin RN):  For clinical questions and refilling prescriptions: [phone]  Email sleep diaries and other documents at: [email or web address]    Adult Sleep Medicine Secretaries:  Pilar Emerson (For Elzbieta/Aguilar/Claudio/Erick/Alex/Yonatan):   P: [phone]  F:  [phone]  Ni Nguyen (For [place]uggenyanethler): P: [phone]  Fax: [phone]  Ada Hasanastasia (For Jur[place]ike/Blank): P: [phone]  F: [phone]  Mara Malone (For [place]): P: [phone]  F: [phone]  Kathy Bo (For Kecia/Caesar/Zakhary): P: [phone]  F: [phone]  Terri Alonso (For Sean/[place]): P: [phone]  F: [phone]     Adult Sleep Medicine Advanced Practice Providers:  Tony Felix ([place], [place])  Nenita Maynard ([facility])  Marita Curiel CNP ([place], [place], [place])  Starr Snyder CNP ([place], [place], [place])  Nuha Osborne ([place], [place], [place])  Adrian Perry CNP ([facility])        OUR SLEEP TESTING LOCATIONS     Our team will contact you to schedule your sleep study, however, you can contact us as follow:  Main Phone Line (scheduling only): [phone]), option 3  Adult and Pediatric Locations  [facility] (6 years and older): [facility] - 4th floor ([address] [place] [address]) After hours line: [phone]  [facility] (Main campus: All ages): [facility], 6th floor. After hours line: [phone]   [place] (5 years and older; younger considered on case-by-case basis): [address]; [address], [address]. Scheduling  After hours line: [phone]   [place] (6 years and older): [address]; [address]; [address]   [place] (6 years and older): [address] [facility], [address]  After hours line: [phone]   [place] (13 years and older) in [place]: [address], 2nd floor  After hours line: [phone]   [place] (13 year and older): [address]  After hours line: [phone]     Adult Only Locations:   [place] (18 years and older): [address] [facility], 2nd floor   [place] (18 years and older): [address] [facility]; 4th floor  After hours line: [phone]  [facility]  "(18 years and older) at [place]: [address] [place] [address]  After hours line: [phone]          CONTACTING YOUR SLEEP MEDICINE PROVIDER     Send a message directly to your provider through \"My Chart\", which is the email service through your  Records Account: [email or web address]   Call [phone] and leave a message. One of the administrative assistants will forward the message to your sleep medicine provider through \"My Chart\" and/or email.     Your sleep medicine provider for this visit was: Vaughn Vazquez DO        "

## 2024-08-05 NOTE — PROGRESS NOTES
Subjective   Patient ID: Fadia Bolden is a 69 y.o. female who presents for Rash.    Patient says that last week Thursday, 8/1, patient had vaginal soreness. Patient believes she may have noticed the bumps on 8/3 a little. They are more prominent today. Patient is using a different soap; 2-3 weeks ago. No discharge/drainage coming from the lesions. No history of vaginal herpes or cold sores. They are slightly itchy and tender but not painful.    Review of Systems   Constitutional: Negative.    HENT: Negative.     Respiratory: Negative.     Cardiovascular: Negative.    Gastrointestinal: Negative.    Musculoskeletal: Negative.    Skin:  Positive for rash.     Objective   /78   Pulse 64   Temp 36.9 °C (98.4 °F)   Resp 16   Wt 46.7 kg (103 lb)   SpO2 95%   BMI 17.68 kg/m²     Physical Exam  Vitals reviewed. Exam conducted with a chaperone present (Delisa Milner MA).   Constitutional:       Appearance: Normal appearance.   HENT:      Head: Atraumatic.   Cardiovascular:      Rate and Rhythm: Normal rate and regular rhythm.      Heart sounds: Normal heart sounds. No murmur heard.  Pulmonary:      Effort: Pulmonary effort is normal.      Breath sounds: Normal breath sounds.   Genitourinary:     Comments: Patient with blister lesions on an erythematous base on the left labia consistent with genital herpes  Skin:     General: Skin is warm and dry.   Neurological:      General: No focal deficit present.      Mental Status: She is alert.   Psychiatric:         Mood and Affect: Mood normal.         Behavior: Behavior normal.     Assessment/Plan   Problem List Items Addressed This Visit    None  Visit Diagnoses         Codes    Blister of vagina    -  Primary S30.824A    Relevant Medications    valACYclovir (Valtrex) 1 gram tablet    Other Relevant Orders    HSV PCR    HSV by PCR, Qualitative Whole Blood        HSV PCR skin swab obtained. Will also have patient get blood work to verify this. Patient is outside  of 72 hour window of these lesions first appearing, but will still treat pt as she is immunocompromised. Patient to start on valacyclovir.     Pt has an upcoming surgery with oncology on Wednesday. I advised patient to call her surgeon's office to discuss the situation and have them further advise; she agreed.     Discussed contagiousness of the virus and how to prevent spread. Patient to take the valacyclovir and follow up with PCP/oncology.

## 2024-08-05 NOTE — PROGRESS NOTES
Patient: Fadia Bolden    [id number]  : 1955 -- AGE 69 y.o.    Provider: Vaughn Vazquez DO     [facility]   Service Date: 2024              [facility]  Follow  Visit Note        HISTORY OF PRESENT ILLNESS     HISTORY OF PRESENT ILLNESS   Fadia Bolden is a 69 y.o. female who presents to a [facility] for a sleep medicine evaluation with concerns of Follow-up (Using pap at home getting better using it. Ok with getting supplies ).     The patient  has a past medical history of Breast cancer (Multi), Cerebral vascular accident (Multi), HTN (hypertension), Hypothyroidism, DAIJA (obstructive sleep apnea), Other chest pain (02/10/2021), Peripheral vision loss, right, Personal history of irradiation, Personal history of malignant neoplasm of breast (2022), and Stroke ([facility])..    PAST SLEEP HISTORY     F with pmhx including GERD, history of breast cancer.      Patient has been snoring since around  and had a sleep study done.   HST 21 --> AHI 21.5. moderate sleep apnea.   patient was started on pap therapy at that time but she has been struggling with a mask leak, air is coming out from the sides of the mouth. Hence, she is not using pap therapy regularly.   she has decreased the max pressure to 14.6 as she could not tolerate higher pressure.       CURRENT HISTORY    24 the patient reports that she had a stroke in 2023?.   She has not been using pap therapy regularly.     24  Patient is trying to use pap therapy regularly. She is using a pad with her mask so that it does not rub her forehead.     PAP Related Problems: denies leak perceived. Denies dry mouth or skin irritation (uses a pad so that the mask does not rub her forehead).   Perceived Benefits of PAP Therapy: decreased snoring/ choking/ gasping.     Sleep schedule  on weekdays / work days:  Usual Bedtime: 11 pm  Sleep latency:  30min  Wake time : 5:30am  Total sleep time average/day: 7.5 hours/day  Awakenings: 1-2x per night, nocturia, variable length. Laying in bed.   Naps: daily, 2pm, 1.5hrs, refreshing, uses pap therapy.       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 works during the day.     Preferred sleeping position: SLEEP POSITION: supine    Sleep-related ROS:    Snoring: n  Witnessed apneas:  n     Gasping/ choking: no      Am Dry mouth:   y          Nasal congestion:  n      am headaches: n    Sleep is described as unrefreshing mostly.     Daytime sleepiness: y  Fatigue or decreased energy: y    Drowsy driving: does not drive now due to stroke / seizures.       RLS screen:  Denies     Sleep-related behaviors: DENIES    ESS: 6       REVIEW OF SYSTEMS     REVIEW OF SYSTEMS  Review of Systems   All other systems reviewed and are negative.      ALLERGIES AND MEDICATIONS     ALLERGIES  No Known Allergies      MEDICATIONS  Current Outpatient Medications   Medication Sig Dispense Refill    ascorbate calcium-bioflavonoid (Amelia-C with Bioflavonoids) 500-200 mg tablet Take 1 tablet by mouth once daily.      aspirin 81 mg chewable tablet Chew 1 tablet (81 mg) once daily.      atorvastatin (Lipitor) 80 mg tablet Take 1 tablet (80 mg) by mouth once daily. 90 tablet 3    levETIRAcetam (Keppra) 500 mg tablet Take 1 tablet (500 mg) by mouth 2 times a day. 180 tablet 3    ALPRAZolam (Xanax) 0.5 mg tablet Take 1 tablet (0.5 mg) by mouth 1 time if needed for anxiety for up to 1 dose. 1 tablet 0     No current facility-administered medications for this visit.         PAST HISTORY     PAST MEDICAL HISTORY  She  has a past medical history of Breast cancer (Multi), Cerebral vascular accident (Multi), HTN (hypertension), Hypothyroidism, DAIJA (obstructive sleep apnea), Other chest pain  "(02/10/2021), Peripheral vision loss, right, Personal history of irradiation, Personal history of malignant neoplasm of breast (06/27/2022), and Stroke (Multi).      PAST SURGICAL HISTORY:  Past Surgical History:   Procedure Laterality Date    COLONOSCOPY      CRANIOTOMY      MR HEAD ANGIO W AND WO IV CONTRAST  10/25/2023    MR HEAD ANGIO W AND WO IV CONTRAST 10/25/2023    MR HEAD ANGIO WO IV CONTRAST  01/05/2024    MR HEAD ANGIO WO IV CONTRAST 1/5/2024 [facility] [id number] MRI    OTHER SURGICAL HISTORY  11/22/2021    Tonsillectomy    OTHER SURGICAL HISTORY      x2 lumpectomy    UPPER GASTROINTESTINAL ENDOSCOPY         FAMILY HISTORY  Family History   Problem Relation Name Age of Onset    Thyroid disease Mother      Parkinsonism Father      Parkinsonism Sister      Other (HTN) Sister      Breast cancer Father's Sister      Breast cancer Paternal Grandmother           SOCIAL HISTORY  She  reports that she has never smoked. She has never used smokeless tobacco. She reports that she does not currently use alcohol. She reports that she does not use drugs.     Caffeine consumption: n      PHYSICAL EXAM     VITAL SIGNS: /67   Pulse 67   Resp 18   Ht 1.626 m (5' 4\")   Wt 46.9 kg (103 lb 8 oz)   BMI 17.77 kg/m²      PREVIOUS WEIGHTS:  Wt Readings from Last 3 Encounters:   08/05/24 46.9 kg (103 lb 8 oz)   07/31/24 46.3 kg (102 lb)   07/29/24 47.3 kg (104 lb 4.4 oz)       Physical Exam  Constitutional: Alert and oriented, cooperative, no obvious distress.   HENT: normocephalic.   Neurologic: AOx3.   psychiatric: appropriate mood and affect.  Integumentary: no significant rashes observed over pap mask area.         RESULTS/DATA         ASSESSMENT/PLAN     Ms. Bolden is a 69 y.o. female and  has a past medical history of Breast cancer (Multi), Cerebral vascular accident (Multi), HTN (hypertension), Hypothyroidism, DAIJA (obstructive sleep apnea), Other chest pain (02/10/2021), Peripheral vision loss, right, Personal history of " irradiation, Personal history of malignant neoplasm of breast (06/27/2022), and Stroke (Multi). She is following up on sleep apnea.     Problem List Items Addressed This Visit       DAIJA on CPAP - Primary         Problem List and Orders    F with pmhx including GERD, history of breast cancer, stroke in oct. 2023.      1- DAIJA  HST 5/13/21 --> AHI 21.5. moderate sleep apnea.      Reviewed and discussed the above sleep study results and management options in details. All questions answered, patient verbalizes understanding.     -cont. Autopap 4-15cwp,  ramp from 5min to 45min to start at CPAP 4cwp. rAHI 1.1, median pressure 6.1, max avg 10.1, acceptable compliance, good seal.     -LOLA herbert.     -had offered titration study especially with hx of recent stroke, but patient will let us know when she is ready for one, not currently interested.     -ordered supplies.   -ordered a mask refitting session. Also provided sample mask in clinic.  She needs to use an external pad so that the mask does not rub her skin.     Most adults need 7-9 hrs of sleep per day, please try to obtain that much sleep.      -do not drive or operate heavy machinery if drowsy.  -avoid sleeping on your back.   -avoid sedating substances/ medication, alcohol, illicit drugs and tobacco.  -counseled on the importance of compliance.      f/u 12 months or sooner as needed.

## 2024-08-06 ENCOUNTER — TELEPHONE (OUTPATIENT)
Dept: PRIMARY CARE | Facility: CLINIC | Age: 69
End: 2024-08-06

## 2024-08-06 ENCOUNTER — TELEPHONE (OUTPATIENT)
Dept: PRIMARY CARE | Facility: CLINIC | Age: 69
End: 2024-08-06
Payer: COMMERCIAL

## 2024-08-06 ENCOUNTER — CLINICAL SUPPORT (OUTPATIENT)
Dept: PRIMARY CARE | Facility: CLINIC | Age: 69
End: 2024-08-06
Payer: COMMERCIAL

## 2024-08-06 DIAGNOSIS — S30.824A BLISTER OF VAGINA: ICD-10-CM

## 2024-08-06 LAB
HSV1 DNA BLD QL NAA+PROBE: NOT DETECTED
HSV2 DNA BLD QL NAA+PROBE: NOT DETECTED

## 2024-08-06 PROCEDURE — 87529 HSV DNA AMP PROBE: CPT

## 2024-08-06 PROCEDURE — 87798 DETECT AGENT NOS DNA AMP: CPT | Performed by: NURSE PRACTITIONER

## 2024-08-06 PROCEDURE — 1123F ACP DISCUSS/DSCN MKR DOCD: CPT | Performed by: NURSE PRACTITIONER

## 2024-08-06 NOTE — TELEPHONE ENCOUNTER
Spoke to patient and let her know the situation. Advised that she may come in for a repeat swab and she said she would think about it. I will follow up with her

## 2024-08-06 NOTE — TELEPHONE ENCOUNTER
----- Message from Freda KAT sent at 8/6/2024  1:58 PM EDT -----   lab services called -  The herpes viral PCR was canceled due to it being the wrong container.

## 2024-08-07 ENCOUNTER — TELEPHONE (OUTPATIENT)
Dept: PRIMARY CARE | Facility: CLINIC | Age: 69
End: 2024-08-07
Payer: COMMERCIAL

## 2024-08-07 ENCOUNTER — PREP FOR PROCEDURE (OUTPATIENT)
Dept: SURGICAL ONCOLOGY | Facility: HOSPITAL | Age: 69
End: 2024-08-07
Payer: COMMERCIAL

## 2024-08-07 ENCOUNTER — APPOINTMENT (OUTPATIENT)
Dept: RADIOLOGY | Facility: HOSPITAL | Age: 69
End: 2024-08-07
Payer: COMMERCIAL

## 2024-08-07 DIAGNOSIS — B02.9 VARICELLA ZOSTER: Primary | ICD-10-CM

## 2024-08-07 LAB
HSV1 DNA SKIN QL NAA+PROBE: NOT DETECTED
HSV2 DNA SKIN QL NAA+PROBE: NOT DETECTED
VZV DNA SPEC QL NAA+PROBE: DETECTED

## 2024-08-07 RX ORDER — VALACYCLOVIR HYDROCHLORIDE 1 G/1
1000 TABLET, FILM COATED ORAL 3 TIMES DAILY
Qty: 21 TABLET | Refills: 0 | Status: SHIPPED | OUTPATIENT
Start: 2024-08-07 | End: 2024-08-14

## 2024-08-07 NOTE — TELEPHONE ENCOUNTER
Pt came into the office for a nurse visit. A chaperone (Delisa Milner MA) was in the room and the patient was swabbed for HSV by this APRN.

## 2024-08-07 NOTE — TELEPHONE ENCOUNTER
Spoke to patient and  regarding her positive VZV results. Spoke to Dr. Hanley, infectious disease, regarding further management. Patient does have cancer but is not currently being treated with any cancer drugs. Will treat patient with valtrex, three times a day for seven days. Patient to start this prescription today. She did not start the previous prescription due to the weather.     Again, pt is to start on vatrex, one pill, three times a day for seven days. Pt to follow up as needed.

## 2024-08-15 ENCOUNTER — APPOINTMENT (OUTPATIENT)
Dept: OCCUPATIONAL THERAPY | Facility: CLINIC | Age: 69
End: 2024-08-15
Payer: COMMERCIAL

## 2024-08-22 ENCOUNTER — LAB (OUTPATIENT)
Dept: LAB | Facility: LAB | Age: 69
End: 2024-08-22
Payer: COMMERCIAL

## 2024-08-22 ENCOUNTER — APPOINTMENT (OUTPATIENT)
Dept: PRIMARY CARE | Facility: CLINIC | Age: 69
End: 2024-08-22
Payer: COMMERCIAL

## 2024-08-22 VITALS
HEART RATE: 68 BPM | TEMPERATURE: 98 F | RESPIRATION RATE: 16 BRPM | OXYGEN SATURATION: 100 % | BODY MASS INDEX: 17.24 KG/M2 | SYSTOLIC BLOOD PRESSURE: 118 MMHG | DIASTOLIC BLOOD PRESSURE: 78 MMHG | WEIGHT: 101 LBS | HEIGHT: 64 IN

## 2024-08-22 DIAGNOSIS — R10.13 EPIGASTRIC PAIN: ICD-10-CM

## 2024-08-22 DIAGNOSIS — I61.2 NONTRAUMATIC HEMORRHAGE OF LEFT CEREBRAL HEMISPHERE (MULTI): ICD-10-CM

## 2024-08-22 DIAGNOSIS — R10.13 EPIGASTRIC PAIN: Primary | ICD-10-CM

## 2024-08-22 LAB
ALBUMIN SERPL BCP-MCNC: 4.2 G/DL (ref 3.4–5)
ALP SERPL-CCNC: 73 U/L (ref 33–136)
ALT SERPL W P-5'-P-CCNC: 23 U/L (ref 7–45)
ANION GAP SERPL CALC-SCNC: 9 MMOL/L (ref 10–20)
AST SERPL W P-5'-P-CCNC: 19 U/L (ref 9–39)
BILIRUB SERPL-MCNC: 1.3 MG/DL (ref 0–1.2)
BUN SERPL-MCNC: 20 MG/DL (ref 6–23)
CALCIUM SERPL-MCNC: 9.4 MG/DL (ref 8.6–10.3)
CHLORIDE SERPL-SCNC: 104 MMOL/L (ref 98–107)
CO2 SERPL-SCNC: 33 MMOL/L (ref 21–32)
CREAT SERPL-MCNC: 0.76 MG/DL (ref 0.5–1.05)
EGFRCR SERPLBLD CKD-EPI 2021: 84 ML/MIN/1.73M*2
ERYTHROCYTE [DISTWIDTH] IN BLOOD BY AUTOMATED COUNT: 14.1 % (ref 11.5–14.5)
GLUCOSE SERPL-MCNC: 110 MG/DL (ref 74–99)
HCT VFR BLD AUTO: 38.5 % (ref 36–46)
HGB BLD-MCNC: 13 G/DL (ref 12–16)
MCH RBC QN AUTO: 30.7 PG (ref 26–34)
MCHC RBC AUTO-ENTMCNC: 33.8 G/DL (ref 32–36)
MCV RBC AUTO: 91 FL (ref 80–100)
NRBC BLD-RTO: 0 /100 WBCS (ref 0–0)
PLATELET # BLD AUTO: 168 X10*3/UL (ref 150–450)
POTASSIUM SERPL-SCNC: 4.4 MMOL/L (ref 3.5–5.3)
PROT SERPL-MCNC: 7 G/DL (ref 6.4–8.2)
RBC # BLD AUTO: 4.23 X10*6/UL (ref 4–5.2)
SODIUM SERPL-SCNC: 142 MMOL/L (ref 136–145)
WBC # BLD AUTO: 5.9 X10*3/UL (ref 4.4–11.3)

## 2024-08-22 PROCEDURE — 1160F RVW MEDS BY RX/DR IN RCRD: CPT | Performed by: INTERNAL MEDICINE

## 2024-08-22 PROCEDURE — 3074F SYST BP LT 130 MM HG: CPT | Performed by: INTERNAL MEDICINE

## 2024-08-22 PROCEDURE — 36415 COLL VENOUS BLD VENIPUNCTURE: CPT

## 2024-08-22 PROCEDURE — 1123F ACP DISCUSS/DSCN MKR DOCD: CPT | Performed by: INTERNAL MEDICINE

## 2024-08-22 PROCEDURE — 3008F BODY MASS INDEX DOCD: CPT | Performed by: INTERNAL MEDICINE

## 2024-08-22 PROCEDURE — 3078F DIAST BP <80 MM HG: CPT | Performed by: INTERNAL MEDICINE

## 2024-08-22 PROCEDURE — 80053 COMPREHEN METABOLIC PANEL: CPT

## 2024-08-22 PROCEDURE — 99214 OFFICE O/P EST MOD 30 MIN: CPT | Performed by: INTERNAL MEDICINE

## 2024-08-22 PROCEDURE — 85027 COMPLETE CBC AUTOMATED: CPT

## 2024-08-22 PROCEDURE — 1159F MED LIST DOCD IN RCRD: CPT | Performed by: INTERNAL MEDICINE

## 2024-08-22 RX ORDER — LISINOPRIL 20 MG/1
20 TABLET ORAL DAILY
COMMUNITY

## 2024-08-22 RX ORDER — PANTOPRAZOLE SODIUM 40 MG/1
40 TABLET, DELAYED RELEASE ORAL DAILY
Qty: 30 TABLET | Refills: 1 | Status: SHIPPED | OUTPATIENT
Start: 2024-08-22 | End: 2024-10-21

## 2024-08-22 RX ORDER — LEVETIRACETAM 500 MG/1
500 TABLET ORAL 2 TIMES DAILY
Qty: 180 TABLET | Refills: 3 | Status: SHIPPED | OUTPATIENT
Start: 2024-08-22 | End: 2025-08-22

## 2024-08-22 NOTE — PROGRESS NOTES
"Subjective   Fadiaalden Bolden is a 69 y.o. female who presents for Abdominal Pain.    HPI   Pt reports abdominal pain x3-4 months.  Increased in intensity.  Denies visible rashes.  Reports constipation. Difficulty to evacuate bowels.  Moving bowels every 1-2 days.  States abdomen occasionally firm.  Some nausea this AM.  Appetite ok.  \"I need to eat more.\"    Scheduled for Mastectomy on 8/28/24.    Review of Systems    Health Maintenance Due   Topic Date Due    Bone Density Scan  Never done    Hepatitis C Screening  Never done    Zoster Vaccines (1 of 2) Never done    Hepatitis B Vaccines (2 of 3 - 19+ 3-dose series) 08/18/2005    RSV Pregnant patients and/or  patients aged 60+ years (1 - 1-dose 60+ series) Never done    Pneumococcal Vaccine: 65+ Years (1 of 1 - PCV) Never done    COVID-19 Vaccine (1 - 2023-24 season) Never done    Influenza Vaccine (1) 09/01/2024       Objective   /78   Pulse 68   Temp 36.7 °C (98 °F)   Resp 16   Ht 1.626 m (5' 4\")   Wt 45.8 kg (101 lb)   SpO2 100%   BMI 17.34 kg/m²     Physical Exam    Assessment/Plan   Problem List Items Addressed This Visit       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)    Relevant Medications    levETIRAcetam (Keppra) 500 mg tablet     Other Visit Diagnoses       Epigastric pain    -  Primary    Relevant Medications    pantoprazole (ProtoNix) 40 mg EC tablet    Other Relevant Orders    CBC (Completed)    Comprehensive Metabolic Panel (Completed)        Reivewed records  Will check labs  Cont meds  Trial of pantoprazole  Scheduled for partial mastectomy next week  If labs good will proceed and fu post operatively  Recent shingles infection  Pt states she is doing well  Didn't take valtrex due to concern for side effects  Has a lot of stress, sister is terminal and upcoming surgery         "

## 2024-08-27 ENCOUNTER — APPOINTMENT (OUTPATIENT)
Dept: SURGICAL ONCOLOGY | Facility: CLINIC | Age: 69
End: 2024-08-27
Payer: COMMERCIAL

## 2024-08-28 ENCOUNTER — HOSPITAL ENCOUNTER (OUTPATIENT)
Dept: RADIOLOGY | Facility: HOSPITAL | Age: 69
Discharge: HOME | End: 2024-08-28
Payer: COMMERCIAL

## 2024-08-28 ENCOUNTER — ANESTHESIA (OUTPATIENT)
Dept: OPERATING ROOM | Facility: HOSPITAL | Age: 69
End: 2024-08-28
Payer: COMMERCIAL

## 2024-08-28 ENCOUNTER — HOSPITAL ENCOUNTER (OUTPATIENT)
Facility: HOSPITAL | Age: 69
Setting detail: OUTPATIENT SURGERY
Discharge: HOME | End: 2024-08-28
Attending: SURGERY | Admitting: SURGERY
Payer: COMMERCIAL

## 2024-08-28 ENCOUNTER — ANESTHESIA EVENT (OUTPATIENT)
Dept: OPERATING ROOM | Facility: HOSPITAL | Age: 69
End: 2024-08-28
Payer: COMMERCIAL

## 2024-08-28 VITALS
SYSTOLIC BLOOD PRESSURE: 167 MMHG | TEMPERATURE: 97 F | DIASTOLIC BLOOD PRESSURE: 84 MMHG | HEIGHT: 64 IN | BODY MASS INDEX: 17.07 KG/M2 | RESPIRATION RATE: 19 BRPM | HEART RATE: 68 BPM | WEIGHT: 100 LBS | OXYGEN SATURATION: 96 %

## 2024-08-28 DIAGNOSIS — Z17.1 MALIGNANT NEOPLASM OF UPPER-OUTER QUADRANT OF RIGHT BREAST IN FEMALE, ESTROGEN RECEPTOR NEGATIVE (MULTI): ICD-10-CM

## 2024-08-28 DIAGNOSIS — C50.411 MALIGNANT NEOPLASM OF UPPER-OUTER QUADRANT OF RIGHT BREAST IN FEMALE, ESTROGEN RECEPTOR NEGATIVE (MULTI): ICD-10-CM

## 2024-08-28 PROCEDURE — 7100000002 HC RECOVERY ROOM TIME - EACH INCREMENTAL 1 MINUTE: Performed by: SURGERY

## 2024-08-28 PROCEDURE — 19301 PARTIAL MASTECTOMY: CPT | Performed by: SURGERY

## 2024-08-28 PROCEDURE — 2720000007 HC OR 272 NO HCPCS: Performed by: SURGERY

## 2024-08-28 PROCEDURE — 38525 BIOPSY/REMOVAL LYMPH NODES: CPT | Performed by: SURGERY

## 2024-08-28 PROCEDURE — 3600000009 HC OR TIME - EACH INCREMENTAL 1 MINUTE - PROCEDURE LEVEL FOUR: Performed by: SURGERY

## 2024-08-28 PROCEDURE — 3700000002 HC GENERAL ANESTHESIA TIME - EACH INCREMENTAL 1 MINUTE: Performed by: SURGERY

## 2024-08-28 PROCEDURE — 38792 RA TRACER ID OF SENTINL NODE: CPT

## 2024-08-28 PROCEDURE — 2500000005 HC RX 250 GENERAL PHARMACY W/O HCPCS: Performed by: NURSE ANESTHETIST, CERTIFIED REGISTERED

## 2024-08-28 PROCEDURE — 76098 X-RAY EXAM SURGICAL SPECIMEN: CPT | Mod: RT

## 2024-08-28 PROCEDURE — 7100000009 HC PHASE TWO TIME - INITIAL BASE CHARGE: Performed by: SURGERY

## 2024-08-28 PROCEDURE — 7100000001 HC RECOVERY ROOM TIME - INITIAL BASE CHARGE: Performed by: SURGERY

## 2024-08-28 PROCEDURE — 3600000004 HC OR TIME - INITIAL BASE CHARGE - PROCEDURE LEVEL FOUR: Performed by: SURGERY

## 2024-08-28 PROCEDURE — 88307 TISSUE EXAM BY PATHOLOGIST: CPT | Mod: TC,STJLAB | Performed by: SURGERY

## 2024-08-28 PROCEDURE — 2500000004 HC RX 250 GENERAL PHARMACY W/ HCPCS (ALT 636 FOR OP/ED): Mod: JZ | Performed by: NURSE ANESTHETIST, CERTIFIED REGISTERED

## 2024-08-28 PROCEDURE — 2500000004 HC RX 250 GENERAL PHARMACY W/ HCPCS (ALT 636 FOR OP/ED): Performed by: ANESTHESIOLOGY

## 2024-08-28 PROCEDURE — 2500000001 HC RX 250 WO HCPCS SELF ADMINISTERED DRUGS (ALT 637 FOR MEDICARE OP): Performed by: ANESTHESIOLOGY

## 2024-08-28 PROCEDURE — 2500000004 HC RX 250 GENERAL PHARMACY W/ HCPCS (ALT 636 FOR OP/ED): Performed by: SURGERY

## 2024-08-28 PROCEDURE — A9520 TC99 TILMANOCEPT DIAG 0.5MCI: HCPCS | Performed by: SURGERY

## 2024-08-28 PROCEDURE — 3700000001 HC GENERAL ANESTHESIA TIME - INITIAL BASE CHARGE: Performed by: SURGERY

## 2024-08-28 PROCEDURE — 38792 RA TRACER ID OF SENTINL NODE: CPT | Performed by: SURGERY

## 2024-08-28 PROCEDURE — 2780000003 HC OR 278 NO HCPCS: Performed by: SURGERY

## 2024-08-28 PROCEDURE — 38900 IO MAP OF SENT LYMPH NODE: CPT | Performed by: SURGERY

## 2024-08-28 PROCEDURE — 3430000001 HC RX 343 DIAGNOSTIC RADIOPHARMACEUTICALS: Performed by: SURGERY

## 2024-08-28 PROCEDURE — 7100000010 HC PHASE TWO TIME - EACH INCREMENTAL 1 MINUTE: Performed by: SURGERY

## 2024-08-28 RX ORDER — HYDROCODONE BITARTRATE AND ACETAMINOPHEN 5; 325 MG/1; MG/1
1 TABLET ORAL EVERY 4 HOURS PRN
Status: DISCONTINUED | OUTPATIENT
Start: 2024-08-28 | End: 2024-08-28 | Stop reason: HOSPADM

## 2024-08-28 RX ORDER — GLYCOPYRROLATE 0.2 MG/ML
INJECTION INTRAMUSCULAR; INTRAVENOUS AS NEEDED
Status: DISCONTINUED | OUTPATIENT
Start: 2024-08-28 | End: 2024-08-28

## 2024-08-28 RX ORDER — HYDROMORPHONE HYDROCHLORIDE 0.2 MG/ML
0.1 INJECTION INTRAMUSCULAR; INTRAVENOUS; SUBCUTANEOUS EVERY 5 MIN PRN
Status: DISCONTINUED | OUTPATIENT
Start: 2024-08-28 | End: 2024-08-28 | Stop reason: HOSPADM

## 2024-08-28 RX ORDER — LIDOCAINE HYDROCHLORIDE 20 MG/ML
INJECTION, SOLUTION EPIDURAL; INFILTRATION; INTRACAUDAL; PERINEURAL AS NEEDED
Status: DISCONTINUED | OUTPATIENT
Start: 2024-08-28 | End: 2024-08-28

## 2024-08-28 RX ORDER — PROPOFOL 10 MG/ML
INJECTION, EMULSION INTRAVENOUS AS NEEDED
Status: DISCONTINUED | OUTPATIENT
Start: 2024-08-28 | End: 2024-08-28

## 2024-08-28 RX ORDER — BUPIVACAINE HYDROCHLORIDE 2.5 MG/ML
INJECTION, SOLUTION INFILTRATION; PERINEURAL AS NEEDED
Status: DISCONTINUED | OUTPATIENT
Start: 2024-08-28 | End: 2024-08-28 | Stop reason: HOSPADM

## 2024-08-28 RX ORDER — MIDAZOLAM HYDROCHLORIDE 1 MG/ML
1 INJECTION, SOLUTION INTRAMUSCULAR; INTRAVENOUS ONCE AS NEEDED
Status: DISCONTINUED | OUTPATIENT
Start: 2024-08-28 | End: 2024-08-28 | Stop reason: HOSPADM

## 2024-08-28 RX ORDER — HYDRALAZINE HYDROCHLORIDE 20 MG/ML
5 INJECTION INTRAMUSCULAR; INTRAVENOUS EVERY 30 MIN PRN
Status: DISCONTINUED | OUTPATIENT
Start: 2024-08-28 | End: 2024-08-28 | Stop reason: HOSPADM

## 2024-08-28 RX ORDER — ACETAMINOPHEN 325 MG/1
650 TABLET ORAL EVERY 4 HOURS PRN
Status: DISCONTINUED | OUTPATIENT
Start: 2024-08-28 | End: 2024-08-28 | Stop reason: HOSPADM

## 2024-08-28 RX ORDER — FENTANYL CITRATE 50 UG/ML
INJECTION, SOLUTION INTRAMUSCULAR; INTRAVENOUS AS NEEDED
Status: DISCONTINUED | OUTPATIENT
Start: 2024-08-28 | End: 2024-08-28

## 2024-08-28 RX ORDER — ACETAMINOPHEN 325 MG/1
975 TABLET ORAL ONCE
Status: DISCONTINUED | OUTPATIENT
Start: 2024-08-28 | End: 2024-08-28 | Stop reason: HOSPADM

## 2024-08-28 RX ORDER — FERROUS SULFATE, DRIED 160(50) MG
1 TABLET, EXTENDED RELEASE ORAL DAILY
COMMUNITY

## 2024-08-28 RX ORDER — ONDANSETRON HYDROCHLORIDE 2 MG/ML
INJECTION, SOLUTION INTRAVENOUS AS NEEDED
Status: DISCONTINUED | OUTPATIENT
Start: 2024-08-28 | End: 2024-08-28

## 2024-08-28 RX ORDER — SODIUM CHLORIDE, SODIUM LACTATE, POTASSIUM CHLORIDE, CALCIUM CHLORIDE 600; 310; 30; 20 MG/100ML; MG/100ML; MG/100ML; MG/100ML
INJECTION, SOLUTION INTRAVENOUS CONTINUOUS PRN
Status: DISCONTINUED | OUTPATIENT
Start: 2024-08-28 | End: 2024-08-28

## 2024-08-28 RX ORDER — HYDROMORPHONE HYDROCHLORIDE 0.2 MG/ML
0.2 INJECTION INTRAMUSCULAR; INTRAVENOUS; SUBCUTANEOUS EVERY 5 MIN PRN
Status: DISCONTINUED | OUTPATIENT
Start: 2024-08-28 | End: 2024-08-28 | Stop reason: HOSPADM

## 2024-08-28 RX ORDER — TRAMADOL HYDROCHLORIDE 50 MG/1
50 TABLET ORAL EVERY 6 HOURS PRN
Qty: 12 TABLET | Refills: 0 | Status: SHIPPED | OUTPATIENT
Start: 2024-08-28 | End: 2024-09-02

## 2024-08-28 RX ORDER — CEFAZOLIN SODIUM 2 G/100ML
INJECTION, SOLUTION INTRAVENOUS AS NEEDED
Status: DISCONTINUED | OUTPATIENT
Start: 2024-08-28 | End: 2024-08-28

## 2024-08-28 RX ORDER — OXYCODONE HYDROCHLORIDE 10 MG/1
10 TABLET ORAL EVERY 4 HOURS PRN
Status: DISCONTINUED | OUTPATIENT
Start: 2024-08-28 | End: 2024-08-28 | Stop reason: HOSPADM

## 2024-08-28 RX ORDER — ONDANSETRON HYDROCHLORIDE 2 MG/ML
4 INJECTION, SOLUTION INTRAVENOUS ONCE AS NEEDED
Status: DISCONTINUED | OUTPATIENT
Start: 2024-08-28 | End: 2024-08-28 | Stop reason: HOSPADM

## 2024-08-28 RX ORDER — SODIUM CHLORIDE, SODIUM LACTATE, POTASSIUM CHLORIDE, CALCIUM CHLORIDE 600; 310; 30; 20 MG/100ML; MG/100ML; MG/100ML; MG/100ML
100 INJECTION, SOLUTION INTRAVENOUS CONTINUOUS
Status: DISCONTINUED | OUTPATIENT
Start: 2024-08-28 | End: 2024-08-28 | Stop reason: HOSPADM

## 2024-08-28 RX ORDER — ALBUTEROL SULFATE 0.83 MG/ML
2.5 SOLUTION RESPIRATORY (INHALATION) ONCE AS NEEDED
Status: DISCONTINUED | OUTPATIENT
Start: 2024-08-28 | End: 2024-08-28 | Stop reason: HOSPADM

## 2024-08-28 SDOH — HEALTH STABILITY: MENTAL HEALTH: CURRENT SMOKER: 0

## 2024-08-28 ASSESSMENT — PAIN - FUNCTIONAL ASSESSMENT
PAIN_FUNCTIONAL_ASSESSMENT: 0-10

## 2024-08-28 ASSESSMENT — PAIN SCALES - GENERAL
PAINLEVEL_OUTOF10: 5 - MODERATE PAIN
PAINLEVEL_OUTOF10: 2
PAINLEVEL_OUTOF10: 5 - MODERATE PAIN
PAINLEVEL_OUTOF10: 0 - NO PAIN
PAINLEVEL_OUTOF10: 7
PAINLEVEL_OUTOF10: 5 - MODERATE PAIN
PAINLEVEL_OUTOF10: 7
PAINLEVEL_OUTOF10: 2
PAINLEVEL_OUTOF10: 4
PAINLEVEL_OUTOF10: 7
PAINLEVEL_OUTOF10: 5 - MODERATE PAIN

## 2024-08-28 ASSESSMENT — PAIN DESCRIPTION - DESCRIPTORS
DESCRIPTORS: BURNING
DESCRIPTORS: ACHING;BURNING

## 2024-08-28 NOTE — DISCHARGE INSTRUCTIONS
DR. ROOT'S BREAST SURGERY DISCHARGE INSTRUCTIONS    Wound Care    Do not remove the surgical bra for 24 hours.  Wear the bra to bed to reduce movement.  Your incisions are covered with steri strips.  Leave these in place until they begin to lift from the edges.  If steri strips are still in place after 10 days you may remove the strips.  You can place ice on your breast as needed for pain relief  (20 minutes on / 20 minutes off).    Activity    You may shower after 24 hours, but no baths, tubs, or swimming for 2 weeks.  Do not lift anything more than 10lbs or do any strenuous activity until seen in post op.  You may drive when you are not taking narcotic pain medication.    Medication    Take prescription  narcotic medication for severe, breakthrough pain.  Do not drive while taking narcotics.  You may take acetaminophen (Tylenol), ibuprofen (Motrin), or naproxen (Aleve) for pain.    When to Call    Your wound is red, swollen, or has a lot of bleeding or drainage.  Your pain is not controlled by the medicines.  You have a fever or 100F or greater.      Follow Up    Dr. Root will review your pathology results at your post-op visit.  Pathology results may take up to 2 weeks or longer.  Results are available immediately on [email or web address] once finalized.  Please call the office at [phone] to make a follow up appointment.

## 2024-08-28 NOTE — ANESTHESIA PREPROCEDURE EVALUATION
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)      GI   (+) Esophageal stricture   (+) Gastroesophageal reflux disease      Endocrine   (+) Acquired hypothyroidism   (+) Nontoxic multinodular goiter      HEENT   (+) Anisocoria   (+) Vision loss      GYN   (+) Malignant neoplasm of upper-outer quadrant of breast in female, estrogen receptor positive, unspecified laterality (Multi)   (+) Malignant neoplasm of upper-outer quadrant of right breast in female, estrogen receptor negative (Multi)       Clinical information reviewed:   Tobacco  Allergies  Meds  Problems  Med Hx  Surg Hx  OB Status    Fam Hx  Soc Hx        NPO Detail:  NPO/Void Status  Carbohydrate Drink Given Prior to Surgery? : N  Date of Last Liquid: 08/27/24  Time of Last Liquid: 2300  Date of Last Solid: 08/27/24  Time of Last Solid: 2200  Last Intake Type: Clear fluids  Time of Last Void: 0600         Physical Exam    Airway  Mallampati: II  TM distance: >3 FB  Neck ROM: full     Cardiovascular - normal exam  Rhythm: regular  Rate: normal     Dental - normal exam     Pulmonary - normal exam  Breath sounds clear to auscultation     Abdominal   Abdomen: soft  Bowel sounds: normal       Anesthesia Plan    History of general anesthesia?: yes  History of complications of general anesthesia?: no    ASA 3     general     The patient is not a current smoker.  Patient was previously instructed to abstain from smoking on day of procedure.  Patient did not smoke on day of  procedure.  Education provided regarding risk of obstructive sleep apnea.  intravenous induction   Postoperative administration of opioids is intended.  Anesthetic plan and risks discussed with patient.  Use of blood products discussed with patient who consented to blood products.    Plan discussed with CRNA.

## 2024-08-28 NOTE — OP NOTE
Date: 2024  OR Location: [facility]    Name: Fadia Bolden, : 1955, Age: 69 y.o., MRN: [id number], Sex: female    Diagnosis  Pre-op Diagnosis      * Malignant neoplasm of upper-outer quadrant of right breast in female, estrogen receptor negative (Multi) [C50.411, Z17.1] Post-op Diagnosis     * Malignant neoplasm of upper-outer quadrant of right breast in female, estrogen receptor negative (Multi) [C50.411, Z17.1]     Procedures  RIGHT PARTIAL MASTECTOMY WITH SENTINEL LYMPH NODE BIOPSY  85529 - AL MASTECTOMY PARTIAL    AL BX/EXC LYMPH NODE OPEN DEEP AXILLARY NODE [47794]  AL INJ RADIOACTIVE TRACER FOR ID OF SENTINEL NODE [[id number]]  Surgeons      * Delisa Rascon - Primary    Resident/Fellow/Other Assistant:  Surgeons and Role:  * No surgeons found with a matching role *    Procedure Summary  Anesthesia: General  ASA: ASA status not filed in the log.  Anesthesia Staff: Anesthesiologist: Kaleb Tee MD  CRNA: DESHAWN Villa-CRNA, DNP  Estimated Blood Loss: 5mL    Staff:   Circulator: Ginny Guzman RN  Scrub Person: Merissa Felix; Jo Amanda    Details of Procedure:    The patient was identified by name and birth date.  The operative site was marked and technetium was injected in the right breast in a four quadrant fashion.  The patient was then transported to the operative suite and placed supine on the OR table.  A sign-in was performed verifying patient identity, procedure and laterality.  One dose of preoperative antibiotics was given.  After induction of anesthesia, 1cc paramagnetic iron oxide dye (Magtrace) was injected in the subareolar region of the right breast and massaged for several minutes.  Uptake into the corresponding axilla was confirmed using the handheld Neoprobe and sentimag probe. The right breast and axilla were prepped and draped in the usual sterile fashion.  Just prior to incision, a time-out was performed confirming patient identity, procedure and laterality.     Attention was turned to the breast. An elliptical incision was made encompassing the skin overlying the mass, and flaps were raised circumferentially. The target lesion was then circumferentially dissected using electrocautery.  Once the specimen was completely excised, it was marked for orientation using a silk suture- short suture superior, long lateral.  The specimen was then inked using the Vector inking kit in the following manner: red = superior, blue = inferior, yellow = medial, orange = lateral, green = anterior, and black = posterior and was then passed off the field.  Specimen radiograph was performed confirming the presence of the targeted lesion and biopsy clip.  Five additional cavity margins were taken: superior, inferior, medial, lateral, and posterior with black ink denoting the new margin.  The posterior extent of dissection did did include pectoralis fascia. Clips were placed to denote the area of resection. Attention was then turned toward the axilla. The lumpectomy incision was utilized. The handheld Neoprobe and sentimag probe were used to identify a hot and stained node which was excised in its entirety. The axilla was palpated and did not reveal any palpably abnormal lymph nodes.  Frozen section was negative for genoveva metastasis. The clavipectoral fascia was re-approximated with 2-0 Vicryl. Next, meticulous hemostasis was achieved.  The breast tissue was reapproximated in multiple layers to recreate the breast mound.  The dermis was closed with 3-0 Vicryl suture and the skin was closed with a running 4-0 Monocryl.  Benzoin and steri-strips were used as dressing.  The patient was then awoken from anesthesia and transported to the PACU in satisfactory condition.    [place] Node Biopsy for Breast Cancer  Operation performed with curative intent Yes   Tracer(s) used to identify sentinel nodes in the upfront surgery (non-neoadjuvant) setting Radioactive tracer and Superparamagnetic iron oxide    Tracer(s) used to identify sentinel nodes in the neoadjuvant setting N/A   All nodes (colored or non-colored) present at the end of a dye-filled lymphatic channel were removed Yes   All significantly radioactive nodes were removed Yes   All palpably suspicious nodes were removed N/A   Biopsy-proven positive nodes marked with clips prior to chemotherapy were identified and removed N/A       SPECIMENS:    Right axillary sentinel node  Right partial mastectomy- short suture superior, long lateral  Right New Superior margin, black ink at new margin  4.   Right New Inferior margin, black ink at new margin  5.   Right New Medial margin, black ink at new margin  6.   Right New Lateral margin, black ink at new margin  7.   Right New Posterior margin, black ink at new margin    Delisa Rascon, DO

## 2024-08-28 NOTE — ANESTHESIA PROCEDURE NOTES
Airway  Date/Time: 8/28/2024 11:42 AM  Urgency: elective    Airway not difficult    Staffing  Performed: CRNA   Authorized by: Kaleb Tee MD    Performed by: DESHAWN Villa-CRNA, GENO  Patient location during procedure: OR    Indications and Patient Condition  Indications for airway management: anesthesia  Spontaneous ventilation: present  Sedation level: deep  Preoxygenated: yes  Patient position: sniffing  Mask difficulty assessment: 1 - vent by mask  Planned trial extubation    Final Airway Details  Final airway type: supraglottic airway      Successful airway: Size 4     Number of attempts at approach: 1  Number of other approaches attempted: 0    Additional Comments  Easy, atraumatic placement of igel LMA. Secured with silk tape.

## 2024-08-28 NOTE — ANESTHESIA POSTPROCEDURE EVALUATION
"Patient: Fadia Bolden    Procedure Summary       Date: 08/28/24 Room / Location: [facility] OR 07 / [facility]    Anesthesia Start: 1132 Anesthesia Stop: 1308    Procedure: RIGHT PARTIAL MASTECTOMY WITH SENTINEL LYMPH NODE BIOPSY (Right: Breast) Diagnosis:       Malignant neoplasm of upper-outer quadrant of right breast in female, estrogen receptor negative (Multi)      (Malignant neoplasm of upper-outer quadrant of right breast in female, estrogen receptor negative (Multi) [C50.411, Z17.1])    Surgeons: Delisa Rascon DO Responsible Provider: Kaleb Tee MD    Anesthesia Type: general ASA Status: Not recorded            Anesthesia Type: general    BP (!) 183/81   Pulse 82   Temp 36.5 °C (97.7 °F) (Temporal)   Resp 16   Ht 1.626 m (5' 4\")   Wt 45.4 kg (100 lb)   SpO2 100%   BMI 17.16 kg/m²    Vitals shown include unfiled device data.    Anesthesia Post Evaluation    Patient location during evaluation: PACU  Patient participation: complete - patient participated  Level of consciousness: awake and alert  Pain management: adequate  Airway patency: patent  Cardiovascular status: acceptable  Respiratory status: acceptable and face mask  Hydration status: acceptable  Postoperative Nausea and Vomiting: none        There were no known notable events for this encounter.    "

## 2024-08-29 ENCOUNTER — APPOINTMENT (OUTPATIENT)
Dept: OCCUPATIONAL THERAPY | Facility: CLINIC | Age: 69
End: 2024-08-29
Payer: COMMERCIAL

## 2024-09-04 ENCOUNTER — TELEPHONE (OUTPATIENT)
Dept: SURGICAL ONCOLOGY | Facility: HOSPITAL | Age: 69
End: 2024-09-04

## 2024-09-04 ENCOUNTER — APPOINTMENT (OUTPATIENT)
Dept: PRIMARY CARE | Facility: CLINIC | Age: 69
End: 2024-09-04
Payer: COMMERCIAL

## 2024-09-05 NOTE — PROGRESS NOTES
Patient came into the office for repeat testing of HSV. Delisa Milner MA was in thr room and the patient was swabbed for HSV by this APRN

## 2024-09-09 DIAGNOSIS — Z17.1 MALIGNANT NEOPLASM OF UPPER-OUTER QUADRANT OF RIGHT BREAST IN FEMALE, ESTROGEN RECEPTOR NEGATIVE: Primary | ICD-10-CM

## 2024-09-09 DIAGNOSIS — C50.411 MALIGNANT NEOPLASM OF UPPER-OUTER QUADRANT OF RIGHT BREAST IN FEMALE, ESTROGEN RECEPTOR NEGATIVE: Primary | ICD-10-CM

## 2024-09-09 LAB
LAB AP ASR DISCLAIMER: NORMAL
LAB AP BLOCK FOR ADDITIONAL STUDIES: NORMAL
LABORATORY COMMENT REPORT: NORMAL
Lab: NORMAL
PATH REPORT.FINAL DX SPEC: NORMAL
PATH REPORT.GROSS SPEC: NORMAL
PATH REPORT.RELEVANT HX SPEC: NORMAL
PATH REPORT.TOTAL CANCER: NORMAL
PATHOLOGY SYNOPTIC REPORT: NORMAL

## 2024-09-10 ENCOUNTER — APPOINTMENT (OUTPATIENT)
Dept: SURGICAL ONCOLOGY | Facility: CLINIC | Age: 69
End: 2024-09-10
Payer: COMMERCIAL

## 2024-09-11 DIAGNOSIS — C50.419 MALIGNANT NEOPLASM OF UPPER-OUTER QUADRANT OF BREAST IN FEMALE, ESTROGEN RECEPTOR POSITIVE, UNSPECIFIED LATERALITY (MULTI): ICD-10-CM

## 2024-09-11 DIAGNOSIS — Z17.0 MALIGNANT NEOPLASM OF UPPER-OUTER QUADRANT OF BREAST IN FEMALE, ESTROGEN RECEPTOR POSITIVE, UNSPECIFIED LATERALITY (MULTI): ICD-10-CM

## 2024-09-12 ENCOUNTER — TUMOR BOARD CONFERENCE (OUTPATIENT)
Dept: HEMATOLOGY/ONCOLOGY | Facility: HOSPITAL | Age: 69
End: 2024-09-12
Payer: COMMERCIAL

## 2024-09-12 NOTE — TUMOR BOARD NOTE
MULTIDISCIPLINARY BREAST CANCER TUMOR BOARD CONFERENCE NOTE  Fadia Vieiraes was presented at Breast Cancer Tumor Board Conference  Conference date: 9/12/2024  Presenting Provider(s): Dr. Delisa Rascon  Present at Conference: Medical Oncology, Radiation Oncology, Surgical Oncology, Radiology, and Pathology Representatives  Conference Review Type: Pathology Review    National Guidelines discussed: Yes    Surgical Resection: Surgery is complete.  Radiation therapy:   Genomic Testing: no   Systemic therapy: Consider adjuvant TC chemotherapy  Clinical Trial Eligible: no    Genetics: meets NCCN criteria    Referral Recommendations:  Radiation Oncology and Medical Oncology and Genetics    Cancer Staging:  Cancer Staging   Malignant neoplasm of upper-outer quadrant of right breast in female, estrogen receptor negative (Multi)  Staging form: Breast, AJCC 8th Edition  - Clinical: Stage IB (cT1b, cN0, cM0, G2, ER-, KS-, HER2-) - Signed by Delisa Rascon DO on 7/23/2024       Disclaimer  SCC tumor board recommendations represent the consensus opinion of physicians present at a weekly patient care conference. The treating SCC physician is not always present, and many of the physicians formulating the recommendation have not personally seen or examined the patient under discussion. It is understood that the treating SCC physician considers the expertise of the Tumor Board Recommendation in formulating his/her plan for the patient. However, in many situations, based on individualized patient considerations, a different plan is determined by the treating physician to be the optimal medical management.    Scribe Attestation  By signing my name below, Mauro ROMEO Scribe   attest that this documentation has been prepared under the direction and in the presence of BREAST TUMOR BOARD.

## 2024-09-17 ENCOUNTER — OFFICE VISIT (OUTPATIENT)
Dept: SURGICAL ONCOLOGY | Facility: CLINIC | Age: 69
End: 2024-09-17
Payer: COMMERCIAL

## 2024-09-17 DIAGNOSIS — Z17.1 MALIGNANT NEOPLASM OF UPPER-OUTER QUADRANT OF RIGHT BREAST IN FEMALE, ESTROGEN RECEPTOR NEGATIVE: Primary | ICD-10-CM

## 2024-09-17 DIAGNOSIS — C50.411 MALIGNANT NEOPLASM OF UPPER-OUTER QUADRANT OF RIGHT BREAST IN FEMALE, ESTROGEN RECEPTOR NEGATIVE: Primary | ICD-10-CM

## 2024-09-17 PROCEDURE — 1160F RVW MEDS BY RX/DR IN RCRD: CPT | Performed by: SURGERY

## 2024-09-17 PROCEDURE — 99211 OFF/OP EST MAY X REQ PHY/QHP: CPT | Performed by: SURGERY

## 2024-09-17 PROCEDURE — 1123F ACP DISCUSS/DSCN MKR DOCD: CPT | Performed by: SURGERY

## 2024-09-17 PROCEDURE — 1159F MED LIST DOCD IN RCRD: CPT | Performed by: SURGERY

## 2024-09-17 NOTE — PROGRESS NOTES
BREAST SURGERY POST OPERATIVE VISIT    Assessment/Plan     1. right breast ILC g2 ER 95% PER- HER2- at 11- 11:30 7cmFN measuring 2.2cm on final pathology diagnosed 2021  -[id number]   -declined xrt and med/onc referral     2. history of left breast cancer s/p lumpectomy and XRT treated in 2002 at [facility].     3. right breast ILC  g2 ER- TX- HER2- measuring 1.7cm on final pathology          -[id number] stage Ib    We reviewed the pathology results from surgery and the patient was provided with a copy of the pathology report.    The cancer has been excised to negative margins with negative lymph nodes.  Her surgical treatment is complete.    I will refer to med/onc and rad/onc for recommendations for adjuvant treatment.  Already scheduled with Dr. Bridges and Dr. Treviño.  Reached out to Dr. Treviño's office to help facilitate a sooner appt.    Clinical photo taken today of the right breast (stored in media).  Follow up for 2 weeks to reassess.  May be reaction to skin prep for surgery.  Chloraprep listed as a hypersensitivity.    Subjective   Fadia Bolden is a 69 y.o. female here for post op visit s/p right partial mastectomy slnb.    Date of surgery: 8/28/2024  Pathology   Tumor size: 1.7cm   Lymph nodes: 0/1   Margins: >2mm   Staging: [id number]     Patient has noticed discoloration of the right breast skin.  No pain, no itch, no warmth.  No fever / chills.    Objective   Physical Exam  General: Alert and oriented x 3.  Mood and affect are appropriate.  HEENT: EOMI, PERRLA.   Neck: supple, no masses, no cervical adenopathy.  Cardiovascular: no lower extremity edema.  Pulmonary: breathing non labored on room air.  GI: Abdomen soft, no masses. No hepatomegaly or splenomegaly.  Lymph nodes: No supraclavicular or axillary adenopathy bilaterally.  Musculoskeletal: Full range of motion in the upper extremities bilaterally.  Neuro: denies dizziness, tremors    Breasts: The breasts were examined in both the seated and supine positions.     RIGHT: The nipple is everted without nipple discharge.  There are no skin changes, skin thickening, or dimpling. There are no masses palpated in the RIGHT breast. Right axillary / UOQ incision is healing well.  There is an interesting purple /petechial discoloration of the skin of the right breast and extending past midline.  There is no raised rash, no warmth, minimal blanching.  Doesn't appear infectious.  Similar skin changes after her last surgery- at the time attributed to using frankincense essential oil. There is a seroma in the axilla / axillary tail   LEFT: The nipple is everted without nipple discharge.  There are no skin changes, skin thickening, or dimpling. There are no masses palpated in the LEFT breast. Axillary tail incision well healed..    Radiology review: All images and reports were personally reviewed.         Delisa Rascon,

## 2024-09-20 ENCOUNTER — APPOINTMENT (OUTPATIENT)
Dept: PRIMARY CARE | Facility: CLINIC | Age: 69
End: 2024-09-20
Payer: COMMERCIAL

## 2024-09-23 NOTE — PROGRESS NOTES
Radiation Oncology Outpatient Consult    Patient Name:  Fadia Bolden  MRN:  [id number]  :  1955    Referring Provider: Delisa Rascon DO  Primary Care Provider: Michelle Martinez DO  Care Team: Patient Care Team:  Michelle Martinez DO as PCP - General  Michelle Martinez DO as PCP - [facility] PCP  Tammie Blevins MD as Surgeon (Neurosurgery)  Cyndi Bridges MD as Radiation Oncologist (Radiation Oncology)    Date of Service: 2024     SUBJECTIVE  History of Present Illness:  Fadia Bolden is a 69 y.o. female who was referred by Delisa Rascon DO, for a consultation to the [facility] Department of Radiation Oncology.  She is presenting for evaluation and management of her recurrent right breast cancer.    Ms. Bolden has a history of a T1b N0 M0, stage IA invasive lobular carcinoma of the left breast diagnosed in  for which she underwent breast conserving surgery and adjuvant radiation (50 Gy with a tumor bed boost to 64 Gy completed 2002).  She took tamoxifen for a short period of time.  In  she was diagnosed with an invasive lobular carcinoma of the right breast for which she underwent a right partial mastectomy with sentinel lymph node biopsy.  The tumor measured 2.2 cm, grade 2.  No angiolymphatic invasion was present.  There was no ductal carcinoma in situ.  The resection margins were negative with tumor less than 1 mm from the posterior margin.  5 sentinel lymph nodes were removed, all of which were negative for metastatic disease (0/5).  She declined radiation oncology and medical oncology referrals.  She underwent a mammogram on 2023 which showed an abnormality in the right breast for which a biopsy was recommended.  A short-term follow-up was performed instead with a mammogram performed 3/25/2024.  There was a stable focal asymmetry in the superior right breast with postlumpectomy scarring in the upper outer  quadrant of the right breast and postlumpectomy scarring in the upper outer quadrant of the left breast.  Ultrasound of the right breast directed at the 11 o'clock position, 6 cm from the nipple revealed a 0.7 x 0.4 x 0.7 cm hypoechoic mass.  She underwent a repeat ultrasound on 6/24/2024 which revealed the mass to have slightly increased in size measuring 0.9 x 0.5 x 0.9 cm.  On 7/15/2024 she underwent a right breast biopsy.  Pathology revealed invasive lobular carcinoma, grade 2.  Estrogen and progesterone receptors were negative.  HER2/delfina was 1+ IHC.  On 8/28/2024 she underwent a right partial mastectomy with sentinel lymph node biopsy.  Pathology revealed a 1.7 cm invasive lobular carcinoma, grade 2.  There was invasion of the dermis without skin ulceration.  No angiolymphatic invasion was present.  There is no ductal carcinoma in situ.  The resection margins were negative.  1 sentinel lymph node was removed which was negative for metastatic disease (0/1).  She has an appointment to meet with Dr. Treviño on 10/2/2024 to discuss adjuvant systemic therapy.  I was asked to see Ms. Bolden by Dr. Delisa Rascon for a discussion regarding the role of radiation in the management of this recurrent breast cancer.    Prior Radiotherapy:  She received radiation therapy to the left breast consisting of a dose of 50 Gray with a tumor bed boost to a total of 64 Gray completed 9/4/2002.       Past Medical History:    Past Medical History:   Diagnosis Date    Breast cancer (Multi)     bilateral    Cerebral vascular accident (Multi)     HTN (hypertension)     Hypothyroidism     DAIJA (obstructive sleep apnea)     Other chest pain 02/10/2021    Chest tightness    Peripheral vision loss, right     Personal history of irradiation     Personal history of malignant neoplasm of breast 06/27/2022    History of malignant neoplasm of breast    Stroke (Multi) 10/24/2023        Past Surgical History:    Past Surgical History:   Procedure  Laterality Date    BREAST LUMPECTOMY Right 08/28/2024    COLONOSCOPY      CRANIOTOMY      MR HEAD ANGIO W AND WO IV CONTRAST  10/25/2023    MR HEAD ANGIO W AND WO IV CONTRAST 10/25/2023    MR HEAD ANGIO WO IV CONTRAST  01/05/2024    MR HEAD ANGIO WO IV CONTRAST 1/5/2024 [id number]    OTHER SURGICAL HISTORY  11/22/2021    Tonsillectomy    OTHER SURGICAL HISTORY      x2 lumpectomy    UPPER GASTROINTESTINAL ENDOSCOPY          Family History:  Cancer-related family history includes Breast cancer in her father's sister and paternal grandmother.    Social History:    Social History     Tobacco Use    Smoking status: Never    Smokeless tobacco: Never   Vaping Use    Vaping status: Never Used   Substance Use Topics    Alcohol use: Not Currently    Drug use: Never     Gynecologic History: Menarche age 13.  G6, P4.  She delivered her first child at the age of 25.  She went through menopause at the age of 47.  She denies any history of oral contraceptive pill usage or hormone replacement therapy.    Allergies:    Allergies   Allergen Reactions    Chlorhexidine Rash     HYPERSENSITIVITY TO CHLOROPREP SURGERY 8/28/24        Medications:    Current Outpatient Medications:     aspirin 81 mg chewable tablet, Chew 1 tablet (81 mg) once daily., Disp: , Rfl:     atorvastatin (Lipitor) 80 mg tablet, Take 1 tablet (80 mg) by mouth once daily. (Patient taking differently: Take 20 mg by mouth once daily.), Disp: 90 tablet, Rfl: 3    calcium carbonate-vitamin D3 500 mg-5 mcg (200 unit) tablet, Take 1 tablet by mouth once daily., Disp: , Rfl:     levETIRAcetam (Keppra) 500 mg tablet, Take 1 tablet (500 mg) by mouth 2 times a day., Disp: 180 tablet, Rfl: 3    lisinopril 20 mg tablet, Take 1 tablet (20 mg) by mouth once daily., Disp: , Rfl:     ALPRAZolam (Xanax) 0.5 mg tablet, Take 1 tablet (0.5 mg) by mouth 1 time if needed for anxiety for up to 1 dose. (Patient not taking: Reported on 8/22/2024), Disp: 1 tablet, Rfl: 0    ascorbate  "calcium-bioflavonoid (Amelia-C with Bioflavonoids) 500-200 mg tablet, Take 1 tablet by mouth once daily., Disp: , Rfl:     pantoprazole (ProtoNix) 40 mg EC tablet, Take 1 tablet (40 mg) by mouth once daily. Do not crush, chew, or split. (Patient not taking: Reported on 8/28/2024), Disp: 30 tablet, Rfl: 1      Review of Systems:  Review of Systems - Oncology.  See nursing note for review of systems.    Performance Status:  The Karnofsky performance scale today is 80, Normal activity with effort; some signs or symptoms of disease (ECOG equivalent 1).        OBJECTIVE  /78 (BP Location: Left arm, Patient Position: Sitting, BP Cuff Size: Adult)   Pulse 68   Temp 36.5 °C (97.7 °F) (Temporal)   Resp 16   Ht 1.626 m (5' 4.02\")   Wt 46.6 kg (102 lb 11.8 oz)   SpO2 100%   BMI 17.63 kg/m²    Physical Exam  Constitutional:       Appearance: Normal appearance.      Comments: She has some mild expressive aphasia.   HENT:      Head: Normocephalic and atraumatic.   Eyes:      Conjunctiva/sclera: Conjunctivae normal.   Cardiovascular:      Heart sounds: Murmur heard.      Comments: There is a 2 out of 6 systolic murmur heard best at the right upper sternal border.  Pulmonary:      Breath sounds: Normal breath sounds.   Chest:   Breasts:     Right: Skin change present. No swelling, bleeding, inverted nipple, mass, nipple discharge or tenderness.      Left: Skin change present. No swelling, bleeding, inverted nipple, mass, nipple discharge or tenderness.          Comments: Examination of the right breast reveals diffuse erythema extending into the medial aspect of the contralateral breast with associated skin thickening.  There is bruising along the lateral aspect of the left breast.  There are no suspicious nodules, nipple retraction, or nipple discharge bilaterally.  There is a soft, mobile 1 cm probably reactive right axillary lymph node.  See media file for photograph.  Abdominal:      Palpations: Abdomen is soft. "   Musculoskeletal:      Right lower leg: No edema.      Left lower leg: No edema.   Lymphadenopathy:      Upper Body:      Right upper body: Axillary adenopathy present. No supraclavicular adenopathy.      Left upper body: No supraclavicular or axillary adenopathy.   Neurological:      Mental Status: She is alert.          Laboratory Review:  There are no laboratory contraindications to radiation therapy.      Pathology Review:  The pertinent pathology results were reviewed and discussed with the patient.   See history for details.    Imaging:  The pertinent imaging results were reviewed and discussed with the patient.   See history for details.       ASSESSMENT:   Fadia Bolden is a 69 y.o. female with Malignant neoplasm of upper-outer quadrant of right breast in female, estrogen receptor negative (Multi), Clinical: Stage IB (cT1b, cN0, cM0, G2, ER-, RI-, HER2-)  Malignant neoplasm of upper-outer quadrant of right breast in female, estrogen receptor negative (Multi), Pathologic: Stage IB (pT1c, pN0, cM0, G2, ER-, RI-, HER2-).  She is status post right partial mastectomy with sentinel lymph node biopsy.     PLAN:   I have made a referral to Onco-Dermatology to advise on how best to manage the skin reaction.  She has an appointment to meet with Dr. Treviño next week to discuss adjuvant systemic therapy in light of this triple negative breast cancer.  She does have some memory loss and word finding difficulties which may impact the decision regarding adjuvant systemic therapy.  She does realize that if systemic chemotherapy is recommended this will come prior to radiation.  We did discuss the rationale, risks, benefits, and alternatives of radiation therapy as a component of breast conserving surgery.  The side effects of radiation discussed included but were not limited to skin irritation with possible breakdown, fatigue, possible poor cosmetic outcome, rib fragility, pulmonary toxicity and the possibility of a  secondary malignancy induced by the radiation.  She voices understanding and wishes to proceed.  As soon as we understand the systemic therapy plan we will schedule her radiation accordingly.  She will need the skin to heal prior to initiation of radiation.    NCCN Guidelines were applicable to guide this patients treatment plan.   Cyndi Bridges MD

## 2024-09-24 ENCOUNTER — HOSPITAL ENCOUNTER (OUTPATIENT)
Dept: RADIATION ONCOLOGY | Facility: CLINIC | Age: 69
Setting detail: RADIATION/ONCOLOGY SERIES
Discharge: HOME | End: 2024-09-24
Payer: COMMERCIAL

## 2024-09-24 VITALS
DIASTOLIC BLOOD PRESSURE: 78 MMHG | OXYGEN SATURATION: 100 % | SYSTOLIC BLOOD PRESSURE: 136 MMHG | TEMPERATURE: 97.7 F | HEIGHT: 64 IN | WEIGHT: 102.73 LBS | BODY MASS INDEX: 17.54 KG/M2 | HEART RATE: 68 BPM | RESPIRATION RATE: 16 BRPM

## 2024-09-24 DIAGNOSIS — C50.419 MALIGNANT NEOPLASM OF UPPER-OUTER QUADRANT OF BREAST IN FEMALE, ESTROGEN RECEPTOR POSITIVE, UNSPECIFIED LATERALITY: Primary | ICD-10-CM

## 2024-09-24 DIAGNOSIS — Z17.1 MALIGNANT NEOPLASM OF UPPER-OUTER QUADRANT OF RIGHT BREAST IN FEMALE, ESTROGEN RECEPTOR NEGATIVE: ICD-10-CM

## 2024-09-24 DIAGNOSIS — C50.411 MALIGNANT NEOPLASM OF UPPER-OUTER QUADRANT OF RIGHT BREAST IN FEMALE, ESTROGEN RECEPTOR NEGATIVE: ICD-10-CM

## 2024-09-24 DIAGNOSIS — Z17.0 MALIGNANT NEOPLASM OF UPPER-OUTER QUADRANT OF BREAST IN FEMALE, ESTROGEN RECEPTOR POSITIVE, UNSPECIFIED LATERALITY: Primary | ICD-10-CM

## 2024-09-24 PROCEDURE — 99215 OFFICE O/P EST HI 40 MIN: CPT | Performed by: RADIOLOGY

## 2024-09-24 PROCEDURE — 99205 OFFICE O/P NEW HI 60 MIN: CPT | Performed by: RADIOLOGY

## 2024-09-24 ASSESSMENT — ENCOUNTER SYMPTOMS
CONSTITUTIONAL NEGATIVE: 1
ENDOCRINE NEGATIVE: 1
EYE PROBLEMS: 1
HEMATOLOGIC/LYMPHATIC NEGATIVE: 1
PSYCHIATRIC NEGATIVE: 1
NEUROLOGICAL NEGATIVE: 1
GASTROINTESTINAL NEGATIVE: 1
RESPIRATORY NEGATIVE: 1
CARDIOVASCULAR NEGATIVE: 1
MUSCULOSKELETAL NEGATIVE: 1

## 2024-09-24 ASSESSMENT — LIFESTYLE VARIABLES
HOW MANY STANDARD DRINKS CONTAINING ALCOHOL DO YOU HAVE ON A TYPICAL DAY: PATIENT DOES NOT DRINK
HOW OFTEN DO YOU HAVE SIX OR MORE DRINKS ON ONE OCCASION: NEVER
AUDIT-C TOTAL SCORE: 0
SKIP TO QUESTIONS 9-10: 1
HOW OFTEN DO YOU HAVE A DRINK CONTAINING ALCOHOL: NEVER

## 2024-09-24 ASSESSMENT — PAIN SCALES - GENERAL: PAINLEVEL: 0-NO PAIN

## 2024-09-24 NOTE — PROGRESS NOTES
Radiation Oncology Nursing Note    Prior Radiotherapy:  Yes, describe: left breast 2002 at [facility]  No radiation treatments to show. (Treatments may have been administered in another system.)     Current Systemic Treatment:  No     Presence of Pacemaker or ICD:  No    History of Autoimmune or Connective Tissue Disorders:  No    Pain: The patient's current pain level was assessed.  They report currently having a pain of 0 out of 10.  They feel their pain is under control without the use of pain medications.    Review of Systems:  Review of Systems   Constitutional: Negative.    HENT:  Negative.     Eyes:  Positive for eye problems (right peripheral defecit).   Respiratory: Negative.     Cardiovascular: Negative.    Gastrointestinal: Negative.    Endocrine: Negative.    Genitourinary: Negative.     Musculoskeletal: Negative.    Skin:  Positive for rash (chest).   Neurological: Negative.    Hematological: Negative.    Psychiatric/Behavioral: Negative.

## 2024-09-26 ENCOUNTER — APPOINTMENT (OUTPATIENT)
Dept: DERMATOLOGY | Facility: CLINIC | Age: 69
End: 2024-09-26
Payer: COMMERCIAL

## 2024-09-27 ENCOUNTER — TELEPHONE (OUTPATIENT)
Dept: NEUROLOGY | Facility: CLINIC | Age: 69
End: 2024-09-27
Payer: COMMERCIAL

## 2024-09-27 NOTE — TELEPHONE ENCOUNTER
Pt is meeting w/ oncologist about chemo and radiation options but heard that it could cause complications w/ stoke patients.   has some concerns and is wondering if you should be involved with these doctors.  Please advise.

## 2024-09-30 NOTE — TELEPHONE ENCOUNTER
The patient's Radiation Oncologist is Dr. Cyndi Bridges and Chemo Oncologist is Dr. Alma Treviño if you would like to reach out to them.

## 2024-09-30 NOTE — PROGRESS NOTES
BREAST SURGERY POST OPERATIVE VISIT    Assessment/Plan     1. right breast ILC g2 ER 95% PER- HER2- at 11- 11:30 7cmFN measuring 2.2cm on final pathology diagnosed 2021  [id number]   -declined xrt and med/onc referral     2. history of left breast cancer s/p lumpectomy and XRT treated in 2002 at [facility].     3. right breast ILC  g2 ER- NE- HER2- measuring 1.7cm on final pathology          -[id number] stage Ib    Clinically the right breast skin is much improved.  Meeting with Dr. Treviño later today to discuss systemic therapy options.      Pt feeling new area of concern in the leftt breast at 3:00- diagnostic imaging ordered.  Will call with results.    Plan for follow up in March 2025 +/- imaging pending timing of radiation.    If patient decides to not have radiation we discussed completion mastectomy  as an option.  I would recommend.    Subjective   Fadia Bolden is a 69 y.o. female here for post op visit s/p right partial mastectomy slnb.    Date of surgery: 8/28/2024  Pathology   Tumor size: 1.7cm   Lymph nodes: 0/1   Margins: >2mm   Staging: [id number]     Patient has noticed discoloration of the right breast skin.  No pain, no itch, no warmth.  No fever / chills.    Has since seen Dr. Bridges where skin discoloration seemed worse.  Dr. Bridges also included a photo of the right breast skin and facilitated appt with dermatology (Dr. Rosario).  Fadia cancelled the appointment.  She has seen improvement of the breast skin using a topical cream prescribed by a naturopathic doctor.      She has an appointment with Dr. Treviño later today.    Also feels a left sided breast lump at 3:00- inquiring on imaging.     Objective   Physical Exam  General: Alert and oriented x 3.  Mood and affect are appropriate.  HEENT: EOMI, PERRLA.   Neck: supple, no masses, no cervical adenopathy.  Cardiovascular: no lower extremity edema.  Pulmonary: breathing non labored on room air.  GI: Abdomen soft, no masses. No hepatomegaly or  splenomegaly.  Lymph nodes: No supraclavicular or axillary adenopathy bilaterally.  Musculoskeletal: Full range of motion in the upper extremities bilaterally.  Neuro: denies dizziness, tremors    Breasts: The breasts were examined in both the seated and supine positions.    RIGHT: The nipple is everted without nipple discharge.  There are no skin changes, skin thickening, or dimpling. There are no masses palpated in the RIGHT breast. Right axillary / UOQ incision is healing well.  Less intense discoloration of the skin.  There is some evolving ecchymosis superiorly in the breast and a small likely hematoma in the UOQ.   LEFT: The nipple is everted without nipple discharge.  There are no skin changes, skin thickening, or dimpling. There are no masses palpated in the LEFT breast. Axillary tail incision well healed. I am unable to palpate an area of concern at 3:00.     Radiology review: All images and reports were personally reviewed.         Delisa Rascon, DO

## 2024-10-01 ENCOUNTER — LAB (OUTPATIENT)
Dept: LAB | Facility: CLINIC | Age: 69
End: 2024-10-01
Payer: COMMERCIAL

## 2024-10-01 ENCOUNTER — OFFICE VISIT (OUTPATIENT)
Dept: HEMATOLOGY/ONCOLOGY | Facility: CLINIC | Age: 69
End: 2024-10-01
Payer: COMMERCIAL

## 2024-10-01 ENCOUNTER — OFFICE VISIT (OUTPATIENT)
Dept: SURGICAL ONCOLOGY | Facility: CLINIC | Age: 69
End: 2024-10-01
Payer: COMMERCIAL

## 2024-10-01 VITALS
DIASTOLIC BLOOD PRESSURE: 82 MMHG | RESPIRATION RATE: 18 BRPM | OXYGEN SATURATION: 98 % | WEIGHT: 102.73 LBS | HEIGHT: 64 IN | SYSTOLIC BLOOD PRESSURE: 166 MMHG | TEMPERATURE: 98.1 F | BODY MASS INDEX: 17.54 KG/M2 | HEART RATE: 67 BPM

## 2024-10-01 DIAGNOSIS — C50.419 MALIGNANT NEOPLASM OF UPPER-OUTER QUADRANT OF BREAST IN FEMALE, ESTROGEN RECEPTOR POSITIVE, UNSPECIFIED LATERALITY: ICD-10-CM

## 2024-10-01 DIAGNOSIS — C50.411 MALIGNANT NEOPLASM OF UPPER-OUTER QUADRANT OF RIGHT BREAST IN FEMALE, ESTROGEN RECEPTOR NEGATIVE: Primary | ICD-10-CM

## 2024-10-01 DIAGNOSIS — N63.21 MASS OF UPPER OUTER QUADRANT OF LEFT BREAST: ICD-10-CM

## 2024-10-01 DIAGNOSIS — Z17.0 MALIGNANT NEOPLASM OF UPPER-OUTER QUADRANT OF BREAST IN FEMALE, ESTROGEN RECEPTOR POSITIVE, UNSPECIFIED LATERALITY: ICD-10-CM

## 2024-10-01 DIAGNOSIS — Z17.1 MALIGNANT NEOPLASM OF UPPER-OUTER QUADRANT OF RIGHT BREAST IN FEMALE, ESTROGEN RECEPTOR NEGATIVE: Primary | ICD-10-CM

## 2024-10-01 LAB
ALBUMIN SERPL BCP-MCNC: 4.3 G/DL (ref 3.4–5)
ALP SERPL-CCNC: 71 U/L (ref 33–136)
ALT SERPL W P-5'-P-CCNC: 20 U/L (ref 7–45)
ANION GAP SERPL CALC-SCNC: 11 MMOL/L (ref 10–20)
AST SERPL W P-5'-P-CCNC: 17 U/L (ref 9–39)
BASOPHILS # BLD AUTO: 0.03 X10*3/UL (ref 0–0.1)
BASOPHILS NFR BLD AUTO: 0.5 %
BILIRUB SERPL-MCNC: 1.1 MG/DL (ref 0–1.2)
BUN SERPL-MCNC: 24 MG/DL (ref 6–23)
CALCIUM SERPL-MCNC: 9.7 MG/DL (ref 8.6–10.3)
CHLORIDE SERPL-SCNC: 105 MMOL/L (ref 98–107)
CO2 SERPL-SCNC: 28 MMOL/L (ref 21–32)
CREAT SERPL-MCNC: 0.86 MG/DL (ref 0.5–1.05)
EGFRCR SERPLBLD CKD-EPI 2021: 73 ML/MIN/1.73M*2
EOSINOPHIL # BLD AUTO: 0.1 X10*3/UL (ref 0–0.7)
EOSINOPHIL NFR BLD AUTO: 1.6 %
ERYTHROCYTE [DISTWIDTH] IN BLOOD BY AUTOMATED COUNT: 13 % (ref 11.5–14.5)
GLUCOSE SERPL-MCNC: 157 MG/DL (ref 74–99)
HCT VFR BLD AUTO: 36.5 % (ref 36–46)
HGB BLD-MCNC: 12.2 G/DL (ref 12–16)
HOLD SPECIMEN: NORMAL
HOLD SPECIMEN: NORMAL
IMM GRANULOCYTES # BLD AUTO: 0.01 X10*3/UL (ref 0–0.7)
IMM GRANULOCYTES NFR BLD AUTO: 0.2 % (ref 0–0.9)
LYMPHOCYTES # BLD AUTO: 1.56 X10*3/UL (ref 1.2–4.8)
LYMPHOCYTES NFR BLD AUTO: 25.2 %
MCH RBC QN AUTO: 31.8 PG (ref 26–34)
MCHC RBC AUTO-ENTMCNC: 33.4 G/DL (ref 32–36)
MCV RBC AUTO: 95 FL (ref 80–100)
MONOCYTES # BLD AUTO: 0.77 X10*3/UL (ref 0.1–1)
MONOCYTES NFR BLD AUTO: 12.4 %
NEUTROPHILS # BLD AUTO: 3.73 X10*3/UL (ref 1.2–7.7)
NEUTROPHILS NFR BLD AUTO: 60.1 %
PLATELET # BLD AUTO: 151 X10*3/UL (ref 150–450)
POTASSIUM SERPL-SCNC: 3.8 MMOL/L (ref 3.5–5.3)
PROT SERPL-MCNC: 7.1 G/DL (ref 6.4–8.2)
RBC # BLD AUTO: 3.84 X10*6/UL (ref 4–5.2)
SODIUM SERPL-SCNC: 140 MMOL/L (ref 136–145)
WBC # BLD AUTO: 6.2 X10*3/UL (ref 4.4–11.3)

## 2024-10-01 PROCEDURE — 1123F ACP DISCUSS/DSCN MKR DOCD: CPT | Performed by: INTERNAL MEDICINE

## 2024-10-01 PROCEDURE — 3079F DIAST BP 80-89 MM HG: CPT | Performed by: INTERNAL MEDICINE

## 2024-10-01 PROCEDURE — 86706 HEP B SURFACE ANTIBODY: CPT

## 2024-10-01 PROCEDURE — 1159F MED LIST DOCD IN RCRD: CPT | Performed by: INTERNAL MEDICINE

## 2024-10-01 PROCEDURE — 36415 COLL VENOUS BLD VENIPUNCTURE: CPT

## 2024-10-01 PROCEDURE — 99205 OFFICE O/P NEW HI 60 MIN: CPT | Performed by: INTERNAL MEDICINE

## 2024-10-01 PROCEDURE — 86704 HEP B CORE ANTIBODY TOTAL: CPT

## 2024-10-01 PROCEDURE — 3077F SYST BP >= 140 MM HG: CPT | Performed by: INTERNAL MEDICINE

## 2024-10-01 PROCEDURE — 3008F BODY MASS INDEX DOCD: CPT | Performed by: INTERNAL MEDICINE

## 2024-10-01 PROCEDURE — 1123F ACP DISCUSS/DSCN MKR DOCD: CPT | Performed by: SURGERY

## 2024-10-01 PROCEDURE — 86300 IMMUNOASSAY TUMOR CA 15-3: CPT

## 2024-10-01 PROCEDURE — 84075 ASSAY ALKALINE PHOSPHATASE: CPT

## 2024-10-01 PROCEDURE — 1125F AMNT PAIN NOTED PAIN PRSNT: CPT | Performed by: INTERNAL MEDICINE

## 2024-10-01 PROCEDURE — 85025 COMPLETE CBC W/AUTO DIFF WBC: CPT

## 2024-10-01 PROCEDURE — 99211 OFF/OP EST MAY X REQ PHY/QHP: CPT | Performed by: SURGERY

## 2024-10-01 PROCEDURE — 99215 OFFICE O/P EST HI 40 MIN: CPT | Performed by: INTERNAL MEDICINE

## 2024-10-01 PROCEDURE — 87340 HEPATITIS B SURFACE AG IA: CPT

## 2024-10-01 ASSESSMENT — PATIENT HEALTH QUESTIONNAIRE - PHQ9
5. POOR APPETITE OR OVEREATING: NOT AT ALL
SUM OF ALL RESPONSES TO PHQ QUESTIONS 1-9: 4
9. THOUGHTS THAT YOU WOULD BE BETTER OFF DEAD, OR OF HURTING YOURSELF: NOT AT ALL
7. TROUBLE CONCENTRATING ON THINGS, SUCH AS READING THE NEWSPAPER OR WATCHING TELEVISION: SEVERAL DAYS
4. FEELING TIRED OR HAVING LITTLE ENERGY: NOT AT ALL
SUM OF ALL RESPONSES TO PHQ9 QUESTIONS 1 AND 2: 3
6. FEELING BAD ABOUT YOURSELF - OR THAT YOU ARE A FAILURE OR HAVE LET YOURSELF OR YOUR FAMILY DOWN: NOT AT ALL
3. TROUBLE FALLING OR STAYING ASLEEP OR SLEEPING TOO MUCH: NOT AT ALL
2. FEELING DOWN, DEPRESSED OR HOPELESS: NOT AT ALL
1. LITTLE INTEREST OR PLEASURE IN DOING THINGS: NEARLY EVERY DAY
8. MOVING OR SPEAKING SO SLOWLY THAT OTHER PEOPLE COULD HAVE NOTICED. OR THE OPPOSITE, BEING SO FIGETY OR RESTLESS THAT YOU HAVE BEEN MOVING AROUND A LOT MORE THAN USUAL: NOT AT ALL

## 2024-10-01 ASSESSMENT — ENCOUNTER SYMPTOMS
LOSS OF SENSATION IN FEET: 0
DEPRESSION: 0
OCCASIONAL FEELINGS OF UNSTEADINESS: 0

## 2024-10-01 ASSESSMENT — PAIN SCALES - GENERAL: PAINLEVEL: 1

## 2024-10-01 NOTE — PROGRESS NOTES
"Patient ID: Fadia Bolden is a 69 y.o. female.  Referring Physician: Delisa Rascon DO  [id number] [facility], [address] [place] [address]  Primary Care Provider: Michelle Martinez DO      Subjective    HPI 69-year-old female who presents for initial consultation at the request of Dr. Rascon for a new  right breast early triple negative breast cancer.  She was initially diagnosed after abnormal mammogram.  On 3/21/2023 bilateral mammogram showed small possible mass in the upper outer quadrant and it was thought that biopsy of this area would be difficult due to location and short interval follow-up was recommended.  Repeat imaging 6/13/2023 shows mass with stable category 4.  Repeat ultrasound 9/14/2023 shows mass unchanged and biopsy recommended.  During this time shortly afterward she suffered a brain bleed following location in [place].  Repeat imaging and ultrasound June 2024 shows increase in size of breast mass previously 0.7 then 0.9.  She underwent biopsy 7/15/2024 which showed invasive lobular carcinoma grade 2 ER negative [place] negative HER2 negative at 11:00 6 cm from the nipple.  She subsequently underwent a right breast partial mastectomy on 8/28/2024 which showed invasive lobular carcinoma grade 2, margins were negative and 0 of 1 lymph nodes involved.  Greatest dimension of largest invasive focus was 17 mm.  No LVI identified.  Did develop a rash and a skin reaction shortly after surgery.    Patient is very anxious about diagnosis and possible treatment options NO new bone pain, GI symptosm. NO new bumps/lumps.    Objective   BSA: 1.46 meters squared  /82   Pulse 67   Temp 36.7 °C (98.1 °F)   Resp 18   Ht (S) 1.638 m (5' 4.49\")   Wt 46.6 kg (102 lb 11.8 oz)   SpO2 98%   BMI 17.37 kg/m²     Family History   Problem Relation Name Age of Onset    Thyroid disease Mother      Parkinsonism Father      Parkinsonism Sister      Other (HTN) Sister      Breast cancer " Father's Sister      Breast cancer Paternal Grandmother       Oncology History   Malignant neoplasm of upper-outer quadrant of right breast in female, estrogen receptor negative   7/23/2024 Initial Diagnosis    Malignant neoplasm of upper-outer quadrant of right breast in female, estrogen receptor negative (Multi)     7/23/2024 Cancer Staged    Staging form: Breast, AJCC 8th Edition, Clinical: Stage IB (cT1b, cN0, cM0, G2, ER-, UT-, HER2-) - Signed by Delisa Rascon DO on 7/23/2024 9/12/2024 Cancer Staged    Staging form: Breast, AJCC 8th Edition, Pathologic: Stage IB (pT1c, pN0, cM0, G2, ER-, UT-, HER2-) - Signed by Delisa Rascon DO on 9/12/2024         Fadia Bolden  reports that she has never smoked. She has never used smokeless tobacco.  She  reports that she does not currently use alcohol.  She  reports no history of drug use.    Gen: appears well in clinic, NAD  HEENT: atraumatic head, normocephalic, EOMI, conjunctiva normal  LUNG: no increased WOB, CTAB  CV: No JVD. RRR  GI: soft, NT, ND  LE: no LE edema  Skin: no obvious rashes or lesions on visible skin  Neuro: interactive, no focal deficits noted  Psych: normal mood and affect      Performance Status:  Symptomatic; fully ambulatory    Assessment/Plan      Right breast ILC, stage IB pT1c (17mm) N0M0 G2, triple negative    - dx 3/21/2023 after bilateral screening mammogram showed small possible mass in the upper outer quadrant and it was thought that biopsy of this area would be difficult due to location and short interval follow-up was recommended.    - Repeat imaging 6/13/2023 shows mass with stable category 4.    - Repeat ultrasound 9/14/2023 shows mass unchanged and biopsy recommended.   -  During this time shortly afterward she suffered a brain bleed following location in [place].    - Repeat imaging and ultrasound June 2024 shows increase in size of breast mass previously 0.7 then 0.9.  - biopsy 7/15/2024 which showed invasive lobular  carcinoma grade 2 ER negative MS negative HER2 negative at 11:00 6 cm from the nipple.   - right breast partial mastectomy on 8/28/2024 which showed invasive lobular carcinoma grade 2, margins were negative and 0 of 1 lymph nodes involved.  Greatest dimension of largest invasive focus was 17 mm.  No LVI identified  - Did develop a rash and a skin reaction shortly after surgery, that is slowly improving    -today we discussed given trip negative histology and tumor size of 17mm would recommend adjuvant chemotherpay  - given age and comorbidies discussed Taxotere plus cytoxan over anthracycline based chemotherapu  - discussed with CVA hx is not contraindication to proceed with treatment  - despite CVA she continues to have a good PS very independent with her ADLs and active just is limited form driving due to some vision loss  - discussed side effects of taxotere plus cytoxan and consent signed  - will plan to start C1 10/15/24 and plan for 4 cycles  - will plan to add benadryl, pepcid, dexamethasone premeds given she does have hx of increased hypersensitivy to drugs/creams but otherwise no major allergies    Diagnoses and all orders for this visit:  Malignant neoplasm of upper-outer quadrant of breast in female, estrogen receptor positive, unspecified laterality  -     Referral to Hematology and Oncology  -     CBC and Auto Differential; Future  -     Comprehensive Metabolic Panel; Future  -     Hepatitis B Core Antibody, Total; Future  -     Hepatitis B surface antibody; Future  -     Hepatitis B surface antigen; Future  -     Cancer Antigen 27-29; Future  -     Referral to Genetics; Future  -     guardant reveal; Other-indicate in comment; unknown - Miscellaneous Test; Future  -     signatera; Other-indicate in comment; unknown - Miscellaneous Test; Future           Alma Treviño MD      Time Spent  Prep time on day of patient encounter: 5 minutes  Time spent directly with patient, family or caregiver: 45  minutes  Additional Time Spent on Patient Care Activities: 7 minutes  Documentation Time: 7 minutes  Other Time Spent: 0 minutes  Total: 64 minutes

## 2024-10-02 ENCOUNTER — APPOINTMENT (OUTPATIENT)
Dept: HEMATOLOGY/ONCOLOGY | Facility: CLINIC | Age: 69
End: 2024-10-02
Payer: COMMERCIAL

## 2024-10-02 ENCOUNTER — TELEPHONE (OUTPATIENT)
Dept: HEMATOLOGY/ONCOLOGY | Facility: CLINIC | Age: 69
End: 2024-10-02
Payer: COMMERCIAL

## 2024-10-02 DIAGNOSIS — C50.419 MALIGNANT NEOPLASM OF UPPER-OUTER QUADRANT OF BREAST IN FEMALE, ESTROGEN RECEPTOR POSITIVE, UNSPECIFIED LATERALITY: Primary | ICD-10-CM

## 2024-10-02 DIAGNOSIS — Z17.0 MALIGNANT NEOPLASM OF UPPER-OUTER QUADRANT OF BREAST IN FEMALE, ESTROGEN RECEPTOR POSITIVE, UNSPECIFIED LATERALITY: Primary | ICD-10-CM

## 2024-10-02 LAB
CANCER AG27-29 SERPL-ACNC: 31.2 U/ML (ref 0–38.6)
HBV CORE AB SER QL: NONREACTIVE
HBV SURFACE AB SER-ACNC: <3.1 MIU/ML
HBV SURFACE AG SERPL QL IA: NONREACTIVE

## 2024-10-02 RX ORDER — FAMOTIDINE 10 MG/ML
20 INJECTION INTRAVENOUS ONCE AS NEEDED
OUTPATIENT
Start: 2024-10-15

## 2024-10-02 RX ORDER — DIPHENHYDRAMINE HYDROCHLORIDE 50 MG/ML
50 INJECTION INTRAMUSCULAR; INTRAVENOUS AS NEEDED
Status: CANCELLED | OUTPATIENT
Start: 2024-10-16

## 2024-10-02 RX ORDER — PALONOSETRON 0.05 MG/ML
0.25 INJECTION, SOLUTION INTRAVENOUS ONCE
OUTPATIENT
Start: 2024-11-05

## 2024-10-02 RX ORDER — ONDANSETRON HYDROCHLORIDE 8 MG/1
8 TABLET, FILM COATED ORAL EVERY 8 HOURS PRN
Qty: 30 TABLET | Refills: 5 | Status: SHIPPED | OUTPATIENT
Start: 2024-10-02

## 2024-10-02 RX ORDER — ALBUTEROL SULFATE 0.83 MG/ML
3 SOLUTION RESPIRATORY (INHALATION) AS NEEDED
Status: CANCELLED | OUTPATIENT
Start: 2024-10-16

## 2024-10-02 RX ORDER — DIPHENHYDRAMINE HYDROCHLORIDE 50 MG/ML
50 INJECTION INTRAMUSCULAR; INTRAVENOUS EVERY 6 HOURS PRN
OUTPATIENT
Start: 2024-11-05

## 2024-10-02 RX ORDER — PROCHLORPERAZINE EDISYLATE 5 MG/ML
10 INJECTION INTRAMUSCULAR; INTRAVENOUS EVERY 6 HOURS PRN
OUTPATIENT
Start: 2024-10-15

## 2024-10-02 RX ORDER — PROCHLORPERAZINE MALEATE 10 MG
10 TABLET ORAL EVERY 6 HOURS PRN
OUTPATIENT
Start: 2024-11-05

## 2024-10-02 RX ORDER — FAMOTIDINE 10 MG/ML
20 INJECTION INTRAVENOUS
OUTPATIENT
Start: 2024-11-05

## 2024-10-02 RX ORDER — HEPARIN 100 UNIT/ML
500 SYRINGE INTRAVENOUS AS NEEDED
OUTPATIENT
Start: 2024-10-02

## 2024-10-02 RX ORDER — EPINEPHRINE 0.3 MG/.3ML
0.3 INJECTION SUBCUTANEOUS EVERY 5 MIN PRN
OUTPATIENT
Start: 2024-11-05

## 2024-10-02 RX ORDER — ALBUTEROL SULFATE 0.83 MG/ML
3 SOLUTION RESPIRATORY (INHALATION) AS NEEDED
OUTPATIENT
Start: 2024-10-15

## 2024-10-02 RX ORDER — HEPARIN SODIUM,PORCINE/PF 10 UNIT/ML
50 SYRINGE (ML) INTRAVENOUS AS NEEDED
OUTPATIENT
Start: 2024-10-02

## 2024-10-02 RX ORDER — PROCHLORPERAZINE MALEATE 10 MG
10 TABLET ORAL EVERY 6 HOURS PRN
Qty: 30 TABLET | Refills: 5 | Status: SHIPPED | OUTPATIENT
Start: 2024-10-02

## 2024-10-02 RX ORDER — DIPHENHYDRAMINE HYDROCHLORIDE 50 MG/ML
50 INJECTION INTRAMUSCULAR; INTRAVENOUS AS NEEDED
Status: CANCELLED | OUTPATIENT
Start: 2024-11-06

## 2024-10-02 RX ORDER — FAMOTIDINE 10 MG/ML
20 INJECTION INTRAVENOUS
OUTPATIENT
Start: 2024-10-15

## 2024-10-02 RX ORDER — EPINEPHRINE 0.3 MG/.3ML
0.3 INJECTION SUBCUTANEOUS EVERY 5 MIN PRN
Status: CANCELLED | OUTPATIENT
Start: 2024-11-06

## 2024-10-02 RX ORDER — ALBUTEROL SULFATE 0.83 MG/ML
3 SOLUTION RESPIRATORY (INHALATION) AS NEEDED
Status: CANCELLED | OUTPATIENT
Start: 2024-11-06

## 2024-10-02 RX ORDER — PROCHLORPERAZINE MALEATE 10 MG
10 TABLET ORAL EVERY 6 HOURS PRN
OUTPATIENT
Start: 2024-10-15

## 2024-10-02 RX ORDER — EPINEPHRINE 0.3 MG/.3ML
0.3 INJECTION SUBCUTANEOUS EVERY 5 MIN PRN
OUTPATIENT
Start: 2024-10-15

## 2024-10-02 RX ORDER — PALONOSETRON 0.05 MG/ML
0.25 INJECTION, SOLUTION INTRAVENOUS ONCE
OUTPATIENT
Start: 2024-10-15

## 2024-10-02 RX ORDER — FAMOTIDINE 10 MG/ML
20 INJECTION INTRAVENOUS ONCE AS NEEDED
Status: CANCELLED | OUTPATIENT
Start: 2024-10-16

## 2024-10-02 RX ORDER — DIPHENHYDRAMINE HYDROCHLORIDE 50 MG/ML
50 INJECTION INTRAMUSCULAR; INTRAVENOUS AS NEEDED
OUTPATIENT
Start: 2024-10-15

## 2024-10-02 RX ORDER — FAMOTIDINE 10 MG/ML
20 INJECTION INTRAVENOUS ONCE AS NEEDED
Status: CANCELLED | OUTPATIENT
Start: 2024-11-06

## 2024-10-02 RX ORDER — ALBUTEROL SULFATE 0.83 MG/ML
3 SOLUTION RESPIRATORY (INHALATION) AS NEEDED
OUTPATIENT
Start: 2024-11-05

## 2024-10-02 RX ORDER — FAMOTIDINE 10 MG/ML
20 INJECTION INTRAVENOUS ONCE AS NEEDED
OUTPATIENT
Start: 2024-11-05

## 2024-10-02 RX ORDER — DIPHENHYDRAMINE HYDROCHLORIDE 50 MG/ML
50 INJECTION INTRAMUSCULAR; INTRAVENOUS EVERY 6 HOURS PRN
OUTPATIENT
Start: 2024-10-15

## 2024-10-02 RX ORDER — DIPHENHYDRAMINE HYDROCHLORIDE 50 MG/ML
50 INJECTION INTRAMUSCULAR; INTRAVENOUS AS NEEDED
OUTPATIENT
Start: 2024-11-05

## 2024-10-02 RX ORDER — PROCHLORPERAZINE EDISYLATE 5 MG/ML
10 INJECTION INTRAMUSCULAR; INTRAVENOUS EVERY 6 HOURS PRN
OUTPATIENT
Start: 2024-11-05

## 2024-10-02 RX ORDER — EPINEPHRINE 0.3 MG/.3ML
0.3 INJECTION SUBCUTANEOUS EVERY 5 MIN PRN
Status: CANCELLED | OUTPATIENT
Start: 2024-10-16

## 2024-10-08 ENCOUNTER — APPOINTMENT (OUTPATIENT)
Dept: PRIMARY CARE | Facility: CLINIC | Age: 69
End: 2024-10-08
Payer: COMMERCIAL

## 2024-10-08 VITALS
HEART RATE: 60 BPM | RESPIRATION RATE: 16 BRPM | SYSTOLIC BLOOD PRESSURE: 138 MMHG | OXYGEN SATURATION: 99 % | TEMPERATURE: 97.9 F | WEIGHT: 102 LBS | DIASTOLIC BLOOD PRESSURE: 72 MMHG | HEIGHT: 64 IN | BODY MASS INDEX: 17.42 KG/M2

## 2024-10-08 DIAGNOSIS — E78.2 MIXED HYPERLIPIDEMIA: ICD-10-CM

## 2024-10-08 DIAGNOSIS — C50.419 MALIGNANT NEOPLASM OF UPPER-OUTER QUADRANT OF BREAST IN FEMALE, ESTROGEN RECEPTOR POSITIVE, UNSPECIFIED LATERALITY: ICD-10-CM

## 2024-10-08 DIAGNOSIS — E46 PROTEIN-CALORIE MALNUTRITION, UNSPECIFIED SEVERITY (MULTI): Primary | ICD-10-CM

## 2024-10-08 DIAGNOSIS — H65.01 NON-RECURRENT ACUTE SEROUS OTITIS MEDIA OF RIGHT EAR: ICD-10-CM

## 2024-10-08 DIAGNOSIS — I10 PRIMARY HYPERTENSION: ICD-10-CM

## 2024-10-08 DIAGNOSIS — R10.12 LEFT UPPER QUADRANT ABDOMINAL PAIN: ICD-10-CM

## 2024-10-08 DIAGNOSIS — Z17.0 MALIGNANT NEOPLASM OF UPPER-OUTER QUADRANT OF BREAST IN FEMALE, ESTROGEN RECEPTOR POSITIVE, UNSPECIFIED LATERALITY: ICD-10-CM

## 2024-10-08 PROCEDURE — 1160F RVW MEDS BY RX/DR IN RCRD: CPT | Performed by: INTERNAL MEDICINE

## 2024-10-08 PROCEDURE — 3078F DIAST BP <80 MM HG: CPT | Performed by: INTERNAL MEDICINE

## 2024-10-08 PROCEDURE — 3075F SYST BP GE 130 - 139MM HG: CPT | Performed by: INTERNAL MEDICINE

## 2024-10-08 PROCEDURE — 1159F MED LIST DOCD IN RCRD: CPT | Performed by: INTERNAL MEDICINE

## 2024-10-08 PROCEDURE — 1123F ACP DISCUSS/DSCN MKR DOCD: CPT | Performed by: INTERNAL MEDICINE

## 2024-10-08 PROCEDURE — 3008F BODY MASS INDEX DOCD: CPT | Performed by: INTERNAL MEDICINE

## 2024-10-08 PROCEDURE — 99214 OFFICE O/P EST MOD 30 MIN: CPT | Performed by: INTERNAL MEDICINE

## 2024-10-08 RX ORDER — LISINOPRIL 20 MG/1
20 TABLET ORAL DAILY
Qty: 90 TABLET | Refills: 3 | Status: SHIPPED | OUTPATIENT
Start: 2024-10-08 | End: 2025-10-08

## 2024-10-08 RX ORDER — METHYLPREDNISOLONE 4 MG/1
TABLET ORAL
Qty: 21 TABLET | Refills: 0 | Status: SHIPPED | OUTPATIENT
Start: 2024-10-08 | End: 2024-10-15

## 2024-10-08 NOTE — PROGRESS NOTES
"Subjective   Fadia Bolden is a 69 y.o. female who presents for Hypertension follow up.    HPI   Denies adverse sx's r/t HTN.  Taking meds as Rx'd.    C/o R ear discomfort, irritation.    Reports continued occasional LUQ, epigastric abdominal pain.  \"Like twisting and releases\".  Denies N/V.  Reports h/o \"hernia\" in past.  Tx: Pepto.  Mildly effective.    Feeling Anxious recent dx, surgery.    Starting Chemo tx's within next few weeks.    Review of Systems   Constitutional:  Negative for fatigue and fever.   Respiratory:  Negative for cough and shortness of breath.    Cardiovascular:  Negative for chest pain and leg swelling.   All other systems reviewed and are negative.      Health Maintenance Due   Topic Date Due    Bone Density Scan  Never done    COVID-19 Vaccine (1) Never done    Pneumococcal Vaccine: 65+ Years (1 of 2 - PCV) Never done    Hepatitis C Screening  Never done    Zoster Vaccines (1 of 2) Never done    Hepatitis B Vaccines (2 of 3 - 19+ 3-dose series) 08/18/2005    RSV Pregnant patients and/or  patients aged 60+ years (1 - 1-dose 60+ series) Never done    Influenza Vaccine (1) Never done       Objective   /72   Pulse 60   Temp 36.6 °C (97.9 °F)   Resp 16   Ht 1.626 m (5' 4\")   Wt 46.3 kg (102 lb)   SpO2 99%   BMI 17.51 kg/m²     Physical Exam  Vitals and nursing note reviewed.   Constitutional:       Appearance: Normal appearance.   HENT:      Head: Normocephalic.   Eyes:      Conjunctiva/sclera: Conjunctivae normal.      Pupils: Pupils are equal, round, and reactive to light.   Cardiovascular:      Rate and Rhythm: Normal rate and regular rhythm.      Pulses: Normal pulses.      Heart sounds: Normal heart sounds.   Pulmonary:      Effort: Pulmonary effort is normal.      Breath sounds: Normal breath sounds.   Abdominal:      General: There is distension.      Tenderness: There is abdominal tenderness. There is guarding.   Musculoskeletal:         General: No swelling.      Cervical " back: Neck supple.   Skin:     General: Skin is warm and dry.   Neurological:      General: No focal deficit present.      Mental Status: She is oriented to person, place, and time.         Assessment/Plan   Problem List Items Addressed This Visit       Protein-calorie malnutrition, unspecified severity (Multi) - Primary    Malignant neoplasm of upper-outer quadrant of breast in female, estrogen receptor positive, unspecified laterality    Mixed hyperlipidemia    Primary hypertension    Relevant Medications    lisinopril 20 mg tablet     Other Visit Diagnoses       Left upper quadrant abdominal pain        Relevant Orders    CT abdomen pelvis w IV contrast    Non-recurrent acute serous otitis media of right ear        Relevant Medications    methylPREDNISolone (Medrol Dospak) 4 mg tablets          Reviewed labs   Cont meds  Steroid for ear  Check abd pelvis  Going to be starting chemo and radiation  Stable based on symptoms and exam.  Continue established treatment plan and follow up at least yearly.

## 2024-10-09 ENCOUNTER — PATIENT OUTREACH (OUTPATIENT)
Dept: HEMATOLOGY/ONCOLOGY | Facility: CLINIC | Age: 69
End: 2024-10-09
Payer: COMMERCIAL

## 2024-10-09 ASSESSMENT — ENCOUNTER SYMPTOMS
SHORTNESS OF BREATH: 0
FEVER: 0
FATIGUE: 0
COUGH: 0

## 2024-10-10 ENCOUNTER — HOSPITAL ENCOUNTER (OUTPATIENT)
Dept: RADIOLOGY | Facility: HOSPITAL | Age: 69
Discharge: HOME | End: 2024-10-10
Payer: COMMERCIAL

## 2024-10-10 VITALS — BODY MASS INDEX: 17.42 KG/M2 | HEIGHT: 64 IN | WEIGHT: 102 LBS

## 2024-10-10 DIAGNOSIS — N63.21 MASS OF UPPER OUTER QUADRANT OF LEFT BREAST: ICD-10-CM

## 2024-10-10 DIAGNOSIS — Z17.0 MALIGNANT NEOPLASM OF UPPER-OUTER QUADRANT OF BREAST IN FEMALE, ESTROGEN RECEPTOR POSITIVE, UNSPECIFIED LATERALITY: ICD-10-CM

## 2024-10-10 DIAGNOSIS — C50.419 MALIGNANT NEOPLASM OF UPPER-OUTER QUADRANT OF BREAST IN FEMALE, ESTROGEN RECEPTOR POSITIVE, UNSPECIFIED LATERALITY: ICD-10-CM

## 2024-10-10 DIAGNOSIS — N63.22 UNSPECIFIED LUMP IN THE LEFT BREAST, UPPER INNER QUADRANT: ICD-10-CM

## 2024-10-10 DIAGNOSIS — N63.0 BREAST LUMP: ICD-10-CM

## 2024-10-10 PROCEDURE — 77065 DX MAMMO INCL CAD UNI: CPT | Mod: LT

## 2024-10-10 PROCEDURE — 76981 USE PARENCHYMA: CPT | Mod: LT,RT

## 2024-10-10 PROCEDURE — 76642 ULTRASOUND BREAST LIMITED: CPT | Mod: LT

## 2024-10-10 NOTE — PROGRESS NOTES
Myself and Rosy HWANG provided chemotherapy education to Fadia, her  and her daughter Liat on TC for her breast cancer. Medication handouts were provided on Docetaxel and Cyclophosphamide. And potential side effects were reviewed including fevers, n/v/d/c, hair/skin/nail bed changes, changes in blood work, hypersensitivity reactions. Yellow card was given and it was reviewed with Fadia when to go to the ER for fevers. Clinic contact information and on-call service contact information was given as well.    We did review her home medications (zofran/compazine), and when/how to take them.    Education was given on the cooling cap;Fadia states that she does not wish to use the cooling cap at this time. She was notified that the clinic does have resources and information on different services/companies that patients use for wigs, wraps, head attire for hair loss.     Fadia states that she will stop by the outpt lab the day before treatment days to have her blood work done. She is aware that this starts with her second cycle.    Fadia is aware that her start date for treatment is 10/15/24 at 930 am. She is also aware that Dr. Treviño is currently out of the office, but when she returns on Monday 10/14/24, Fadia's treatment plan and treatment schedule will be finalized and that Fadia will receive an updated appt schedule on her 1st treatment day.     Handouts given and reviewed included; the gathering place, caregiver resources, financial counselors, safe handling of bodily fluids.    She was also notified that the clinic has social workers and registered dieticians available if needed. Fadia stated that she would like to talk to the a dietician. I notified her that I would reach out to Nel ROCA RD.     Patient was given a red chemo bag, all questions were answered at this time and she is aware to call the clinic with any further questions/concerns.

## 2024-10-15 ENCOUNTER — INFUSION (OUTPATIENT)
Dept: HEMATOLOGY/ONCOLOGY | Facility: CLINIC | Age: 69
End: 2024-10-15
Payer: COMMERCIAL

## 2024-10-15 VITALS
HEART RATE: 73 BPM | SYSTOLIC BLOOD PRESSURE: 152 MMHG | WEIGHT: 101.63 LBS | OXYGEN SATURATION: 97 % | RESPIRATION RATE: 16 BRPM | BODY MASS INDEX: 17.45 KG/M2 | DIASTOLIC BLOOD PRESSURE: 76 MMHG | TEMPERATURE: 97 F

## 2024-10-15 DIAGNOSIS — C50.419 MALIGNANT NEOPLASM OF UPPER-OUTER QUADRANT OF BREAST IN FEMALE, ESTROGEN RECEPTOR POSITIVE, UNSPECIFIED LATERALITY: ICD-10-CM

## 2024-10-15 DIAGNOSIS — Z17.0 MALIGNANT NEOPLASM OF UPPER-OUTER QUADRANT OF BREAST IN FEMALE, ESTROGEN RECEPTOR POSITIVE, UNSPECIFIED LATERALITY: ICD-10-CM

## 2024-10-15 PROCEDURE — 96417 CHEMO IV INFUS EACH ADDL SEQ: CPT

## 2024-10-15 PROCEDURE — 96411 CHEMO IV PUSH ADDL DRUG: CPT

## 2024-10-15 PROCEDURE — 2500000004 HC RX 250 GENERAL PHARMACY W/ HCPCS (ALT 636 FOR OP/ED): Performed by: INTERNAL MEDICINE

## 2024-10-15 PROCEDURE — 96367 TX/PROPH/DG ADDL SEQ IV INF: CPT

## 2024-10-15 PROCEDURE — 96413 CHEMO IV INFUSION 1 HR: CPT

## 2024-10-15 PROCEDURE — 96375 TX/PRO/DX INJ NEW DRUG ADDON: CPT | Mod: INF

## 2024-10-15 RX ORDER — PROCHLORPERAZINE EDISYLATE 5 MG/ML
10 INJECTION INTRAMUSCULAR; INTRAVENOUS EVERY 6 HOURS PRN
Status: DISCONTINUED | OUTPATIENT
Start: 2024-10-15 | End: 2024-10-15 | Stop reason: HOSPADM

## 2024-10-15 RX ORDER — ALBUTEROL SULFATE 0.83 MG/ML
3 SOLUTION RESPIRATORY (INHALATION) AS NEEDED
Status: DISCONTINUED | OUTPATIENT
Start: 2024-10-15 | End: 2024-10-15 | Stop reason: HOSPADM

## 2024-10-15 RX ORDER — DIPHENHYDRAMINE HYDROCHLORIDE 50 MG/ML
50 INJECTION INTRAMUSCULAR; INTRAVENOUS AS NEEDED
Status: DISCONTINUED | OUTPATIENT
Start: 2024-10-15 | End: 2024-10-15 | Stop reason: HOSPADM

## 2024-10-15 RX ORDER — FAMOTIDINE 10 MG/ML
20 INJECTION INTRAVENOUS
Status: DISCONTINUED | OUTPATIENT
Start: 2024-10-15 | End: 2024-10-15 | Stop reason: HOSPADM

## 2024-10-15 RX ORDER — EPINEPHRINE 0.3 MG/.3ML
0.3 INJECTION SUBCUTANEOUS EVERY 5 MIN PRN
Status: DISCONTINUED | OUTPATIENT
Start: 2024-10-15 | End: 2024-10-15 | Stop reason: HOSPADM

## 2024-10-15 RX ORDER — FAMOTIDINE 10 MG/ML
20 INJECTION INTRAVENOUS ONCE AS NEEDED
Status: DISCONTINUED | OUTPATIENT
Start: 2024-10-15 | End: 2024-10-15 | Stop reason: HOSPADM

## 2024-10-15 RX ORDER — PROCHLORPERAZINE MALEATE 10 MG
10 TABLET ORAL EVERY 6 HOURS PRN
Status: DISCONTINUED | OUTPATIENT
Start: 2024-10-15 | End: 2024-10-15 | Stop reason: HOSPADM

## 2024-10-15 RX ORDER — PALONOSETRON 0.05 MG/ML
0.25 INJECTION, SOLUTION INTRAVENOUS ONCE
Status: COMPLETED | OUTPATIENT
Start: 2024-10-15 | End: 2024-10-15

## 2024-10-15 ASSESSMENT — PAIN SCALES - GENERAL: PAINLEVEL: 0-NO PAIN

## 2024-10-15 NOTE — PROGRESS NOTES
1st dose education provided and reinforced with patient and family.    Hypersensitivity reaction education reviewed.    Sitting with patient for first 15 minutes.     Tolerated first 15 minutes of chemo without reaction.  Safety education reviewed with family.      Family requesting a visit from dietician today, however Nel Gomes is at the [place] location.  Family is aware Nel is planning to see her Treatment 2.  Requested a phone call from dietician if there is time throughout the week.      Discussed options to assist with coping/anxiety such as .  She is aware these are available to her and family if needed.  They are refusing at this time.      Tolerated both Docetaxel and Cyclophos without incident.    Pt escorted to car by wheel chair as benadryl had increased her imbalance.  Messaged Dr. Treviño and team to notify.     Pt instructed to call with any additional questions/concerns.  Is aware there is an on-call physician after hours.      Will return tomorrow at 1430 for her injection and in one week for toxicity check.

## 2024-10-16 ENCOUNTER — NUTRITION (OUTPATIENT)
Dept: HEMATOLOGY/ONCOLOGY | Facility: CLINIC | Age: 69
End: 2024-10-16

## 2024-10-16 ENCOUNTER — INFUSION (OUTPATIENT)
Dept: HEMATOLOGY/ONCOLOGY | Facility: CLINIC | Age: 69
End: 2024-10-16
Payer: COMMERCIAL

## 2024-10-16 VITALS
WEIGHT: 104.28 LBS | HEART RATE: 67 BPM | OXYGEN SATURATION: 98 % | RESPIRATION RATE: 16 BRPM | SYSTOLIC BLOOD PRESSURE: 145 MMHG | TEMPERATURE: 97.7 F | BODY MASS INDEX: 17.9 KG/M2 | DIASTOLIC BLOOD PRESSURE: 80 MMHG

## 2024-10-16 VITALS — HEIGHT: 64 IN | WEIGHT: 104.28 LBS | BODY MASS INDEX: 17.8 KG/M2

## 2024-10-16 DIAGNOSIS — Z17.0 MALIGNANT NEOPLASM OF UPPER-OUTER QUADRANT OF BREAST IN FEMALE, ESTROGEN RECEPTOR POSITIVE, UNSPECIFIED LATERALITY: ICD-10-CM

## 2024-10-16 DIAGNOSIS — C50.419 MALIGNANT NEOPLASM OF UPPER-OUTER QUADRANT OF BREAST IN FEMALE, ESTROGEN RECEPTOR POSITIVE, UNSPECIFIED LATERALITY: ICD-10-CM

## 2024-10-16 PROCEDURE — 96372 THER/PROPH/DIAG INJ SC/IM: CPT

## 2024-10-16 PROCEDURE — 2500000004 HC RX 250 GENERAL PHARMACY W/ HCPCS (ALT 636 FOR OP/ED): Mod: JZ

## 2024-10-16 ASSESSMENT — PAIN SCALES - GENERAL: PAINLEVEL_OUTOF10: 0-NO PAIN

## 2024-10-16 NOTE — PROGRESS NOTES
"NUTRITION Assessment NOTE    Nutrition Assessment     Reason for Visit:  Fadia Bolden is a 69 y.o. female seen today while she was at [facility] for a shot.  She was here yesterday for her initial infusion for diagnosis of breast cancer.  Patient is receiving Docetaxel/Cyclophosphamide. Received a message from RN yesterday that patient/daughter had questions.  Noted she was coming in today for shot and visit with patient then.  Patient reported she did not have any questions specifically.     Lab Results   Component Value Date/Time    GLUCOSE 157 (H) 10/01/2024 1542     10/01/2024 1542    K 3.8 10/01/2024 1542     10/01/2024 1542    CO2 28 10/01/2024 1542    ANIONGAP 11 10/01/2024 1542    BUN 24 (H) 10/01/2024 1542    CREATININE 0.86 10/01/2024 1542    EGFR 73 10/01/2024 1542    CALCIUM 9.7 10/01/2024 1542    ALBUMIN 4.3 10/01/2024 1542    ALKPHOS 71 10/01/2024 1542    PROT 7.1 10/01/2024 1542    AST 17 10/01/2024 1542    BILITOT 1.1 10/01/2024 1542    ALT 20 10/01/2024 1542     Lab Results   Component Value Date/Time    VITD25 58 06/15/2024 1002       Anthropometrics:  Anthropometrics  Height: 163.8 cm (5' 4.49\")  Weight: 47.3 kg (104 lb 4.4 oz)  BMI (Calculated): 17.63        Wt Readings from Last 10 Encounters:   10/16/24 47.3 kg (104 lb 4.4 oz)   10/16/24 47.3 kg (104 lb 4.4 oz)   10/15/24 46.1 kg (101 lb 10.1 oz)   10/10/24 46.3 kg (102 lb)   10/08/24 46.3 kg (102 lb)   10/01/24 46.6 kg (102 lb 11.8 oz)   09/24/24 46.6 kg (102 lb 11.8 oz)   08/28/24 45.4 kg (100 lb)   08/22/24 45.8 kg (101 lb)   08/05/24 46.7 kg (103 lb)        Food And Nutrient Intake:  Food and Nutrient History  Food and Nutrient History: Pt denies recent wt changes.  Pt reports good appetite,  confirms this, states they eat a healthy diet. They both stated they did not have questions at this time as she eats well and appetite is good.  Energy Intake: Good > 75 %  Fluid Intake: She reported drinks good usually, today " "has not been getting a lot in.  had a 16 oz water bottle with her, and stated she has not drank much today.  GI Symptoms: nausea (reported little nausea this morning, but did not get worse and went away.)  GI Symptoms greater than 2 weeks: no     Food Intake  Amount of Food: Reported eating well, healthy diet of fruits and vegetables.  Spouse stated they eat enough protein when discussing protein needs.                                                        Nutrition Focused Physical Exam Findings:                          Energy Needs  Calculated Energy Needs Using Equations  Height: 163.8 cm (5' 4.49\")  Estimated Energy Needs  Total Energy Estimated Needs (kCal): 1500 kCal  Total Estimated Energy Need per Day (kCal/kg): 30 kCal/kg  Estimated Fluid Needs  Total Fluid Estimated Needs (mL): 1500 mL  Method for Estimating Needs: 1 ml/kcal or per MD  Estimated Protein Needs  Total Protein Estimated Needs (g): 55 g  Method for Estimating Needs: 55-65g protein/day (1.2-1.4g protein/kg)        Nutrition Diagnosis        Nutrition Diagnosis  Patient has Nutrition Diagnosis: Yes  Diagnosis Status (1): New  Nutrition Diagnosis 1: Increased nutrient needs  Related to (1): increased demand for calories, protein and fluids  As Evidenced by (1): chemotherapy for dx of breast ca    Nutrition Interventions/Recommendations   Nutrition Prescription  Individualized Nutrition Prescription Provided for : smaller frequent meals including lean protein foods, plant based foods,; fluids of at least 64 oz per day    Food and Nutrition Delivery  Food and Nutrition Delivery  Meals & Snacks: General Healthful Diet    Nutrition Education  Nutrition Education  Nutrition Education Content: Content related nutrition education    Coordination of Care  Coordination of Nutrition Care by a Nutrition Professional  Collaboration and referral of nutrition care: Collaboration by nutrition professional with other providers    There are no Patient " Instructions on file for this visit.    Nutrition Monitoring and Evaluation   Food/Nutrient Related History Monitoring  Monitoring and Evaluation Plan: Meal/snack pattern  Meal/Snack Pattern: Food variety  Criteria: smaller frequent meals of high calorie, high protein foods, plant based foods.  Body Composition/Growth/Weight History  Monitoring and Evaluation Plan: Weight  Weight: Weight change  Criteria: wt/LBM maintenance  Biochemical Data, Medical Tests and Procedures  Monitoring and Evaluation Plan: Electrolyte/renal panel      Time Spent  Prep time on day of patient encounter: 10 minutes  Time spent directly with patient, family or caregiver: 10 minutes  Additional Time Spent on Patient Care Activities: 0 minutes  Documentation Time: 10 minutes  Other Time Spent: 0 minutes  Total: 30 minutes

## 2024-10-17 ENCOUNTER — TELEPHONE (OUTPATIENT)
Dept: HEMATOLOGY/ONCOLOGY | Facility: CLINIC | Age: 69
End: 2024-10-17
Payer: COMMERCIAL

## 2024-10-17 NOTE — TELEPHONE ENCOUNTER
I spoke with Toni. He states that Fadia has been feeling and doing well, post treatment. I also answered residual questions that he and his daughter, monica, had while Fadia was in clinic receiving treatment.     He did have questions about radiation treatments for Fadia, I notified him that Laron pantoja will be able to answer questions about potential radiation treatments at Fadia's next visit on 10/22/24.    He was appreciative of the call and nothing further is needed at this time.

## 2024-10-19 NOTE — PROGRESS NOTES
"Patient ID: Fadia Bolden is a 69 y.o. female.  Referring Physician: No referring provider defined for this encounter.  Primary Care Provider: Michelle Martinez DO    Oncology History   Malignant neoplasm of upper-outer quadrant of breast in female, estrogen receptor positive, unspecified laterality   12/12/2023 Initial Diagnosis    Malignant neoplasm of upper-outer quadrant of breast in female, estrogen receptor positive, unspecified laterality     10/15/2024 -  Chemotherapy    TC (DOCEtaxel / Cyclophosphamide), 21 Day Cycles     Malignant neoplasm of upper-outer quadrant of right breast in female, estrogen receptor negative   7/23/2024 Initial Diagnosis    Malignant neoplasm of upper-outer quadrant of right breast in female, estrogen receptor negative (Multi)     7/23/2024 Cancer Staged    Staging form: Breast, AJCC 8th Edition, Clinical: Stage IB (cT1b, cN0, cM0, G2, ER-, OR-, HER2-) - Signed by Delisa Rascon DO on 7/23/2024 9/12/2024 Cancer Staged    Staging form: Breast, AJCC 8th Edition, Pathologic: Stage IB (pT1c, pN0, cM0, G2, ER-, OR-, HER2-) - Signed by Delisa Rascon DO on 9/12/2024           Subjective    HPI 69-year-old female who presents for follow up for right breast early triple negative breast cancer.  She was diagnosed 7/15/2024, invasive lobular carcinoma grade 2 ER negative OR negative HER2 negative at 11:00 6 cm from the nipple.  She subsequently underwent a right breast partial mastectomy on 8/28/2024 which showed invasive lobular carcinoma grade 2, margins were negative and 0 of 1 lymph nodes involved.  Greatest dimension of largest invasive focus was 17 mm.  No LVI identified.      She began chemotherapy on 10/15/24. She reports feeling \"totally normal\" with no major concerns or complaints. She has intermittent constipation that she manages well that predates chemotherapy. She takes time to answer some questions but her  says she hasa improved significantly since her " "original stroke deficits and denies new/worsening symptoms. She is eating well. She was to have a CT a/p for a \"twisting\" discomfort that has been ongoing in mid/left abdomen but she canceled it due to all she has going on, says the pain is unchanged and resolves quickly when it does appear.       Objective   Visit Vitals  /72   Pulse 78   Temp 36.9 °C (98.4 °F)   Resp 15   Wt 45.4 kg (100 lb 1.4 oz)   SpO2 98%   BMI 16.92 kg/m²   OB Status Postmenopausal   Smoking Status Never   BSA 1.44 m²     Review of Systems   Constitutional: Negative.  Negative for fever.   HENT:  Negative.  Negative for lump/mass.    Eyes: Negative.    Respiratory: Negative.  Negative for shortness of breath.    Cardiovascular: Negative.  Negative for chest pain.   Gastrointestinal: Negative.    Endocrine: Negative.    Genitourinary: Negative.     Musculoskeletal: Negative.  Negative for arthralgias, back pain and myalgias.   Skin: Negative.    Neurological: Negative.    Hematological: Negative.  Negative for adenopathy.   Psychiatric/Behavioral: Negative.         Family History   Problem Relation Name Age of Onset    Thyroid disease Mother      Parkinsonism Father      Parkinsonism Sister      Other (HTN) Sister      Breast cancer Father's Sister      Breast cancer Paternal Grandmother         Fadia Bolden  reports that she has never smoked. She has never used smokeless tobacco.  She  reports that she does not currently use alcohol.  She  reports no history of drug use.    Gen: appears well in clinic, NAD  HEENT: atraumatic head, normocephalic, EOMI, conjunctiva normal  LUNG: no increased WOB, CTAB  CV: No JVD. RRR  GI: soft, NT, ND  LE: no LE edema  Skin: no obvious rashes or lesions on visible skin  Neuro: interactive, no focal deficits noted  Psych: normal mood and affect    Performance Status:  Symptomatic; fully ambulatory    Assessment/Plan      Right breast ILC, stage IB pT1c (17mm) N0M0 G2, triple negative    - dx 3/21/2023 " after bilateral screening mammogram showed small possible mass in the upper outer quadrant and it was thought that biopsy of this area would be difficult due to location and short interval follow-up was recommended.    - Repeat imaging 6/13/2023 shows mass with stable category 4.    - Repeat ultrasound 9/14/2023 shows mass unchanged and biopsy recommended.   -  During this time shortly afterward she suffered a brain bleed following location in [place].    - Repeat imaging and ultrasound June 2024 shows increase in size of breast mass previously 0.7 then 0.9.  - biopsy 7/15/2024 which showed invasive lobular carcinoma grade 2 ER negative GA negative HER2 negative at 11:00 6 cm from the nipple.   - right breast partial mastectomy on 8/28/2024 which showed invasive lobular carcinoma grade 2, margins were negative and 0 of 1 lymph nodes involved.  Greatest dimension of largest invasive focus was 17 mm.  No LVI identified  - Did develop a rash and a skin reaction shortly after surgery, that is slowly improving  10/1/24: -today we discussed given trip negative histology and tumor size of 17mm would recommend adjuvant chemotherpay  - given age and comorbidies discussed Taxotere plus cytoxan over anthracycline based chemotherapu  - discussed with CVA hx is not contraindication to proceed with treatment  - despite CVA she continues to have a good PS very independent with her ADLs and active just is limited form driving due to some vision loss  - discussed side effects of taxotere plus cytoxan and consent signed  - will plan to start C1 10/15/24 and plan for 4 cycles  - will plan to add benadryl, pepcid, dexamethasone premeds given she does have hx of increased hypersensitivy to drugs/creams but otherwise no major allergies    10/22/2024:   - Presents for toxicity check   - Referral to genetics is pending  - Tolerated treatment well with no complaints   - Labs unremarkable   - She is aware to continue hydrating well and eat  a balanced diet   - She is already scheduled to RTC on 11/4/24 to see Dr. Treviño and then treatment the next day           DESHAWN Garner-CNP

## 2024-10-22 ENCOUNTER — LAB (OUTPATIENT)
Dept: LAB | Facility: CLINIC | Age: 69
End: 2024-10-22
Payer: COMMERCIAL

## 2024-10-22 ENCOUNTER — APPOINTMENT (OUTPATIENT)
Dept: RADIOLOGY | Facility: HOSPITAL | Age: 69
End: 2024-10-22
Payer: COMMERCIAL

## 2024-10-22 ENCOUNTER — OFFICE VISIT (OUTPATIENT)
Dept: HEMATOLOGY/ONCOLOGY | Facility: CLINIC | Age: 69
End: 2024-10-22
Payer: COMMERCIAL

## 2024-10-22 ENCOUNTER — APPOINTMENT (OUTPATIENT)
Dept: HEMATOLOGY/ONCOLOGY | Facility: CLINIC | Age: 69
End: 2024-10-22
Payer: COMMERCIAL

## 2024-10-22 VITALS
WEIGHT: 100.09 LBS | RESPIRATION RATE: 15 BRPM | SYSTOLIC BLOOD PRESSURE: 137 MMHG | OXYGEN SATURATION: 98 % | TEMPERATURE: 98.4 F | HEART RATE: 78 BPM | BODY MASS INDEX: 16.92 KG/M2 | DIASTOLIC BLOOD PRESSURE: 72 MMHG

## 2024-10-22 DIAGNOSIS — Z85.3 HISTORY OF BREAST CANCER: Primary | ICD-10-CM

## 2024-10-22 DIAGNOSIS — Z17.0 MALIGNANT NEOPLASM OF UPPER-OUTER QUADRANT OF BREAST IN FEMALE, ESTROGEN RECEPTOR POSITIVE, UNSPECIFIED LATERALITY: ICD-10-CM

## 2024-10-22 DIAGNOSIS — C50.419 MALIGNANT NEOPLASM OF UPPER-OUTER QUADRANT OF BREAST IN FEMALE, ESTROGEN RECEPTOR POSITIVE, UNSPECIFIED LATERALITY: Primary | ICD-10-CM

## 2024-10-22 DIAGNOSIS — C50.419 MALIGNANT NEOPLASM OF UPPER-OUTER QUADRANT OF BREAST IN FEMALE, ESTROGEN RECEPTOR POSITIVE, UNSPECIFIED LATERALITY: ICD-10-CM

## 2024-10-22 DIAGNOSIS — Z17.0 MALIGNANT NEOPLASM OF UPPER-OUTER QUADRANT OF BREAST IN FEMALE, ESTROGEN RECEPTOR POSITIVE, UNSPECIFIED LATERALITY: Primary | ICD-10-CM

## 2024-10-22 LAB
ALBUMIN SERPL BCP-MCNC: 4.1 G/DL (ref 3.4–5)
ALP SERPL-CCNC: 86 U/L (ref 33–136)
ALT SERPL W P-5'-P-CCNC: 17 U/L (ref 7–45)
ANION GAP SERPL CALC-SCNC: 11 MMOL/L (ref 10–20)
AST SERPL W P-5'-P-CCNC: 18 U/L (ref 9–39)
BILIRUB SERPL-MCNC: 0.7 MG/DL (ref 0–1.2)
BUN SERPL-MCNC: 15 MG/DL (ref 6–23)
CALCIUM SERPL-MCNC: 9.8 MG/DL (ref 8.6–10.3)
CHLORIDE SERPL-SCNC: 101 MMOL/L (ref 98–107)
CO2 SERPL-SCNC: 32 MMOL/L (ref 21–32)
CREAT SERPL-MCNC: 0.81 MG/DL (ref 0.5–1.05)
EGFRCR SERPLBLD CKD-EPI 2021: 78 ML/MIN/1.73M*2
GLUCOSE SERPL-MCNC: 99 MG/DL (ref 74–99)
HOLD SPECIMEN: NORMAL
HOLD SPECIMEN: NORMAL
MAGNESIUM SERPL-MCNC: 2.3 MG/DL (ref 1.6–2.4)
POTASSIUM SERPL-SCNC: 4.2 MMOL/L (ref 3.5–5.3)
PROT SERPL-MCNC: 7 G/DL (ref 6.4–8.2)
SODIUM SERPL-SCNC: 140 MMOL/L (ref 136–145)

## 2024-10-22 PROCEDURE — 36415 COLL VENOUS BLD VENIPUNCTURE: CPT

## 2024-10-22 PROCEDURE — 3078F DIAST BP <80 MM HG: CPT

## 2024-10-22 PROCEDURE — 3075F SYST BP GE 130 - 139MM HG: CPT

## 2024-10-22 PROCEDURE — 80053 COMPREHEN METABOLIC PANEL: CPT

## 2024-10-22 PROCEDURE — 1123F ACP DISCUSS/DSCN MKR DOCD: CPT

## 2024-10-22 PROCEDURE — 83735 ASSAY OF MAGNESIUM: CPT

## 2024-10-22 PROCEDURE — 1159F MED LIST DOCD IN RCRD: CPT

## 2024-10-22 PROCEDURE — 99213 OFFICE O/P EST LOW 20 MIN: CPT

## 2024-10-22 PROCEDURE — 1126F AMNT PAIN NOTED NONE PRSNT: CPT

## 2024-10-22 ASSESSMENT — PAIN SCALES - GENERAL: PAINLEVEL_OUTOF10: 0-NO PAIN

## 2024-10-23 LAB — SCAN RESULT: NORMAL

## 2024-10-24 ASSESSMENT — ENCOUNTER SYMPTOMS
PSYCHIATRIC NEGATIVE: 1
NEUROLOGICAL NEGATIVE: 1
EYES NEGATIVE: 1
RESPIRATORY NEGATIVE: 1
BACK PAIN: 0
ARTHRALGIAS: 0
GASTROINTESTINAL NEGATIVE: 1
CONSTITUTIONAL NEGATIVE: 1
ADENOPATHY: 0
CARDIOVASCULAR NEGATIVE: 1
FEVER: 0
MUSCULOSKELETAL NEGATIVE: 1
ENDOCRINE NEGATIVE: 1
SHORTNESS OF BREATH: 0
MYALGIAS: 0
HEMATOLOGIC/LYMPHATIC NEGATIVE: 1

## 2024-10-28 LAB — SCAN RESULT: NORMAL

## 2024-10-31 NOTE — PROGRESS NOTES
Patient ID: Fadia Bolden is a 69 y.o. female.  Cancer Staging   Malignant neoplasm of upper-outer quadrant of right breast in female, estrogen receptor negative  Staging form: Breast, AJCC 8th Edition  - Clinical: Stage IB (cT1b, cN0, cM0, G2, ER-, WI-, HER2-) - Signed by Delisa Rascon DO on 7/23/2024  - Pathologic: Stage IB (pT1c, pN0, cM0, G2, ER-, WI-, HER2-) - Signed by Delisa Rascon DO on 9/12/2024  Oncology History   Malignant neoplasm of upper-outer quadrant of breast in female, estrogen receptor positive, unspecified laterality   12/12/2023 Initial Diagnosis    Malignant neoplasm of upper-outer quadrant of breast in female, estrogen receptor positive, unspecified laterality     10/15/2024 -  Chemotherapy    TC (DOCEtaxel / Cyclophosphamide), 21 Day Cycles     Malignant neoplasm of upper-outer quadrant of right breast in female, estrogen receptor negative   7/23/2024 Initial Diagnosis    Malignant neoplasm of upper-outer quadrant of right breast in female, estrogen receptor negative (Multi)     7/23/2024 Cancer Staged    Staging form: Breast, AJCC 8th Edition, Clinical: Stage IB (cT1b, cN0, cM0, G2, ER-, WI-, HER2-) - Signed by Delisa Rascon DO on 7/23/2024 9/12/2024 Cancer Staged    Staging form: Breast, AJCC 8th Edition, Pathologic: Stage IB (pT1c, pN0, cM0, G2, ER-, WI-, HER2-) - Signed by Delisa Rascon DO on 9/12/2024       Subjective    HPI  Ms. Fadia Bolden is a 69-year-old female who presents for follow up for right breast early triple negative breast cancer.    CBC and CMP from today unremarkable.    Patient reports she is doing well. No new lumps, bumps, or rash. No new bone pain. Good energy and good appetite. No new B symptoms. No night sweats or unexplained weight loss. No recent infections, fevers, or chills. No mouth sores. She denies any nausea or use of nausea medications. She did have some constipation but it was manageable. No chest pain or pressure. Gets a palpitation  her and there but no often or constant. No other major complaints at this time.     Patient's past medical history, surgical history, family history and social history reviewed.     Objective      Vitals:    11/04/24 1337   BP: 130/62   Pulse: 68   Resp: 16   Temp: 37.2 °C (99 °F)   SpO2: 97%     Review of Systems:   Review of Systems:    Positive per HPI, otherwise negative.    Physical Exam  Gen: appears well in clinic, NAD  HEENT: atraumatic head, normocephalic, EOMI, conjunctiva normal  LUNG: no increased WOB, CTAB  CV: No JVD. RRR  GI: soft, NT, ND  LE: no LE edema  Skin: no obvious rashes or lesions on visible skin  Neuro: interactive, no focal deficits noted  Psych: normal mood and affect  Performance Status:  Symptomatic; fully ambulatory    Labs/Imaging/Pathology: personally reviewed reports and images in Epic electronic medical record system. Pertinent results as it related to the plan represented in below in assessment and plan.   Assessment/Plan   Right breast ILC, stage IB pT1c (17mm) N0M0 G2, triple negative     - dx 3/21/2023 after bilateral screening mammogram showed small possible mass in the upper outer quadrant and it was thought that biopsy of this area would be difficult due to location and short interval follow-up was recommended.    - Repeat imaging 6/13/2023 shows mass with stable category 4.    - Repeat ultrasound 9/14/2023 shows mass unchanged and biopsy recommended.   -  During this time shortly afterward she suffered a brain bleed following location in [place].    - Repeat imaging and ultrasound June 2024 shows increase in size of breast mass previously 0.7 then 0.9.  - biopsy 7/15/2024 which showed invasive lobular carcinoma grade 2 ER negative WI negative HER2 negative at 11:00 6 cm from the nipple.   - right breast partial mastectomy on 8/28/2024 which showed invasive lobular carcinoma grade 2, margins were negative and 0 of 1 lymph nodes involved.  Greatest dimension of largest  invasive focus was 17 mm.  No LVI identified  - Did develop a rash and a skin reaction shortly after surgery, that is slowly improving  10/1/24: -today we discussed given trip negative histology and tumor size of 17mm would recommend adjuvant chemotherpay  - given age and comorbidies discussed Taxotere plus cytoxan over anthracycline based chemotherapu  - discussed with CVA hx is not contraindication to proceed with treatment  - despite CVA she continues to have a good PS very independent with her ADLs and active just is limited form driving due to some vision loss  - discussed side effects of taxotere plus cytoxan and consent signed  - will plan to start C1 10/15/24 and plan for 4 cycles  - will plan to add benadryl, pepcid, dexamethasone premeds given she does have hx of increased hypersensitivy to drugs/creams but otherwise no major allergies     10/22/2024:   - Presents for toxicity check   - Referral to genetics is pending  - Tolerated treatment well with no complaints   - Labs unremarkable   - She is aware to continue hydrating well and eat a balanced diet   - She is already scheduled to RTC on 11/4/24 to see Dr. Treviño and then treatment the next day      11/4/24:  - Labs from today with CBC and CMP unremarkable.   - Patient had no significant nausea with no extra medications needed.   - She does reports some increased constipation that was manageable.   - No fevers or signs of infections.   - No dose adjustments for treatment tomorrow.  - Continue with current dose.  - Reviewed again the benefits of adjuvant chemo.  - She has some questions about about RT.  - RTC in 3 weeks for cycle 3.  - She would like to get treatment and see me in the same day to limit co pays.  - She will get blood work done at a closer to her home  lab.    Reviewed ongoing medical problems and how they relate to her breast cancer, will continue long term monitoring.    RTC in 3 weeks. This note has been transcribed using a medical  dipika and there is a possibility of unintentional typing misprints.     Diagnoses and all orders for this visit:  Malignant neoplasm of upper-outer quadrant of breast in female, estrogen receptor positive, unspecified laterality  -     Clinic Appointment Request  -     Clinic Appointment Request; Future  -     Infusion Appointment Request; Future  -     CBC and Auto Differential; Future  -     Comprehensive metabolic panel; Future  -     Infusion Appointment Request [facility] INFUSION; Future       Alma Treviño MD  Hematology/Oncology  [facility]    Maiae Attestation  By signing my name below, IApril Scribe   attest that this documentation has been prepared under the direction and in the presence of Alma Treviño MD.     Time Spent  Prep time on day of patient encounter: 5 minutes  Time spent directly with patient, family or caregiver: 20 minutes  Additional Time Spent on Patient Care Activities: 5 minutes  Documentation Time: 5 minutes  Other Time Spent: 0 minutes  Total: 35 minutes

## 2024-11-04 ENCOUNTER — OFFICE VISIT (OUTPATIENT)
Dept: HEMATOLOGY/ONCOLOGY | Facility: CLINIC | Age: 69
End: 2024-11-04
Payer: COMMERCIAL

## 2024-11-04 ENCOUNTER — LAB (OUTPATIENT)
Dept: LAB | Facility: CLINIC | Age: 69
End: 2024-11-04
Payer: COMMERCIAL

## 2024-11-04 ENCOUNTER — APPOINTMENT (OUTPATIENT)
Dept: HEMATOLOGY/ONCOLOGY | Facility: CLINIC | Age: 69
End: 2024-11-04
Payer: COMMERCIAL

## 2024-11-04 VITALS
OXYGEN SATURATION: 97 % | SYSTOLIC BLOOD PRESSURE: 130 MMHG | WEIGHT: 102.73 LBS | BODY MASS INDEX: 17.37 KG/M2 | TEMPERATURE: 99 F | RESPIRATION RATE: 16 BRPM | DIASTOLIC BLOOD PRESSURE: 62 MMHG | HEART RATE: 68 BPM

## 2024-11-04 DIAGNOSIS — Z17.0 MALIGNANT NEOPLASM OF UPPER-OUTER QUADRANT OF BREAST IN FEMALE, ESTROGEN RECEPTOR POSITIVE, UNSPECIFIED LATERALITY: ICD-10-CM

## 2024-11-04 DIAGNOSIS — C50.419 MALIGNANT NEOPLASM OF UPPER-OUTER QUADRANT OF BREAST IN FEMALE, ESTROGEN RECEPTOR POSITIVE, UNSPECIFIED LATERALITY: ICD-10-CM

## 2024-11-04 LAB
ALBUMIN SERPL BCP-MCNC: 4 G/DL (ref 3.4–5)
ALP SERPL-CCNC: 74 U/L (ref 33–136)
ALT SERPL W P-5'-P-CCNC: 15 U/L (ref 7–45)
ANION GAP SERPL CALC-SCNC: 10 MMOL/L (ref 10–20)
AST SERPL W P-5'-P-CCNC: 15 U/L (ref 9–39)
BASOPHILS # BLD AUTO: 0.1 X10*3/UL (ref 0–0.1)
BASOPHILS NFR BLD AUTO: 1.1 %
BILIRUB SERPL-MCNC: 0.5 MG/DL (ref 0–1.2)
BUN SERPL-MCNC: 18 MG/DL (ref 6–23)
CALCIUM SERPL-MCNC: 9.3 MG/DL (ref 8.6–10.3)
CHLORIDE SERPL-SCNC: 103 MMOL/L (ref 98–107)
CO2 SERPL-SCNC: 31 MMOL/L (ref 21–32)
CREAT SERPL-MCNC: 0.73 MG/DL (ref 0.5–1.05)
EGFRCR SERPLBLD CKD-EPI 2021: 89 ML/MIN/1.73M*2
EOSINOPHIL # BLD AUTO: 0.01 X10*3/UL (ref 0–0.7)
EOSINOPHIL NFR BLD AUTO: 0.1 %
ERYTHROCYTE [DISTWIDTH] IN BLOOD BY AUTOMATED COUNT: 13.1 % (ref 11.5–14.5)
GLUCOSE SERPL-MCNC: 138 MG/DL (ref 74–99)
HCT VFR BLD AUTO: 37.4 % (ref 36–46)
HGB BLD-MCNC: 12.4 G/DL (ref 12–16)
IMM GRANULOCYTES # BLD AUTO: 0.07 X10*3/UL (ref 0–0.7)
IMM GRANULOCYTES NFR BLD AUTO: 0.8 % (ref 0–0.9)
LYMPHOCYTES # BLD AUTO: 1.24 X10*3/UL (ref 1.2–4.8)
LYMPHOCYTES NFR BLD AUTO: 13.7 %
MCH RBC QN AUTO: 32.4 PG (ref 26–34)
MCHC RBC AUTO-ENTMCNC: 33.2 G/DL (ref 32–36)
MCV RBC AUTO: 98 FL (ref 80–100)
MONOCYTES # BLD AUTO: 0.98 X10*3/UL (ref 0.1–1)
MONOCYTES NFR BLD AUTO: 10.9 %
NEUTROPHILS # BLD AUTO: 6.62 X10*3/UL (ref 1.2–7.7)
NEUTROPHILS NFR BLD AUTO: 73.4 %
PLATELET # BLD AUTO: 281 X10*3/UL (ref 150–450)
POTASSIUM SERPL-SCNC: 4.1 MMOL/L (ref 3.5–5.3)
PROT SERPL-MCNC: 6.7 G/DL (ref 6.4–8.2)
RBC # BLD AUTO: 3.83 X10*6/UL (ref 4–5.2)
SODIUM SERPL-SCNC: 140 MMOL/L (ref 136–145)
WBC # BLD AUTO: 9 X10*3/UL (ref 4.4–11.3)

## 2024-11-04 PROCEDURE — 1159F MED LIST DOCD IN RCRD: CPT | Performed by: INTERNAL MEDICINE

## 2024-11-04 PROCEDURE — 85025 COMPLETE CBC W/AUTO DIFF WBC: CPT

## 2024-11-04 PROCEDURE — 1123F ACP DISCUSS/DSCN MKR DOCD: CPT | Performed by: INTERNAL MEDICINE

## 2024-11-04 PROCEDURE — G2211 COMPLEX E/M VISIT ADD ON: HCPCS | Performed by: INTERNAL MEDICINE

## 2024-11-04 PROCEDURE — 3078F DIAST BP <80 MM HG: CPT | Performed by: INTERNAL MEDICINE

## 2024-11-04 PROCEDURE — 99214 OFFICE O/P EST MOD 30 MIN: CPT | Performed by: INTERNAL MEDICINE

## 2024-11-04 PROCEDURE — 36415 COLL VENOUS BLD VENIPUNCTURE: CPT

## 2024-11-04 PROCEDURE — 3075F SYST BP GE 130 - 139MM HG: CPT | Performed by: INTERNAL MEDICINE

## 2024-11-04 PROCEDURE — 1126F AMNT PAIN NOTED NONE PRSNT: CPT | Performed by: INTERNAL MEDICINE

## 2024-11-04 PROCEDURE — 80053 COMPREHEN METABOLIC PANEL: CPT

## 2024-11-04 ASSESSMENT — PAIN SCALES - GENERAL: PAINLEVEL_OUTOF10: 0-NO PAIN

## 2024-11-05 ENCOUNTER — INFUSION (OUTPATIENT)
Dept: HEMATOLOGY/ONCOLOGY | Facility: CLINIC | Age: 69
End: 2024-11-05
Payer: COMMERCIAL

## 2024-11-05 ENCOUNTER — APPOINTMENT (OUTPATIENT)
Dept: HEMATOLOGY/ONCOLOGY | Facility: CLINIC | Age: 69
End: 2024-11-05
Payer: COMMERCIAL

## 2024-11-05 VITALS
SYSTOLIC BLOOD PRESSURE: 113 MMHG | TEMPERATURE: 98.2 F | HEART RATE: 60 BPM | RESPIRATION RATE: 16 BRPM | OXYGEN SATURATION: 98 % | BODY MASS INDEX: 17.07 KG/M2 | WEIGHT: 100.97 LBS | DIASTOLIC BLOOD PRESSURE: 62 MMHG

## 2024-11-05 DIAGNOSIS — Z17.0 MALIGNANT NEOPLASM OF UPPER-OUTER QUADRANT OF BREAST IN FEMALE, ESTROGEN RECEPTOR POSITIVE, UNSPECIFIED LATERALITY: Primary | ICD-10-CM

## 2024-11-05 DIAGNOSIS — C50.419 MALIGNANT NEOPLASM OF UPPER-OUTER QUADRANT OF BREAST IN FEMALE, ESTROGEN RECEPTOR POSITIVE, UNSPECIFIED LATERALITY: Primary | ICD-10-CM

## 2024-11-05 PROCEDURE — 96413 CHEMO IV INFUSION 1 HR: CPT

## 2024-11-05 PROCEDURE — 2500000004 HC RX 250 GENERAL PHARMACY W/ HCPCS (ALT 636 FOR OP/ED): Performed by: INTERNAL MEDICINE

## 2024-11-05 PROCEDURE — 96417 CHEMO IV INFUS EACH ADDL SEQ: CPT

## 2024-11-05 PROCEDURE — 96367 TX/PROPH/DG ADDL SEQ IV INF: CPT

## 2024-11-05 PROCEDURE — 96375 TX/PRO/DX INJ NEW DRUG ADDON: CPT | Mod: INF

## 2024-11-05 RX ORDER — ALBUTEROL SULFATE 0.83 MG/ML
3 SOLUTION RESPIRATORY (INHALATION) AS NEEDED
Status: DISCONTINUED | OUTPATIENT
Start: 2024-11-05 | End: 2024-11-05 | Stop reason: HOSPADM

## 2024-11-05 RX ORDER — PALONOSETRON 0.05 MG/ML
0.25 INJECTION, SOLUTION INTRAVENOUS ONCE
Status: COMPLETED | OUTPATIENT
Start: 2024-11-05 | End: 2024-11-05

## 2024-11-05 RX ORDER — EPINEPHRINE 0.3 MG/.3ML
0.3 INJECTION SUBCUTANEOUS EVERY 5 MIN PRN
Status: DISCONTINUED | OUTPATIENT
Start: 2024-11-05 | End: 2024-11-05 | Stop reason: HOSPADM

## 2024-11-05 RX ORDER — FAMOTIDINE 10 MG/ML
20 INJECTION INTRAVENOUS
Status: DISCONTINUED | OUTPATIENT
Start: 2024-11-05 | End: 2024-11-05 | Stop reason: HOSPADM

## 2024-11-05 RX ORDER — PROCHLORPERAZINE MALEATE 10 MG
10 TABLET ORAL EVERY 6 HOURS PRN
Status: DISCONTINUED | OUTPATIENT
Start: 2024-11-05 | End: 2024-11-05 | Stop reason: HOSPADM

## 2024-11-05 RX ORDER — FAMOTIDINE 10 MG/ML
20 INJECTION INTRAVENOUS ONCE AS NEEDED
Status: DISCONTINUED | OUTPATIENT
Start: 2024-11-05 | End: 2024-11-05 | Stop reason: HOSPADM

## 2024-11-05 RX ORDER — DIPHENHYDRAMINE HYDROCHLORIDE 50 MG/ML
50 INJECTION INTRAMUSCULAR; INTRAVENOUS EVERY 6 HOURS PRN
Status: DISCONTINUED | OUTPATIENT
Start: 2024-11-05 | End: 2024-11-05

## 2024-11-05 RX ORDER — DIPHENHYDRAMINE HYDROCHLORIDE 50 MG/ML
50 INJECTION INTRAMUSCULAR; INTRAVENOUS AS NEEDED
Status: DISCONTINUED | OUTPATIENT
Start: 2024-11-05 | End: 2024-11-05 | Stop reason: HOSPADM

## 2024-11-05 RX ORDER — PROCHLORPERAZINE EDISYLATE 5 MG/ML
10 INJECTION INTRAMUSCULAR; INTRAVENOUS EVERY 6 HOURS PRN
Status: DISCONTINUED | OUTPATIENT
Start: 2024-11-05 | End: 2024-11-05 | Stop reason: HOSPADM

## 2024-11-05 NOTE — PROGRESS NOTES
Pt and  wondering if the benadryl can be decreased  per beka team, it can perhaps be decreased next cycle if pt tolerates infusion today.       1240 - tolerated fady 15 minutes of Docetaxel (2nd dose )

## 2024-11-06 ENCOUNTER — INFUSION (OUTPATIENT)
Dept: HEMATOLOGY/ONCOLOGY | Facility: CLINIC | Age: 69
End: 2024-11-06
Payer: COMMERCIAL

## 2024-11-06 VITALS
HEART RATE: 68 BPM | OXYGEN SATURATION: 98 % | BODY MASS INDEX: 18.06 KG/M2 | WEIGHT: 106.81 LBS | SYSTOLIC BLOOD PRESSURE: 128 MMHG | RESPIRATION RATE: 16 BRPM | DIASTOLIC BLOOD PRESSURE: 59 MMHG | TEMPERATURE: 97.2 F

## 2024-11-06 DIAGNOSIS — Z17.0 MALIGNANT NEOPLASM OF UPPER-OUTER QUADRANT OF BREAST IN FEMALE, ESTROGEN RECEPTOR POSITIVE, UNSPECIFIED LATERALITY: ICD-10-CM

## 2024-11-06 DIAGNOSIS — C50.419 MALIGNANT NEOPLASM OF UPPER-OUTER QUADRANT OF BREAST IN FEMALE, ESTROGEN RECEPTOR POSITIVE, UNSPECIFIED LATERALITY: ICD-10-CM

## 2024-11-06 PROCEDURE — 2500000004 HC RX 250 GENERAL PHARMACY W/ HCPCS (ALT 636 FOR OP/ED): Mod: JZ | Performed by: INTERNAL MEDICINE

## 2024-11-06 PROCEDURE — 96372 THER/PROPH/DIAG INJ SC/IM: CPT

## 2024-11-06 ASSESSMENT — PAIN SCALES - GENERAL: PAINLEVEL_OUTOF10: 0-NO PAIN

## 2024-11-06 NOTE — PROGRESS NOTES
Patient in clinic for pegfilgrastim. Weight increased from yesterday to today by approx 6lbs. Laron Chowdary CNP notified. Patient denied feeling swollen, no edema visible. Patient reported having bowel movements but stated she often has some issues with constipation. Patient and  agreeable to monitor at home and to  call if anything changes. Patient DC to home in stable condition

## 2024-11-13 ENCOUNTER — TELEPHONE (OUTPATIENT)
Dept: HEMATOLOGY/ONCOLOGY | Facility: CLINIC | Age: 69
End: 2024-11-13
Payer: COMMERCIAL

## 2024-11-13 LAB
AP SUMMARY REPORT: NORMAL
SCAN RESULT: NORMAL

## 2024-11-13 NOTE — TELEPHONE ENCOUNTER
RN called back and was unable to reach anyone sounds like he phone is off the hook. RN called daughter monica and left a message that patient is currently scheduled to see Laron Chowdary 11/15 at 11/30, she is able to be seen at 3pm today or we can schedule her to see someone else tomorrow , Thursday 11/14. RN requested a call back.

## 2024-11-15 ENCOUNTER — OFFICE VISIT (OUTPATIENT)
Dept: HEMATOLOGY/ONCOLOGY | Facility: CLINIC | Age: 69
End: 2024-11-15
Payer: COMMERCIAL

## 2024-11-15 VITALS
SYSTOLIC BLOOD PRESSURE: 115 MMHG | RESPIRATION RATE: 16 BRPM | BODY MASS INDEX: 17.33 KG/M2 | DIASTOLIC BLOOD PRESSURE: 64 MMHG | TEMPERATURE: 98.1 F | WEIGHT: 102.51 LBS | HEART RATE: 74 BPM | OXYGEN SATURATION: 98 %

## 2024-11-15 DIAGNOSIS — Z17.0 MALIGNANT NEOPLASM OF UPPER-OUTER QUADRANT OF BREAST IN FEMALE, ESTROGEN RECEPTOR POSITIVE, UNSPECIFIED LATERALITY: Primary | ICD-10-CM

## 2024-11-15 DIAGNOSIS — C50.419 MALIGNANT NEOPLASM OF UPPER-OUTER QUADRANT OF BREAST IN FEMALE, ESTROGEN RECEPTOR POSITIVE, UNSPECIFIED LATERALITY: Primary | ICD-10-CM

## 2024-11-15 PROCEDURE — 1126F AMNT PAIN NOTED NONE PRSNT: CPT

## 2024-11-15 PROCEDURE — 1123F ACP DISCUSS/DSCN MKR DOCD: CPT

## 2024-11-15 PROCEDURE — 99213 OFFICE O/P EST LOW 20 MIN: CPT

## 2024-11-15 PROCEDURE — 3078F DIAST BP <80 MM HG: CPT

## 2024-11-15 PROCEDURE — 1159F MED LIST DOCD IN RCRD: CPT

## 2024-11-15 PROCEDURE — 3074F SYST BP LT 130 MM HG: CPT

## 2024-11-15 ASSESSMENT — PAIN SCALES - GENERAL: PAINLEVEL_OUTOF10: 0-NO PAIN

## 2024-11-15 NOTE — PROGRESS NOTES
Patient ID: Fadia Bolden is a 69 y.o. female.    Cancer Staging   Malignant neoplasm of upper-outer quadrant of right breast in female, estrogen receptor negative  Staging form: Breast, AJCC 8th Edition  - Clinical: Stage IB (cT1b, cN0, cM0, G2, ER-, NH-, HER2-) - Signed by Delisa Rascon DO on 7/23/2024  - Pathologic: Stage IB (pT1c, pN0, cM0, G2, ER-, NH-, HER2-) - Signed by Delisa Rascon DO on 9/12/2024    Oncology History   Malignant neoplasm of upper-outer quadrant of breast in female, estrogen receptor positive, unspecified laterality   12/12/2023 Initial Diagnosis    Malignant neoplasm of upper-outer quadrant of breast in female, estrogen receptor positive, unspecified laterality     10/15/2024 -  Chemotherapy    TC (DOCEtaxel / Cyclophosphamide), 21 Day Cycles     Malignant neoplasm of upper-outer quadrant of right breast in female, estrogen receptor negative   7/23/2024 Initial Diagnosis    Malignant neoplasm of upper-outer quadrant of right breast in female, estrogen receptor negative (Multi)     7/23/2024 Cancer Staged    Staging form: Breast, AJCC 8th Edition, Clinical: Stage IB (cT1b, cN0, cM0, G2, ER-, NH-, HER2-) - Signed by Delisa Rascon DO on 7/23/2024 9/12/2024 Cancer Staged    Staging form: Breast, AJCC 8th Edition, Pathologic: Stage IB (pT1c, pN0, cM0, G2, ER-, NH-, HER2-) - Signed by Delisa Rascon DO on 9/12/2024         Subjective      HPI  Ms. Fadia Bolden is a 69-year-old female who presents for follow up for right breast early triple negative breast cancer.    CBC and CMP from today unremarkable.    Patient reports she is doing well. No new lumps, bumps, or rash. No new bone pain. Good energy and good appetite. No new B symptoms. No night sweats or unexplained weight loss. No recent infections, fevers, or chills. No mouth sores. She denies any nausea or use of nausea medications. She did have some constipation but it was manageable. No chest pain or pressure. Gets a  palpitation her and there but no often or constant. No other major complaints at this time.     Patient's past medical history, surgical history, family history and social history reviewed.     11/15/24:   Patient presents for problem focused visit. She called in two days ago with complaints of redness and pain on her feet.   She reports symptoms started started over a week ago and have no changed much. She reports redness of her bilateral heels with tenderness upon palpation. She denies pruritus, fevers, chills, skin changes elsewhere, changes in her neuropathy, falls, injuries, new shoes, new products, or edema. She sleeps about 7 or so hours per night and her  reports she will go back to bed for a few hours in the mornings and then she will nap during the day, she lays on her back at all times for sleeping. No prior symptoms/episodes like this before.     She denies shortness of breath, chest pain, weight loss, GI changes or other concerns.       Objective    Visit Vitals  /64 (BP Location: Right arm, Patient Position: Sitting, BP Cuff Size: Adult)   Pulse 74   Temp 36.7 °C (98.1 °F) (Temporal)   Resp 16   Wt 46.5 kg (102 lb 8.2 oz)   SpO2 98%   BMI 17.33 kg/m²   OB Status Postmenopausal   Smoking Status Never   BSA 1.45 m²     Review of Systems:   Review of Systems:    Positive per HPI, otherwise negative.      Physical Exam  Gen: appears well in clinic, NAD  HEENT: atraumatic head, normocephalic, EOMI, conjunctiva normal. Edema of left eye lid without drainage or other signs of infection   LUNG: no increased WOB, CTAB  CV: No JVD. RRR  GI: soft, NT, ND  LE: no LE edema  Skin: bilateral erythema with small area of non blanching skin of heels. No open skin, rashes, bumps, edema noted.   Neuro: interactive, no focal deficits noted  Psych: normal mood and affect    Performance Status:  Symptomatic; fully ambulatory    Labs/Imaging/Pathology: personally reviewed reports and images in Epic electronic  medical record system. Pertinent results as it related to the plan represented in below in assessment and plan.       Assessment/Plan   Right breast ILC, stage IB pT1c (17mm) N0M0 G2, triple negative     - dx 3/21/2023 after bilateral screening mammogram showed small possible mass in the upper outer quadrant and it was thought that biopsy of this area would be difficult due to location and short interval follow-up was recommended.    - Repeat imaging 6/13/2023 shows mass with stable category 4.    - Repeat ultrasound 9/14/2023 shows mass unchanged and biopsy recommended.   -  During this time shortly afterward she suffered a brain bleed following location in [place].    - Repeat imaging and ultrasound June 2024 shows increase in size of breast mass previously 0.7 then 0.9.  - biopsy 7/15/2024 which showed invasive lobular carcinoma grade 2 ER negative NC negative HER2 negative at 11:00 6 cm from the nipple.   - right breast partial mastectomy on 8/28/2024 which showed invasive lobular carcinoma grade 2, margins were negative and 0 of 1 lymph nodes involved.  Greatest dimension of largest invasive focus was 17 mm.  No LVI identified  - Did develop a rash and a skin reaction shortly after surgery, that is slowly improving  10/1/24: -today we discussed given trip negative histology and tumor size of 17mm would recommend adjuvant chemotherpay  - given age and comorbidies discussed Taxotere plus cytoxan over anthracycline based chemotherapu  - discussed with CVA hx is not contraindication to proceed with treatment  - despite CVA she continues to have a good PS very independent with her ADLs and active just is limited form driving due to some vision loss  - discussed side effects of taxotere plus cytoxan and consent signed  - will plan to start C1 10/15/24 and plan for 4 cycles  - will plan to add benadryl, pepcid, dexamethasone premeds given she does have hx of increased hypersensitivy to drugs/creams but otherwise no  major allergies     10/22/2024:   - Presents for toxicity check   - Referral to genetics is pending  - Tolerated treatment well with no complaints   - Labs unremarkable   - She is aware to continue hydrating well and eat a balanced diet   - She is already scheduled to RTC on 11/4/24 to see Dr. Treviño and then treatment the next day      11/4/24:  - Labs from today with CBC and CMP unremarkable.   - Patient had no significant nausea with no extra medications needed.   - She does reports some increased constipation that was manageable.   - No fevers or signs of infections.   - No dose adjustments for treatment tomorrow.  - Continue with current dose.  - Reviewed again the benefits of adjuvant chemo.  - She has some questions about about RT.  - RTC in 3 weeks for cycle 3.  - She would like to get treatment and see me in the same day to limit co pays.  - She will get blood work done at a closer to her home  lab.    11/15/2024:   - Presents for problem focused visit: pain and erythema of heels   - Based on exam and HPI the erythema seems to most likely be pressure spots from sleeping on her back only including naps  - Discussed using a pillow or foot elevation cushion to elevate her ankles off of the bed, rotating sleeping on her back/sides to rotate pressure areas, using barrier creams/dressing such as mepilex, ect.   - Discussed risk of pressure spots progressing with ongoing pressure and to monitor other areas of her skin such as back/sacrum which are fully intact without pressure spots based on my exam   - Discussed importance of healthy diet with protein and hydration with water  - Call if symptoms are worsening or other symptoms arise   - Warm compresses to swollen left eye lid for 5-10 minutes 3-4 times per day, urgent care over the weekend if site is worsening, PCP if site is not improving with warm compress, ED for emergencies all discussed with patient/   - RTC as scheduled for cycle 3 of treatment with  labs prior         Laron Chowdary, APRN-CNP

## 2024-11-22 NOTE — PROGRESS NOTES
Patient ID: Fadia Bloden is a 69 y.o. female.    Cancer Staging   Malignant neoplasm of upper-outer quadrant of right breast in female, estrogen receptor negative  Staging form: Breast, AJCC 8th Edition  - Clinical: Stage IB (cT1b, cN0, cM0, G2, ER-, UT-, HER2-) - Signed by Delisa Rascon DO on 7/23/2024  - Pathologic: Stage IB (pT1c, pN0, cM0, G2, ER-, UT-, HER2-) - Signed by Delisa Rascon DO on 9/12/2024    Oncology History   Malignant neoplasm of upper-outer quadrant of breast in female, estrogen receptor positive, unspecified laterality   12/12/2023 Initial Diagnosis    Malignant neoplasm of upper-outer quadrant of breast in female, estrogen receptor positive, unspecified laterality     10/15/2024 -  Chemotherapy    TC (DOCEtaxel / Cyclophosphamide), 21 Day Cycles     Malignant neoplasm of upper-outer quadrant of right breast in female, estrogen receptor negative   7/23/2024 Initial Diagnosis    Malignant neoplasm of upper-outer quadrant of right breast in female, estrogen receptor negative (Multi)     7/23/2024 Cancer Staged    Staging form: Breast, AJCC 8th Edition, Clinical: Stage IB (cT1b, cN0, cM0, G2, ER-, UT-, HER2-) - Signed by Delisa Rascon DO on 7/23/2024 9/12/2024 Cancer Staged    Staging form: Breast, AJCC 8th Edition, Pathologic: Stage IB (pT1c, pN0, cM0, G2, ER-, UT-, HER2-) - Signed by Delisa Rascon DO on 9/12/2024         Subjective      HPI  Ms. Fadia Bolden is a 69-year-old female who presents for follow up for right breast early triple negative breast cancer.    Patient presents for cycle 3 of treatment today. She says her left eye stye is improving with warm compresses and reports the redness/soreness on the back of her heels have improved as well. She reports she is more tired but is not feeling exhausted. She says she takes her time with doing things. Reports good appetite and energy is unchanged. She says constipation can be a problem but she is managing with stool  softeners and prunes. Last bowel movement was Sunday.     She denies shortness of breath, chest pain, weight loss, neuropathy, nausea, vomiting, diarrhea, edema or other concerns.    Objective    Visit Vitals  OB Status Postmenopausal   Smoking Status Never     Review of Systems:   Review of Systems:    Positive per HPI, otherwise negative.      Physical Exam  Gen: appears well in clinic, NAD  HEENT: atraumatic head, normocephalic, EOMI, conjunctiva normal.   LUNG: no increased WOB, CTAB  CV: No JVD. RRR  GI: soft, NT, ND  LE: no LE edema  Skin: No open skin, rashes, bumps, edema noted.   Neuro: interactive, no focal deficits noted  Psych: normal mood and affect    Performance Status:  Symptomatic; fully ambulatory    Labs/Imaging/Pathology: personally reviewed reports and images in Epic electronic medical record system. Pertinent results as it related to the plan represented in below in assessment and plan.     Labs:  Lab Results   Component Value Date    WBC 10.4 11/25/2024    NEUTROABS 7.70 11/25/2024    IGABSOL 0.07 11/25/2024    LYMPHSABS 1.28 11/25/2024    MONOSABS 1.24 (H) 11/25/2024    EOSABS 0.02 11/25/2024    BASOSABS 0.13 (H) 11/25/2024    RBC 3.60 (L) 11/25/2024    MCV 97 11/25/2024    MCHC 33.6 11/25/2024    HGB 11.7 (L) 11/25/2024    HCT 34.8 (L) 11/25/2024     11/25/2024     Lab Results   Component Value Date    CREATININE 0.69 11/25/2024    BUN 27 (H) 11/25/2024    EGFR >90 11/25/2024     11/25/2024    K 4.5 11/25/2024     11/25/2024    CO2 31 11/25/2024      Lab Results   Component Value Date    ALT 14 11/25/2024    AST 15 11/25/2024    ALKPHOS 74 11/25/2024    BILITOT 0.5 11/25/2024            Assessment/Plan   Right breast ILC, stage IB pT1c (17mm) N0M0 G2, triple negative     - dx 3/21/2023 after bilateral screening mammogram showed small possible mass in the upper outer quadrant and it was thought that biopsy of this area would be difficult due to location and short interval  follow-up was recommended.    - Repeat imaging 6/13/2023 shows mass with stable category 4.    - Repeat ultrasound 9/14/2023 shows mass unchanged and biopsy recommended.   -  During this time shortly afterward she suffered a brain bleed following location in [place].    - Repeat imaging and ultrasound June 2024 shows increase in size of breast mass previously 0.7 then 0.9.  - biopsy 7/15/2024 which showed invasive lobular carcinoma grade 2 ER negative UT negative HER2 negative at 11:00 6 cm from the nipple.   - right breast partial mastectomy on 8/28/2024 which showed invasive lobular carcinoma grade 2, margins were negative and 0 of 1 lymph nodes involved.  Greatest dimension of largest invasive focus was 17 mm.  No LVI identified  - Did develop a rash and a skin reaction shortly after surgery, that is slowly improving  10/1/24: -today we discussed given trip negative histology and tumor size of 17mm would recommend adjuvant chemotherpay  - given age and comorbidies discussed Taxotere plus cytoxan over anthracycline based chemotherapu  - discussed with CVA hx is not contraindication to proceed with treatment  - despite CVA she continues to have a good PS very independent with her ADLs and active just is limited form driving due to some vision loss  - discussed side effects of taxotere plus cytoxan and consent signed  - will plan to start C1 10/15/24 and plan for 4 cycles  - will plan to add benadryl, pepcid, dexamethasone premeds given she does have hx of increased hypersensitivy to drugs/creams but otherwise no major allergies     10/22/2024:   - Presents for toxicity check   - Referral to genetics is pending  - Tolerated treatment well with no complaints   - Labs unremarkable   - She is aware to continue hydrating well and eat a balanced diet   - She is already scheduled to RTC on 11/4/24 to see Dr. Treviño and then treatment the next day      11/4/24:  - Labs from today with CBC and CMP unremarkable.   - Patient  had no significant nausea with no extra medications needed.   - She does reports some increased constipation that was manageable.   - No fevers or signs of infections.   - No dose adjustments for treatment tomorrow.  - Continue with current dose.  - Reviewed again the benefits of adjuvant chemo.  - She has some questions about about RT.  - RTC in 3 weeks for cycle 3.  - She would like to get treatment and see me in the same day to limit co pays.  - She will get blood work done at a closer to her home  lab.    11/26/2024:  - Presents for cycle 3 docetaxel + cyclophosphamide   - She is tolerating treatment well   - Labs meet parameters for treatment   - We will decrease benadryl to 25 mg today since she is now on cycle 3 due to feeling unsteady with the 50 mg dose   - Orders signed off to be scheduled for cycle 4 treatment in 3 weeks with Dr. Treviño, labs ahead of time again        DESHAWN Garner-CNP

## 2024-11-25 ENCOUNTER — LAB (OUTPATIENT)
Dept: LAB | Facility: LAB | Age: 69
End: 2024-11-25
Payer: COMMERCIAL

## 2024-11-25 DIAGNOSIS — C50.419 MALIGNANT NEOPLASM OF UPPER-OUTER QUADRANT OF BREAST IN FEMALE, ESTROGEN RECEPTOR POSITIVE, UNSPECIFIED LATERALITY: ICD-10-CM

## 2024-11-25 DIAGNOSIS — Z17.0 MALIGNANT NEOPLASM OF UPPER-OUTER QUADRANT OF BREAST IN FEMALE, ESTROGEN RECEPTOR POSITIVE, UNSPECIFIED LATERALITY: ICD-10-CM

## 2024-11-25 LAB
ALBUMIN SERPL BCP-MCNC: 3.7 G/DL (ref 3.4–5)
ALP SERPL-CCNC: 74 U/L (ref 33–136)
ALT SERPL W P-5'-P-CCNC: 14 U/L (ref 7–45)
ANION GAP SERPL CALC-SCNC: 10 MMOL/L (ref 10–20)
AST SERPL W P-5'-P-CCNC: 15 U/L (ref 9–39)
BASOPHILS # BLD AUTO: 0.13 X10*3/UL (ref 0–0.1)
BASOPHILS NFR BLD AUTO: 1.2 %
BILIRUB SERPL-MCNC: 0.5 MG/DL (ref 0–1.2)
BUN SERPL-MCNC: 27 MG/DL (ref 6–23)
CALCIUM SERPL-MCNC: 9 MG/DL (ref 8.6–10.3)
CHLORIDE SERPL-SCNC: 105 MMOL/L (ref 98–107)
CO2 SERPL-SCNC: 31 MMOL/L (ref 21–32)
CREAT SERPL-MCNC: 0.69 MG/DL (ref 0.5–1.05)
EGFRCR SERPLBLD CKD-EPI 2021: >90 ML/MIN/1.73M*2
EOSINOPHIL # BLD AUTO: 0.02 X10*3/UL (ref 0–0.7)
EOSINOPHIL NFR BLD AUTO: 0.2 %
ERYTHROCYTE [DISTWIDTH] IN BLOOD BY AUTOMATED COUNT: 13.6 % (ref 11.5–14.5)
GLUCOSE SERPL-MCNC: 103 MG/DL (ref 74–99)
HCT VFR BLD AUTO: 34.8 % (ref 36–46)
HGB BLD-MCNC: 11.7 G/DL (ref 12–16)
IMM GRANULOCYTES # BLD AUTO: 0.07 X10*3/UL (ref 0–0.7)
IMM GRANULOCYTES NFR BLD AUTO: 0.7 % (ref 0–0.9)
LYMPHOCYTES # BLD AUTO: 1.28 X10*3/UL (ref 1.2–4.8)
LYMPHOCYTES NFR BLD AUTO: 12.3 %
MCH RBC QN AUTO: 32.5 PG (ref 26–34)
MCHC RBC AUTO-ENTMCNC: 33.6 G/DL (ref 32–36)
MCV RBC AUTO: 97 FL (ref 80–100)
MONOCYTES # BLD AUTO: 1.24 X10*3/UL (ref 0.1–1)
MONOCYTES NFR BLD AUTO: 11.9 %
NEUTROPHILS # BLD AUTO: 7.7 X10*3/UL (ref 1.2–7.7)
NEUTROPHILS NFR BLD AUTO: 73.7 %
NRBC BLD-RTO: 0 /100 WBCS (ref 0–0)
PLATELET # BLD AUTO: 272 X10*3/UL (ref 150–450)
POTASSIUM SERPL-SCNC: 4.5 MMOL/L (ref 3.5–5.3)
PROT SERPL-MCNC: 6.1 G/DL (ref 6.4–8.2)
RBC # BLD AUTO: 3.6 X10*6/UL (ref 4–5.2)
SODIUM SERPL-SCNC: 141 MMOL/L (ref 136–145)
WBC # BLD AUTO: 10.4 X10*3/UL (ref 4.4–11.3)

## 2024-11-25 PROCEDURE — 85025 COMPLETE CBC W/AUTO DIFF WBC: CPT

## 2024-11-25 PROCEDURE — 36415 COLL VENOUS BLD VENIPUNCTURE: CPT

## 2024-11-25 PROCEDURE — 80053 COMPREHEN METABOLIC PANEL: CPT

## 2024-11-26 ENCOUNTER — OFFICE VISIT (OUTPATIENT)
Dept: HEMATOLOGY/ONCOLOGY | Facility: CLINIC | Age: 69
End: 2024-11-26
Payer: COMMERCIAL

## 2024-11-26 ENCOUNTER — INFUSION (OUTPATIENT)
Dept: HEMATOLOGY/ONCOLOGY | Facility: CLINIC | Age: 69
End: 2024-11-26
Payer: COMMERCIAL

## 2024-11-26 VITALS
BODY MASS INDEX: 17.18 KG/M2 | DIASTOLIC BLOOD PRESSURE: 61 MMHG | SYSTOLIC BLOOD PRESSURE: 126 MMHG | HEART RATE: 65 BPM | OXYGEN SATURATION: 98 % | TEMPERATURE: 97.9 F | WEIGHT: 101.63 LBS | RESPIRATION RATE: 15 BRPM

## 2024-11-26 DIAGNOSIS — C50.419 MALIGNANT NEOPLASM OF UPPER-OUTER QUADRANT OF BREAST IN FEMALE, ESTROGEN RECEPTOR POSITIVE, UNSPECIFIED LATERALITY: ICD-10-CM

## 2024-11-26 DIAGNOSIS — C50.419 MALIGNANT NEOPLASM OF UPPER-OUTER QUADRANT OF BREAST IN FEMALE, ESTROGEN RECEPTOR POSITIVE, UNSPECIFIED LATERALITY: Primary | ICD-10-CM

## 2024-11-26 DIAGNOSIS — Z17.0 MALIGNANT NEOPLASM OF UPPER-OUTER QUADRANT OF BREAST IN FEMALE, ESTROGEN RECEPTOR POSITIVE, UNSPECIFIED LATERALITY: ICD-10-CM

## 2024-11-26 DIAGNOSIS — Z17.0 MALIGNANT NEOPLASM OF UPPER-OUTER QUADRANT OF BREAST IN FEMALE, ESTROGEN RECEPTOR POSITIVE, UNSPECIFIED LATERALITY: Primary | ICD-10-CM

## 2024-11-26 PROCEDURE — 96375 TX/PRO/DX INJ NEW DRUG ADDON: CPT | Mod: INF

## 2024-11-26 PROCEDURE — 3074F SYST BP LT 130 MM HG: CPT

## 2024-11-26 PROCEDURE — 2500000004 HC RX 250 GENERAL PHARMACY W/ HCPCS (ALT 636 FOR OP/ED)

## 2024-11-26 PROCEDURE — 96413 CHEMO IV INFUSION 1 HR: CPT

## 2024-11-26 PROCEDURE — 96417 CHEMO IV INFUS EACH ADDL SEQ: CPT

## 2024-11-26 PROCEDURE — 1123F ACP DISCUSS/DSCN MKR DOCD: CPT

## 2024-11-26 PROCEDURE — 99214 OFFICE O/P EST MOD 30 MIN: CPT | Mod: 25

## 2024-11-26 PROCEDURE — 96367 TX/PROPH/DG ADDL SEQ IV INF: CPT

## 2024-11-26 PROCEDURE — 1126F AMNT PAIN NOTED NONE PRSNT: CPT

## 2024-11-26 PROCEDURE — 3078F DIAST BP <80 MM HG: CPT

## 2024-11-26 PROCEDURE — 99214 OFFICE O/P EST MOD 30 MIN: CPT

## 2024-11-26 PROCEDURE — 2500000005 HC RX 250 GENERAL PHARMACY W/O HCPCS

## 2024-11-26 PROCEDURE — 1159F MED LIST DOCD IN RCRD: CPT

## 2024-11-26 RX ORDER — EPINEPHRINE 0.3 MG/.3ML
0.3 INJECTION SUBCUTANEOUS EVERY 5 MIN PRN
Status: DISCONTINUED | OUTPATIENT
Start: 2024-11-26 | End: 2024-11-26 | Stop reason: HOSPADM

## 2024-11-26 RX ORDER — FAMOTIDINE 10 MG/ML
20 INJECTION INTRAVENOUS
Status: CANCELLED | OUTPATIENT
Start: 2024-11-26

## 2024-11-26 RX ORDER — DIPHENHYDRAMINE HYDROCHLORIDE 50 MG/ML
50 INJECTION INTRAMUSCULAR; INTRAVENOUS AS NEEDED
Status: DISCONTINUED | OUTPATIENT
Start: 2024-11-26 | End: 2024-11-26 | Stop reason: HOSPADM

## 2024-11-26 RX ORDER — FAMOTIDINE 10 MG/ML
20 INJECTION INTRAVENOUS
Status: DISCONTINUED | OUTPATIENT
Start: 2024-11-26 | End: 2024-11-26 | Stop reason: HOSPADM

## 2024-11-26 RX ORDER — FAMOTIDINE 10 MG/ML
20 INJECTION INTRAVENOUS
OUTPATIENT
Start: 2024-12-17

## 2024-11-26 RX ORDER — PALONOSETRON 0.05 MG/ML
0.25 INJECTION, SOLUTION INTRAVENOUS ONCE
Status: COMPLETED | OUTPATIENT
Start: 2024-11-26 | End: 2024-11-26

## 2024-11-26 RX ORDER — ALBUTEROL SULFATE 0.83 MG/ML
3 SOLUTION RESPIRATORY (INHALATION) AS NEEDED
Status: DISCONTINUED | OUTPATIENT
Start: 2024-11-26 | End: 2024-11-26 | Stop reason: HOSPADM

## 2024-11-26 RX ORDER — PROCHLORPERAZINE EDISYLATE 5 MG/ML
10 INJECTION INTRAMUSCULAR; INTRAVENOUS EVERY 6 HOURS PRN
Status: DISCONTINUED | OUTPATIENT
Start: 2024-11-26 | End: 2024-11-26 | Stop reason: HOSPADM

## 2024-11-26 RX ORDER — DEXAMETHASONE 6 MG/1
12 TABLET ORAL ONCE
Status: CANCELLED | OUTPATIENT
Start: 2024-12-17 | End: 2024-12-17

## 2024-11-26 RX ORDER — FAMOTIDINE 10 MG/ML
20 INJECTION INTRAVENOUS ONCE AS NEEDED
OUTPATIENT
Start: 2024-12-17

## 2024-11-26 RX ORDER — FAMOTIDINE 10 MG/ML
20 INJECTION INTRAVENOUS ONCE AS NEEDED
Status: DISCONTINUED | OUTPATIENT
Start: 2024-11-26 | End: 2024-11-26 | Stop reason: HOSPADM

## 2024-11-26 RX ORDER — PALONOSETRON 0.05 MG/ML
0.25 INJECTION, SOLUTION INTRAVENOUS ONCE
Status: CANCELLED | OUTPATIENT
Start: 2024-11-26

## 2024-11-26 RX ORDER — PROCHLORPERAZINE EDISYLATE 5 MG/ML
10 INJECTION INTRAMUSCULAR; INTRAVENOUS EVERY 6 HOURS PRN
Status: CANCELLED | OUTPATIENT
Start: 2024-11-26

## 2024-11-26 RX ORDER — PALONOSETRON 0.05 MG/ML
0.25 INJECTION, SOLUTION INTRAVENOUS ONCE
OUTPATIENT
Start: 2024-12-17

## 2024-11-26 RX ORDER — DIPHENHYDRAMINE HYDROCHLORIDE 50 MG/ML
50 INJECTION INTRAMUSCULAR; INTRAVENOUS AS NEEDED
OUTPATIENT
Start: 2024-12-17

## 2024-11-26 RX ORDER — EPINEPHRINE 0.3 MG/.3ML
0.3 INJECTION SUBCUTANEOUS EVERY 5 MIN PRN
Status: CANCELLED | OUTPATIENT
Start: 2024-11-26

## 2024-11-26 RX ORDER — FAMOTIDINE 10 MG/ML
20 INJECTION INTRAVENOUS ONCE AS NEEDED
Status: CANCELLED | OUTPATIENT
Start: 2024-11-26

## 2024-11-26 RX ORDER — ALBUTEROL SULFATE 0.83 MG/ML
3 SOLUTION RESPIRATORY (INHALATION) AS NEEDED
Status: CANCELLED | OUTPATIENT
Start: 2024-11-26

## 2024-11-26 RX ORDER — DIPHENHYDRAMINE HYDROCHLORIDE 50 MG/ML
50 INJECTION INTRAMUSCULAR; INTRAVENOUS AS NEEDED
Status: CANCELLED | OUTPATIENT
Start: 2024-11-26

## 2024-11-26 RX ORDER — PROCHLORPERAZINE MALEATE 10 MG
10 TABLET ORAL EVERY 6 HOURS PRN
Status: DISCONTINUED | OUTPATIENT
Start: 2024-11-26 | End: 2024-11-26 | Stop reason: HOSPADM

## 2024-11-26 RX ORDER — PROCHLORPERAZINE MALEATE 10 MG
10 TABLET ORAL EVERY 6 HOURS PRN
OUTPATIENT
Start: 2024-12-17

## 2024-11-26 RX ORDER — DIPHENHYDRAMINE HYDROCHLORIDE 50 MG/ML
25 INJECTION INTRAMUSCULAR; INTRAVENOUS ONCE
OUTPATIENT
Start: 2024-12-17 | End: 2024-12-17

## 2024-11-26 RX ORDER — PROCHLORPERAZINE MALEATE 10 MG
10 TABLET ORAL EVERY 6 HOURS PRN
Status: CANCELLED | OUTPATIENT
Start: 2024-11-26

## 2024-11-26 RX ORDER — EPINEPHRINE 0.3 MG/.3ML
0.3 INJECTION SUBCUTANEOUS EVERY 5 MIN PRN
OUTPATIENT
Start: 2024-12-17

## 2024-11-26 RX ORDER — PROCHLORPERAZINE EDISYLATE 5 MG/ML
10 INJECTION INTRAMUSCULAR; INTRAVENOUS EVERY 6 HOURS PRN
OUTPATIENT
Start: 2024-12-17

## 2024-11-26 RX ORDER — ALBUTEROL SULFATE 0.83 MG/ML
3 SOLUTION RESPIRATORY (INHALATION) AS NEEDED
OUTPATIENT
Start: 2024-12-17

## 2024-11-26 ASSESSMENT — PAIN SCALES - GENERAL: PAINLEVEL_OUTOF10: 0-NO PAIN

## 2024-11-27 ENCOUNTER — INFUSION (OUTPATIENT)
Dept: HEMATOLOGY/ONCOLOGY | Facility: CLINIC | Age: 69
End: 2024-11-27
Payer: COMMERCIAL

## 2024-11-27 ENCOUNTER — TELEPHONE (OUTPATIENT)
Dept: HEMATOLOGY/ONCOLOGY | Facility: CLINIC | Age: 69
End: 2024-11-27

## 2024-11-27 VITALS
TEMPERATURE: 97.7 F | BODY MASS INDEX: 17.89 KG/M2 | DIASTOLIC BLOOD PRESSURE: 68 MMHG | HEART RATE: 61 BPM | OXYGEN SATURATION: 100 % | WEIGHT: 105.82 LBS | RESPIRATION RATE: 16 BRPM | SYSTOLIC BLOOD PRESSURE: 115 MMHG

## 2024-11-27 DIAGNOSIS — Z17.0 MALIGNANT NEOPLASM OF UPPER-OUTER QUADRANT OF BREAST IN FEMALE, ESTROGEN RECEPTOR POSITIVE, UNSPECIFIED LATERALITY: ICD-10-CM

## 2024-11-27 DIAGNOSIS — C50.419 MALIGNANT NEOPLASM OF UPPER-OUTER QUADRANT OF BREAST IN FEMALE, ESTROGEN RECEPTOR POSITIVE, UNSPECIFIED LATERALITY: ICD-10-CM

## 2024-11-27 PROCEDURE — 96372 THER/PROPH/DIAG INJ SC/IM: CPT

## 2024-11-27 PROCEDURE — 2500000004 HC RX 250 GENERAL PHARMACY W/ HCPCS (ALT 636 FOR OP/ED): Mod: JZ,JG

## 2024-11-27 RX ORDER — HEPARIN SODIUM,PORCINE/PF 10 UNIT/ML
50 SYRINGE (ML) INTRAVENOUS AS NEEDED
OUTPATIENT
Start: 2024-11-27

## 2024-11-27 RX ORDER — HEPARIN 100 UNIT/ML
500 SYRINGE INTRAVENOUS AS NEEDED
OUTPATIENT
Start: 2024-11-27

## 2024-11-27 ASSESSMENT — PAIN SCALES - GENERAL: PAINLEVEL_OUTOF10: 0-NO PAIN

## 2024-11-27 NOTE — TELEPHONE ENCOUNTER
Started feeling dizzy over last week or two.  She has a right peripheral deficit already, but feels like this is also more of a problem over last few weeks as well.  Patient is not in any pain.  Please call her at: [phone]

## 2024-11-27 NOTE — TELEPHONE ENCOUNTER
"I spoke with Fadia, she states that she does have a right sided peripheral deficit from a previous stroke 1 year ago;she states that she can see through the right eye but that sometimes it feels like the \"message isn't getting through\". She feels that her vision is good but weakening in the right side. She endorses that for the last 2-3 weeks she has been struggling to keep her vision sharp;she is also seeing double and she states that her vision feels \"tired\". These symptoms are also intermittent. She is denying any eye pain, blurred vision, headaches, balance issues, nausea, vomiting, weakness or generalized body pain. She states that she has also been having intermittent lightheadedness/dizziness the past couple of weeks as well.     Fadia states that she has been eating well and hydrating well (taking in about 6 cups of fluids per day).    She also states that she does not see an eye doctor d/t her neurologist telling her that she didn't need to see one (in regards to the type of deficit that she has).    I notified I would update Dr. Treviño's team and give her a call back with updates. She was appreciative of the call.   "

## 2024-11-27 NOTE — TELEPHONE ENCOUNTER
"I spoke with Fadia and notified her that per Laron pantoja, \"I spoke with Dr. Treviño, she let me know that it can be normal to have some vision changes with Taxotere and dry tear ducts so we would recommend trying Refresh eye drops and see her eye doctor or we can refer to ophthalmology\". Fadia gave verbal understanding stating that she does have the refresh eye drops at home to use and she also states that she will give their eye doctor a call. She is aware that if her symptoms worsen or if she has any further questions to please give the clinic a call. She was appreciative of the call and all of her questions were answered at this time.     "

## 2024-12-14 ENCOUNTER — LAB (OUTPATIENT)
Dept: LAB | Facility: LAB | Age: 69
End: 2024-12-14
Payer: COMMERCIAL

## 2024-12-14 DIAGNOSIS — Z17.0 MALIGNANT NEOPLASM OF UPPER-OUTER QUADRANT OF BREAST IN FEMALE, ESTROGEN RECEPTOR POSITIVE, UNSPECIFIED LATERALITY: ICD-10-CM

## 2024-12-14 DIAGNOSIS — C50.419 MALIGNANT NEOPLASM OF UPPER-OUTER QUADRANT OF BREAST IN FEMALE, ESTROGEN RECEPTOR POSITIVE, UNSPECIFIED LATERALITY: ICD-10-CM

## 2024-12-14 LAB
ALBUMIN SERPL BCP-MCNC: 3.9 G/DL (ref 3.4–5)
ALP SERPL-CCNC: 79 U/L (ref 33–136)
ALT SERPL W P-5'-P-CCNC: 13 U/L (ref 7–45)
ANION GAP SERPL CALC-SCNC: 10 MMOL/L (ref 10–20)
AST SERPL W P-5'-P-CCNC: 15 U/L (ref 9–39)
BASOPHILS # BLD AUTO: 0.15 X10*3/UL (ref 0–0.1)
BASOPHILS NFR BLD AUTO: 1.6 %
BILIRUB SERPL-MCNC: 0.8 MG/DL (ref 0–1.2)
BUN SERPL-MCNC: 23 MG/DL (ref 6–23)
CALCIUM SERPL-MCNC: 9.2 MG/DL (ref 8.6–10.3)
CHLORIDE SERPL-SCNC: 103 MMOL/L (ref 98–107)
CO2 SERPL-SCNC: 31 MMOL/L (ref 21–32)
CREAT SERPL-MCNC: 0.86 MG/DL (ref 0.5–1.05)
EGFRCR SERPLBLD CKD-EPI 2021: 73 ML/MIN/1.73M*2
EOSINOPHIL # BLD AUTO: 0.02 X10*3/UL (ref 0–0.7)
EOSINOPHIL NFR BLD AUTO: 0.2 %
ERYTHROCYTE [DISTWIDTH] IN BLOOD BY AUTOMATED COUNT: 14.6 % (ref 11.5–14.5)
GLUCOSE SERPL-MCNC: 105 MG/DL (ref 74–99)
HCT VFR BLD AUTO: 35.7 % (ref 36–46)
HGB BLD-MCNC: 11.6 G/DL (ref 12–16)
IMM GRANULOCYTES # BLD AUTO: 0.06 X10*3/UL (ref 0–0.7)
IMM GRANULOCYTES NFR BLD AUTO: 0.7 % (ref 0–0.9)
LYMPHOCYTES # BLD AUTO: 0.85 X10*3/UL (ref 1.2–4.8)
LYMPHOCYTES NFR BLD AUTO: 9.3 %
MCH RBC QN AUTO: 32.1 PG (ref 26–34)
MCHC RBC AUTO-ENTMCNC: 32.5 G/DL (ref 32–36)
MCV RBC AUTO: 99 FL (ref 80–100)
MONOCYTES # BLD AUTO: 0.98 X10*3/UL (ref 0.1–1)
MONOCYTES NFR BLD AUTO: 10.7 %
NEUTROPHILS # BLD AUTO: 7.08 X10*3/UL (ref 1.2–7.7)
NEUTROPHILS NFR BLD AUTO: 77.5 %
NRBC BLD-RTO: 0 /100 WBCS (ref 0–0)
PLATELET # BLD AUTO: 244 X10*3/UL (ref 150–450)
POTASSIUM SERPL-SCNC: 4 MMOL/L (ref 3.5–5.3)
PROT SERPL-MCNC: 6.8 G/DL (ref 6.4–8.2)
RBC # BLD AUTO: 3.61 X10*6/UL (ref 4–5.2)
SODIUM SERPL-SCNC: 140 MMOL/L (ref 136–145)
WBC # BLD AUTO: 9.1 X10*3/UL (ref 4.4–11.3)

## 2024-12-14 PROCEDURE — 36415 COLL VENOUS BLD VENIPUNCTURE: CPT

## 2024-12-14 PROCEDURE — 85025 COMPLETE CBC W/AUTO DIFF WBC: CPT

## 2024-12-14 PROCEDURE — 80053 COMPREHEN METABOLIC PANEL: CPT

## 2024-12-16 NOTE — PROGRESS NOTES
Patient ID: Fadia Bolden is a 69 y.o. female.  Cancer Staging   Malignant neoplasm of upper-outer quadrant of right breast in female, estrogen receptor negative  Staging form: Breast, AJCC 8th Edition  - Clinical: Stage IB (cT1b, cN0, cM0, G2, ER-, ID-, HER2-) - Signed by Delisa Rascon DO on 7/23/2024  - Pathologic: Stage IB (pT1c, pN0, cM0, G2, ER-, ID-, HER2-) - Signed by Delisa Rascon DO on 9/12/2024  Oncology History   Malignant neoplasm of upper-outer quadrant of breast in female, estrogen receptor positive, unspecified laterality   12/12/2023 Initial Diagnosis    Malignant neoplasm of upper-outer quadrant of breast in female, estrogen receptor positive, unspecified laterality     10/15/2024 -  Chemotherapy    TC (DOCEtaxel / Cyclophosphamide), 21 Day Cycles     Malignant neoplasm of upper-outer quadrant of right breast in female, estrogen receptor negative   7/23/2024 Initial Diagnosis    Malignant neoplasm of upper-outer quadrant of right breast in female, estrogen receptor negative (Multi)     7/23/2024 Cancer Staged    Staging form: Breast, AJCC 8th Edition, Clinical: Stage IB (cT1b, cN0, cM0, G2, ER-, ID-, HER2-) - Signed by Delisa Rascon DO on 7/23/2024 9/12/2024 Cancer Staged    Staging form: Breast, AJCC 8th Edition, Pathologic: Stage IB (pT1c, pN0, cM0, G2, ER-, ID-, HER2-) - Signed by Delisa Rascon DO on 9/12/2024       Subjective    HPI  Ms. Fadia Bolden is a 69-year-old female who presents for follow up for right breast early triple negative breast cancer.     Patient reports her nail beds are tender. Her heels continue to improve. She denies any new palpitations. No new cough, chest pressure, or SOB. No mouth sores. She denies any neuropathy. Good appetite and ok energy. No other major complaints at this time.     Patient's past medical history, surgical history, family history and social history reviewed.    Objective      Vitals:    12/17/24 0921   BP: 116/70   Pulse: 81    Resp: 16   Temp: 36.2 °C (97.2 °F)   SpO2: 96%     Review of Systems:   Review of Systems:    Positive per HPI, otherwise negative.    Physical Exam  Gen: appears well in clinic, NAD  HEENT: atraumatic head, normocephalic, EOMI, conjunctiva normal  LUNG: no increased WOB, CTAB  CV: No JVD. RRR  GI: soft, NT, ND  LE: no LE edema  Skin: no obvious rashes or lesions on visible skin  Neuro: interactive, no focal deficits noted  Psych: normal mood and affect  Performance Status:  Symptomatic; fully ambulatory    Labs/Imaging/Pathology: personally reviewed reports and images in Epic electronic medical record system. Pertinent results as it related to the plan represented in below in assessment and plan.   Assessment/Plan   Right breast ILC, stage IB pT1c (17mm) N0M0 G2, triple negative     - dx 3/21/2023 after bilateral screening mammogram showed small possible mass in the upper outer quadrant and it was thought that biopsy of this area would be difficult due to location and short interval follow-up was recommended.    - Repeat imaging 6/13/2023 shows mass with stable category 4.    - Repeat ultrasound 9/14/2023 shows mass unchanged and biopsy recommended.   -  During this time shortly afterward she suffered a brain bleed following location in [place].    - Repeat imaging and ultrasound June 2024 shows increase in size of breast mass previously 0.7 then 0.9.  - biopsy 7/15/2024 which showed invasive lobular carcinoma grade 2 ER negative NM negative HER2 negative at 11:00 6 cm from the nipple.   - right breast partial mastectomy on 8/28/2024 which showed invasive lobular carcinoma grade 2, margins were negative and 0 of 1 lymph nodes involved.  Greatest dimension of largest invasive focus was 17 mm.  No LVI identified  - Did develop a rash and a skin reaction shortly after surgery, that is slowly improving  10/1/24: -today we discussed given trip negative histology and tumor size of 17mm would recommend adjuvant  chemotherpay  - given age and comorbidies discussed Taxotere plus cytoxan over anthracycline based chemotherapu  - discussed with CVA hx is not contraindication to proceed with treatment  - despite CVA she continues to have a good PS very independent with her ADLs and active just is limited form driving due to some vision loss  - discussed side effects of taxotere plus cytoxan and consent signed  - will plan to start C1 10/15/24 and plan for 4 cycles  - will plan to add benadryl, pepcid, dexamethasone premeds given she does have hx of increased hypersensitivy to drugs/creams but otherwise no major allergies     10/22/2024:   - Presents for toxicity check   - Referral to genetics is pending  - Tolerated treatment well with no complaints   - Labs unremarkable   - She is aware to continue hydrating well and eat a balanced diet   - She is already scheduled to RTC on 11/4/24 to see Dr. Treviño and then treatment the next day      11/4/24:  - Labs from today with CBC and CMP unremarkable.   - Patient had no significant nausea with no extra medications needed.   - She does reports some increased constipation that was manageable.   - No fevers or signs of infections.   - No dose adjustments for treatment tomorrow.  - Continue with current dose.  - Reviewed again the benefits of adjuvant chemo.  - She has some questions about about RT.  - RTC in 3 weeks for cycle 3.  - She would like to get treatment and see me in the same day to limit co pays.  - She will get blood work done at a closer to her home  lab.     11/26/2024:  - Presents for cycle 3 docetaxel + cyclophosphamide   - She is tolerating treatment well   - Labs meet parameters for treatment   - We will decrease benadryl to 25 mg today since she is now on cycle 3 due to feeling unsteady with the 50 mg dose   - Orders signed off to be scheduled for cycle 4 treatment in 3 weeks with Dr. Treviño, labs ahead of time again       12/17/24:  - No contraindications to proceed  with cycle 4 today.  - We discussed this will be her final cycle.   - She edson come back tomorrow for her Neulasta shot.  - labs reviewed and meets parameters  - Follow-up in 6 weeks with assess for toxicity  - I will reach out to radiation team for follow up with Dr Hanna for next steps with RT.   - RTC in 6 weeks.     Reviewed ongoing medical problems and how they relate to her breast cancer, will continue long term monitoring.    RTC in 6 weeks. This note has been transcribed using a medical scribe and there is a possibility of unintentional typing misprints.    Diagnoses and all orders for this visit:  Malignant neoplasm of upper-outer quadrant of breast in female, estrogen receptor positive, unspecified laterality  -     Clinic Appointment Request MURRAY TREVIÑO  -     Referral to Radiation Oncology; Future  -     Clinic Appointment Request; Future  -     CBC and Auto Differential; Future  -     Comprehensive Metabolic Panel; Future  -     Signatera; Other-indicate in comment; unknown - Miscellaneous Test; Future  -     Cancer Antigen 27-29; Future  -     Reveal; Other-indicate in comment; unknown - Miscellaneous Test; Future         Murray Treviño MD  Hematology/Oncology  [facility]    Scribe Attestation  By signing my name below, IApril Scribe   attest that this documentation has been prepared under the direction and in the presence of Murray Treviño MD.     Time Spent  Prep time on day of patient encounter: 5 minutes  Time spent directly with patient, family or caregiver: 22 minutes  Additional Time Spent on Patient Care Activities: 5 minutes  Documentation Time: 5 minutes  Other Time Spent: 0 minutes  Total: 37 minutes

## 2024-12-17 ENCOUNTER — OFFICE VISIT (OUTPATIENT)
Dept: HEMATOLOGY/ONCOLOGY | Facility: CLINIC | Age: 69
End: 2024-12-17
Payer: COMMERCIAL

## 2024-12-17 ENCOUNTER — INFUSION (OUTPATIENT)
Dept: HEMATOLOGY/ONCOLOGY | Facility: CLINIC | Age: 69
End: 2024-12-17
Payer: COMMERCIAL

## 2024-12-17 VITALS
OXYGEN SATURATION: 96 % | HEART RATE: 81 BPM | TEMPERATURE: 97.2 F | DIASTOLIC BLOOD PRESSURE: 70 MMHG | BODY MASS INDEX: 17.44 KG/M2 | SYSTOLIC BLOOD PRESSURE: 116 MMHG | WEIGHT: 103.17 LBS | RESPIRATION RATE: 16 BRPM

## 2024-12-17 DIAGNOSIS — C50.419 MALIGNANT NEOPLASM OF UPPER-OUTER QUADRANT OF BREAST IN FEMALE, ESTROGEN RECEPTOR POSITIVE, UNSPECIFIED LATERALITY: ICD-10-CM

## 2024-12-17 DIAGNOSIS — C50.419 MALIGNANT NEOPLASM OF UPPER-OUTER QUADRANT OF BREAST IN FEMALE, ESTROGEN RECEPTOR POSITIVE, UNSPECIFIED LATERALITY: Primary | ICD-10-CM

## 2024-12-17 DIAGNOSIS — Z17.0 MALIGNANT NEOPLASM OF UPPER-OUTER QUADRANT OF BREAST IN FEMALE, ESTROGEN RECEPTOR POSITIVE, UNSPECIFIED LATERALITY: Primary | ICD-10-CM

## 2024-12-17 DIAGNOSIS — Z17.0 MALIGNANT NEOPLASM OF UPPER-OUTER QUADRANT OF BREAST IN FEMALE, ESTROGEN RECEPTOR POSITIVE, UNSPECIFIED LATERALITY: ICD-10-CM

## 2024-12-17 PROCEDURE — 3074F SYST BP LT 130 MM HG: CPT | Performed by: INTERNAL MEDICINE

## 2024-12-17 PROCEDURE — 2500000004 HC RX 250 GENERAL PHARMACY W/ HCPCS (ALT 636 FOR OP/ED)

## 2024-12-17 PROCEDURE — 99214 OFFICE O/P EST MOD 30 MIN: CPT | Performed by: INTERNAL MEDICINE

## 2024-12-17 PROCEDURE — G2211 COMPLEX E/M VISIT ADD ON: HCPCS | Performed by: INTERNAL MEDICINE

## 2024-12-17 PROCEDURE — 3078F DIAST BP <80 MM HG: CPT | Performed by: INTERNAL MEDICINE

## 2024-12-17 PROCEDURE — 96413 CHEMO IV INFUSION 1 HR: CPT

## 2024-12-17 PROCEDURE — 1123F ACP DISCUSS/DSCN MKR DOCD: CPT | Performed by: INTERNAL MEDICINE

## 2024-12-17 PROCEDURE — 96375 TX/PRO/DX INJ NEW DRUG ADDON: CPT | Mod: INF

## 2024-12-17 PROCEDURE — 2500000004 HC RX 250 GENERAL PHARMACY W/ HCPCS (ALT 636 FOR OP/ED): Performed by: INTERNAL MEDICINE

## 2024-12-17 PROCEDURE — 1126F AMNT PAIN NOTED NONE PRSNT: CPT | Performed by: INTERNAL MEDICINE

## 2024-12-17 PROCEDURE — 1159F MED LIST DOCD IN RCRD: CPT | Performed by: INTERNAL MEDICINE

## 2024-12-17 PROCEDURE — 96411 CHEMO IV PUSH ADDL DRUG: CPT

## 2024-12-17 PROCEDURE — 2500000001 HC RX 250 WO HCPCS SELF ADMINISTERED DRUGS (ALT 637 FOR MEDICARE OP): Performed by: INTERNAL MEDICINE

## 2024-12-17 PROCEDURE — 96417 CHEMO IV INFUS EACH ADDL SEQ: CPT

## 2024-12-17 RX ORDER — PROCHLORPERAZINE MALEATE 10 MG
10 TABLET ORAL EVERY 6 HOURS PRN
Status: DISCONTINUED | OUTPATIENT
Start: 2024-12-17 | End: 2024-12-17 | Stop reason: HOSPADM

## 2024-12-17 RX ORDER — ALBUTEROL SULFATE 0.83 MG/ML
3 SOLUTION RESPIRATORY (INHALATION) AS NEEDED
Status: DISCONTINUED | OUTPATIENT
Start: 2024-12-17 | End: 2024-12-17 | Stop reason: HOSPADM

## 2024-12-17 RX ORDER — FAMOTIDINE 10 MG/ML
20 INJECTION INTRAVENOUS
Status: DISCONTINUED | OUTPATIENT
Start: 2024-12-17 | End: 2024-12-17 | Stop reason: HOSPADM

## 2024-12-17 RX ORDER — DEXAMETHASONE 6 MG/1
12 TABLET ORAL ONCE
Status: COMPLETED | OUTPATIENT
Start: 2024-12-17 | End: 2024-12-17

## 2024-12-17 RX ORDER — DIPHENHYDRAMINE HYDROCHLORIDE 50 MG/ML
25 INJECTION INTRAMUSCULAR; INTRAVENOUS ONCE
Status: DISCONTINUED | OUTPATIENT
Start: 2024-12-17 | End: 2024-12-17 | Stop reason: SDUPTHER

## 2024-12-17 RX ORDER — DIPHENHYDRAMINE HYDROCHLORIDE 50 MG/ML
50 INJECTION INTRAMUSCULAR; INTRAVENOUS AS NEEDED
Status: DISCONTINUED | OUTPATIENT
Start: 2024-12-17 | End: 2024-12-17 | Stop reason: HOSPADM

## 2024-12-17 RX ORDER — EPINEPHRINE 0.3 MG/.3ML
0.3 INJECTION SUBCUTANEOUS EVERY 5 MIN PRN
Status: DISCONTINUED | OUTPATIENT
Start: 2024-12-17 | End: 2024-12-17 | Stop reason: HOSPADM

## 2024-12-17 RX ORDER — FAMOTIDINE 10 MG/ML
20 INJECTION INTRAVENOUS ONCE AS NEEDED
Status: COMPLETED | OUTPATIENT
Start: 2024-12-17 | End: 2024-12-17

## 2024-12-17 RX ORDER — PALONOSETRON 0.05 MG/ML
0.25 INJECTION, SOLUTION INTRAVENOUS ONCE
Status: COMPLETED | OUTPATIENT
Start: 2024-12-17 | End: 2024-12-17

## 2024-12-17 RX ORDER — DIPHENHYDRAMINE HCL 25 MG
25 CAPSULE ORAL ONCE
Status: COMPLETED | OUTPATIENT
Start: 2024-12-17 | End: 2024-12-17

## 2024-12-17 RX ORDER — PROCHLORPERAZINE EDISYLATE 5 MG/ML
10 INJECTION INTRAMUSCULAR; INTRAVENOUS EVERY 6 HOURS PRN
Status: DISCONTINUED | OUTPATIENT
Start: 2024-12-17 | End: 2024-12-17 | Stop reason: HOSPADM

## 2024-12-17 ASSESSMENT — PAIN SCALES - GENERAL: PAINLEVEL_OUTOF10: 0-NO PAIN

## 2024-12-18 ENCOUNTER — APPOINTMENT (OUTPATIENT)
Dept: PRIMARY CARE | Facility: CLINIC | Age: 69
End: 2024-12-18
Payer: COMMERCIAL

## 2024-12-18 ENCOUNTER — INFUSION (OUTPATIENT)
Dept: HEMATOLOGY/ONCOLOGY | Facility: CLINIC | Age: 69
End: 2024-12-18
Payer: COMMERCIAL

## 2024-12-18 VITALS
SYSTOLIC BLOOD PRESSURE: 126 MMHG | OXYGEN SATURATION: 98 % | TEMPERATURE: 97.9 F | RESPIRATION RATE: 18 BRPM | BODY MASS INDEX: 17.85 KG/M2 | HEART RATE: 61 BPM | DIASTOLIC BLOOD PRESSURE: 77 MMHG | WEIGHT: 105.6 LBS

## 2024-12-18 DIAGNOSIS — C50.419 MALIGNANT NEOPLASM OF UPPER-OUTER QUADRANT OF BREAST IN FEMALE, ESTROGEN RECEPTOR POSITIVE, UNSPECIFIED LATERALITY: ICD-10-CM

## 2024-12-18 DIAGNOSIS — Z17.0 MALIGNANT NEOPLASM OF UPPER-OUTER QUADRANT OF BREAST IN FEMALE, ESTROGEN RECEPTOR POSITIVE, UNSPECIFIED LATERALITY: ICD-10-CM

## 2024-12-18 PROCEDURE — 96372 THER/PROPH/DIAG INJ SC/IM: CPT

## 2024-12-18 PROCEDURE — 2500000004 HC RX 250 GENERAL PHARMACY W/ HCPCS (ALT 636 FOR OP/ED): Mod: JZ,JG

## 2024-12-18 ASSESSMENT — PAIN SCALES - GENERAL: PAINLEVEL_OUTOF10: 0-NO PAIN

## 2024-12-26 NOTE — PROGRESS NOTES
Staff Physician: Sanjuana Nascimento MD  Referring Physician: Alma Treviño MD  Date of Service: 12/27/2024  Patient name: Fadia Boldne   MRN: [id number]    RADIATION ONCOLOGY FOLLOW-UP NOTE      DIAGNOSIS:   Cancer Staging   Malignant neoplasm of upper-outer quadrant of right breast in female, estrogen receptor negative  Staging form: Breast, AJCC 8th Edition  - Clinical: Stage IB (cT1b, cN0, cM0, G2, ER-, NV-, HER2-) - Signed by Delisa Rascon DO on 7/23/2024  - Pathologic: Stage IB (pT1c, pN0, cM0, G2, ER-, NV-, HER2-) - Signed by Delisa Rascon DO on 9/12/2024      Problem List Items Addressed This Visit    None      IDENTIFICATION  Nafisa Bolden is a 69 year old woman with personal history of LEFT breast pT1bN0 ILC diagnosed in 2002 treated with BCS and RT 50 Gy + 14 Gy boost.  More recently she developed a RIGHT breast pT2N0 ILC [id number] treated with partial mastectomy and SLN biopsy without adjuvant therapy (patient declined).  She now has new primary in her RIGHT breast cT1N0 ILC triple negative treated with right partial mastectomy and SLN Bx with pT1cN0 disease, 0/1 SLN involved, margins negative.  She completed TC chemotherapy x 4 cycles in 12/2024.   She presents for consideration of adjuvant radiation therapy.    ONCOLOGIC HISTORY:    2002: Diagnosed pT1bN0 ILC Left breast treated with breast conservation surgery and adjuvant left whole breast RT 50 Gy + 14 Gy boost.  She was 57 years old at the time.  She took Tamoxifen for a period of time.  We reached out to [facility].  Records have been destroyed.  Mosaic (radiation planning software) reflects her receiving 25 treatments radiation + 7 treatments tumor bed boost.    3/2021 Presented palpable masses in left breast UOQ    3/12/2021 [facility] Diagnostic Mammogram   Left breast: post treatment changes  Right breast: Persistent focal asymmetry at site of palpable lump right breast UOQ posteriorly.    Right breast US: @ 11 o'clock and @ 10:30  oclock N+7 cm there are 2 irregular hypoechoic masses measuring 0.8 x 0.7 x 1.3 cm and 0.4 x 0.4 x 0.5 cm.  The masses are 0.7 cm apart.    Right axillary US: normal level 1 axillary nodes.    3/15/2021 Right breast US guided biopsy:   Right breast US guided biopsy of both lesions appreciated on mammogram at 11 o'clock and 11:30 o'clock N+ 7cm respectively.    Pathology: Invasive lobular carcinoma grade 2 ER 95% GA neg Her2 Neg.     3/24/2021 [facility] Breast MRI:  Irregular mass and nonmass enhancement in superolateral right breast measuring 2.6 x 1.1 x 4.0 cm.  Abnormal enhancement extending to the subcutaneous level of superolateral breast without skin enhancement.      Additional areas of indeterminate focal non-mass enhancement in right breast as follows:  0.5 cm medial central right breast, 5.1 cm from index lesion  0.7 cm lateral inferior posterior depth  0.7 cm inferior central mid-poasterior depth    No axillary or IM lymphadenopathy.    3/31/2021 Second look Right breast US: No sonographic correlate for 3 areas of non-mass enhancement outlined above.    4/7/2021 MRI Guided biopsy:  Right breast central medial non-mass enhancement: apocrine cysts and metaplasia.  Right breast inferior central: apocrine cysts and metaplasia.    4/23/2021 Right magseed localized partial mastectomy, lymphatic mapping w/dual tracer, right axillary SLN Bx.  Specimen radiograph confirmed biopsy clip and Magseed.  Pathology: 2.2 cm ILC grade , no LVSI, no DCIS, margins negative, < 1mm posteriorly.  ER95%, Prnegative [id number].  [id number].  0/3 SLN involved, 0/2 intramammary nodes involved.    Declined adjuvant therapy referrals.    3/21/2023 [facility] Diagnostic Mammogram:  Mammogram: post operative scaring deep upper outer breasts bilaterally.  Focal asymmetry upper outer right breast posterior region.  US: Right breast 11 o'clock N+ 6 cm: 0.6 x 0.3 x 0.5 cm hypoechoic mass.  Biopsy versus short interval FU.  Patient elected for short  interval FU.      9/14/2023  Post-operative scarring bilateral breasts.    Right breast focal asymmetry UOQ posterior depth.  Right breast US @ 11 o'clock N+ 6 cm: 0.6 x 0.3 x 0.5 cm irregular hypoechoic mass.  Biopsy recommended, patient elected for short interval re-imaging.    3/25/2024: BL Diagnostic Mammogram:  Right breast: stable focal asymmetry superior right breast.  Post lumpectomy scarring and clips in superolateral right breast posterior depth.    Right breast US: @ 11 o'clock N+ 6cm there is a 0.7 x 0.4 x 0.7 cm hypoechoic mass (previously 0.6 x 0.3 x 0.5 cm)  BIRADS-4    6/24/2024 Repeat Right Breast US:  @ 11 o'clock N+6 cm there was slight interval increase in size of previously seen hypoechoic nodule measuring 0.9 x 0.5 x 0.9 cm    7/15/2024 Right breast US guided biopsy @ 11 o'clock N+ 6 cm: ILC grade 2 with histocytoid features.  Er-Pr-Her2 1+IHC neg    8/28/2024 Right partial mastectomy and SLN Bx:  1.7 cm ILC grade 2, invasion of dermis without skin ulceration, no LVSI.  Margins negative > 2 mm. No DCIS.   0/1 SLN involved.  pT1cN0    10/15/2024-12/17/2024  TC x 4 cycles     She finished chemotherapy 10 days ago.  She is recovering gradually.  She has fatigue.  She also had a CVA last year and has been recovering from that as well.  Has a visual field deficit and memory changes.  She had prior breast RT as outlined above.  She experienced expect radiation dermatitis with this.      PAST MEDICAL HISTORY:  Past Medical History:   Diagnosis Date    Breast cancer (Multi)     bilateral    Cerebral vascular accident (Multi)     HTN (hypertension)     Hypothyroidism     DAIJA (obstructive sleep apnea)     Other chest pain 02/10/2021    Chest tightness    Peripheral vision loss, right     Personal history of irradiation     Personal history of malignant neoplasm of breast 06/27/2022    History of malignant neoplasm of breast    Stroke (Multi) 10/24/2023     PAST SURGICAL HISTORY:  Past Surgical History:    Procedure Laterality Date    BREAST LUMPECTOMY Right 08/28/2024    COLONOSCOPY      CRANIOTOMY      MR HEAD ANGIO W AND WO IV CONTRAST  10/25/2023    MR HEAD ANGIO W AND WO IV CONTRAST 10/25/2023    MR HEAD ANGIO WO IV CONTRAST  01/05/2024    MR HEAD ANGIO WO IV CONTRAST 1/5/2024 [id number] MRI    OTHER SURGICAL HISTORY  11/22/2021    Tonsillectomy    OTHER SURGICAL HISTORY      x2 lumpectomy    UPPER GASTROINTESTINAL ENDOSCOPY       ALLERGIES:  Allergies   Allergen Reactions    Chlorhexidine Rash     HYPERSENSITIVITY TO CHLOROPREP SURGERY 8/28/24     MEDICATIONS:    Current Outpatient Medications:     ascorbate calcium-bioflavonoid (Amelia-C with Bioflavonoids) 500-200 mg tablet, Take 1 tablet by mouth once daily., Disp: , Rfl:     aspirin 81 mg chewable tablet, Chew 1 tablet (81 mg) once daily., Disp: , Rfl:     atorvastatin (Lipitor) 80 mg tablet, Take 1 tablet (80 mg) by mouth once daily., Disp: 90 tablet, Rfl: 3    calcium carbonate-vitamin D3 500 mg-5 mcg (200 unit) tablet, Take 1 tablet by mouth once daily., Disp: , Rfl:     levETIRAcetam (Keppra) 500 mg tablet, Take 1 tablet (500 mg) by mouth 2 times a day., Disp: 180 tablet, Rfl: 3    lisinopril 20 mg tablet, Take 1 tablet (20 mg) by mouth once daily., Disp: 90 tablet, Rfl: 3    ondansetron (Zofran) 8 mg tablet, Take 1 tablet (8 mg) by mouth every 8 hours if needed for nausea or vomiting., Disp: 30 tablet, Rfl: 5    prochlorperazine (Compazine) 10 mg tablet, Take 1 tablet (10 mg) by mouth every 6 hours if needed for nausea or vomiting., Disp: 30 tablet, Rfl: 5   SOCIAL HISTORY:  Social History     Tobacco Use    Smoking status: Never    Smokeless tobacco: Never   Substance Use Topics    Alcohol use: Not Currently     FAMILY HISTORY:  Family History   Problem Relation Name Age of Onset    Thyroid disease Mother      Parkinsonism Father      Parkinsonism Sister      Other (HTN) Sister      Breast cancer Father's Sister      Breast cancer Paternal  Grandmother         REVIEW OF SYSTEMS:  Please refer to RN note.    PHYSICAL EXAMINATION:  There were no vitals taken for this visit.  Physical Exam   Alopecia, well appearing NAD  Breast: well healing curvilinear incision UOQ right breast along axilla.  She states that the same incision was used for her partial mastectomy in .  Well healed scar left breast, + radiation tattoos identified.  No masses palpated in biilateral breasts.  Skin is clear.    NO axillary CLAUDIA  No lymphedema    CTCAE (v5) ADVERSE EVENTS:      PERFORMANCE STATUS:  KPS/ECO, Normal activity with effort; some signs or symptoms of disease (ECOG equivalent 1)    LABORATORY AND IMAGING DATA:  Imaging: All imaging was personally reviewed and interpreted in clinic. Findings as per HPI and EMR.    No valid procedures specified.     Laboratory/Pathology:  All pertinent labs and pathology were personally reviewed and interpreted in clinic. Findings as per HPI and EMR.      IMPRESSION:  Nafisa Bolden is a 69 year old woman with personal history of LEFT breast pT1bN0 ILC diagnosed in  treated with BCS and RT 50 Gy + 14 Gy boost.  More recently she developed a RIGHT breast pT2N0 ILC [id number] treated with partial mastectomy and SLN biopsy without adjuvant therapy (patient declined) in .  She now has new primary in her RIGHT breast cT1N0 ILC triple negative treated with right partial mastectomy and SLN Bx with pT1cN0 disease, 0/1 SLN involved, margins negative. She received adjuvant TC x 4 cycles completed in mid 2024.   She presents for consideration of adjuvant radiation therapy.    I recommended a course of whole breast radiation therapy to the right breast with tumor bed boost given triple negative histology.  I reviewed that radiation therapy reduces the risk of in breast tumor recurrence.  I do not recommend APBI given triple negative biology as well as prior breast cancer in same quadrant treated with lumpectomy alone in  2021.  I do not favor ultrahypofractionated whole breast RT as she would benefit from a boost and there is limited data for boosting in this setting.     Finally, we discussed the side effects and risk of RT, including the need for CT simulation, tattoo placement, and daily radiation (M-F) x 20 days. We discussed the side effects and risks of radiation, including fatigue, radiation dermatitis, and breast pain in the short-term; skin fibrosis, pigmentation, chest wall pain, damage to underlying lung, rib, or heart, and secondary malignancies in the long-term. All questions were answered to the best of our ability and informed consent was obtained.       PLAN:  Plan for whole breast RT 42.56 Gy in 16 fractions followed by 10 Gy in 4 fraction boost to tumor bed.  Total treatments=20.  She will return the week of 1/6/2025 for simulation as she wishes to recover a bit more from chemotherapy.  Previously referred to genetics in 10/2024 but did not see them.  Will re-initiate referral.  She wishes to pursue breast conservation regardless of the results which is reasonable.    All questions answered.  FU with me in 1-2 weeks for simulation.  Prior RT as outlined above, records destroyed.  Care will be taken to avoid dose to left breast/chest with RT planning  Thank you for the opportunity to participate in the care of this kind patient.    Sanjuana Nascimento MD  Radiation Oncology

## 2024-12-27 ENCOUNTER — HOSPITAL ENCOUNTER (OUTPATIENT)
Dept: RADIATION ONCOLOGY | Facility: CLINIC | Age: 69
Setting detail: RADIATION/ONCOLOGY SERIES
Discharge: HOME | End: 2024-12-27
Payer: COMMERCIAL

## 2024-12-27 ENCOUNTER — APPOINTMENT (OUTPATIENT)
Dept: RADIATION ONCOLOGY | Facility: CLINIC | Age: 69
End: 2024-12-27
Payer: COMMERCIAL

## 2024-12-27 ENCOUNTER — HOSPITAL ENCOUNTER (OUTPATIENT)
Dept: RADIOLOGY | Facility: EXTERNAL LOCATION | Age: 69
Discharge: HOME | End: 2024-12-27

## 2024-12-27 VITALS
SYSTOLIC BLOOD PRESSURE: 131 MMHG | BODY MASS INDEX: 17.48 KG/M2 | DIASTOLIC BLOOD PRESSURE: 69 MMHG | WEIGHT: 103.4 LBS | OXYGEN SATURATION: 99 % | RESPIRATION RATE: 18 BRPM | HEART RATE: 78 BPM | TEMPERATURE: 97.3 F

## 2024-12-27 DIAGNOSIS — C50.411 MALIGNANT NEOPLASM OF UPPER-OUTER QUADRANT OF RIGHT FEMALE BREAST, UNSPECIFIED ESTROGEN RECEPTOR STATUS: ICD-10-CM

## 2024-12-27 DIAGNOSIS — C50.419 MALIGNANT NEOPLASM OF UPPER-OUTER QUADRANT OF BREAST IN FEMALE, ESTROGEN RECEPTOR POSITIVE, UNSPECIFIED LATERALITY: ICD-10-CM

## 2024-12-27 DIAGNOSIS — C50.411 MALIGNANT NEOPLASM OF UPPER-OUTER QUADRANT OF RIGHT BREAST IN FEMALE, ESTROGEN RECEPTOR NEGATIVE: Primary | ICD-10-CM

## 2024-12-27 DIAGNOSIS — Z17.1 MALIGNANT NEOPLASM OF UPPER-OUTER QUADRANT OF RIGHT BREAST IN FEMALE, ESTROGEN RECEPTOR NEGATIVE: Primary | ICD-10-CM

## 2024-12-27 DIAGNOSIS — Z17.0 MALIGNANT NEOPLASM OF UPPER-OUTER QUADRANT OF BREAST IN FEMALE, ESTROGEN RECEPTOR POSITIVE, UNSPECIFIED LATERALITY: ICD-10-CM

## 2024-12-27 PROCEDURE — 99215 OFFICE O/P EST HI 40 MIN: CPT | Performed by: RADIOLOGY

## 2024-12-27 PROCEDURE — 99417 PROLNG OP E/M EACH 15 MIN: CPT | Performed by: RADIOLOGY

## 2024-12-27 ASSESSMENT — PATIENT HEALTH QUESTIONNAIRE - PHQ9
2. FEELING DOWN, DEPRESSED OR HOPELESS: SEVERAL DAYS
10. IF YOU CHECKED OFF ANY PROBLEMS, HOW DIFFICULT HAVE THESE PROBLEMS MADE IT FOR YOU TO DO YOUR WORK, TAKE CARE OF THINGS AT HOME, OR GET ALONG WITH OTHER PEOPLE: SOMEWHAT DIFFICULT
1. LITTLE INTEREST OR PLEASURE IN DOING THINGS: SEVERAL DAYS
SUM OF ALL RESPONSES TO PHQ9 QUESTIONS 1 AND 2: 2

## 2024-12-27 ASSESSMENT — ENCOUNTER SYMPTOMS
CONSTITUTIONAL NEGATIVE: 1
LIGHT-HEADEDNESS: 1
COUGH: 1
EXTREMITY WEAKNESS: 1
SPEECH DIFFICULTY: 1
DEPRESSION: 1
SLEEP DISTURBANCE: 1
SEIZURES: 1
CONSTIPATION: 1
NERVOUS/ANXIOUS: 1
EYE PROBLEMS: 1
ENDOCRINE NEGATIVE: 1
BRUISES/BLEEDS EASILY: 1
OCCASIONAL FEELINGS OF UNSTEADINESS: 0
PALPITATIONS: 1
LOSS OF SENSATION IN FEET: 0

## 2024-12-27 ASSESSMENT — COLUMBIA-SUICIDE SEVERITY RATING SCALE - C-SSRS
6. HAVE YOU EVER DONE ANYTHING, STARTED TO DO ANYTHING, OR PREPARED TO DO ANYTHING TO END YOUR LIFE?: NO
2. HAVE YOU ACTUALLY HAD ANY THOUGHTS OF KILLING YOURSELF?: NO
1. IN THE PAST MONTH, HAVE YOU WISHED YOU WERE DEAD OR WISHED YOU COULD GO TO SLEEP AND NOT WAKE UP?: NO

## 2024-12-27 ASSESSMENT — PAIN SCALES - GENERAL: PAINLEVEL_OUTOF10: 0-NO PAIN

## 2024-12-27 NOTE — PROGRESS NOTES
HISTORY OF PRESENT ILLNESS:  Ms. Bolden is a 69 y.o. female with a history of left ILC breast cancer diagnosed at 47 and a right ILC breast cancer diagnosed at 66. She was recently diagnosed with a new primary triple negative right breast cancer. She also has a family history of breast cancer.  She was referred to the [facility] at [facility] by her medical provider, Dr. Treviño.  She is interested in genetic testing to clarify her personal risks for cancer, as well as the risks to her family members.   Ms. Bolden was seen virtually today with her .    PERSONAL HISTORY OF CANCER?  Yes   Prior breast history includes (per Dr. Rascon note 6/13/23):  Dx with pT1bN0 left ILC in 2002 (at 47)  S/p left lumpectomy and XRT.  Took tamoxifen for short time (less than 5 years)    3/2021-- palpated 2 masses in right breast   3/12/21-- bilateral diagnostic mammogram and U/S reveals 2 irregular hypoechoic masses at 11- 11:30 7cmFN in close proximity measuring 1.3cm and 0.5cm. U/S biopsy is recommended. Axilla scanned and negative.   3/15/21-- U/S guided right breast biopsy   Pathology: Invasive lobular carcinoma grade 2 ER 95% MS neg Her2 Neg (right breast)  4/23/21-- right MS PM SLNB (0/5) tumor 2.2cm, margins negative.   Declined xrt and med/onc referral     3/21/2023-- dx after bilateral screening mammogram showed small possible mass in the upper outer quadrant and it was thought that biopsy of this area would be difficult due to location and short interval follow-up was recommended.    Repeat imaging 6/13/2023 shows mass with stable category 4.    Repeat ultrasound 9/14/2023 shows mass unchanged and biopsy recommended.   During this time shortly afterward she suffered a brain bleed while on vacation in [place]      Repeat imaging and ultrasound June 2024 shows increase in size of breast mass previously 0.7 then 0.9.    Biopsy 7/15/2024 which showed invasive lobular carcinoma grade 2 ER negative MS  "negative HER2 negative at 11:00 6 cm from the nipple (right breast)    Right breast partial mastectomy on 8/28/2024 which showed invasive lobular carcinoma grade 2, margins were negative and 0 of 1 lymph nodes involved.  Greatest dimension of largest invasive focus was 17 mm.  No LVI identified    10/15/2024-12/17/2024-- TC x 4 cycles   Per radiation oncology note 12/27/24: \"Plan for whole breast RT 42.56 Gy in 16 fractions followed by 10 Gy in 4 fraction boost to tumor bed. Total treatments=20.\"    PREVIOUS GENETIC TESTING?  No     CANCER SCREENING HISTORY:  Mammograms?   Yes   Breast biopsies?  Yes   Vaginal ultrasound to screen for ovarian cancer?   No   CA-27.29?   Yes-- 10/1/24-- 31.2 U/mL   PAP smear?   Yes, describe: no hx of abnormal PAP smears    Colonoscopy?   11/13/14-- colonoscopy (pathology report not available for my review):    7/7/21-- colonoscopy:      Endoscopy?  Yes   Hysterectomy?  No   Oophorectomy?  No   Dermatology?   Yes, describe: around 1 mole removed      OTHER MEDICAL CONCERNS:  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, estrogen receptor positive, unspecified laterality    Cerebrovascular accident (CVA) (Multi)    Aphasia    Anisocoria    Cerebral edema    Impaired gait and mobility    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)    Right sided weakness    Mixed hyperlipidemia    Primary hypertension    Coordination abnormal    SDH (subdural hematoma) (Multi)    Acquired skull defect    " Gastroesophageal reflux disease    Vision loss    Apraxia following cerebrovascular accident    Anxiety    Hemianopsia    Seizure-like activity (Multi)    Malignant neoplasm of upper-outer quadrant of right breast in female, estrogen receptor negative     REPRODUCTIVE HISTORY:  # Children:   4  # Pregnancies:   6   Age first birth:   25  Breast feeding?:   Yes-- around 3 years and 4 months  Menarche (age):   13  Menopause (age):   47  OCP:    No   HRT:   No      FAMILY HISTORY:  A 4-generation pedigree was obtained. The family history was significant for the following:  Sister had dementia and Parkinson's. She passed away around 76.    Father had Parkinson's. He passed away at 80.  Paternal aunt had breast cancer (age of diagnosis/death is unknown).    Consanguinity and Ashkenazi Protestant ancestry were denied. The patient's maternal side is of English descent, and the patient's paternal side is of White descent. The remainder of the family history was negative for intellectual disability, birth defects, miscarriages/stillbirths, blindness, deafness, kidney disease, heart disease, cancer, muscle disease, and blood disorders.    DISCUSSION:  Ms. Bolden is a 69 y.o. female with a history of left ILC breast cancer diagnosed at 47 and a right ILC breast cancer diagnosed at 66. She was recently diagnosed with a new primary triple negative right breast cancer. She also has a family history of breast cancer.  Based on her personal history and family history, she meets NCCN criteria for genetic testing of the high-penetrant breast cancer genes (BRCA1, BRCA2, CDH1, PALB2, PTEN, STK11, and TP53).  Ms. Bolden is interested in testing, which is recommended, and was ordered today via the 13-gene BRCAPlus panel and 39-gene CancerNext + RNA panel.    Our discussion is summarized below:    We reviewed genes, inherited forms of cancer, and the BRCA1 and BRCA2 genes.   We discussed that most cancers are NOT due to an inherited genetic  susceptibility.  However, in up to 20% of patients with triple negative breast cancer have a breast cancer susceptibility gene mutation, particularly in BRCA1.   By contrast, less than 6% of all breast cancers are associated with a BRCA mutation (UptoDate).  Within these families, we often see multiple family members with cancer, occurring in multiple generations. In addition, earlier onset and bilateral cancers are suggestive of an inherited form of cancer. There also tends to be a clustering of certain types of cancer in these families, such as breast and ovarian cancer.     We discussed that genetic testing has the potential to identify information that could impact medical management/treatment, and could estimate her risk to develop another primary cancer.     We discussed the BRCA1 and BRCA2 genes, which are two genes that have been linked to early-onset breast and/or ovarian cancer.  Changes in these genes (sometimes referred to as mutations) are inherited in a dominant pattern and confer up to an 87% lifetime risk for breast cancer.   This is elevated compared to the general population risk of 10-12%.   In addition, BRCA1 and BRCA2 mutation carriers have up to a 45% lifetime risk for ovarian cancer, which is elevated over the 1.1% general population risk.   Mutation carriers who have already been diagnosed with cancer have an increased risk to develop a second, contralateral breast cancer.   BRCA2 gene mutation carriers have an increased risk for male breast cancer, prostate cancer, melanoma and pancreatic cancer.    Gene mutations in most cancer risk genes, i.e. BRCA1 and BRCA2, are inherited in an autosomal dominant fashion.   This means that if an individual has a mutation in either of these genes, their siblings and children have a 50% chance of also having the same gene mutation and a 50% chance of not having the gene mutation.     We discussed that there are multiple genes associated with increased  "breast cancer risk.   Some genes, like the BRCA1 and BRCA2 genes, are considered highly penetrant breast cancer genes, meaning a mutation in the gene confers a high risk of breast cancer.   Other high penetrant breast cancer genes include the following genes: CDH1, PALB2, PTEN, TP53, STK11.  Additionally, there are other intermediate (moderate risk) breast cancer genes.   Other moderate risk breast cancer genes include the following genes: KWESI, BARD1, BMPR1A, CHEK2, NF1, RAD51C, RAD51D.  For some of the moderate risk genes, there is often limited information regarding the degree to which a mutation in the gene affects risk of different types of cancers.   Additionally, for some of these moderate risk genes, the appropriate management for individuals who have a mutation in one of these genes is not always clear.   Our knowledge about the cancer risks associated with mutations in these moderate risk genes is always growing, and we will likely be able to provide more comprehensive information in the future.     We then discussed the type of results that can be obtained with this testing.   The first is a positive result.  The next is a negative result.  The last result is a \"maybe\" which we call a variant of unknown significant, meaning that we found a change in your DNA but we are not sure at this time if it increases the risk for cancer or not.    Ms. Bolden was counseled about hereditary cancer susceptibility including cancer risks, options for increased screening and/or risk reduction, genetic testing, and the implications for other family members.   We discussed performing genetic testing in the context of a multi-gene panel test that looks at the BRCA1 and BRCA2, as well as moderate penetrant genes.   Ms. Bolden is interested in this approach, which is recommended and was ordered today via the 13-gene BRCAPlus panel and 39-gene CancerNext + RNA panel by [employer].  The 13-gene BRCAplus panel analyzes the following 13 " genes: KWESI, BARD1, BRCA1, BRCA2, CDH1, CHEK2, NF1, PALB2, PTEN, RAD51C, RAD51D, STK11, TP53.  The 39-gene CancerNext + RNA panel analyzes the following 39 genes: APC, KWESI, AXIN2, BAP1, BARD1, BMPR1A, BRCA1, BRCA2, BRIP1, CDH1, , CDKN2A, CHEK2, EPCAM, FH, FLCN, GREM1, HOXB13, MBD4, MET, MLH1, MSH2, MSH3, MSH6, MUTYH, NF1, NTHL1, PALB2, PMS2, POLD1, POLE, PTEN, RAD51C, RAD51D, SMAD4, STK11, TP53, TSC1, TSC2, VHL.    Results of the 13-gene BRCAPlus panel are typically available within 7-12 days and the results of the 39-gene CancerNext + RNA panel are typically available in ~3 weeks.  Your test results may be released to you when they are reported.   I will call once I receive and review your test results.  If you choose to view your results in advance of my call, I may not be available to discuss them at that exact time.  Most people choose to review results with their provider when they call.

## 2024-12-27 NOTE — PROGRESS NOTES
"Radiation Oncology Nursing Note    Pain: The patient's current pain level was assessed.  They report currently having a pain of 0 out of 10.  They feel their pain is under control without the use of pain medications.    Review of Systems:  Review of Systems   Constitutional: Negative.    HENT:   Positive for tinnitus (baseline).    Eyes:  Positive for eye problems (right peripheral deficet since stroke in 2023).   Respiratory:  Positive for cough (dry cough).    Cardiovascular:  Positive for palpitations.   Gastrointestinal:  Positive for constipation (intermittent).   Endocrine: Negative.    Genitourinary: Negative.     Skin:         Blisters on feet and toes and peeling since last chemo. Using vaseline   Neurological:  Positive for extremity weakness (achy in arms and legs. \"tension\"), light-headedness (intermittent), seizures (focal seizure this past April. Now on Keppra) and speech difficulty (\"difficulty finding the right words\").   Hematological:  Bruises/bleeds easily.   Psychiatric/Behavioral:  Positive for depression and sleep disturbance. The patient is nervous/anxious.         "

## 2024-12-30 ENCOUNTER — TELEPHONE (OUTPATIENT)
Dept: HEMATOLOGY/ONCOLOGY | Facility: CLINIC | Age: 69
End: 2024-12-30
Payer: COMMERCIAL

## 2024-12-30 DIAGNOSIS — G57.93 NEUROPATHY INVOLVING BOTH LOWER EXTREMITIES: Primary | ICD-10-CM

## 2024-12-30 DIAGNOSIS — M54.41 ACUTE BILATERAL LOW BACK PAIN WITH BILATERAL SCIATICA: ICD-10-CM

## 2024-12-30 DIAGNOSIS — M54.42 ACUTE BILATERAL LOW BACK PAIN WITH BILATERAL SCIATICA: ICD-10-CM

## 2024-12-30 NOTE — TELEPHONE ENCOUNTER
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  Wants to talk to someone.

## 2024-12-30 NOTE — TELEPHONE ENCOUNTER
Spoke with the patient. Provided update from Dr. Treviño. Pt verbalized understanding and is aware we will message scheduling team to call her to schedule CT. Pt had no further questions or concerns at this time.

## 2024-12-30 NOTE — TELEPHONE ENCOUNTER
"Spoke with the patient. States it feels like \"I nathaniel hit a wall.\" States she is tired & hasn't fully recovered from her last treatment. States she is having a \"burning feeling\" in her bilat legs from her buttocks all the way down her bilat legs. States the burning is there \"all the time.\" Denies any leg swelling, redness or changes to her legs. States it hurts \"a little\" to walk all the time. States pain is 5/10- aching/burning feeling. Denies any loss of balance or troubles walking. States bilat leg pain started on 12.25. States the pain is getting progressively worse and not \"easing up anymore.\" Explained I would update Dr. Treviño and then call her back once I receive a response.   "

## 2024-12-31 ENCOUNTER — HOSPITAL ENCOUNTER (OUTPATIENT)
Dept: RADIOLOGY | Facility: HOSPITAL | Age: 69
Discharge: HOME | End: 2024-12-31
Payer: COMMERCIAL

## 2024-12-31 ENCOUNTER — APPOINTMENT (OUTPATIENT)
Dept: RADIOLOGY | Facility: HOSPITAL | Age: 69
End: 2024-12-31
Payer: COMMERCIAL

## 2024-12-31 DIAGNOSIS — M54.42 ACUTE BILATERAL LOW BACK PAIN WITH BILATERAL SCIATICA: ICD-10-CM

## 2024-12-31 DIAGNOSIS — G57.93 NEUROPATHY INVOLVING BOTH LOWER EXTREMITIES: ICD-10-CM

## 2024-12-31 DIAGNOSIS — M54.41 ACUTE BILATERAL LOW BACK PAIN WITH BILATERAL SCIATICA: ICD-10-CM

## 2024-12-31 PROCEDURE — 72132 CT LUMBAR SPINE W/DYE: CPT

## 2024-12-31 PROCEDURE — 2550000001 HC RX 255 CONTRASTS: Performed by: INTERNAL MEDICINE

## 2024-12-31 RX ADMIN — IOHEXOL 75 ML: 350 INJECTION, SOLUTION INTRAVENOUS at 08:44

## 2024-12-31 NOTE — TELEPHONE ENCOUNTER
"I spoke with Fadia about her CT. Per Dr. Treviño \" No evidence of fracture or anything to explain her symptoms. I would like her to continue to monitor her symptoms and if they worsen or do not get better I would like her to call us so we can place a referral to PT or speciality service depending on symptoms.\" I told Fadia all information. She was very appreciative and all questions were answered. Fadia knows to call us if symptoms do not improve. She was appreciative.   "

## 2025-01-03 ENCOUNTER — APPOINTMENT (OUTPATIENT)
Dept: NEUROLOGY | Facility: CLINIC | Age: 70
End: 2025-01-03
Payer: COMMERCIAL

## 2025-01-03 ENCOUNTER — TELEMEDICINE CLINICAL SUPPORT (OUTPATIENT)
Facility: CLINIC | Age: 70
End: 2025-01-03
Payer: MEDICARE

## 2025-01-03 VITALS
HEART RATE: 67 BPM | SYSTOLIC BLOOD PRESSURE: 120 MMHG | HEIGHT: 65 IN | TEMPERATURE: 97.5 F | BODY MASS INDEX: 17.56 KG/M2 | WEIGHT: 105.4 LBS | DIASTOLIC BLOOD PRESSURE: 62 MMHG

## 2025-01-03 DIAGNOSIS — Z71.83 ENCOUNTER FOR NONPROCREATIVE GENETIC COUNSELING AND TESTING: ICD-10-CM

## 2025-01-03 DIAGNOSIS — Z85.3 HISTORY OF BREAST CANCER: ICD-10-CM

## 2025-01-03 DIAGNOSIS — I61.1 NONTRAUMATIC CORTICAL HEMORRHAGE OF LEFT CEREBRAL HEMISPHERE (MULTI): ICD-10-CM

## 2025-01-03 DIAGNOSIS — Z13.71 ENCOUNTER FOR NONPROCREATIVE GENETIC COUNSELING AND TESTING: ICD-10-CM

## 2025-01-03 DIAGNOSIS — R47.01 APHASIA: ICD-10-CM

## 2025-01-03 DIAGNOSIS — C50.419 MALIGNANT NEOPLASM OF UPPER-OUTER QUADRANT OF BREAST IN FEMALE, ESTROGEN RECEPTOR POSITIVE, UNSPECIFIED LATERALITY: ICD-10-CM

## 2025-01-03 DIAGNOSIS — I63.89 CEREBROVASCULAR ACCIDENT (CVA) DUE TO OTHER MECHANISM: Primary | ICD-10-CM

## 2025-01-03 DIAGNOSIS — I69.320 APHASIA AS LATE EFFECT OF CEREBROVASCULAR ACCIDENT (CVA): ICD-10-CM

## 2025-01-03 DIAGNOSIS — Z80.3 FAMILY HISTORY OF BREAST CANCER: ICD-10-CM

## 2025-01-03 DIAGNOSIS — G47.33 OSA (OBSTRUCTIVE SLEEP APNEA): ICD-10-CM

## 2025-01-03 DIAGNOSIS — Z17.0 MALIGNANT NEOPLASM OF UPPER-OUTER QUADRANT OF BREAST IN FEMALE, ESTROGEN RECEPTOR POSITIVE, UNSPECIFIED LATERALITY: ICD-10-CM

## 2025-01-03 PROCEDURE — 1159F MED LIST DOCD IN RCRD: CPT | Performed by: STUDENT IN AN ORGANIZED HEALTH CARE EDUCATION/TRAINING PROGRAM

## 2025-01-03 PROCEDURE — 3074F SYST BP LT 130 MM HG: CPT | Performed by: STUDENT IN AN ORGANIZED HEALTH CARE EDUCATION/TRAINING PROGRAM

## 2025-01-03 PROCEDURE — 99214 OFFICE O/P EST MOD 30 MIN: CPT | Performed by: STUDENT IN AN ORGANIZED HEALTH CARE EDUCATION/TRAINING PROGRAM

## 2025-01-03 PROCEDURE — 3078F DIAST BP <80 MM HG: CPT | Performed by: STUDENT IN AN ORGANIZED HEALTH CARE EDUCATION/TRAINING PROGRAM

## 2025-01-03 PROCEDURE — 3008F BODY MASS INDEX DOCD: CPT | Performed by: STUDENT IN AN ORGANIZED HEALTH CARE EDUCATION/TRAINING PROGRAM

## 2025-01-03 PROCEDURE — 1123F ACP DISCUSS/DSCN MKR DOCD: CPT | Performed by: STUDENT IN AN ORGANIZED HEALTH CARE EDUCATION/TRAINING PROGRAM

## 2025-01-03 PROCEDURE — 1160F RVW MEDS BY RX/DR IN RCRD: CPT | Performed by: STUDENT IN AN ORGANIZED HEALTH CARE EDUCATION/TRAINING PROGRAM

## 2025-01-03 PROCEDURE — GENMD PR GENETICS VISIT (MEDICAID/MEDICARE): Performed by: GENETIC COUNSELOR, MS

## 2025-01-03 PROCEDURE — G2211 COMPLEX E/M VISIT ADD ON: HCPCS | Performed by: STUDENT IN AN ORGANIZED HEALTH CARE EDUCATION/TRAINING PROGRAM

## 2025-01-03 ASSESSMENT — LIFESTYLE VARIABLES
HOW MANY STANDARD DRINKS CONTAINING ALCOHOL DO YOU HAVE ON A TYPICAL DAY: PATIENT DOES NOT DRINK
SKIP TO QUESTIONS 9-10: 1
HOW OFTEN DO YOU HAVE A DRINK CONTAINING ALCOHOL: NEVER
AUDIT-C TOTAL SCORE: 0
HOW OFTEN DO YOU HAVE SIX OR MORE DRINKS ON ONE OCCASION: NEVER

## 2025-01-03 ASSESSMENT — PATIENT HEALTH QUESTIONNAIRE - PHQ9
2. FEELING DOWN, DEPRESSED OR HOPELESS: NOT AT ALL
SUM OF ALL RESPONSES TO PHQ9 QUESTIONS 1 & 2: 0
1. LITTLE INTEREST OR PLEASURE IN DOING THINGS: NOT AT ALL

## 2025-01-03 NOTE — PROGRESS NOTES
[facility]   Referring: No ref. provider found  PCP: Michelle Martinez DO  Chief Complaint   Patient presents with    Subdural Hematoma       Subjective   Fadia Bolden is a 69 y.o. year old female presenting for visit for follow-up .     She was last seen 7/31/2024.     She states she is doing well. She is scheduled for adjuvant RT at the end of the month. She just finished chemotherapy with Dr. Treviño, and will not require any further treatments. She is doing home exercises now. She is tolerating Keppra well, and no breakthrough seizures since her initial episode.     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, estrogen receptor positive, unspecified laterality    Cerebrovascular accident (CVA) (Multi)    Aphasia    Anisocoria    Cerebral edema    Impaired gait and mobility    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)    Right sided weakness    Mixed hyperlipidemia    Primary hypertension    Coordination abnormal    SDH (subdural hematoma) (Multi)    Acquired skull defect    Gastroesophageal reflux disease    Vision loss    Apraxia following cerebrovascular accident    Anxiety    Hemianopsia    Seizure-like activity (Multi)    Malignant neoplasm of upper-outer quadrant of right breast in female, estrogen receptor negative       Objective   Neurological Exam  Mental Status  Alert. Oriented to person, place, and time.    Cranial Nerves  CN III, IV, VI: Extraocular movements intact  bilaterally.    Physical Exam  Constitutional:       Appearance: Normal appearance.   HENT:      Head: Normocephalic and atraumatic.   Eyes:      Extraocular Movements: Extraocular movements intact.   Cardiovascular:      Rate and Rhythm: Normal rate.   Pulmonary:      Effort: Pulmonary effort is normal.   Abdominal:      General: Abdomen is flat.   Musculoskeletal:         General: No swelling. Normal range of motion.   Skin:     General: Skin is warm and dry.   Neurological:      General: No focal deficit present.      Mental Status: She is alert and oriented to person, place, and time. Mental status is at baseline.      Cranial Nerves: No cranial nerve deficit.   Psychiatric:         Mood and Affect: Mood normal.         Behavior: Behavior normal.         Assessment/Plan   Problem List Items Addressed This Visit    None    #Hx of ICH, L parietoccipital lobe  #Hx of Seizure Activity  -Continue ASA 81mg daily  -Continue Keppra 500mg BID    #DAIJA  -Continue using CPAP  -Follows with Sleep Medicine    Seen and discussed with Dr. Johnathan Wood DO  PGY-3 Internal Medicine

## 2025-01-03 NOTE — PATIENT INSTRUCTIONS
PLAN:  Ms. Bolden elected to undergo genetic testing via [facility]'s 13-gene BRCAPlus panel and 39-gene CancerNext + RNA panel.  Verbal consent for testing was given.   Gil will send a DNA/RNA blood kit to your house.  Ms. Bolden was instructed to bring the test kit to a  outpatient blood draw lab to have her blood drawn for testing (using the test tubes provided in the kit).   After your blood is drawn, the  lab will send your sample out to [facility] for analysis.  Results of the 13-gene BRCAPlus panel are typically available within 7-12 days and the results of the 39-gene CancerNext + RNA panel are typically available in ~3 weeks.  I will call with the results of the testing.  Gil's billing number was given to Ms. Bolden and her  and they were encouraged to call to discuss their billing questions.  We remain available to Ms. Bolden and her family members at [phone] if any questions arise regarding information discussed at today's visit.    Ginny Heath, [facility]  Certified Genetic Counselor  [facility]  [phone]    A virtual (video and audio) visit between the patient (at the originating site) and provider (at the distant site) was utilized to provide this telehealth service. Verbal consent was requested and obtained from Ms. Bolden on this date, January 3, 2025 for a telehealth visit.

## 2025-01-09 ENCOUNTER — LAB (OUTPATIENT)
Dept: LAB | Facility: LAB | Age: 70
End: 2025-01-09
Payer: MEDICARE

## 2025-01-09 DIAGNOSIS — Z71.83 ENCOUNTER FOR NONPROCREATIVE GENETIC COUNSELING AND TESTING: ICD-10-CM

## 2025-01-09 DIAGNOSIS — Z13.71 ENCOUNTER FOR NONPROCREATIVE GENETIC COUNSELING AND TESTING: ICD-10-CM

## 2025-01-09 DIAGNOSIS — Z80.3 FAMILY HISTORY OF BREAST CANCER: ICD-10-CM

## 2025-01-09 DIAGNOSIS — C50.419 MALIGNANT NEOPLASM OF UPPER-OUTER QUADRANT OF BREAST IN FEMALE, ESTROGEN RECEPTOR POSITIVE, UNSPECIFIED LATERALITY: ICD-10-CM

## 2025-01-09 DIAGNOSIS — Z17.0 MALIGNANT NEOPLASM OF UPPER-OUTER QUADRANT OF BREAST IN FEMALE, ESTROGEN RECEPTOR POSITIVE, UNSPECIFIED LATERALITY: ICD-10-CM

## 2025-01-09 DIAGNOSIS — Z85.3 HISTORY OF BREAST CANCER: ICD-10-CM

## 2025-01-09 PROCEDURE — 9990000009 MISCELLANEOUS GENETICS TEST

## 2025-01-10 NOTE — ADDENDUM NOTE
Encounter addended by: Sanjuana Nascimento MD on: 1/10/2025 2:40 PM   Actions taken: Clinical Note Signed

## 2025-01-14 ENCOUNTER — HOSPITAL ENCOUNTER (OUTPATIENT)
Dept: RADIATION ONCOLOGY | Facility: CLINIC | Age: 70
Setting detail: RADIATION/ONCOLOGY SERIES
Discharge: HOME | End: 2025-01-14
Payer: MEDICARE

## 2025-01-14 ENCOUNTER — HOSPITAL ENCOUNTER (OUTPATIENT)
Dept: RADIOLOGY | Facility: EXTERNAL LOCATION | Age: 70
Discharge: HOME | End: 2025-01-14

## 2025-01-14 ENCOUNTER — TRANSCRIBE ORDERS (OUTPATIENT)
Dept: RADIATION ONCOLOGY | Facility: CLINIC | Age: 70
End: 2025-01-14

## 2025-01-14 ENCOUNTER — APPOINTMENT (OUTPATIENT)
Dept: PRIMARY CARE | Facility: CLINIC | Age: 70
End: 2025-01-14
Payer: COMMERCIAL

## 2025-01-14 VITALS
SYSTOLIC BLOOD PRESSURE: 108 MMHG | DIASTOLIC BLOOD PRESSURE: 62 MMHG | TEMPERATURE: 97.6 F | HEIGHT: 65 IN | WEIGHT: 106 LBS | OXYGEN SATURATION: 99 % | RESPIRATION RATE: 16 BRPM | BODY MASS INDEX: 17.66 KG/M2 | HEART RATE: 68 BPM

## 2025-01-14 DIAGNOSIS — H00.019 HORDEOLUM EXTERNUM, UNSPECIFIED LATERALITY: ICD-10-CM

## 2025-01-14 DIAGNOSIS — R56.9 SEIZURE-LIKE ACTIVITY (MULTI): ICD-10-CM

## 2025-01-14 DIAGNOSIS — I69.351 HEMIPLEGIA AND HEMIPARESIS FOLLOWING CEREBRAL INFARCTION AFFECTING RIGHT DOMINANT SIDE (MULTI): Primary | ICD-10-CM

## 2025-01-14 DIAGNOSIS — R60.0 BILATERAL LOWER EXTREMITY EDEMA: ICD-10-CM

## 2025-01-14 DIAGNOSIS — C50.411 CARCINOMA OF UPPER-OUTER QUADRANT OF FEMALE BREAST, RIGHT: ICD-10-CM

## 2025-01-14 DIAGNOSIS — Z17.0 MALIGNANT NEOPLASM OF OVERLAPPING SITES OF RIGHT BREAST IN FEMALE, ESTROGEN RECEPTOR POSITIVE: ICD-10-CM

## 2025-01-14 DIAGNOSIS — C50.811 MALIGNANT NEOPLASM OF OVERLAPPING SITES OF RIGHT BREAST IN FEMALE, ESTROGEN RECEPTOR POSITIVE: ICD-10-CM

## 2025-01-14 DIAGNOSIS — E78.2 MIXED HYPERLIPIDEMIA: ICD-10-CM

## 2025-01-14 DIAGNOSIS — S06.9X9A UNSPECIFIED INTRACRANIAL INJURY WITH LOSS OF CONSCIOUSNESS OF UNSPECIFIED DURATION, INITIAL ENCOUNTER (MULTI): ICD-10-CM

## 2025-01-14 DIAGNOSIS — T45.1X5A CHEMOTHERAPY-INDUCED FATIGUE: ICD-10-CM

## 2025-01-14 DIAGNOSIS — S90.129D: ICD-10-CM

## 2025-01-14 DIAGNOSIS — I10 PRIMARY HYPERTENSION: ICD-10-CM

## 2025-01-14 DIAGNOSIS — I48.3 TYPICAL ATRIAL FLUTTER (MULTI): ICD-10-CM

## 2025-01-14 DIAGNOSIS — R53.83 CHEMOTHERAPY-INDUCED FATIGUE: ICD-10-CM

## 2025-01-14 DIAGNOSIS — C50.411 CARCINOMA OF UPPER-OUTER QUADRANT OF FEMALE BREAST, RIGHT: Primary | ICD-10-CM

## 2025-01-14 PROCEDURE — 77334 RADIATION TREATMENT AID(S): CPT | Performed by: RADIOLOGY

## 2025-01-14 PROCEDURE — 3078F DIAST BP <80 MM HG: CPT | Performed by: INTERNAL MEDICINE

## 2025-01-14 PROCEDURE — 3008F BODY MASS INDEX DOCD: CPT | Performed by: INTERNAL MEDICINE

## 2025-01-14 PROCEDURE — 1123F ACP DISCUSS/DSCN MKR DOCD: CPT | Performed by: INTERNAL MEDICINE

## 2025-01-14 PROCEDURE — G2211 COMPLEX E/M VISIT ADD ON: HCPCS | Performed by: INTERNAL MEDICINE

## 2025-01-14 PROCEDURE — 3074F SYST BP LT 130 MM HG: CPT | Performed by: INTERNAL MEDICINE

## 2025-01-14 PROCEDURE — 99213 OFFICE O/P EST LOW 20 MIN: CPT | Performed by: RADIOLOGY

## 2025-01-14 PROCEDURE — 77290 THER RAD SIMULAJ FIELD CPLX: CPT | Performed by: RADIOLOGY

## 2025-01-14 PROCEDURE — 1159F MED LIST DOCD IN RCRD: CPT | Performed by: INTERNAL MEDICINE

## 2025-01-14 PROCEDURE — 1160F RVW MEDS BY RX/DR IN RCRD: CPT | Performed by: INTERNAL MEDICINE

## 2025-01-14 PROCEDURE — 99214 OFFICE O/P EST MOD 30 MIN: CPT | Performed by: INTERNAL MEDICINE

## 2025-01-14 RX ORDER — LISINOPRIL 20 MG/1
20 TABLET ORAL DAILY
Qty: 90 TABLET | Refills: 3 | Status: SHIPPED | OUTPATIENT
Start: 2025-01-14 | End: 2026-01-14

## 2025-01-14 RX ORDER — CICLOPIROX 80 MG/ML
SOLUTION TOPICAL NIGHTLY
Qty: 6.6 ML | Refills: 11 | Status: SHIPPED | OUTPATIENT
Start: 2025-01-14

## 2025-01-14 ASSESSMENT — PATIENT HEALTH QUESTIONNAIRE - PHQ9
SUM OF ALL RESPONSES TO PHQ9 QUESTIONS 1 AND 2: 0
2. FEELING DOWN, DEPRESSED OR HOPELESS: NOT AT ALL
1. LITTLE INTEREST OR PLEASURE IN DOING THINGS: NOT AT ALL

## 2025-01-14 NOTE — PROGRESS NOTES
"Subjective   Fadia Bolden is a 69 y.o. female who presents for 3 month Hypertension follow up.    HPI   Pt c/o increase in BLE edema over past week.  Noted after 3rd chemo infusion.  Denies chest pain, sob, headaches, vision changes, weight changes.    Bumped feet.  C/o toenail changes to both great toes.    Reports generalized aching pain after last chemo infusion.  Lasted approx 3 weeks.    C/o stye to L upper eyelid x2 months.  States area is improving.  Tx: Warm compresses TID PRN.    Review of Systems   Constitutional:  Negative for fatigue and fever.   Respiratory:  Negative for cough and shortness of breath.    Cardiovascular:  Negative for chest pain and leg swelling.   All other systems reviewed and are negative.      Health Maintenance Due   Topic Date Due    Bone Density Scan  Never done    COVID-19 Vaccine (1) Never done    Hepatitis C Screening  Never done    Pneumococcal Vaccine (1 of 2 - PCV) Never done    Zoster Vaccines (1 of 2) Never done    Hepatitis B Vaccines (2 of 3 - 19+ 3-dose series) 08/18/2005    RSV High Risk: (Elderly (60+) or Pregnant Population) (1 - Risk 60-74 years 1-dose series) Never done    Influenza Vaccine (1) Never done       Objective   /62   Pulse 68   Temp 36.4 °C (97.6 °F)   Resp 16   Ht 1.638 m (5' 4.5\")   Wt 48.1 kg (106 lb)   SpO2 99%   BMI 17.91 kg/m²     Physical Exam  Vitals and nursing note reviewed.   Constitutional:       Appearance: Normal appearance.   HENT:      Head: Normocephalic.   Eyes:      Conjunctiva/sclera: Conjunctivae normal.      Pupils: Pupils are equal, round, and reactive to light.   Cardiovascular:      Rate and Rhythm: Normal rate and regular rhythm.      Pulses: Normal pulses.      Heart sounds: Normal heart sounds.   Pulmonary:      Effort: Pulmonary effort is normal.      Breath sounds: Normal breath sounds.   Musculoskeletal:         General: No swelling.      Cervical back: Neck supple.   Skin:     General: Skin is warm and dry. "   Neurological:      General: No focal deficit present.      Mental Status: She is oriented to person, place, and time.         Assessment/Plan   Problem List Items Addressed This Visit       Mixed hyperlipidemia    Primary hypertension    Relevant Medications    lisinopril 20 mg tablet    Seizure-like activity (Multi)    Hemiplegia and hemiparesis following cerebral infarction affecting right dominant side (Multi) - Primary    Typical atrial flutter (Multi)    Unspecified intracranial injury with loss of consciousness of unspecified duration, initial encounter (Multi)     Other Visit Diagnoses       Malignant neoplasm of overlapping sites of right breast in female, estrogen receptor positive        Hordeolum externum, unspecified laterality        Contusion of toe without damage to nail, unspecified laterality, unspecified toe, subsequent encounter        Relevant Medications    ciclopirox (Penlac) 8 % solution    Chemotherapy-induced fatigue        Bilateral lower extremity edema                Cont meds  Reviewed records  Warm compress  Penlac and then call will refer to podiatry  Has completed chemo  Monitor edema as it has resolved during the last round of chemo  Call if not improving  Stable based on symptoms and exam.  Continue established treatment plan and follow up at least yearly.

## 2025-01-14 NOTE — PROGRESS NOTES
Staff Physician: Sanjuana Nascimento MD  Referring Physician: Alma Treviño MD  Date of Service: 1/14/2025  Patient name: Fadia Bolden   MRN: [id number]    RADIATION ONCOLOGY FOLLOW-UP NOTE    IDENTIFYING DATA:  DIAGNOSIS:  Cancer Staging   Malignant neoplasm of upper-outer quadrant of right breast in female, estrogen receptor negative  Staging form: Breast, AJCC 8th Edition  - Clinical: Stage IB (cT1b, cN0, cM0, G2, ER-, TN-, HER2-) - Signed by Delisa Rascon DO on 7/23/2024  - Pathologic: Stage IB (pT1c, pN0, cM0, G2, ER-, TN-, HER2-) - Signed by Delisa Rascon DO on 9/12/2024      Problem List Items Addressed This Visit    None      IDENTIFICATION  Nafisa Bolden is a 69 year old woman with personal history of LEFT breast pT1bN0 ILC diagnosed in 2002 treated with BCS and RT 50 Gy + 14 Gy boost.  More recently she developed a RIGHT breast pT2N0 ILC [id number] treated with partial mastectomy and SLN biopsy without adjuvant therapy (patient declined).  She now has new primary in her RIGHT breast cT1N0 ILC triple negative treated with right partial mastectomy and SLN Bx with pT1cN0 disease, 0/1 SLN involved, margins negative.  She completed TC chemotherapy x 4 cycles in 12/2024.   She presents for consent and radiation simulation.    ONCOLOGIC HISTORY:    2002: Diagnosed pT1bN0 ILC Left breast treated with breast conservation surgery and adjuvant left whole breast RT 50 Gy + 14 Gy boost.  She was 57 years old at the time.  She took Tamoxifen for a period of time.  We reached out to [facility].  Records have been destroyed.  Mosaic (radiation planning software) reflects her receiving 25 treatments radiation + 7 treatments tumor bed boost.     3/2021 Presented palpable masses in left breast UOQ     3/12/2021 BL Diagnostic Mammogram   Left breast: post treatment changes  Right breast: Persistent focal asymmetry at site of palpable lump right breast UOQ posteriorly.    Right breast US: @ 11 o'clock and @  10:30 oclock N+7 cm there are 2 irregular hypoechoic masses measuring 0.8 x 0.7 x 1.3 cm and 0.4 x 0.4 x 0.5 cm.  The masses are 0.7 cm apart.    Right axillary US: normal level 1 axillary nodes.     3/15/2021 Right breast US guided biopsy:   Right breast US guided biopsy of both lesions appreciated on mammogram at 11 o'clock and 11:30 o'clock N+ 7cm respectively.    Pathology: Invasive lobular carcinoma grade 2 ER 95% AZ neg Her2 Neg.      3/24/2021 BM Breast MRI:  Irregular mass and nonmass enhancement in superolateral right breast measuring 2.6 x 1.1 x 4.0 cm.  Abnormal enhancement extending to the subcutaneous level of superolateral breast without skin enhancement.       Additional areas of indeterminate focal non-mass enhancement in right breast as follows:  0.5 cm medial central right breast, 5.1 cm from index lesion  0.7 cm lateral inferior posterior depth  0.7 cm inferior central mid-poasterior depth     No axillary or IM lymphadenopathy.     3/31/2021 Second look Right breast US: No sonographic correlate for 3 areas of non-mass enhancement outlined above.     4/7/2021 MRI Guided biopsy:  Right breast central medial non-mass enhancement: apocrine cysts and metaplasia.  Right breast inferior central: apocrine cysts and metaplasia.     4/23/2021 Right magseed localized partial mastectomy, lymphatic mapping w/dual tracer, right axillary SLN Bx.  Specimen radiograph confirmed biopsy clip and Magseed.  Pathology: 2.2 cm ILC grade , no LVSI, no DCIS, margins negative, < 1mm posteriorly.  ER95%, Prnegative [id number].  [id number].  0/3 SLN involved, 0/2 intramammary nodes involved.     Declined adjuvant therapy referrals.     3/21/2023 BL Diagnostic Mammogram:  Mammogram: post operative scaring deep upper outer breasts bilaterally.  Focal asymmetry upper outer right breast posterior region.  US: Right breast 11 o'clock N+ 6 cm: 0.6 x 0.3 x 0.5 cm hypoechoic mass.  Biopsy versus short interval FU.  Patient elected  for short interval FU.        9/14/2023  Post-operative scarring bilateral breasts.    Right breast focal asymmetry UOQ posterior depth.  Right breast US @ 11 o'clock N+ 6 cm: 0.6 x 0.3 x 0.5 cm irregular hypoechoic mass.  Biopsy recommended, patient elected for short interval re-imaging.     3/25/2024: BL Diagnostic Mammogram:  Right breast: stable focal asymmetry superior right breast.  Post lumpectomy scarring and clips in superolateral right breast posterior depth.    Right breast US: @ 11 o'clock N+ 6cm there is a 0.7 x 0.4 x 0.7 cm hypoechoic mass (previously 0.6 x 0.3 x 0.5 cm)  BIRADS-4     6/24/2024 Repeat Right Breast US:  @ 11 o'clock N+6 cm there was slight interval increase in size of previously seen hypoechoic nodule measuring 0.9 x 0.5 x 0.9 cm     7/15/2024 Right breast US guided biopsy @ 11 o'clock N+ 6 cm: ILC grade 2 with histocytoid features.  Er-Pr-Her2 1+IHC neg     8/28/2024 Right partial mastectomy and SLN Bx:  1.7 cm ILC grade 2, invasion of dermis without skin ulceration, no LVSI.  Margins negative > 2 mm. No DCIS.   0/1 SLN involved.  pT1cN0     10/15/2024-12/17/2024  TC x 4 cycles     INTERVAL HISTORY:  No issues since last visit.  She did submit sample for germline genetic testing, results pending.  She is here for consent and simulation.        PAST MEDICAL HISTORY:  Past Medical History:   Diagnosis Date    Breast cancer (Multi)     bilateral    Cerebral vascular accident (Multi)     HTN (hypertension)     Hypothyroidism     DAIJA (obstructive sleep apnea)     Other chest pain 02/10/2021    Chest tightness    Peripheral vision loss, right     Personal history of irradiation     Personal history of malignant neoplasm of breast 06/27/2022    History of malignant neoplasm of breast    Stroke (Multi) 10/24/2023     PAST SURGICAL HISTORY:  Past Surgical History:   Procedure Laterality Date    BREAST LUMPECTOMY Right 08/28/2024    COLONOSCOPY      CRANIOTOMY      MR HEAD ANGIO W AND WO IV  CONTRAST  10/25/2023    MR HEAD ANGIO W AND WO IV CONTRAST 10/25/2023    MR HEAD ANGIO WO IV CONTRAST  01/05/2024    MR HEAD ANGIO WO IV CONTRAST 1/5/2024 USMAN [id number] MRI    OTHER SURGICAL HISTORY  11/22/2021    Tonsillectomy    OTHER SURGICAL HISTORY      x2 lumpectomy    UPPER GASTROINTESTINAL ENDOSCOPY       ALLERGIES:  Allergies   Allergen Reactions    Chlorhexidine Rash     HYPERSENSITIVITY TO CHLOROPREP SURGERY 8/28/24     MEDICATIONS:    Current Outpatient Medications:     ascorbate calcium-bioflavonoid (Amelia-C with Bioflavonoids) 500-200 mg tablet, Take 1 tablet by mouth once daily., Disp: , Rfl:     aspirin 81 mg chewable tablet, Chew 1 tablet (81 mg) once daily., Disp: , Rfl:     calcium carbonate-vitamin D3 500 mg-5 mcg (200 unit) tablet, Take 1 tablet by mouth once daily., Disp: , Rfl:     ciclopirox (Penlac) 8 % solution, Apply topically once daily at bedtime., Disp: 6.6 mL, Rfl: 11    levETIRAcetam (Keppra) 500 mg tablet, Take 1 tablet (500 mg) by mouth 2 times a day., Disp: 180 tablet, Rfl: 3    lisinopril 20 mg tablet, Take 1 tablet (20 mg) by mouth once daily., Disp: 90 tablet, Rfl: 3   SOCIAL HISTORY:  Social History     Tobacco Use    Smoking status: Never     Passive exposure: Never    Smokeless tobacco: Never   Substance Use Topics    Alcohol use: Not Currently     FAMILY HISTORY:  Family History   Problem Relation Name Age of Onset    Thyroid disease Mother      Parkinsonism Father      Parkinsonism Sister      Other (HTN) Sister      Breast cancer Father's Sister      Breast cancer Paternal Grandmother         REVIEW OF SYSTEMS:  Please refer to RN note.    PHYSICAL EXAMINATION:  There were no vitals taken for this visit.  Physical Exam   Well appearing NAD  Well healed incision UOQ right breast, no masses in bilateral breasts, skin is clear  + radiation tattoos present from left breast radiation 2002.    LABORATORY AND IMAGING DATA:  Imaging: All imaging was personally reviewed and  interpreted in clinic. Findings as per HPI and EMR.    No valid procedures specified.     Laboratory/Pathology:  All pertinent labs and pathology were personally reviewed and interpreted in clinic. Findings as per HPI and EMR.      IMPRESSION:  Nafisa Bolden is a 69 year old woman with personal history of LEFT breast pT1bN0 ILC diagnosed in 2002 treated with BCS and RT 50 Gy + 14 Gy boost.  More recently she developed a RIGHT breast pT2N0 ILC [id number] treated with partial mastectomy and SLN biopsy without adjuvant therapy (patient declined).  She now has new primary in her RIGHT breast cT1N0 ILC triple negative treated with right partial mastectomy and SLN Bx with pT1cN0 disease, 0/1 SLN involved, margins negative.  She completed TC chemotherapy x 4 cycles in 12/2024.   She presents for consent and radiation simulation.    Finally, we discussed the side effects and risk of RT, including the need for CT simulation, tattoo placement, and daily radiation (M-F) x 20 days. We discussed the side effects and risks of radiation, including fatigue, radiation dermatitis, and breast pain in the short-term; skin fibrosis, pigmentation, chest wall pain, damage to underlying lung, rib, or heart, and secondary malignancies in the long-term. All questions were answered to the best of our ability and informed consent was obtained.     PLAN:  - Simulation for RT today  - Anticipate right breast 42.56 Gy + 10 Gy in 4 fraction boost (triple negative)  - FU germline genetic testing, no impact of treatment plan.  - Note remote history of left whole breast RT, will take care to avoid midline with RT dose.  Mosaic records noted, plan/dicom were destroyed per [facility].  Consent obtained.    I spent over 15 minutes in face to face time with the patient over 50% of which was counseling and coordinating care.    Thank you for the opportunity to participate in the care of this kind patient.    Sanjuana Nascimento MD  Radiation Oncology

## 2025-01-14 NOTE — ADDENDUM NOTE
Encounter addended by: Sanjuana Nascimento MD on: 1/14/2025 11:04 AM   Actions taken: Clinical Note Signed

## 2025-01-15 ASSESSMENT — ENCOUNTER SYMPTOMS
FEVER: 0
SHORTNESS OF BREATH: 0
FATIGUE: 0
COUGH: 0

## 2025-01-16 ENCOUNTER — HOSPITAL ENCOUNTER (OUTPATIENT)
Dept: RADIATION ONCOLOGY | Facility: CLINIC | Age: 70
Setting detail: RADIATION/ONCOLOGY SERIES
Discharge: HOME | End: 2025-01-16
Payer: MEDICARE

## 2025-01-16 PROCEDURE — 77295 3-D RADIOTHERAPY PLAN: CPT | Performed by: RADIOLOGY

## 2025-01-16 PROCEDURE — 77334 RADIATION TREATMENT AID(S): CPT | Performed by: RADIOLOGY

## 2025-01-16 PROCEDURE — 77300 RADIATION THERAPY DOSE PLAN: CPT | Performed by: RADIOLOGY

## 2025-01-20 ENCOUNTER — TELEPHONE (OUTPATIENT)
Dept: GENETICS | Facility: CLINIC | Age: 70
End: 2025-01-20
Payer: MEDICARE

## 2025-01-21 ENCOUNTER — HOSPITAL ENCOUNTER (OUTPATIENT)
Dept: RADIATION ONCOLOGY | Facility: CLINIC | Age: 70
Setting detail: RADIATION/ONCOLOGY SERIES
Discharge: HOME | End: 2025-01-21
Payer: MEDICARE

## 2025-01-21 DIAGNOSIS — C50.411 MALIGNANT NEOPLASM OF UPPER-OUTER QUADRANT OF RIGHT FEMALE BREAST: ICD-10-CM

## 2025-01-21 LAB
RAD ONC MSQ ACTUAL FRACTIONS DELIVERED: 1
RAD ONC MSQ ACTUAL SESSION DELIVERED DOSE: 266 CGRAY
RAD ONC MSQ ACTUAL TOTAL DOSE: 266 CGRAY
RAD ONC MSQ ELAPSED DAYS: 0
RAD ONC MSQ LAST DATE: NORMAL
RAD ONC MSQ PRESCRIBED FRACTIONAL DOSE: 266 CGRAY
RAD ONC MSQ PRESCRIBED NUMBER OF FRACTIONS: 16
RAD ONC MSQ PRESCRIBED TECHNIQUE: NORMAL
RAD ONC MSQ PRESCRIBED TOTAL DOSE: 4256 CGRAY
RAD ONC MSQ PRESCRIPTION PATTERN COMMENT: NORMAL
RAD ONC MSQ START DATE: NORMAL
RAD ONC MSQ TREATMENT COURSE NUMBER: 1
RAD ONC MSQ TREATMENT SITE: NORMAL

## 2025-01-21 PROCEDURE — 77280 THER RAD SIMULAJ FIELD SMPL: CPT | Performed by: RADIOLOGY

## 2025-01-21 PROCEDURE — 77412 RADIATION TX DELIVERY LVL 3: CPT | Performed by: RADIOLOGY

## 2025-01-22 ENCOUNTER — TELEPHONE (OUTPATIENT)
Dept: GENETICS | Facility: CLINIC | Age: 70
End: 2025-01-22
Payer: MEDICARE

## 2025-01-22 ENCOUNTER — HOSPITAL ENCOUNTER (OUTPATIENT)
Dept: RADIATION ONCOLOGY | Facility: CLINIC | Age: 70
Setting detail: RADIATION/ONCOLOGY SERIES
Discharge: HOME | End: 2025-01-22
Payer: MEDICARE

## 2025-01-22 DIAGNOSIS — Z51.0 ENCOUNTER FOR ANTINEOPLASTIC RADIATION THERAPY: ICD-10-CM

## 2025-01-22 DIAGNOSIS — C50.411 MALIGNANT NEOPLASM OF UPPER-OUTER QUADRANT OF RIGHT FEMALE BREAST: ICD-10-CM

## 2025-01-22 LAB
RAD ONC MSQ ACTUAL FRACTIONS DELIVERED: 2
RAD ONC MSQ ACTUAL SESSION DELIVERED DOSE: 266 CGRAY
RAD ONC MSQ ACTUAL TOTAL DOSE: 532 CGRAY
RAD ONC MSQ ELAPSED DAYS: 1
RAD ONC MSQ LAST DATE: NORMAL
RAD ONC MSQ PRESCRIBED FRACTIONAL DOSE: 266 CGRAY
RAD ONC MSQ PRESCRIBED NUMBER OF FRACTIONS: 16
RAD ONC MSQ PRESCRIBED TECHNIQUE: NORMAL
RAD ONC MSQ PRESCRIBED TOTAL DOSE: 4256 CGRAY
RAD ONC MSQ PRESCRIPTION PATTERN COMMENT: NORMAL
RAD ONC MSQ START DATE: NORMAL
RAD ONC MSQ TREATMENT COURSE NUMBER: 1
RAD ONC MSQ TREATMENT SITE: NORMAL

## 2025-01-22 PROCEDURE — 77412 RADIATION TX DELIVERY LVL 3: CPT | Performed by: RADIOLOGY

## 2025-01-22 PROCEDURE — 77336 RADIATION PHYSICS CONSULT: CPT | Performed by: RADIOLOGY

## 2025-01-22 PROCEDURE — 77387 GUIDANCE FOR RADJ TX DLVR: CPT | Performed by: RADIOLOGY

## 2025-01-22 NOTE — TELEPHONE ENCOUNTER
I tried calling with her genetic test results, but her voicemail box is full    Ginny Heath MS, [facility] [place]  Certified Genetic Counselor  [place] [facility]  [phone]

## 2025-01-23 ENCOUNTER — RADIATION ONCOLOGY OTV (OUTPATIENT)
Dept: RADIATION ONCOLOGY | Facility: CLINIC | Age: 70
End: 2025-01-23
Payer: MEDICARE

## 2025-01-23 ENCOUNTER — HOSPITAL ENCOUNTER (OUTPATIENT)
Dept: RADIATION ONCOLOGY | Facility: CLINIC | Age: 70
Setting detail: RADIATION/ONCOLOGY SERIES
Discharge: HOME | End: 2025-01-23
Payer: MEDICARE

## 2025-01-23 VITALS — WEIGHT: 105.16 LBS | BODY MASS INDEX: 17.77 KG/M2 | TEMPERATURE: 96.4 F

## 2025-01-23 DIAGNOSIS — C50.411 MALIGNANT NEOPLASM OF UPPER-OUTER QUADRANT OF RIGHT FEMALE BREAST: ICD-10-CM

## 2025-01-23 DIAGNOSIS — Z51.0 ENCOUNTER FOR ANTINEOPLASTIC RADIATION THERAPY: ICD-10-CM

## 2025-01-23 LAB
RAD ONC MSQ ACTUAL FRACTIONS DELIVERED: 3
RAD ONC MSQ ACTUAL SESSION DELIVERED DOSE: 266 CGRAY
RAD ONC MSQ ACTUAL TOTAL DOSE: 798 CGRAY
RAD ONC MSQ ELAPSED DAYS: 2
RAD ONC MSQ LAST DATE: NORMAL
RAD ONC MSQ PRESCRIBED FRACTIONAL DOSE: 266 CGRAY
RAD ONC MSQ PRESCRIBED NUMBER OF FRACTIONS: 16
RAD ONC MSQ PRESCRIBED TECHNIQUE: NORMAL
RAD ONC MSQ PRESCRIBED TOTAL DOSE: 4256 CGRAY
RAD ONC MSQ PRESCRIPTION PATTERN COMMENT: NORMAL
RAD ONC MSQ START DATE: NORMAL
RAD ONC MSQ TREATMENT COURSE NUMBER: 1
RAD ONC MSQ TREATMENT SITE: NORMAL

## 2025-01-23 PROCEDURE — 77387 GUIDANCE FOR RADJ TX DLVR: CPT | Performed by: RADIOLOGY

## 2025-01-23 PROCEDURE — 77412 RADIATION TX DELIVERY LVL 3: CPT | Performed by: RADIOLOGY

## 2025-01-23 ASSESSMENT — PAIN SCALES - GENERAL: PAINLEVEL_OUTOF10: 0-NO PAIN

## 2025-01-23 NOTE — PROGRESS NOTES
Radiation Oncology On Treatment Visit    Patient Name:  Fadia Bolden  MRN:  [id number]  :  1955    Referring Provider: No ref. 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  A friend recommended mepitel dressings during treatment.  She is very concerned about getting a skin reaction.  She is interested in having a dressing applied.    Nursing Note: Patient on 3/16 today. Patient tolerating radiation treatment without complaint. No new changes this week. Denies fatigue, swelling, skin changes, pain or pruritus. Reviewed skin care recommendations with patient. Patient inquiring about mepitel or mepilex. Mepitel applied and educated as per patient request. Skin assessed and is clean, dry, and intact, no redness noted. Toxicity as noted below.            OBJECTIVE:   Vital Signs:  Temp 35.8 °C (96.4 °F) (Temporal)   Wt 47.7 kg (105 lb 2.6 oz)   BMI 17.77 kg/m²       Well appearing NAD  No skin change over breast    Other Pertinent Findings:     Toxicity Assessment           1/23/2025    09:00   Toxicity Assessment   Adverse Events Reviewed (WDL) Yes (Within Defined Limits)   Treatment Site Breast   Anorexia Grade 0   Dehydration Grade 0   Dermatitis Radiation Grade 0   Fatigue Grade 0   Nausea Grade 0   Vomiting Grade 0   Breast Infection Grade 0   Joint Range of Motion Decreased Grade 0   Breast Pain Grade 0   Edema Limbs Grade 0   Lymphedema Grade 0        Assessment / Plan:  Fadia is doing well, she just started treatment this week.  I reviewed with her that radiation dermatitis is typically mild to moderate with hypofractionated whole breast RT.  Studies demonstrated that Mepitel can reduce radiation dermatitis, but I have typically applied it in patients at high risk of desquamation (post mastectomy, skin bolus).  I discussed pros/cons of dressing and she wishes to proceed with it.  A mepitel dressing was applied in clinic today.  Continue RT per plan.

## 2025-01-24 ENCOUNTER — HOSPITAL ENCOUNTER (OUTPATIENT)
Dept: RADIATION ONCOLOGY | Facility: CLINIC | Age: 70
Setting detail: RADIATION/ONCOLOGY SERIES
Discharge: HOME | End: 2025-01-24
Payer: MEDICARE

## 2025-01-24 DIAGNOSIS — C50.411 MALIGNANT NEOPLASM OF UPPER-OUTER QUADRANT OF RIGHT FEMALE BREAST: ICD-10-CM

## 2025-01-24 DIAGNOSIS — Z51.0 ENCOUNTER FOR ANTINEOPLASTIC RADIATION THERAPY: ICD-10-CM

## 2025-01-24 LAB
RAD ONC MSQ ACTUAL FRACTIONS DELIVERED: 4
RAD ONC MSQ ACTUAL SESSION DELIVERED DOSE: 266 CGRAY
RAD ONC MSQ ACTUAL TOTAL DOSE: 1064 CGRAY
RAD ONC MSQ ELAPSED DAYS: 3
RAD ONC MSQ LAST DATE: NORMAL
RAD ONC MSQ PRESCRIBED FRACTIONAL DOSE: 266 CGRAY
RAD ONC MSQ PRESCRIBED NUMBER OF FRACTIONS: 16
RAD ONC MSQ PRESCRIBED TECHNIQUE: NORMAL
RAD ONC MSQ PRESCRIBED TOTAL DOSE: 4256 CGRAY
RAD ONC MSQ PRESCRIPTION PATTERN COMMENT: NORMAL
RAD ONC MSQ START DATE: NORMAL
RAD ONC MSQ TREATMENT COURSE NUMBER: 1
RAD ONC MSQ TREATMENT SITE: NORMAL

## 2025-01-24 PROCEDURE — 77412 RADIATION TX DELIVERY LVL 3: CPT | Performed by: RADIOLOGY

## 2025-01-24 PROCEDURE — 77387 GUIDANCE FOR RADJ TX DLVR: CPT | Performed by: RADIOLOGY

## 2025-01-24 NOTE — PROGRESS NOTES
Patient ID: Fadia Bolden is a 69 y.o. female.  Cancer Staging   Malignant neoplasm of upper-outer quadrant of right breast in female, estrogen receptor negative  Staging form: Breast, AJCC 8th Edition  - Clinical: Stage IB (cT1b, cN0, cM0, G2, ER-, OK-, HER2-) - Signed by Delisa Rascon DO on 7/23/2024  - Pathologic: Stage IB (pT1c, pN0, cM0, G2, ER-, OK-, HER2-) - Signed by Delisa Rascon DO on 9/12/2024  Oncology History   Malignant neoplasm of upper-outer quadrant of breast in female, estrogen receptor positive, unspecified laterality   12/12/2023 Initial Diagnosis    Malignant neoplasm of upper-outer quadrant of breast in female, estrogen receptor positive, unspecified laterality     10/15/2024 -  Chemotherapy    TC (DOCEtaxel / Cyclophosphamide), 21 Day Cycles     Malignant neoplasm of upper-outer quadrant of right breast in female, estrogen receptor negative   7/23/2024 Initial Diagnosis    Malignant neoplasm of upper-outer quadrant of right breast in female, estrogen receptor negative (Multi)     7/23/2024 Cancer Staged    Staging form: Breast, AJCC 8th Edition, Clinical: Stage IB (cT1b, cN0, cM0, G2, ER-, OK-, HER2-) - Signed by Delisa Racson DO on 7/23/2024 9/12/2024 Cancer Staged    Staging form: Breast, AJCC 8th Edition, Pathologic: Stage IB (pT1c, pN0, cM0, G2, ER-, OK-, HER2-) - Signed by Delisa Rascon DO on 9/12/2024       Subjective    HPI  Ms. Fadia Bolden is a 69-year-old female who presents for follow up for right breast early triple negative breast cancer.    Patient reports that she has been recovering slowly after chemotherapy and does have some mild neuropathy that is stable.  No significant fatigue.  No new fevers or chills.  No new nausea vomiting diarrhea.  Overall feels like she is getting back to herself.    Patient's past medical history, surgical history, family history and social history reviewed.    Objective      /58   Pulse 61   Temp 36.9 °C (98.4 °F)    Resp 18   Wt 48.1 kg (106 lb 2.4 oz)   SpO2 97%   BMI 17.94 kg/m²   Review of Systems:   Review of Systems:    Positive per HPI, otherwise negative.    Physical Exam  Gen: appears well in clinic, NAD  HEENT: atraumatic head, normocephalic, EOMI, conjunctiva normal  LUNG: no increased WOB, CTAB  CV: No JVD. RRR  GI: soft, NT, ND  LE: no LE edema  Skin: no obvious rashes or lesions on visible skin  Neuro: interactive, no focal deficits noted  Psych: normal mood and affect  Performance Status:  Symptomatic; fully ambulatory    Labs/Imaging/Pathology: personally reviewed reports and images in Epic electronic medical record system. Pertinent results as it related to the plan represented in below in assessment and plan.   Assessment/Plan   Right breast ILC, stage IB pT1c (17mm) N0M0 G2, triple negative     - dx 3/21/2023 after bilateral screening mammogram showed small possible mass in the upper outer quadrant and it was thought that biopsy of this area would be difficult due to location and short interval follow-up was recommended.    - Repeat imaging 6/13/2023 shows mass with stable category 4.    - Repeat ultrasound 9/14/2023 shows mass unchanged and biopsy recommended.   -  During this time shortly afterward she suffered a brain bleed following location in [place].    - Repeat imaging and ultrasound June 2024 shows increase in size of breast mass previously 0.7 then 0.9.  - biopsy 7/15/2024 which showed invasive lobular carcinoma grade 2 ER negative AK negative HER2 negative at 11:00 6 cm from the nipple.   - right breast partial mastectomy on 8/28/2024 which showed invasive lobular carcinoma grade 2, margins were negative and 0 of 1 lymph nodes involved.  Greatest dimension of largest invasive focus was 17 mm.  No LVI identified  - Did develop a rash and a skin reaction shortly after surgery, that is slowly improving  10/1/24: -today we discussed given trip negative histology and tumor size of 17mm would  recommend adjuvant chemotherpay  - given age and comorbidies discussed Taxotere plus cytoxan over anthracycline based chemotherapu  - discussed with CVA hx is not contraindication to proceed with treatment  - despite CVA she continues to have a good PS very independent with her ADLs and active just is limited form driving due to some vision loss  - discussed side effects of taxotere plus cytoxan and consent signed  - will plan to start C1 10/15/24 and plan for 4 cycles  - will plan to add benadryl, pepcid, dexamethasone premeds given she does have hx of increased hypersensitivy to drugs/creams but otherwise no major allergies     10/22/2024:   - Presents for toxicity check   - Referral to genetics is pending  - Tolerated treatment well with no complaints   - Labs unremarkable   - She is aware to continue hydrating well and eat a balanced diet   - She is already scheduled to RTC on 11/4/24 to see Dr. Treviño and then treatment the next day      11/4/24:  - Labs from today with CBC and CMP unremarkable.   - Patient had no significant nausea with no extra medications needed.   - She does reports some increased constipation that was manageable.   - No fevers or signs of infections.   - No dose adjustments for treatment tomorrow.  - Continue with current dose.  - Reviewed again the benefits of adjuvant chemo.  - She has some questions about about RT.  - RTC in 3 weeks for cycle 3.  - She would like to get treatment and see me in the same day to limit co pays.  - She will get blood work done at a closer to her home  lab.     11/26/2024:  - Presents for cycle 3 docetaxel + cyclophosphamide   - She is tolerating treatment well   - Labs meet parameters for treatment   - We will decrease benadryl to 25 mg today since she is now on cycle 3 due to feeling unsteady with the 50 mg dose   - Orders signed off to be scheduled for cycle 4 treatment in 3 weeks with Dr. Treviño, labs ahead of time again       12/17/24:  - No  contraindications to proceed with cycle 4 today.  - We discussed this will be her final cycle.   - She edson come back tomorrow for her Neulasta shot.  - labs reviewed and meets parameters  - Follow-up in 6 weeks with assess for toxicity  - I will reach out to radiation team for follow up with Dr Hanna for next steps with RT.   - RTC in 6 weeks.      1/28/25:  -No new side effects since completing chemotherapy  -Blood work today shows that her CBC is recovering, guardant reveal and Signatera also ordered  -No new complaints and we will continue surveillance  -Is also followed by Dr. Rascon in the breast team in March and we will see her 3 months post for exam    Reviewed ongoing medical problems and how they relate to her breast cancer, will continue long term monitoring.    RTC in June with Laron . This note has been transcribed using a medical scribe and there is a possibility of unintentional typing misprints.     Diagnoses and all orders for this visit:  Malignant neoplasm of upper-outer quadrant of breast in female, estrogen receptor positive, unspecified laterality  -     Clinic Appointment Request  -     Clinic Appointment Request; Future  -     CBC and Auto Differential; Future  -     Comprehensive metabolic panel; Future  -     signatera; Other-indicate in comment; unknown - Miscellaneous Test; Future  -     guardant reveal; Other-indicate in comment; unknown - Miscellaneous Test; Future         Alma Treviño MD  Hematology/Oncology  [facility]    Scribe Attestation  By signing my name below, IApril Scribe   attest that this documentation has been prepared under the direction and in the presence of Alma Treviño MD.     Time Spent  Prep time on day of patient encounter: 5 minutes  Time spent directly with patient, family or caregiver: 20 minutes  Additional Time Spent on Patient Care Activities: 5 minutes  Documentation Time: 5 minutes  Other Time Spent: 0  minutes  Total: 35 minutes

## 2025-01-27 ENCOUNTER — DOCUMENTATION (OUTPATIENT)
Dept: GENETICS | Facility: CLINIC | Age: 70
End: 2025-01-27
Payer: MEDICARE

## 2025-01-27 ENCOUNTER — HOSPITAL ENCOUNTER (OUTPATIENT)
Dept: RADIATION ONCOLOGY | Facility: CLINIC | Age: 70
Setting detail: RADIATION/ONCOLOGY SERIES
Discharge: HOME | End: 2025-01-27
Payer: MEDICARE

## 2025-01-27 DIAGNOSIS — C50.411 MALIGNANT NEOPLASM OF UPPER-OUTER QUADRANT OF RIGHT FEMALE BREAST: ICD-10-CM

## 2025-01-27 DIAGNOSIS — Z51.0 ENCOUNTER FOR ANTINEOPLASTIC RADIATION THERAPY: ICD-10-CM

## 2025-01-27 LAB
RAD ONC MSQ ACTUAL FRACTIONS DELIVERED: 5
RAD ONC MSQ ACTUAL SESSION DELIVERED DOSE: 266 CGRAY
RAD ONC MSQ ACTUAL TOTAL DOSE: 1330 CGRAY
RAD ONC MSQ ELAPSED DAYS: 6
RAD ONC MSQ LAST DATE: NORMAL
RAD ONC MSQ PRESCRIBED FRACTIONAL DOSE: 266 CGRAY
RAD ONC MSQ PRESCRIBED NUMBER OF FRACTIONS: 16
RAD ONC MSQ PRESCRIBED TECHNIQUE: NORMAL
RAD ONC MSQ PRESCRIBED TOTAL DOSE: 4256 CGRAY
RAD ONC MSQ PRESCRIPTION PATTERN COMMENT: NORMAL
RAD ONC MSQ START DATE: NORMAL
RAD ONC MSQ TREATMENT COURSE NUMBER: 1
RAD ONC MSQ TREATMENT SITE: NORMAL

## 2025-01-27 PROCEDURE — 77387 GUIDANCE FOR RADJ TX DLVR: CPT | Performed by: RADIOLOGY

## 2025-01-27 PROCEDURE — 77412 RADIATION TX DELIVERY LVL 3: CPT | Performed by: RADIOLOGY

## 2025-01-27 NOTE — PROGRESS NOTES
DISCUSSION:  Ms. Bolden is a 69 y.o. female with a history of left ILC breast cancer diagnosed at 47 and a right ILC breast cancer diagnosed at 66. She was recently diagnosed with a new primary triple negative right breast cancer. She also has a family history of breast cancer.   Ms. Bloden was initially seen virtually in the [facility] on 1/3/25 for counseling and coordination of testing.    Based on her personal and family history, the 13-gene BRCAPlus panel and 39-gene CancerNext + RNA panel from [facility] was recommended and ordered.  She returned my call today and we reviewed the results of this testing, and our discussion is summarized below.    Fadia's results were NEGATIVE, meaning that no disease causing mutations were identified.  A genetic cause for her personal and family history of cancer has not been identified.            Negative genetic testing results can be explained by several possibilities:  It is estimated that a small percentage of gene mutations are not identified by current testing technology.   She negative testing results make a hereditary cancer syndrome less likely, but they do not rule it out completely.  There could be other genes that may increase the risk for cancer for which there is no testing available at this time.   Your personal and family history of cancer is not due to an inherited risk factor, and instead is sporadic or due to chance.    While it is reassuring that no mutations were detected in the tested genes Ms. Bolden's female relatives are considered to have an increased risk to develop breast cancer over the general population, based on the family history alone.   They should speak to their healthcare providers about their breast cancer screening protocols, as they may be a candidate for annual breast MRIs, in addition to an annual mammogram and clinic breast exam.   For women with a lifetime risk of breast cancer estimated to be >20%, [facility]  Cancer Network (NCCN) guidelines include:   Breast awareness,   Clinical breast examination performed by a physician every 6-12 months.  Annual mammogram to begin 10 years prior to the youngest breast cancer diagnosis in the family, but not less than age 30.   The NCCN also suggests screening with breast MRI as an adjunct to mammogram in the high risk setting, with breast MRI screening beginning 10 years prior to the youngest breast cancer diagnosis in the family, but not less than age 25.  Recommendations and options for management should be discussed with managing physicians.     Genetic testing is not recommended for your children at this time unless there is a strong family history of cancer on their father's side.    Ms. Bolden should continue to follow her oncology care team's recommendations for treatment and cancer screenings.  In addition, it is important for you to follow all other age-related cancer screenings per your primary care providers' recommendations, such as Pap smears, colonoscopies, etc.    PLAN:  A copy of your genetic test report will be mailed to you and a copy was uploaded to your chart.  Our understanding of genetic contribution to cancer diagnoses is always evolving, so there may be additional testing recommended in the future.   She can contact us in ~5 years to determine if there have been any changes since our discussion today.  Please reach out to me with any questions.    Ginny Heath, [facility] [place]  Certified Genetic Counselor  [facility]  [phone]

## 2025-01-28 ENCOUNTER — LAB (OUTPATIENT)
Dept: LAB | Facility: CLINIC | Age: 70
End: 2025-01-28
Payer: MEDICARE

## 2025-01-28 ENCOUNTER — HOSPITAL ENCOUNTER (OUTPATIENT)
Dept: RADIATION ONCOLOGY | Facility: CLINIC | Age: 70
Setting detail: RADIATION/ONCOLOGY SERIES
Discharge: HOME | End: 2025-01-28
Payer: MEDICARE

## 2025-01-28 ENCOUNTER — RADIATION ONCOLOGY OTV (OUTPATIENT)
Dept: RADIATION ONCOLOGY | Facility: CLINIC | Age: 70
End: 2025-01-28
Payer: MEDICARE

## 2025-01-28 ENCOUNTER — OFFICE VISIT (OUTPATIENT)
Dept: HEMATOLOGY/ONCOLOGY | Facility: CLINIC | Age: 70
End: 2025-01-28
Payer: MEDICARE

## 2025-01-28 VITALS
OXYGEN SATURATION: 97 % | TEMPERATURE: 98.4 F | HEART RATE: 61 BPM | DIASTOLIC BLOOD PRESSURE: 58 MMHG | BODY MASS INDEX: 17.94 KG/M2 | WEIGHT: 106.15 LBS | RESPIRATION RATE: 18 BRPM | SYSTOLIC BLOOD PRESSURE: 118 MMHG

## 2025-01-28 VITALS — WEIGHT: 104.72 LBS | TEMPERATURE: 96.3 F | BODY MASS INDEX: 17.7 KG/M2

## 2025-01-28 DIAGNOSIS — Z51.0 ENCOUNTER FOR ANTINEOPLASTIC RADIATION THERAPY: ICD-10-CM

## 2025-01-28 DIAGNOSIS — C50.411 MALIGNANT NEOPLASM OF UPPER-OUTER QUADRANT OF RIGHT FEMALE BREAST: ICD-10-CM

## 2025-01-28 DIAGNOSIS — Z17.0 MALIGNANT NEOPLASM OF UPPER-OUTER QUADRANT OF BREAST IN FEMALE, ESTROGEN RECEPTOR POSITIVE, UNSPECIFIED LATERALITY: ICD-10-CM

## 2025-01-28 DIAGNOSIS — C50.419 MALIGNANT NEOPLASM OF UPPER-OUTER QUADRANT OF BREAST IN FEMALE, ESTROGEN RECEPTOR POSITIVE, UNSPECIFIED LATERALITY: ICD-10-CM

## 2025-01-28 LAB
ALBUMIN SERPL BCP-MCNC: 3.9 G/DL (ref 3.4–5)
ALP SERPL-CCNC: 59 U/L (ref 33–136)
ALT SERPL W P-5'-P-CCNC: 15 U/L (ref 7–45)
ANION GAP SERPL CALC-SCNC: 9 MMOL/L (ref 10–20)
AST SERPL W P-5'-P-CCNC: 13 U/L (ref 9–39)
BASOPHILS # BLD AUTO: 0.03 X10*3/UL (ref 0–0.1)
BASOPHILS NFR BLD AUTO: 0.7 %
BILIRUB SERPL-MCNC: 0.6 MG/DL (ref 0–1.2)
BUN SERPL-MCNC: 22 MG/DL (ref 6–23)
CALCIUM SERPL-MCNC: 8.9 MG/DL (ref 8.6–10.3)
CHLORIDE SERPL-SCNC: 104 MMOL/L (ref 98–107)
CO2 SERPL-SCNC: 33 MMOL/L (ref 21–32)
CREAT SERPL-MCNC: 0.77 MG/DL (ref 0.5–1.05)
EGFRCR SERPLBLD CKD-EPI 2021: 83 ML/MIN/1.73M*2
EOSINOPHIL # BLD AUTO: 0.25 X10*3/UL (ref 0–0.7)
EOSINOPHIL NFR BLD AUTO: 5.6 %
ERYTHROCYTE [DISTWIDTH] IN BLOOD BY AUTOMATED COUNT: 14.5 % (ref 11.5–14.5)
GLUCOSE SERPL-MCNC: 103 MG/DL (ref 74–99)
HCT VFR BLD AUTO: 34.5 % (ref 36–46)
HGB BLD-MCNC: 11.2 G/DL (ref 12–16)
IMM GRANULOCYTES # BLD AUTO: 0 X10*3/UL (ref 0–0.7)
IMM GRANULOCYTES NFR BLD AUTO: 0 % (ref 0–0.9)
LYMPHOCYTES # BLD AUTO: 0.64 X10*3/UL (ref 1.2–4.8)
LYMPHOCYTES NFR BLD AUTO: 14.3 %
MCH RBC QN AUTO: 32.4 PG (ref 26–34)
MCHC RBC AUTO-ENTMCNC: 32.5 G/DL (ref 32–36)
MCV RBC AUTO: 100 FL (ref 80–100)
MONOCYTES # BLD AUTO: 0.52 X10*3/UL (ref 0.1–1)
MONOCYTES NFR BLD AUTO: 11.7 %
NEUTROPHILS # BLD AUTO: 3.02 X10*3/UL (ref 1.2–7.7)
NEUTROPHILS NFR BLD AUTO: 67.7 %
PLATELET # BLD AUTO: 201 X10*3/UL (ref 150–450)
POTASSIUM SERPL-SCNC: 4.2 MMOL/L (ref 3.5–5.3)
PROT SERPL-MCNC: 6.5 G/DL (ref 6.4–8.2)
RAD ONC MSQ ACTUAL FRACTIONS DELIVERED: 6
RAD ONC MSQ ACTUAL SESSION DELIVERED DOSE: 266 CGRAY
RAD ONC MSQ ACTUAL TOTAL DOSE: 1596 CGRAY
RAD ONC MSQ ELAPSED DAYS: 7
RAD ONC MSQ LAST DATE: NORMAL
RAD ONC MSQ PRESCRIBED FRACTIONAL DOSE: 266 CGRAY
RAD ONC MSQ PRESCRIBED NUMBER OF FRACTIONS: 16
RAD ONC MSQ PRESCRIBED TECHNIQUE: NORMAL
RAD ONC MSQ PRESCRIBED TOTAL DOSE: 4256 CGRAY
RAD ONC MSQ PRESCRIPTION PATTERN COMMENT: NORMAL
RAD ONC MSQ START DATE: NORMAL
RAD ONC MSQ TREATMENT COURSE NUMBER: 1
RAD ONC MSQ TREATMENT SITE: NORMAL
RBC # BLD AUTO: 3.46 X10*6/UL (ref 4–5.2)
SODIUM SERPL-SCNC: 142 MMOL/L (ref 136–145)
WBC # BLD AUTO: 4.5 X10*3/UL (ref 4.4–11.3)

## 2025-01-28 PROCEDURE — 80053 COMPREHEN METABOLIC PANEL: CPT

## 2025-01-28 PROCEDURE — 99214 OFFICE O/P EST MOD 30 MIN: CPT | Performed by: INTERNAL MEDICINE

## 2025-01-28 PROCEDURE — 85025 COMPLETE CBC W/AUTO DIFF WBC: CPT

## 2025-01-28 PROCEDURE — 3078F DIAST BP <80 MM HG: CPT | Performed by: INTERNAL MEDICINE

## 2025-01-28 PROCEDURE — 77412 RADIATION TX DELIVERY LVL 3: CPT | Performed by: RADIOLOGY

## 2025-01-28 PROCEDURE — 86300 IMMUNOASSAY TUMOR CA 15-3: CPT

## 2025-01-28 PROCEDURE — 36415 COLL VENOUS BLD VENIPUNCTURE: CPT

## 2025-01-28 PROCEDURE — 1126F AMNT PAIN NOTED NONE PRSNT: CPT | Performed by: INTERNAL MEDICINE

## 2025-01-28 PROCEDURE — 1159F MED LIST DOCD IN RCRD: CPT | Performed by: INTERNAL MEDICINE

## 2025-01-28 PROCEDURE — 1123F ACP DISCUSS/DSCN MKR DOCD: CPT | Performed by: INTERNAL MEDICINE

## 2025-01-28 PROCEDURE — 3074F SYST BP LT 130 MM HG: CPT | Performed by: INTERNAL MEDICINE

## 2025-01-28 PROCEDURE — G2211 COMPLEX E/M VISIT ADD ON: HCPCS | Performed by: INTERNAL MEDICINE

## 2025-01-28 PROCEDURE — 77387 GUIDANCE FOR RADJ TX DLVR: CPT | Performed by: RADIOLOGY

## 2025-01-28 ASSESSMENT — PAIN SCALES - GENERAL
PAINLEVEL_OUTOF10: 0-NO PAIN
PAINLEVEL_OUTOF10: 0-NO PAIN

## 2025-01-28 NOTE — PROGRESS NOTES
Radiation Oncology On Treatment Visit    Patient Name:  Fadia Bolden  MRN:  [id number]  :  1955    Referring Provider: No ref. provider found  Primary Care Provider: Michelle Martinez DO  Care Team: Patient Care Team:  Michelle Martinez DO as PCP - General  Michelle Martinez DO as PCP - [employer] PCP  Tammie Blevins MD as Surgeon (Neurosurgery)  Cyndi Bridges MD as Radiation Oncologist (Radiation Oncology)  Alma Treviño MD as Consulting Physician (Hematology and Oncology)    Date of Service: 2025     Diagnosis:   Specialty Problems          Radiation Oncology Problems    Malignant neoplasm of upper-outer quadrant of breast in female, estrogen receptor positive, unspecified laterality        Malignant neoplasm of upper-outer quadrant of right breast in female, estrogen receptor negative         Treatment Summary:  Radiation Therapy    Treatment Period Technique Fraction Dose Fractions Total Dose   Course 1 2025-2025  (days elapsed: 7)         R breast 2025-2025 3D 266 / 266 cGy  1596 / 4,256 cGy     SUBJECTIVE:   @15.96 Gy / 42.56 Gy + 10 Gy in 4 fraction boost planned.  Doing well, no skin concerns.  Has Mepitel dressing on.      Nursing note  Patient on  today. Patient tolerating radiation treatment without complaint. No new changes this week. Denies fatigue, swelling, skin changes, pain or pruritus. Reviewed skin care recommendations with patient. Toxicity as noted below.            OBJECTIVE:   Vital Signs:  Temp 35.7 °C (96.3 °F) (Temporal)   Wt 47.5 kg (104 lb 11.5 oz)   BMI 17.70 kg/m²     Well appearing nad  No skin changes over breast, dressing intact    Other Pertinent Findings:     Toxicity Assessment          2025    09:00 2025    10:00   Toxicity Assessment   Adverse Events Reviewed (WDL) Yes (Within Defined Limits) Yes (Within Defined Limits)   Treatment Site Breast Breast   Anorexia Grade 0 Grade 0   Dehydration  Grade 0 Grade 0   Dermatitis Radiation Grade 0 Grade 0       mepitel on   Fatigue Grade 0 Grade 0   Nausea Grade 0 Grade 0   Vomiting Grade 0 Grade 0   Breast Infection Grade 0 Grade 0   Joint Range of Motion Decreased Grade 0 Grade 0   Breast Pain Grade 0 Grade 0   Edema Limbs Grade 0 Grade 0   Lymphedema Grade 0 Grade 0        Assessment / Plan:  Tolerating RT well, continue per plan

## 2025-01-29 LAB
CANCER AG27-29 SERPL-ACNC: 30.5 U/ML (ref 0–38.6)
RAD ONC MSQ ACTUAL FRACTIONS DELIVERED: 7
RAD ONC MSQ ACTUAL SESSION DELIVERED DOSE: 266 CGRAY
RAD ONC MSQ ACTUAL TOTAL DOSE: 1862 CGRAY
RAD ONC MSQ ELAPSED DAYS: 8
RAD ONC MSQ LAST DATE: NORMAL
RAD ONC MSQ PRESCRIBED FRACTIONAL DOSE: 266 CGRAY
RAD ONC MSQ PRESCRIBED NUMBER OF FRACTIONS: 16
RAD ONC MSQ PRESCRIBED TECHNIQUE: NORMAL
RAD ONC MSQ PRESCRIBED TOTAL DOSE: 4256 CGRAY
RAD ONC MSQ PRESCRIPTION PATTERN COMMENT: NORMAL
RAD ONC MSQ START DATE: NORMAL
RAD ONC MSQ TREATMENT COURSE NUMBER: 1
RAD ONC MSQ TREATMENT SITE: NORMAL

## 2025-01-29 PROCEDURE — 77336 RADIATION PHYSICS CONSULT: CPT | Performed by: RADIOLOGY

## 2025-01-29 PROCEDURE — 77387 GUIDANCE FOR RADJ TX DLVR: CPT | Performed by: RADIOLOGY

## 2025-01-29 PROCEDURE — 77412 RADIATION TX DELIVERY LVL 3: CPT | Performed by: RADIOLOGY

## 2025-01-30 ENCOUNTER — HOSPITAL ENCOUNTER (OUTPATIENT)
Dept: RADIATION ONCOLOGY | Facility: CLINIC | Age: 70
Setting detail: RADIATION/ONCOLOGY SERIES
Discharge: HOME | End: 2025-01-30
Payer: MEDICARE

## 2025-01-30 DIAGNOSIS — C50.411 MALIGNANT NEOPLASM OF UPPER-OUTER QUADRANT OF RIGHT FEMALE BREAST: ICD-10-CM

## 2025-01-30 DIAGNOSIS — Z51.0 ENCOUNTER FOR ANTINEOPLASTIC RADIATION THERAPY: ICD-10-CM

## 2025-01-30 LAB
RAD ONC MSQ ACTUAL FRACTIONS DELIVERED: 8
RAD ONC MSQ ACTUAL SESSION DELIVERED DOSE: 266 CGRAY
RAD ONC MSQ ACTUAL TOTAL DOSE: 2128 CGRAY
RAD ONC MSQ ELAPSED DAYS: 9
RAD ONC MSQ LAST DATE: NORMAL
RAD ONC MSQ PRESCRIBED FRACTIONAL DOSE: 266 CGRAY
RAD ONC MSQ PRESCRIBED NUMBER OF FRACTIONS: 16
RAD ONC MSQ PRESCRIBED TECHNIQUE: NORMAL
RAD ONC MSQ PRESCRIBED TOTAL DOSE: 4256 CGRAY
RAD ONC MSQ PRESCRIPTION PATTERN COMMENT: NORMAL
RAD ONC MSQ START DATE: NORMAL
RAD ONC MSQ TREATMENT COURSE NUMBER: 1
RAD ONC MSQ TREATMENT SITE: NORMAL

## 2025-01-30 PROCEDURE — 77412 RADIATION TX DELIVERY LVL 3: CPT | Performed by: RADIOLOGY

## 2025-01-30 PROCEDURE — 77387 GUIDANCE FOR RADJ TX DLVR: CPT | Performed by: RADIOLOGY

## 2025-01-31 ENCOUNTER — HOSPITAL ENCOUNTER (OUTPATIENT)
Dept: RADIATION ONCOLOGY | Facility: CLINIC | Age: 70
Setting detail: RADIATION/ONCOLOGY SERIES
Discharge: HOME | End: 2025-01-31
Payer: MEDICARE

## 2025-01-31 DIAGNOSIS — C50.411 MALIGNANT NEOPLASM OF UPPER-OUTER QUADRANT OF RIGHT FEMALE BREAST: ICD-10-CM

## 2025-01-31 DIAGNOSIS — Z51.0 ENCOUNTER FOR ANTINEOPLASTIC RADIATION THERAPY: ICD-10-CM

## 2025-01-31 LAB
COMMENTS - MP RESULT TYPE: NORMAL
RAD ONC MSQ ACTUAL FRACTIONS DELIVERED: 9
RAD ONC MSQ ACTUAL SESSION DELIVERED DOSE: 266 CGRAY
RAD ONC MSQ ACTUAL TOTAL DOSE: 2394 CGRAY
RAD ONC MSQ ELAPSED DAYS: 10
RAD ONC MSQ LAST DATE: NORMAL
RAD ONC MSQ PRESCRIBED FRACTIONAL DOSE: 266 CGRAY
RAD ONC MSQ PRESCRIBED NUMBER OF FRACTIONS: 16
RAD ONC MSQ PRESCRIBED TECHNIQUE: NORMAL
RAD ONC MSQ PRESCRIBED TOTAL DOSE: 4256 CGRAY
RAD ONC MSQ PRESCRIPTION PATTERN COMMENT: NORMAL
RAD ONC MSQ START DATE: NORMAL
RAD ONC MSQ TREATMENT COURSE NUMBER: 1
RAD ONC MSQ TREATMENT SITE: NORMAL
SCAN RESULT: NORMAL

## 2025-01-31 PROCEDURE — 77387 GUIDANCE FOR RADJ TX DLVR: CPT | Performed by: RADIOLOGY

## 2025-01-31 PROCEDURE — 77412 RADIATION TX DELIVERY LVL 3: CPT | Performed by: RADIOLOGY

## 2025-02-03 ENCOUNTER — HOSPITAL ENCOUNTER (OUTPATIENT)
Dept: RADIATION ONCOLOGY | Facility: CLINIC | Age: 70
Setting detail: RADIATION/ONCOLOGY SERIES
Discharge: HOME | End: 2025-02-03
Payer: MEDICARE

## 2025-02-03 DIAGNOSIS — C50.411 MALIGNANT NEOPLASM OF UPPER-OUTER QUADRANT OF RIGHT FEMALE BREAST: ICD-10-CM

## 2025-02-03 DIAGNOSIS — Z51.0 ENCOUNTER FOR ANTINEOPLASTIC RADIATION THERAPY: ICD-10-CM

## 2025-02-03 LAB
RAD ONC MSQ ACTUAL FRACTIONS DELIVERED: 10
RAD ONC MSQ ACTUAL SESSION DELIVERED DOSE: 266 CGRAY
RAD ONC MSQ ACTUAL TOTAL DOSE: 2660 CGRAY
RAD ONC MSQ ELAPSED DAYS: 13
RAD ONC MSQ LAST DATE: NORMAL
RAD ONC MSQ PRESCRIBED FRACTIONAL DOSE: 266 CGRAY
RAD ONC MSQ PRESCRIBED NUMBER OF FRACTIONS: 16
RAD ONC MSQ PRESCRIBED TECHNIQUE: NORMAL
RAD ONC MSQ PRESCRIBED TOTAL DOSE: 4256 CGRAY
RAD ONC MSQ PRESCRIPTION PATTERN COMMENT: NORMAL
RAD ONC MSQ START DATE: NORMAL
RAD ONC MSQ TREATMENT COURSE NUMBER: 1
RAD ONC MSQ TREATMENT SITE: NORMAL

## 2025-02-03 PROCEDURE — 77387 GUIDANCE FOR RADJ TX DLVR: CPT | Performed by: RADIOLOGY

## 2025-02-03 PROCEDURE — 77412 RADIATION TX DELIVERY LVL 3: CPT | Performed by: RADIOLOGY

## 2025-02-04 ENCOUNTER — HOSPITAL ENCOUNTER (OUTPATIENT)
Dept: RADIATION ONCOLOGY | Facility: CLINIC | Age: 70
Setting detail: RADIATION/ONCOLOGY SERIES
Discharge: HOME | End: 2025-02-04
Payer: MEDICARE

## 2025-02-04 ENCOUNTER — DOCUMENTATION (OUTPATIENT)
Dept: HEMATOLOGY/ONCOLOGY | Facility: CLINIC | Age: 70
End: 2025-02-04
Payer: MEDICARE

## 2025-02-04 VITALS — BODY MASS INDEX: 17.47 KG/M2 | WEIGHT: 103.4 LBS | TEMPERATURE: 96.8 F

## 2025-02-04 DIAGNOSIS — Z51.0 ENCOUNTER FOR ANTINEOPLASTIC RADIATION THERAPY: ICD-10-CM

## 2025-02-04 DIAGNOSIS — C50.411 MALIGNANT NEOPLASM OF UPPER-OUTER QUADRANT OF RIGHT FEMALE BREAST: ICD-10-CM

## 2025-02-04 LAB
RAD ONC MSQ ACTUAL FRACTIONS DELIVERED: 11
RAD ONC MSQ ACTUAL SESSION DELIVERED DOSE: 266 CGRAY
RAD ONC MSQ ACTUAL TOTAL DOSE: 2926 CGRAY
RAD ONC MSQ ELAPSED DAYS: 14
RAD ONC MSQ LAST DATE: NORMAL
RAD ONC MSQ PRESCRIBED FRACTIONAL DOSE: 266 CGRAY
RAD ONC MSQ PRESCRIBED NUMBER OF FRACTIONS: 16
RAD ONC MSQ PRESCRIBED TECHNIQUE: NORMAL
RAD ONC MSQ PRESCRIBED TOTAL DOSE: 4256 CGRAY
RAD ONC MSQ PRESCRIPTION PATTERN COMMENT: NORMAL
RAD ONC MSQ START DATE: NORMAL
RAD ONC MSQ TREATMENT COURSE NUMBER: 1
RAD ONC MSQ TREATMENT SITE: NORMAL

## 2025-02-04 PROCEDURE — 77387 GUIDANCE FOR RADJ TX DLVR: CPT | Performed by: RADIOLOGY

## 2025-02-04 PROCEDURE — 77412 RADIATION TX DELIVERY LVL 3: CPT | Performed by: RADIOLOGY

## 2025-02-04 ASSESSMENT — PAIN SCALES - GENERAL: PAINLEVEL_OUTOF10: 0-NO PAIN

## 2025-02-04 NOTE — PROGRESS NOTES
"Dr. Treviño made aware of recent Guardant Reveal drawn on 01/28/25 that came back detected. Per Dr. Treviño \" no further recommendations or testing at this time. We will recheck this at Fadia's next visit in June.\" Results are scanned in labs   "

## 2025-02-04 NOTE — PROGRESS NOTES
Radiation Oncology On Treatment Visit    Patient Name:  Fadia Bolden  MRN:  [id number]  :  1955    Referring Provider: Alma Treviño MD  Primary Care Provider: Michelle Martinez DO  Care Team: Patient Care Team:  Michelle Martinez DO as PCP - General  Michelle Martinez DO as PCP - [employer] PCP  Tammie Blevins MD as Surgeon (Neurosurgery)  Cyndi Bridges MD as Radiation Oncologist (Radiation Oncology)  Alma Treviño MD as Consulting Physician (Hematology and Oncology)    Date of Service: 2025     Diagnosis:   Specialty Problems          Radiation Oncology Problems    Malignant neoplasm of upper-outer quadrant of breast in female, estrogen receptor positive, unspecified laterality        Malignant neoplasm of upper-outer quadrant of right breast in female, estrogen receptor negative         Treatment Summary:  Radiation Therapy    Treatment Period Technique Fraction Dose Fractions Total Dose   Course 1 2025-2025  (days elapsed: 14)         R breast 2025-2025 3D 266 / 266 cGy  2926 / 4,256 cGy     SUBJECTIVE:@ 29.26 Gy /42.56 Gy + planned boost (10 Gy in 4 fractions).  Skin is doing well.  Energy is good.  No  complaints.      OBJECTIVE:   Vital Signs:  Temp 36 °C (96.8 °F) (Temporal)   Wt 46.9 kg (103 lb 6.3 oz)   BMI 17.47 kg/m²     Well appearing nad  Mepitel dressing in place, no skin reaction.    Other Pertinent Findings:     Toxicity Assessment          2025    09:00 2025    10:00 2025    09:00   Toxicity Assessment   Adverse Events Reviewed (WDL) Yes (Within Defined Limits) Yes (Within Defined Limits) Yes (Within Defined Limits)   Treatment Site Breast Breast Breast   Anorexia Grade 0 Grade 0 Grade 0   Dehydration Grade 0 Grade 0 Grade 0   Dermatitis Radiation Grade 0 Grade 0       mepitel on Grade 0       mepitel   Fatigue Grade 0 Grade 0 Grade 0   Nausea Grade 0 Grade 0 Grade 0   Vomiting Grade 0 Grade 0 Grade 0   Breast  Infection Grade 0 Grade 0 Grade 0   Joint Range of Motion Decreased Grade 0 Grade 0 Grade 0   Breast Pain Grade 0 Grade 0 Grade 0   Edema Limbs Grade 0 Grade 0 Grade 0   Lymphedema Grade 0 Grade 0 Grade 0        Assessment / Plan:  The patient is tolerating radiation therapy as anticipated.  Continue per current treatment plan.

## 2025-02-05 ENCOUNTER — HOSPITAL ENCOUNTER (OUTPATIENT)
Dept: RADIATION ONCOLOGY | Facility: CLINIC | Age: 70
Setting detail: RADIATION/ONCOLOGY SERIES
Discharge: HOME | End: 2025-02-05
Payer: MEDICARE

## 2025-02-05 ENCOUNTER — OFFICE VISIT (OUTPATIENT)
Dept: PRIMARY CARE | Facility: CLINIC | Age: 70
End: 2025-02-05
Payer: MEDICARE

## 2025-02-05 VITALS
DIASTOLIC BLOOD PRESSURE: 70 MMHG | HEART RATE: 76 BPM | HEIGHT: 65 IN | WEIGHT: 102 LBS | TEMPERATURE: 98.2 F | RESPIRATION RATE: 16 BRPM | BODY MASS INDEX: 17 KG/M2 | SYSTOLIC BLOOD PRESSURE: 126 MMHG | OXYGEN SATURATION: 98 %

## 2025-02-05 DIAGNOSIS — R10.32 LEFT LOWER QUADRANT ABDOMINAL PAIN: Primary | ICD-10-CM

## 2025-02-05 DIAGNOSIS — Z17.0 MALIGNANT NEOPLASM OF UPPER-OUTER QUADRANT OF BREAST IN FEMALE, ESTROGEN RECEPTOR POSITIVE, UNSPECIFIED LATERALITY: ICD-10-CM

## 2025-02-05 DIAGNOSIS — C50.419 MALIGNANT NEOPLASM OF UPPER-OUTER QUADRANT OF BREAST IN FEMALE, ESTROGEN RECEPTOR POSITIVE, UNSPECIFIED LATERALITY: ICD-10-CM

## 2025-02-05 DIAGNOSIS — C50.411 MALIGNANT NEOPLASM OF UPPER-OUTER QUADRANT OF RIGHT FEMALE BREAST: ICD-10-CM

## 2025-02-05 DIAGNOSIS — Z51.0 ENCOUNTER FOR ANTINEOPLASTIC RADIATION THERAPY: ICD-10-CM

## 2025-02-05 LAB
RAD ONC MSQ ACTUAL FRACTIONS DELIVERED: 12
RAD ONC MSQ ACTUAL SESSION DELIVERED DOSE: 266 CGRAY
RAD ONC MSQ ACTUAL TOTAL DOSE: 3192 CGRAY
RAD ONC MSQ ELAPSED DAYS: 15
RAD ONC MSQ LAST DATE: NORMAL
RAD ONC MSQ PRESCRIBED FRACTIONAL DOSE: 266 CGRAY
RAD ONC MSQ PRESCRIBED NUMBER OF FRACTIONS: 16
RAD ONC MSQ PRESCRIBED TECHNIQUE: NORMAL
RAD ONC MSQ PRESCRIBED TOTAL DOSE: 4256 CGRAY
RAD ONC MSQ PRESCRIPTION PATTERN COMMENT: NORMAL
RAD ONC MSQ START DATE: NORMAL
RAD ONC MSQ TREATMENT COURSE NUMBER: 1
RAD ONC MSQ TREATMENT SITE: NORMAL

## 2025-02-05 PROCEDURE — 77387 GUIDANCE FOR RADJ TX DLVR: CPT | Performed by: RADIOLOGY

## 2025-02-05 PROCEDURE — 1123F ACP DISCUSS/DSCN MKR DOCD: CPT | Performed by: INTERNAL MEDICINE

## 2025-02-05 PROCEDURE — 77336 RADIATION PHYSICS CONSULT: CPT | Performed by: RADIOLOGY

## 2025-02-05 PROCEDURE — 77412 RADIATION TX DELIVERY LVL 3: CPT | Performed by: RADIOLOGY

## 2025-02-05 PROCEDURE — 99214 OFFICE O/P EST MOD 30 MIN: CPT | Performed by: INTERNAL MEDICINE

## 2025-02-05 PROCEDURE — 1159F MED LIST DOCD IN RCRD: CPT | Performed by: INTERNAL MEDICINE

## 2025-02-05 PROCEDURE — 1160F RVW MEDS BY RX/DR IN RCRD: CPT | Performed by: INTERNAL MEDICINE

## 2025-02-05 PROCEDURE — 3074F SYST BP LT 130 MM HG: CPT | Performed by: INTERNAL MEDICINE

## 2025-02-05 PROCEDURE — 3008F BODY MASS INDEX DOCD: CPT | Performed by: INTERNAL MEDICINE

## 2025-02-05 PROCEDURE — 3078F DIAST BP <80 MM HG: CPT | Performed by: INTERNAL MEDICINE

## 2025-02-05 NOTE — PROGRESS NOTES
"Subjective   Fadia Bolden is a 69 y.o. female who presents for Abdominal Pain.    HPI   Pt c/o continued intermittent L sided abdominal pain.  Occasional distension, constipation.  Denies N/V.  Started Flaxseed daily.  Currently doing radiation and chemo for breast cancer  Pain has persisted for several months and is increasing in severity  No fever    Review of Systems   Constitutional:  Positive for fatigue. Negative for fever.   Respiratory:  Negative for cough and shortness of breath.    Cardiovascular:  Negative for chest pain and leg swelling.   All other systems reviewed and are negative.      Health Maintenance Due   Topic Date Due    Bone Density Scan  Never done    COVID-19 Vaccine (1) Never done    Hepatitis C Screening  Never done    Pneumococcal Vaccine (1 of 2 - PCV) Never done    Zoster Vaccines (1 of 2) Never done    Hepatitis B Vaccines (2 of 3 - 19+ 3-dose series) 08/18/2005    RSV High Risk: (Elderly (60+) or Pregnant Population) (1 - Risk 60-74 years 1-dose series) Never done    Influenza Vaccine (1) Never done       Objective   /70   Pulse 76   Temp 36.8 °C (98.2 °F)   Resp 16   Ht 1.638 m (5' 4.5\")   Wt 46.3 kg (102 lb)   SpO2 98%   BMI 17.24 kg/m²     Physical Exam  Vitals and nursing note reviewed.   Constitutional:       Appearance: Normal appearance.   HENT:      Head: Normocephalic.   Eyes:      Conjunctiva/sclera: Conjunctivae normal.      Pupils: Pupils are equal, round, and reactive to light.   Cardiovascular:      Rate and Rhythm: Normal rate and regular rhythm.      Pulses: Normal pulses.      Heart sounds: Normal heart sounds.   Pulmonary:      Effort: Pulmonary effort is normal.      Breath sounds: Normal breath sounds.   Abdominal:      General: Bowel sounds are normal. There is distension.      Tenderness: There is abdominal tenderness.   Musculoskeletal:         General: No swelling.      Cervical back: Neck supple.   Skin:     General: Skin is warm and dry. "   Neurological:      General: No focal deficit present.      Mental Status: She is oriented to person, place, and time.         Assessment/Plan   Problem List Items Addressed This Visit       Malignant neoplasm of upper-outer quadrant of breast in female, estrogen receptor positive, unspecified laterality     Other Visit Diagnoses       Left lower quadrant abdominal pain    -  Primary    Relevant Orders    CT abdomen pelvis w IV contrast        Will reorder ct scan   To rule out any pathology, or infection   Stable based on symptoms and exam.  Continue established treatment plan and follow up at least yearly.  Reviewed records and labs

## 2025-02-06 ENCOUNTER — HOSPITAL ENCOUNTER (OUTPATIENT)
Dept: RADIATION ONCOLOGY | Facility: CLINIC | Age: 70
Setting detail: RADIATION/ONCOLOGY SERIES
Discharge: HOME | End: 2025-02-06
Payer: MEDICARE

## 2025-02-06 DIAGNOSIS — Z51.0 ENCOUNTER FOR ANTINEOPLASTIC RADIATION THERAPY: ICD-10-CM

## 2025-02-06 DIAGNOSIS — C50.411 MALIGNANT NEOPLASM OF UPPER-OUTER QUADRANT OF RIGHT FEMALE BREAST: ICD-10-CM

## 2025-02-06 LAB
RAD ONC MSQ ACTUAL FRACTIONS DELIVERED: 13
RAD ONC MSQ ACTUAL SESSION DELIVERED DOSE: 266 CGRAY
RAD ONC MSQ ACTUAL TOTAL DOSE: 3458 CGRAY
RAD ONC MSQ ELAPSED DAYS: 16
RAD ONC MSQ LAST DATE: NORMAL
RAD ONC MSQ PRESCRIBED FRACTIONAL DOSE: 266 CGRAY
RAD ONC MSQ PRESCRIBED NUMBER OF FRACTIONS: 16
RAD ONC MSQ PRESCRIBED TECHNIQUE: NORMAL
RAD ONC MSQ PRESCRIBED TOTAL DOSE: 4256 CGRAY
RAD ONC MSQ PRESCRIPTION PATTERN COMMENT: NORMAL
RAD ONC MSQ START DATE: NORMAL
RAD ONC MSQ TREATMENT COURSE NUMBER: 1
RAD ONC MSQ TREATMENT SITE: NORMAL

## 2025-02-06 PROCEDURE — 77387 GUIDANCE FOR RADJ TX DLVR: CPT | Performed by: RADIOLOGY

## 2025-02-06 PROCEDURE — 77412 RADIATION TX DELIVERY LVL 3: CPT | Performed by: RADIOLOGY

## 2025-02-06 ASSESSMENT — ENCOUNTER SYMPTOMS
SHORTNESS OF BREATH: 0
FATIGUE: 1
FEVER: 0
COUGH: 0

## 2025-02-07 ENCOUNTER — HOSPITAL ENCOUNTER (OUTPATIENT)
Dept: RADIATION ONCOLOGY | Facility: CLINIC | Age: 70
Setting detail: RADIATION/ONCOLOGY SERIES
Discharge: HOME | End: 2025-02-07
Payer: MEDICARE

## 2025-02-07 PROCEDURE — 77387 GUIDANCE FOR RADJ TX DLVR: CPT | Performed by: RADIOLOGY

## 2025-02-07 PROCEDURE — 77412 RADIATION TX DELIVERY LVL 3: CPT | Performed by: RADIOLOGY

## 2025-02-10 ENCOUNTER — HOSPITAL ENCOUNTER (OUTPATIENT)
Dept: RADIATION ONCOLOGY | Facility: CLINIC | Age: 70
Setting detail: RADIATION/ONCOLOGY SERIES
Discharge: HOME | End: 2025-02-10
Payer: MEDICARE

## 2025-02-10 DIAGNOSIS — C50.411 MALIGNANT NEOPLASM OF UPPER-OUTER QUADRANT OF RIGHT FEMALE BREAST: ICD-10-CM

## 2025-02-10 DIAGNOSIS — Z51.0 ENCOUNTER FOR ANTINEOPLASTIC RADIATION THERAPY: ICD-10-CM

## 2025-02-10 LAB
RAD ONC MSQ ACTUAL FRACTIONS DELIVERED: 15
RAD ONC MSQ ACTUAL SESSION DELIVERED DOSE: 266 CGRAY
RAD ONC MSQ ACTUAL TOTAL DOSE: 3990 CGRAY
RAD ONC MSQ ELAPSED DAYS: 20
RAD ONC MSQ LAST DATE: NORMAL
RAD ONC MSQ PRESCRIBED FRACTIONAL DOSE: 266 CGRAY
RAD ONC MSQ PRESCRIBED NUMBER OF FRACTIONS: 16
RAD ONC MSQ PRESCRIBED TECHNIQUE: NORMAL
RAD ONC MSQ PRESCRIBED TOTAL DOSE: 4256 CGRAY
RAD ONC MSQ PRESCRIPTION PATTERN COMMENT: NORMAL
RAD ONC MSQ START DATE: NORMAL
RAD ONC MSQ TREATMENT COURSE NUMBER: 1
RAD ONC MSQ TREATMENT SITE: NORMAL

## 2025-02-10 PROCEDURE — 77387 GUIDANCE FOR RADJ TX DLVR: CPT | Performed by: RADIOLOGY

## 2025-02-10 PROCEDURE — 77412 RADIATION TX DELIVERY LVL 3: CPT | Performed by: RADIOLOGY

## 2025-02-11 ENCOUNTER — HOSPITAL ENCOUNTER (OUTPATIENT)
Dept: RADIATION ONCOLOGY | Facility: CLINIC | Age: 70
Setting detail: RADIATION/ONCOLOGY SERIES
Discharge: HOME | End: 2025-02-11
Payer: MEDICARE

## 2025-02-11 VITALS — BODY MASS INDEX: 17.55 KG/M2 | TEMPERATURE: 96.4 F | WEIGHT: 103.84 LBS

## 2025-02-11 DIAGNOSIS — Z51.0 ENCOUNTER FOR ANTINEOPLASTIC RADIATION THERAPY: ICD-10-CM

## 2025-02-11 DIAGNOSIS — C50.411 MALIGNANT NEOPLASM OF UPPER-OUTER QUADRANT OF RIGHT FEMALE BREAST: ICD-10-CM

## 2025-02-11 LAB
RAD ONC MSQ ACTUAL FRACTIONS DELIVERED: 16
RAD ONC MSQ ACTUAL SESSION DELIVERED DOSE: 266 CGRAY
RAD ONC MSQ ACTUAL TOTAL DOSE: 4256 CGRAY
RAD ONC MSQ ELAPSED DAYS: 21
RAD ONC MSQ LAST DATE: NORMAL
RAD ONC MSQ PRESCRIBED FRACTIONAL DOSE: 266 CGRAY
RAD ONC MSQ PRESCRIBED NUMBER OF FRACTIONS: 16
RAD ONC MSQ PRESCRIBED TECHNIQUE: NORMAL
RAD ONC MSQ PRESCRIBED TOTAL DOSE: 4256 CGRAY
RAD ONC MSQ PRESCRIPTION PATTERN COMMENT: NORMAL
RAD ONC MSQ START DATE: NORMAL
RAD ONC MSQ TREATMENT COURSE NUMBER: 1
RAD ONC MSQ TREATMENT SITE: NORMAL

## 2025-02-11 PROCEDURE — 77412 RADIATION TX DELIVERY LVL 3: CPT | Performed by: RADIOLOGY

## 2025-02-11 PROCEDURE — 77280 THER RAD SIMULAJ FIELD SMPL: CPT | Performed by: RADIOLOGY

## 2025-02-11 ASSESSMENT — PAIN SCALES - GENERAL: PAINLEVEL_OUTOF10: 0-NO PAIN

## 2025-02-11 NOTE — PROGRESS NOTES
Radiation Oncology On Treatment Visit    Patient Name:  Fadia Bolden  MRN:  [id number]  :  1955    Referring Provider: Alma Treviño MD  Primary Care Provider: Michelle Martinez DO  Care Team: Patient Care Team:  Michelle Martinez DO as PCP - General  Michelle Martinez DO as PCP - [employer] PCP  Tammie Blevins MD as Surgeon (Neurosurgery)  Cyndi Bridges MD as Radiation Oncologist (Radiation Oncology)  Alma Treviño MD as Consulting Physician (Hematology and Oncology)    Date of Service: 2025     Diagnosis:   Specialty Problems          Radiation Oncology Problems    Malignant neoplasm of upper-outer quadrant of breast in female, estrogen receptor positive, unspecified laterality        Malignant neoplasm of upper-outer quadrant of right breast in female, estrogen receptor negative         Treatment Summary:  Radiation Therapy    Treatment Period Technique Fraction Dose Fractions Total Dose   Course 1 2025-2025  (days elapsed: 21)         R breast 2025-2025 3D 266 / 266 cGy  4256 / 4,256 cGy     SUBJECTIVE:   @42.45 Gy to whole breast @ 2.5 Gy /10 fractions tumor bed boost.  Doing well,  no complaints.  No skin issues.    OBJECTIVE:   Vital Signs:  Temp 35.8 °C (96.4 °F) (Temporal)   Wt 47.1 kg (103 lb 13.4 oz)   BMI 17.55 kg/m²     Well appearing nad  No skin changes under mepitel dressing    Other Pertinent Findings:     Toxicity Assessment          2025    09:00 2025    10:00 2025    09:00 2025    10:00   Toxicity Assessment   Adverse Events Reviewed (WDL) Yes (Within Defined Limits) Yes (Within Defined Limits) Yes (Within Defined Limits) Yes (Within Defined Limits)   Treatment Site Breast Breast Breast Breast   Anorexia Grade 0 Grade 0 Grade 0 Grade 0   Dehydration Grade 0 Grade 0 Grade 0 Grade 0   Dermatitis Radiation Grade 0 Grade 0       mepitel on Grade 0       mepitel Grade 0   Fatigue Grade 0 Grade 0 Grade 0 Grade  0   Nausea Grade 0 Grade 0 Grade 0 Grade 0   Vomiting Grade 0 Grade 0 Grade 0 Grade 0   Breast Infection Grade 0 Grade 0 Grade 0 Grade 0   Joint Range of Motion Decreased Grade 0 Grade 0 Grade 0 Grade 0   Breast Pain Grade 0 Grade 0 Grade 0 Grade 0   Edema Limbs Grade 0 Grade 0 Grade 0 Grade 0   Lymphedema Grade 0 Grade 0 Grade 0 Grade 0        Assessment / Plan:  The patient is tolerating radiation therapy as anticipated.  Continue per current treatment plan.   Reviewed ongoing skin care.  Advised to make post-treatment FU appt w me in 6/ weeks.  She has FU for mammogram/Rascon in 3/2025 and recently saw Dr Treviño who will see her again in June.

## 2025-02-12 ENCOUNTER — APPOINTMENT (OUTPATIENT)
Dept: RADIATION ONCOLOGY | Facility: CLINIC | Age: 70
End: 2025-02-12
Payer: MEDICARE

## 2025-02-12 LAB — SCAN RESULT: NORMAL

## 2025-02-13 ENCOUNTER — APPOINTMENT (OUTPATIENT)
Dept: RADIATION ONCOLOGY | Facility: CLINIC | Age: 70
End: 2025-02-13
Payer: MEDICARE

## 2025-02-13 NOTE — PROGRESS NOTES
CM attempted to contact pts family, on emergency contact: Andre, and attempts was unsuccessful.  CM attempted to contact pts other family member: Yissel and mailbox was full.    Pt has worked with therapist and pt could benefit from snf placement at this time.  Pt is not wanting to transition to snf at this time.  Pt is have difficult ambulating.  CM will continue to encourage pt for placement to ensure a safe d/c.    ESTELA Vick CM  [phone]     Discharge Facility: [facility]  Discharge Diagnosis: Right sided weakness, focal aware seizure  Admission Date: 4/10/2024  Discharge Date: 4/12/2024    PCP Appointment Date:  -Not scheduled d/t no contact; task sent to office to assist with follow up    Hospital Encounter and Summary: Linked    Unable to reach patient x2 attempts, voicemail left with call back number.

## 2025-02-14 ENCOUNTER — HOSPITAL ENCOUNTER (OUTPATIENT)
Dept: RADIATION ONCOLOGY | Facility: HOSPITAL | Age: 70
Setting detail: RADIATION/ONCOLOGY SERIES
Discharge: HOME | End: 2025-02-14
Payer: MEDICARE

## 2025-02-14 ENCOUNTER — APPOINTMENT (OUTPATIENT)
Dept: RADIATION ONCOLOGY | Facility: CLINIC | Age: 70
End: 2025-02-14
Payer: MEDICARE

## 2025-02-14 PROCEDURE — 77412 RADIATION TX DELIVERY LVL 3: CPT | Performed by: RADIOLOGY

## 2025-02-17 ENCOUNTER — HOSPITAL ENCOUNTER (OUTPATIENT)
Dept: RADIATION ONCOLOGY | Facility: CLINIC | Age: 70
Setting detail: RADIATION/ONCOLOGY SERIES
Discharge: HOME | End: 2025-02-17
Payer: MEDICARE

## 2025-02-17 ENCOUNTER — HOSPITAL ENCOUNTER (OUTPATIENT)
Dept: RADIOLOGY | Facility: HOSPITAL | Age: 70
Discharge: HOME | End: 2025-02-17
Payer: MEDICARE

## 2025-02-17 DIAGNOSIS — Z51.0 ENCOUNTER FOR ANTINEOPLASTIC RADIATION THERAPY: ICD-10-CM

## 2025-02-17 DIAGNOSIS — C50.411 MALIGNANT NEOPLASM OF UPPER-OUTER QUADRANT OF RIGHT FEMALE BREAST: ICD-10-CM

## 2025-02-17 DIAGNOSIS — R10.32 LEFT LOWER QUADRANT ABDOMINAL PAIN: ICD-10-CM

## 2025-02-17 LAB
RAD ONC MSQ ACTUAL FRACTIONS DELIVERED: 2
RAD ONC MSQ ACTUAL SESSION DELIVERED DOSE: 250 CGRAY
RAD ONC MSQ ACTUAL TOTAL DOSE: 500 CGRAY
RAD ONC MSQ ELAPSED DAYS: 3
RAD ONC MSQ LAST DATE: NORMAL
RAD ONC MSQ PRESCRIBED FRACTIONAL DOSE: 250 CGRAY
RAD ONC MSQ PRESCRIBED NUMBER OF FRACTIONS: 4
RAD ONC MSQ PRESCRIBED TECHNIQUE: NORMAL
RAD ONC MSQ PRESCRIBED TOTAL DOSE: 1000 CGRAY
RAD ONC MSQ PRESCRIPTION PATTERN COMMENT: NORMAL
RAD ONC MSQ START DATE: NORMAL
RAD ONC MSQ TREATMENT COURSE NUMBER: 1
RAD ONC MSQ TREATMENT SITE: NORMAL

## 2025-02-17 PROCEDURE — 74177 CT ABD & PELVIS W/CONTRAST: CPT | Performed by: RADIOLOGY

## 2025-02-17 PROCEDURE — 2550000001 HC RX 255 CONTRASTS

## 2025-02-17 PROCEDURE — 77412 RADIATION TX DELIVERY LVL 3: CPT | Performed by: RADIOLOGY

## 2025-02-17 PROCEDURE — 74177 CT ABD & PELVIS W/CONTRAST: CPT

## 2025-02-17 RX ADMIN — IOHEXOL 75 ML: 350 INJECTION, SOLUTION INTRAVENOUS at 13:51

## 2025-02-18 ENCOUNTER — HOSPITAL ENCOUNTER (OUTPATIENT)
Dept: RADIATION ONCOLOGY | Facility: CLINIC | Age: 70
Setting detail: RADIATION/ONCOLOGY SERIES
Discharge: HOME | End: 2025-02-18
Payer: MEDICARE

## 2025-02-18 VITALS — TEMPERATURE: 96.4 F | WEIGHT: 102.73 LBS | BODY MASS INDEX: 17.36 KG/M2

## 2025-02-18 DIAGNOSIS — C50.411 MALIGNANT NEOPLASM OF UPPER-OUTER QUADRANT OF RIGHT FEMALE BREAST: ICD-10-CM

## 2025-02-18 DIAGNOSIS — Z51.0 ENCOUNTER FOR ANTINEOPLASTIC RADIATION THERAPY: ICD-10-CM

## 2025-02-18 PROBLEM — Z08 ENCOUNTER FOR FOLLOW-UP SURVEILLANCE OF BREAST CANCER: Status: ACTIVE | Noted: 2025-02-18

## 2025-02-18 PROBLEM — Z85.3 ENCOUNTER FOR FOLLOW-UP SURVEILLANCE OF BREAST CANCER: Status: ACTIVE | Noted: 2025-02-18

## 2025-02-18 LAB
RAD ONC MSQ ACTUAL FRACTIONS DELIVERED: 3
RAD ONC MSQ ACTUAL SESSION DELIVERED DOSE: 250 CGRAY
RAD ONC MSQ ACTUAL TOTAL DOSE: 750 CGRAY
RAD ONC MSQ ELAPSED DAYS: 4
RAD ONC MSQ LAST DATE: NORMAL
RAD ONC MSQ PRESCRIBED FRACTIONAL DOSE: 250 CGRAY
RAD ONC MSQ PRESCRIBED NUMBER OF FRACTIONS: 4
RAD ONC MSQ PRESCRIBED TECHNIQUE: NORMAL
RAD ONC MSQ PRESCRIBED TOTAL DOSE: 1000 CGRAY
RAD ONC MSQ PRESCRIPTION PATTERN COMMENT: NORMAL
RAD ONC MSQ START DATE: NORMAL
RAD ONC MSQ TREATMENT COURSE NUMBER: 1
RAD ONC MSQ TREATMENT SITE: NORMAL

## 2025-02-18 PROCEDURE — 77412 RADIATION TX DELIVERY LVL 3: CPT | Performed by: RADIOLOGY

## 2025-02-18 ASSESSMENT — PAIN SCALES - GENERAL: PAINLEVEL_OUTOF10: 0-NO PAIN

## 2025-02-18 NOTE — PROGRESS NOTES
Radiation Oncology On Treatment Visit    Patient Name:  Fadia Bolden  MRN:  [id number]  :  1955    Referring Provider: Alma Treviño MD  Primary Care Provider: Michelle Martinez DO  Care Team: Patient Care Team:  Michelle Martinez DO as PCP - General  Michelle Martinez DO as PCP - [employer] PCP  Tammie Blevins MD as Surgeon (Neurosurgery)  Cyndi Bridges MD as Radiation Oncologist (Radiation Oncology)  Alma Treviño MD as Consulting Physician (Hematology and Oncology)  Sanjuana Nascimento MD as Radiation Oncologist (Radiation Oncology)    Date of Service: 2025     Diagnosis:   Specialty Problems          Radiation Oncology Problems    Malignant neoplasm of upper-outer quadrant of breast in female, estrogen receptor positive, unspecified laterality        Malignant neoplasm of upper-outer quadrant of right breast in female, estrogen receptor negative         Treatment Summary:  Radiation Therapy    Treatment Period Technique Fraction Dose Fractions Total Dose   Course 1 2025-2025  (days elapsed: 28)         R breast 2025-2025 3D 266 / 266 cGy  4256 / 4,256 cGy         Rt TB 2025-2025 Electron Boost 250 / 250 cGy 3 / 4 750 / 1,000 cGy     SUBJECTIVE: @42.56 Gy right breast and 7.5 Gy /10 Gy boost.  Using mepitel dressing  OBJECTIVE:   Vital Signs:  Temp 35.8 °C (96.4 °F) (Temporal)   Wt 46.6 kg (102 lb 11.8 oz)   BMI 17.36 kg/m²     Well appearing nad    mild hyperpigmentation in right axilla.  Overall skin looks great.    Other Pertinent Findings:     Toxicity Assessment          2025    09:00 2025    10:00 2025    09:00 2025    10:00 2025    09:00   Toxicity Assessment   Adverse Events Reviewed (WDL) Yes (Within Defined Limits) Yes (Within Defined Limits) Yes (Within Defined Limits) Yes (Within Defined Limits) Yes (Within Defined Limits)   Treatment Site Breast Breast Breast Breast Breast   Anorexia Grade 0 Grade 0  Grade 0 Grade 0 Grade 0   Dehydration Grade 0 Grade 0 Grade 0 Grade 0 Grade 0   Dermatitis Radiation Grade 0 Grade 0       mepitel on Grade 0       mepitel Grade 0 Grade 0       has mepitel on   Fatigue Grade 0 Grade 0 Grade 0 Grade 0 Grade 0   Nausea Grade 0 Grade 0 Grade 0 Grade 0 Grade 0   Vomiting Grade 0 Grade 0 Grade 0 Grade 0 Grade 0   Breast Infection Grade 0 Grade 0 Grade 0 Grade 0 Grade 0   Joint Range of Motion Decreased Grade 0 Grade 0 Grade 0 Grade 0 Grade 0   Breast Pain Grade 0 Grade 0 Grade 0 Grade 0 Grade 0   Edema Limbs Grade 0 Grade 0 Grade 0 Grade 0 Grade 0   Lymphedema Grade 0 Grade 0 Grade 0 Grade 0 Grade 0        Assessment / Plan:  The patient is tolerating radiation therapy as anticipated.  Reviewed ongoing skin care.  Has appts with Dr Rascon this month and Dr Treviño in June.  She has FU with Cee Hamm, rad onc NP, in 6 weeks.  Continue per current treatment plan.

## 2025-02-18 NOTE — PROGRESS NOTES
[facility] [place]  Fadia Bolden female   1955 69 y.o.   [id number]      Chief Complaint  Follow up skin lesion right breast, history right breast cancer.    History Of Present Illness  Fadia Bolden is a pleasant 69 y.o.  female s/p right partial mastectomy and sentinel lymph node biopsy (0) on 2024 for invasive lobular carcinoma (ILC), grade 2 ER-/NC-/HER2-, 1.7 cm with negative margins. She completed adjuvant chemotherapy. She completed radiation on 2025. She has a remote history of left breast cancer s/p lumpectomy and radiation treatment in  at [facility]. She has family history of breast cancer in her paternal aunt and paternal grandmother. She presents today for a new area of concern right breast around the nipple.     Cancer Staging   Malignant neoplasm of upper-outer quadrant of right breast in female, estrogen receptor negative  Staging form: Breast, AJCC 8th Edition  - Clinical: Stage IB (cT1b, cN0, cM0, G2, ER-, NC-, HER2-) - Signed by Delisa Rascon DO on 2024  - Pathologic: Stage IB (pT1c, pN0, cM0, G2, ER-, NC-, HER2-) - Signed by Delisa Rascon DO on 2024      REPRODUCTIVE HISTORY:  menarche age 13, , first birth age 25, did not breastfeed, no OCP's, natural menopause age 49, no HRT, heterogeneously dense    FAMILY CANCER HISTORY:   Maternal Aunt: Breast cancer  Maternal Grandmother: Breast cancer     Review of Systems  Constitutional:  Negative for appetite change, fatigue, fever and unexpected weight change.   HENT:  Negative for ear pain, hearing loss, nosebleeds, sore throat and trouble swallowing.    Eyes:  Negative for discharge, itching and visual disturbance.   Breast: As stated in HPI.  Respiratory:  Negative for cough, chest tightness and shortness of breath.    Cardiovascular:  Negative for chest pain, palpitations and leg swelling.   Gastrointestinal:  Negative for abdominal pain, constipation, diarrhea and nausea.   Endocrine: Negative for  cold intolerance and heat intolerance.   Genitourinary:  Negative for dysuria, frequency, hematuria, pelvic pain and vaginal bleeding.   Musculoskeletal:  Negative for arthralgias, back pain, gait problem, joint swelling and myalgias.   Skin:  Negative for color change and rash.   Allergic/Immunologic: Negative for environmental allergies and food allergies.   Neurological:  Negative for dizziness, tremors, speech difficulty, weakness, numbness and headaches.   Hematological:  Does not bruise/bleed easily.   Psychiatric/Behavioral:  Negative for agitation, dysphoric mood and sleep disturbance. The patient is not nervous/anxious.       Past Medical History  She has a past medical history of Breast cancer (Multi) (2002 2021 2023), Cerebral vascular accident (Multi), HTN (hypertension), antineoplastic chemo (2024), Hypothyroidism, Lobular carcinoma in situ (2024), DAIJA (obstructive sleep apnea), Other chest pain (02/10/2021), Peripheral vision loss, right, Personal history of irradiation (2025), Personal history of malignant neoplasm of breast (06/27/2022), and Stroke (Multi) (10/24/2023).    Surgical History  She has a past surgical history that includes Other surgical history (11/22/2021); MR angio head w and wo IV contrast (10/25/2023); MR angio head wo IV contrast (01/05/2024); Other surgical history; Craniotomy; Colonoscopy; Upper gastrointestinal endoscopy; Breast lumpectomy (Right, 08/28/2024); Mastectomy (2002); and Breast biopsy (2024).    Family History  Cancer-related family history includes Breast cancer in her father's sister and paternal grandmother.     Social History  She reports that she has never smoked. She has never been exposed to tobacco smoke. She has never used smokeless tobacco. She reports that she does not currently use alcohol. She reports that she does not use drugs.    Allergies  Chlorhexidine    Medications  Current Outpatient Medications   Medication Instructions    ascorbate  calcium-bioflavonoid (Amelia-C with Bioflavonoids) 500-200 mg tablet 1 tablet, Daily    aspirin 81 mg, Daily    calcium carbonate-vitamin D3 500 mg-5 mcg (200 unit) tablet 1 tablet, Daily    cholecalciferol, vitamin D3, (VITAMIN D3 ORAL) Take 1 tab po every day.    ciclopirox (Penlac) 8 % solution Topical, Nightly    levETIRAcetam (KEPPRA) 500 mg, oral, 2 times daily    lisinopril 20 mg, oral, Daily       Last Recorded Vitals  Vitals:    02/20/25 1321   BP: 132/60   Pulse: 70   Resp: 16          Physical Exam  Chest:           Patient is alert and oriented x3 but forgetful, and in a relaxed and appropriate mood. Her gait is steady and hand grasps are equal. Sclera is clear. The breasts are asymmetrical left > right. Left breast has a well healed axillary incision. The right breast has a well healed partial mastectomy incision and axillary incision. The skin has hyperpigmentation s/p radiation changes. The tissue is soft without palpable abnormalities, discrete nodules or masses. The left breast skin and bilateral nipples appear normal. There is no cervical, supraclavicular or axillary lymphadenopathy.     Relevant Results and Imaging  Study Result    Narrative & Impression   Interpreted By:  Courtney Mendoza,   STUDY:  BI MAMMO BILATERAL DIAGNOSTIC TOMOSYNTHESIS;  2/20/2025 2:50 pm      ACCESSION NUMBER(S):  [id number]      ORDERING CLINICIAN:  NATY ROOT      INDICATION:  Left lumpectomy with radiation in 2002. Right lumpectomy in 2021.  Right recurrence in 2024 status post lumpectomy, chemotherapy and  radiation. Benign MRI core biopsy x2. Possible area of concern in the  right breast, possible skin lesion. The patient is a poor historian.      ,C50.411 Malignant neoplasm of upper-outer quadrant of right female  breast,Z17.1 Estrogen receptor negative status (ER-)      COMPARISON:  03/12/2021, 03/16/2022, 03/21/2023, 03/25/2024      FINDINGS:  2D and tomosynthesis images were reviewed at 1 mm slice  thickness.      Density:  The breasts are heterogeneously dense, which may obscure  small masses.      Breast density has increased and breast thickness has decreased over  time suggestive of weight loss. There is new postsurgical scarring  and skin retraction in the axillary tail from most recent lumpectomy.  Postsurgical scarring and surgical clips is also noted in the deep  upper outer right breast from more remote lumpectomy. There is  postsurgical scarring and surgical clips in the left axillary tail  from previous lumpectomy. There is bilateral axillary scarring. There  is new right skin and trabecular thickening consistent with radiation  therapy changes. Left skin and trabecular thickening appears more  pronounced on the current study. This may be due to a combination of  previous radiation therapy and weight loss. No suspicious masses or  calcifications are identified.      IMPRESSION:  1. Bilateral post treatment changes. No mammographic evidence of  malignancy in either breast.  2. Suggestion of interval weight loss. Clinical correlation is  recommended.  3. Possible clinical area of concern in the right breast. The patient  is a poor historian and could not elaborate. There is an area of  concern, targeted ultrasound is recommended.      BI-RADS CATEGORY:  BI-RADS Category:  2 Benign.  Recommendation:  Annual Screening.  Recommended Date:  1 Year.  Laterality:  Bilateral.      For any future breast imaging appointments, please call [phone]).          MACRO:  None      Signed by: Courtney Mendoza 2/20/2025 4:09 PM  Dictation workstation:   [id number]       I explained the results in depth, along with suggested explanation for follow up recommendations based on the testing results. BI-RADS Category 2      Visit Diagnosis  1. Encounter for follow-up surveillance of breast cancer            Assessment  Breast cancer surveillance, normal clinical exam and imaging, history right breast cancer,  lumpectomy, chemotherapy and radiation, remote hx left breast cancer with lumpectomy and radiation, heterogeneously dense    Plan  Return February 2026 for bilateral diagnostic mammogram and exam.     Patient Discussion/Summary  Your clinical examination and imaging are normal. Please return in one year for mammogram and office visit or sooner if you have any problems or concerns.       You can see your health information, review clinical summaries from office visits & test results online when you follow your health with MY  Chart, a personal health record. To sign up go to [email or web address]. If you need assistance with signing up or trouble getting into your account call [facility] Patient Line 24/7 at [phone].    My office phone number is [phone] if you need to get in touch with me or have additional questions or concerns. Thank you for choosing [facility] and trusting me as your healthcare provider. I look forward to seeing you again at your next office visit. I am honored to be a provider on your health care team and I remain dedicated to helping you achieve your health goals.    Shelby Hinojosa, APRN-CNP

## 2025-02-19 ENCOUNTER — DOCUMENTATION (OUTPATIENT)
Dept: RADIATION ONCOLOGY | Facility: CLINIC | Age: 70
End: 2025-02-19
Payer: MEDICARE

## 2025-02-19 ENCOUNTER — HOSPITAL ENCOUNTER (OUTPATIENT)
Dept: RADIATION ONCOLOGY | Facility: CLINIC | Age: 70
Setting detail: RADIATION/ONCOLOGY SERIES
Discharge: HOME | End: 2025-02-19
Payer: MEDICARE

## 2025-02-19 ENCOUNTER — TELEPHONE (OUTPATIENT)
Dept: PRIMARY CARE | Facility: CLINIC | Age: 70
End: 2025-02-19
Payer: MEDICARE

## 2025-02-19 DIAGNOSIS — C50.411 MALIGNANT NEOPLASM OF UPPER-OUTER QUADRANT OF RIGHT FEMALE BREAST: ICD-10-CM

## 2025-02-19 DIAGNOSIS — Z51.0 ENCOUNTER FOR ANTINEOPLASTIC RADIATION THERAPY: ICD-10-CM

## 2025-02-19 LAB
RAD ONC MSQ ACTUAL FRACTIONS DELIVERED: 4
RAD ONC MSQ ACTUAL SESSION DELIVERED DOSE: 250 CGRAY
RAD ONC MSQ ACTUAL TOTAL DOSE: 1000 CGRAY
RAD ONC MSQ ELAPSED DAYS: 5
RAD ONC MSQ LAST DATE: NORMAL
RAD ONC MSQ PRESCRIBED FRACTIONAL DOSE: 250 CGRAY
RAD ONC MSQ PRESCRIBED NUMBER OF FRACTIONS: 4
RAD ONC MSQ PRESCRIBED TECHNIQUE: NORMAL
RAD ONC MSQ PRESCRIBED TOTAL DOSE: 1000 CGRAY
RAD ONC MSQ PRESCRIPTION PATTERN COMMENT: NORMAL
RAD ONC MSQ START DATE: NORMAL
RAD ONC MSQ TREATMENT COURSE NUMBER: 1
RAD ONC MSQ TREATMENT SITE: NORMAL

## 2025-02-19 PROCEDURE — 77412 RADIATION TX DELIVERY LVL 3: CPT | Performed by: RADIOLOGY

## 2025-02-19 PROCEDURE — 77336 RADIATION PHYSICS CONSULT: CPT | Performed by: RADIOLOGY

## 2025-02-19 NOTE — RESULT ENCOUNTER NOTE
Call patient her ct looks good  No concerning findings  Does show constipation, want her to add miralax or metamucil daily with plenty of fluids

## 2025-02-19 NOTE — TELEPHONE ENCOUNTER
----- Message from Michelle Martinez sent at 2/19/2025  8:21 AM EST -----  Call patient her ct looks good  No concerning findings  Does show constipation, want her to add miralax or metamucil daily with plenty of fluids

## 2025-02-20 ENCOUNTER — OFFICE VISIT (OUTPATIENT)
Dept: SURGICAL ONCOLOGY | Facility: HOSPITAL | Age: 70
End: 2025-02-20
Payer: MEDICARE

## 2025-02-20 ENCOUNTER — HOSPITAL ENCOUNTER (OUTPATIENT)
Dept: RADIOLOGY | Facility: HOSPITAL | Age: 70
Discharge: HOME | End: 2025-02-20
Payer: MEDICARE

## 2025-02-20 VITALS — HEIGHT: 64 IN | BODY MASS INDEX: 17.07 KG/M2 | WEIGHT: 100 LBS

## 2025-02-20 VITALS
DIASTOLIC BLOOD PRESSURE: 60 MMHG | HEART RATE: 70 BPM | HEIGHT: 65 IN | SYSTOLIC BLOOD PRESSURE: 132 MMHG | BODY MASS INDEX: 16.66 KG/M2 | RESPIRATION RATE: 16 BRPM | WEIGHT: 100 LBS

## 2025-02-20 DIAGNOSIS — Z85.3 ENCOUNTER FOR FOLLOW-UP SURVEILLANCE OF BREAST CANCER: Primary | ICD-10-CM

## 2025-02-20 DIAGNOSIS — Z17.1 MALIGNANT NEOPLASM OF UPPER-OUTER QUADRANT OF RIGHT BREAST IN FEMALE, ESTROGEN RECEPTOR NEGATIVE: ICD-10-CM

## 2025-02-20 DIAGNOSIS — N63.0 BREAST LUMP IN FEMALE: ICD-10-CM

## 2025-02-20 DIAGNOSIS — C50.411 MALIGNANT NEOPLASM OF UPPER-OUTER QUADRANT OF RIGHT BREAST IN FEMALE, ESTROGEN RECEPTOR NEGATIVE: ICD-10-CM

## 2025-02-20 DIAGNOSIS — Z08 ENCOUNTER FOR FOLLOW-UP SURVEILLANCE OF BREAST CANCER: Primary | ICD-10-CM

## 2025-02-20 DIAGNOSIS — N61.1 ABSCESS OF THE BREAST AND NIPPLE: ICD-10-CM

## 2025-02-20 PROCEDURE — 3075F SYST BP GE 130 - 139MM HG: CPT | Performed by: NURSE PRACTITIONER

## 2025-02-20 PROCEDURE — 1123F ACP DISCUSS/DSCN MKR DOCD: CPT | Performed by: NURSE PRACTITIONER

## 2025-02-20 PROCEDURE — 99213 OFFICE O/P EST LOW 20 MIN: CPT | Performed by: NURSE PRACTITIONER

## 2025-02-20 PROCEDURE — 3078F DIAST BP <80 MM HG: CPT | Performed by: NURSE PRACTITIONER

## 2025-02-20 PROCEDURE — 3008F BODY MASS INDEX DOCD: CPT | Performed by: NURSE PRACTITIONER

## 2025-02-20 PROCEDURE — 77062 BREAST TOMOSYNTHESIS BI: CPT

## 2025-02-20 PROCEDURE — 1159F MED LIST DOCD IN RCRD: CPT | Performed by: NURSE PRACTITIONER

## 2025-02-24 NOTE — PROGRESS NOTES
Radiation Oncology Treatment Summary    Patient Name:  Fadia Bolden  MRN:  [id number]  :  1955    Referring Provider: No ref. provider found  Primary Care Provider: Michelle Martinez DO    Brief History: Fadia Bolden is a 69 y.o. female with Malignant neoplasm of upper-outer quadrant of right breast in female, estrogen receptor negative, Clinical: Stage IB (cT1b, cN0, cM0, G2, ER-, CO-, HER2-)  Malignant neoplasm of upper-outer quadrant of right breast in female, estrogen receptor negative, Pathologic: Stage IB (pT1c, pN0, cM0, G2, ER-, CO-, HER2-).  The patient completed radiotherapy as outlined below.    Radiation Treatment Summary:    Radiation Therapy    Treatment Period Technique Fraction Dose Fractions Total Dose   Course 1 2025-2025  (days elapsed: 29)         R breast 2025-2025 3D 266 / 266 cGy  4256 / 4,256 cGy         Rt TB 2025-2025 Electron Boost 250 / 250 cGy  1000 / 1,000 cGy       Please see the patient's Mosaiq chart for further details regarding the radiation plan, including beam energy.    Concurrent Chemotherapy:  Treatment Plans       Name Type Plan Dates Plan Provider         Active    TC (DOCEtaxel / Cyclophosphamide), 21 Day Cycles Oncology Treatment 10/2/2024 - Present Alma Treviño MD                    CTCAE Toxicity Overview:   Toxicity Assessment          2025    09:00 2025    10:00 2025    09:00 2025    10:00 2025    09:00   Toxicity Assessment   Adverse Events Reviewed (WDL) Yes (Within Defined Limits) Yes (Within Defined Limits) Yes (Within Defined Limits) Yes (Within Defined Limits) Yes (Within Defined Limits)   Treatment Site Breast Breast Breast Breast Breast   Anorexia Grade 0 Grade 0 Grade 0 Grade 0 Grade 0   Dehydration Grade 0 Grade 0 Grade 0 Grade 0 Grade 0   Dermatitis Radiation Grade 0 Grade 0       mepitel on Grade 0       mepitel Grade 0 Grade 0       has mepitel on   Fatigue Grade 0 Grade 0 Grade 0  Grade 0 Grade 0   Nausea Grade 0 Grade 0 Grade 0 Grade 0 Grade 0   Vomiting Grade 0 Grade 0 Grade 0 Grade 0 Grade 0   Breast Infection Grade 0 Grade 0 Grade 0 Grade 0 Grade 0   Joint Range of Motion Decreased Grade 0 Grade 0 Grade 0 Grade 0 Grade 0   Breast Pain Grade 0 Grade 0 Grade 0 Grade 0 Grade 0   Edema Limbs Grade 0 Grade 0 Grade 0 Grade 0 Grade 0   Lymphedema Grade 0 Grade 0 Grade 0 Grade 0 Grade 0     Patient Disposition: Follow up Cee Hamm 3/25/25

## 2025-03-13 ENCOUNTER — APPOINTMENT (OUTPATIENT)
Facility: CLINIC | Age: 70
End: 2025-03-13
Payer: MEDICARE

## 2025-03-25 ENCOUNTER — HOSPITAL ENCOUNTER (OUTPATIENT)
Dept: RADIATION ONCOLOGY | Facility: CLINIC | Age: 70
Setting detail: RADIATION/ONCOLOGY SERIES
Discharge: HOME | End: 2025-03-25
Payer: MEDICARE

## 2025-03-25 VITALS
OXYGEN SATURATION: 98 % | TEMPERATURE: 96.8 F | RESPIRATION RATE: 16 BRPM | HEART RATE: 62 BPM | DIASTOLIC BLOOD PRESSURE: 75 MMHG | SYSTOLIC BLOOD PRESSURE: 146 MMHG

## 2025-03-25 DIAGNOSIS — C50.419 MALIGNANT NEOPLASM OF UPPER-OUTER QUADRANT OF BREAST IN FEMALE, ESTROGEN RECEPTOR POSITIVE, UNSPECIFIED LATERALITY: Primary | ICD-10-CM

## 2025-03-25 DIAGNOSIS — Z17.1 MALIGNANT NEOPLASM OF UPPER-OUTER QUADRANT OF RIGHT BREAST IN FEMALE, ESTROGEN RECEPTOR NEGATIVE: ICD-10-CM

## 2025-03-25 DIAGNOSIS — C50.411 MALIGNANT NEOPLASM OF UPPER-OUTER QUADRANT OF RIGHT BREAST IN FEMALE, ESTROGEN RECEPTOR NEGATIVE: ICD-10-CM

## 2025-03-25 DIAGNOSIS — Z17.0 MALIGNANT NEOPLASM OF UPPER-OUTER QUADRANT OF BREAST IN FEMALE, ESTROGEN RECEPTOR POSITIVE, UNSPECIFIED LATERALITY: Primary | ICD-10-CM

## 2025-03-25 PROCEDURE — 99214 OFFICE O/P EST MOD 30 MIN: CPT | Performed by: NURSE PRACTITIONER

## 2025-03-25 PROCEDURE — 99213 OFFICE O/P EST LOW 20 MIN: CPT | Performed by: NURSE PRACTITIONER

## 2025-03-25 ASSESSMENT — PAIN SCALES - GENERAL: PAINLEVEL_OUTOF10: 0-NO PAIN

## 2025-03-25 NOTE — PROGRESS NOTES
Radiation Oncology Follow-Up    Patient Name:  Fadia Bolden  MRN:  [id number]  :  1955    Referring Provider: Alma Treviño MD  Primary Care Provider: Michelle Martinez DO  Care Team: Patient Care Team:  Michelle Martinez DO as PCP - General  Michelle Martinez DO as PCP - O Medicare Advantage PCP  Tammie Blevins MD as Surgeon (Neurosurgery)  Cyndi Bridges MD as Radiation Oncologist (Radiation Oncology)  Alma Treviño MD as Consulting Physician (Hematology and Oncology)  Sanjuana Nascimento MD as Radiation Oncologist (Radiation Oncology)    Date of Service: 3/25/2025   Expand All Collapse All    Patient ID: Fadia Bolden is a 69 y.o. female.  Cancer Staging   Malignant neoplasm of upper-outer quadrant of right breast in female, estrogen receptor negative  Staging form: Breast, AJCC 8th Edition  - Clinical: Stage IB (cT1b, cN0, cM0, G2, ER-, MN-, HER2-) - Signed by Delisa Rascon DO on 2024  - Pathologic: Stage IB (pT1c, pN0, cM0, G2, ER-, MN-, HER2-) - Signed by Delisa Rascon DO on 2024        69 year old woman with personal history of LEFT breast pT1bN0 ILC diagnosed in  treated with BCS and RT 50 Gy + 14 Gy boost.  More recently she developed a RIGHT breast pT2N0 ILC [id number] treated with partial mastectomy and SLN biopsy without adjuvant therapy (patient declined).  She developed new primary in her RIGHT breast cT1N0 ILC triple negative treated with right partial mastectomy and SLN Bx with pT1cN0 disease (1.7 cm ILC grade 2, invasion of dermis without skin ulceration, no LVSI.  Margins negative > 2 mm. No DCIS.   0/1 SLN involved)       Oncologic History:    - 3/21/2023 after bilateral screening mammogram showed small possible mass in the upper outer quadrant and it was thought that biopsy of this area would be difficult due to location and short interval follow-up was recommended.    - Repeat imaging 2023 shows mass with stable category 4.    - Repeat ultrasound  9/14/2023 shows mass unchanged and biopsy recommended.   -  During this time shortly afterward she suffered a brain bleed following location in [place].    - Repeat imaging and ultrasound June 2024 shows increase in size of breast mass previously 0.7 then 0.9.  - biopsy 7/15/2024 which showed invasive lobular carcinoma grade 2 ER negative MO negative HER2 negative at 11:00 6 cm from the nipple.   - right breast partial mastectomy on 8/28/2024 which showed invasive lobular carcinoma grade 2, margins were negative and 0 of 1 lymph nodes involved.  Greatest dimension of largest invasive focus was 17 mm.  No LVI identified  - Adjuvant chemotherapy with Taxotere and cytoxan 10/15/24 - 12/17/24  - Radiation therapy as follows:     Radiation Treatment Summary:            Radiation Therapy     Treatment Period Technique Fraction Dose Fractions Total Dose   Course 1 1/21/2025-2/19/2025  (days elapsed: 29)         R breast 1/21/2025-2/11/2025 3D 266 / 266 cGy 16 / 16 4256 / 4,256 cGy         Rt TB 2/14/2025-2/19/2025 Electron Boost 250 / 250 cGy 4 / 4 1000 / 1,000 cGy     SUBJECTIVE    History of Present Illness:   Fadia Bolden is here with her  today for radiation follow up/initial radiation survivorship visit.  She says she is feeling well and right breast with minimal skin changes post radiation.  Skin fully intact and no peeling or discoloration.  No swelling of right arm or difficulty with ROM.  Energy level nearly at baseline.  She is mildly aphasic since her stroke.  No recent seizures and she continues Keppra BID.  No headaches, fever, chills, cough, SOB, chest pain, n/v/c/d or bony pain.     Review of Systems:    Review of Systems   All other systems reviewed and are negative.    Performance Status:   The Karnofsky performance scale today is 90, Able to carry on normal activity; minor signs or symptoms of disease (ECOG equivalent 0).      OBJECTIVE    Current Outpatient Medications:     ascorbate  calcium-bioflavonoid (Amelia-C with Bioflavonoids) 500-200 mg tablet, Take 1 tablet by mouth once daily., Disp: , Rfl:     aspirin 81 mg chewable tablet, Chew 1 tablet (81 mg) once daily., Disp: , Rfl:     calcium carbonate-vitamin D3 500 mg-5 mcg (200 unit) tablet, Take 1 tablet by mouth once daily. (Patient not taking: Reported on 2/20/2025), Disp: , Rfl:     cholecalciferol, vitamin D3, (VITAMIN D3 ORAL), Take 1 tab po every day. (Patient not taking: Reported on 2/20/2025), Disp: , Rfl:     ciclopirox (Penlac) 8 % solution, Apply topically once daily at bedtime., Disp: 6.6 mL, Rfl: 11    levETIRAcetam (Keppra) 500 mg tablet, Take 1 tablet (500 mg) by mouth 2 times a day., Disp: 180 tablet, Rfl: 3    lisinopril 20 mg tablet, Take 1 tablet (20 mg) by mouth once daily., Disp: 90 tablet, Rfl: 3     Physical Exam  Vitals reviewed.   Constitutional:       Appearance: Normal appearance. She is normal weight.   HENT:      Head: Normocephalic and atraumatic.      Nose: Nose normal.      Mouth/Throat:      Mouth: Mucous membranes are moist.      Pharynx: Oropharynx is clear.   Eyes:      Pupils: Pupils are equal, round, and reactive to light.   Cardiovascular:      Rate and Rhythm: Normal rate and regular rhythm.      Heart sounds: Normal heart sounds.   Pulmonary:      Effort: Pulmonary effort is normal.      Breath sounds: Normal breath sounds.   Chest:   Breasts:     Right: No swelling, inverted nipple, mass, nipple discharge or skin change.      Left: No swelling, inverted nipple, mass, nipple discharge or skin change.   Abdominal:      Palpations: Abdomen is soft.   Musculoskeletal:         General: No swelling. Normal range of motion.      Cervical back: Normal range of motion and neck supple.   Lymphadenopathy:      Cervical: No cervical adenopathy.      Upper Body:      Right upper body: No supraclavicular or axillary adenopathy.      Left upper body: No supraclavicular or axillary adenopathy.   Skin:     General:  Skin is warm and dry.   Neurological:      Mental Status: She is alert and oriented to person, place, and time.      Comments: Hx hemorrhagic stroke.  Mildly aphasic.    Psychiatric:         Mood and Affect: Mood normal.         Behavior: Behavior normal.       ASSESSMENT:  69 y.o. female with right breast cancer s/p right partial mastectomy followed by adjuvant chemotherapy followed by radiation therapy.  Doing well overall.  Reviewed skin care, possible late effects of treatment, follow up plan.     PLAN:    - Mammogram and FUV with Dr. Rascon  - Med onc follow up with Dr. Treviño   - Radiation follow up in 6 mo.  Call with any questions or concerns.     Cee Hamm CNP  [phone]

## 2025-03-31 ENCOUNTER — APPOINTMENT (OUTPATIENT)
Dept: RADIOLOGY | Facility: HOSPITAL | Age: 70
End: 2025-03-31
Payer: MEDICARE

## 2025-03-31 ENCOUNTER — APPOINTMENT (OUTPATIENT)
Dept: SURGICAL ONCOLOGY | Facility: HOSPITAL | Age: 70
End: 2025-03-31
Payer: MEDICARE

## 2025-04-10 LAB — SCAN RESULT: NORMAL

## 2025-05-16 ENCOUNTER — APPOINTMENT (OUTPATIENT)
Dept: PRIMARY CARE | Facility: CLINIC | Age: 70
End: 2025-05-16
Payer: MEDICARE

## 2025-05-16 VITALS
DIASTOLIC BLOOD PRESSURE: 68 MMHG | BODY MASS INDEX: 17.42 KG/M2 | HEART RATE: 60 BPM | SYSTOLIC BLOOD PRESSURE: 116 MMHG | WEIGHT: 102 LBS | HEIGHT: 64 IN | OXYGEN SATURATION: 100 % | TEMPERATURE: 97.9 F | RESPIRATION RATE: 16 BRPM

## 2025-05-16 DIAGNOSIS — E55.9 VITAMIN D DEFICIENCY: ICD-10-CM

## 2025-05-16 DIAGNOSIS — S06.5XAA SDH (SUBDURAL HEMATOMA) (MULTI): ICD-10-CM

## 2025-05-16 DIAGNOSIS — C50.419 MALIGNANT NEOPLASM OF UPPER-OUTER QUADRANT OF BREAST IN FEMALE, ESTROGEN RECEPTOR POSITIVE, UNSPECIFIED LATERALITY: ICD-10-CM

## 2025-05-16 DIAGNOSIS — E78.2 MIXED HYPERLIPIDEMIA: Primary | ICD-10-CM

## 2025-05-16 DIAGNOSIS — Z17.0 MALIGNANT NEOPLASM OF UPPER-OUTER QUADRANT OF BREAST IN FEMALE, ESTROGEN RECEPTOR POSITIVE, UNSPECIFIED LATERALITY: ICD-10-CM

## 2025-05-16 DIAGNOSIS — I69.320 APHASIA AS LATE EFFECT OF CEREBROVASCULAR ACCIDENT (CVA): ICD-10-CM

## 2025-05-16 DIAGNOSIS — I48.3 TYPICAL ATRIAL FLUTTER (MULTI): ICD-10-CM

## 2025-05-16 DIAGNOSIS — S42.295D OTHER CLOSED NONDISPLACED FRACTURE OF PROXIMAL END OF LEFT HUMERUS WITH ROUTINE HEALING, SUBSEQUENT ENCOUNTER: ICD-10-CM

## 2025-05-16 DIAGNOSIS — E46 PROTEIN-CALORIE MALNUTRITION, UNSPECIFIED SEVERITY (MULTI): ICD-10-CM

## 2025-05-16 DIAGNOSIS — Z00.00 HEALTHCARE MAINTENANCE: ICD-10-CM

## 2025-05-16 DIAGNOSIS — E53.8 VITAMIN B12 DEFICIENCY: ICD-10-CM

## 2025-05-16 DIAGNOSIS — I10 PRIMARY HYPERTENSION: ICD-10-CM

## 2025-05-16 DIAGNOSIS — E03.9 ACQUIRED HYPOTHYROIDISM: ICD-10-CM

## 2025-05-16 DIAGNOSIS — K21.9 GASTROESOPHAGEAL REFLUX DISEASE WITHOUT ESOPHAGITIS: ICD-10-CM

## 2025-05-16 ASSESSMENT — ENCOUNTER SYMPTOMS
EYE PAIN: 0
UNEXPECTED WEIGHT CHANGE: 0
RHINORRHEA: 0
ARTHRALGIAS: 0
DYSURIA: 0
NAUSEA: 0
FREQUENCY: 0
DEPRESSION: 0
SORE THROAT: 0
CONSTIPATION: 0
SHORTNESS OF BREATH: 0
HEADACHES: 0
DIARRHEA: 0
DIFFICULTY URINATING: 0
WHEEZING: 0
FEVER: 0
EYE ITCHING: 0
WEAKNESS: 0
COUGH: 0
BACK PAIN: 0
ABDOMINAL PAIN: 0
LOSS OF SENSATION IN FEET: 0
PALPITATIONS: 0
EYE REDNESS: 0
PSYCHIATRIC NEGATIVE: 1
OCCASIONAL FEELINGS OF UNSTEADINESS: 0
FATIGUE: 0

## 2025-05-16 ASSESSMENT — ACTIVITIES OF DAILY LIVING (ADL)
DRESSING: INDEPENDENT
BATHING: INDEPENDENT
DOING_HOUSEWORK: NEEDS ASSISTANCE
MANAGING_FINANCES: NEEDS ASSISTANCE
TAKING_MEDICATION: NEEDS ASSISTANCE
GROCERY_SHOPPING: NEEDS ASSISTANCE

## 2025-05-16 NOTE — PROGRESS NOTES
"Subjective   Reason for Visit: Fadia Bolden is an 70 y.o. female here for a Medicare Wellness visit.     Past Medical, Surgical, and Family History reviewed and updated in chart.    Reviewed all medications by prescribing practitioner or clinical pharmacist (such as prescriptions, OTCs, herbal therapies and supplements) and documented in the medical record.    HPI  Influenza declines  Covid declines  PCV20 declines  Shingrix declines  Tdap 2019  Mammogram 2024  Dexa declines  Colonoscopy 2021  Adv Dir Yes  Bmi 17  Depression screen 25  Eye exam 25  Fall risk 25    Skin concerns    Patient Care Team:  Michelle Martinez DO as PCP - General  Michelle Martinez DO as PCP - MMO Medicare Advantage PCP  Tammie Blevins MD as Surgeon (Neurosurgery)  Cyndi Bridges MD as Radiation Oncologist (Radiation Oncology)  Alma Treviño MD as Consulting Physician (Hematology and Oncology)  Sanjuana Nascimento MD as Radiation Oncologist (Radiation Oncology)     Review of Systems   Constitutional:  Negative for fatigue, fever and unexpected weight change.   HENT:  Negative for congestion, ear pain, rhinorrhea and sore throat.    Eyes:  Negative for pain, redness and itching.   Respiratory:  Negative for cough, shortness of breath and wheezing.    Cardiovascular:  Negative for chest pain, palpitations and leg swelling.   Gastrointestinal:  Negative for abdominal pain, constipation, diarrhea and nausea.   Genitourinary:  Negative for difficulty urinating, dysuria and frequency.   Musculoskeletal:  Negative for arthralgias and back pain.   Allergic/Immunologic: Negative for environmental allergies, food allergies and immunocompromised state.   Neurological:  Negative for weakness and headaches.   Psychiatric/Behavioral: Negative.     All other systems reviewed and are negative.      Objective   Vitals:  /68   Pulse 60   Temp 36.6 °C (97.9 °F)   Resp 16   Ht 1.626 m (5' 4\")   Wt 46.3 kg (102 lb)   SpO2 100%   BMI 17.51 kg/m²  "      Physical Exam  Vitals and nursing note reviewed.   Constitutional:       Appearance: Normal appearance.   HENT:      Head: Normocephalic.   Eyes:      Conjunctiva/sclera: Conjunctivae normal.      Pupils: Pupils are equal, round, and reactive to light.   Cardiovascular:      Rate and Rhythm: Normal rate and regular rhythm.      Pulses: Normal pulses.      Heart sounds: Normal heart sounds.   Pulmonary:      Effort: Pulmonary effort is normal.      Breath sounds: Normal breath sounds.   Musculoskeletal:         General: No swelling.      Cervical back: Neck supple.   Skin:     General: Skin is warm and dry.   Neurological:      General: No focal deficit present.      Mental Status: She is oriented to person, place, and time.         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  Continue established treatment plan and follow up at least yearly.  Answered questions

## 2025-05-17 LAB
25(OH)D3+25(OH)D2 SERPL-MCNC: 48 NG/ML (ref 30–100)
ALBUMIN SERPL-MCNC: 4.3 G/DL (ref 3.6–5.1)
ALP SERPL-CCNC: 64 U/L (ref 37–153)
ALT SERPL-CCNC: 19 U/L (ref 6–29)
ANION GAP SERPL CALCULATED.4IONS-SCNC: 7 MMOL/L (CALC) (ref 7–17)
AST SERPL-CCNC: 18 U/L (ref 10–35)
BILIRUB SERPL-MCNC: 1 MG/DL (ref 0.2–1.2)
BUN SERPL-MCNC: 32 MG/DL (ref 7–25)
CALCIUM SERPL-MCNC: 9.6 MG/DL (ref 8.6–10.4)
CHLORIDE SERPL-SCNC: 104 MMOL/L (ref 98–110)
CHOLEST SERPL-MCNC: 201 MG/DL
CHOLEST/HDLC SERPL: 2.7 (CALC)
CO2 SERPL-SCNC: 32 MMOL/L (ref 20–32)
CREAT SERPL-MCNC: 0.77 MG/DL (ref 0.6–1)
EGFRCR SERPLBLD CKD-EPI 2021: 82 ML/MIN/1.73M2
ERYTHROCYTE [DISTWIDTH] IN BLOOD BY AUTOMATED COUNT: 13.1 % (ref 11–15)
GLUCOSE SERPL-MCNC: 102 MG/DL (ref 65–139)
HCT VFR BLD AUTO: 39.6 % (ref 35–45)
HDLC SERPL-MCNC: 75 MG/DL
HGB BLD-MCNC: 13.3 G/DL (ref 11.7–15.5)
LDLC SERPL CALC-MCNC: 100 MG/DL (CALC)
MCH RBC QN AUTO: 31.5 PG (ref 27–33)
MCHC RBC AUTO-ENTMCNC: 33.6 G/DL (ref 32–36)
MCV RBC AUTO: 93.8 FL (ref 80–100)
NONHDLC SERPL-MCNC: 126 MG/DL (CALC)
PLATELET # BLD AUTO: 156 THOUSAND/UL (ref 140–400)
PMV BLD REES-ECKER: 10.7 FL (ref 7.5–12.5)
POTASSIUM SERPL-SCNC: 4.9 MMOL/L (ref 3.5–5.3)
PROT SERPL-MCNC: 7 G/DL (ref 6.1–8.1)
RBC # BLD AUTO: 4.22 MILLION/UL (ref 3.8–5.1)
SODIUM SERPL-SCNC: 143 MMOL/L (ref 135–146)
TRIGL SERPL-MCNC: 162 MG/DL
TSH SERPL-ACNC: 1.01 MIU/L (ref 0.4–4.5)
VIT B12 SERPL-MCNC: 419 PG/ML (ref 200–1100)
WBC # BLD AUTO: 5.4 THOUSAND/UL (ref 3.8–10.8)

## 2025-05-19 ENCOUNTER — TELEPHONE (OUTPATIENT)
Dept: PRIMARY CARE | Facility: CLINIC | Age: 70
End: 2025-05-19
Payer: MEDICARE

## 2025-05-19 NOTE — TELEPHONE ENCOUNTER
----- Message from Michelle Martinez sent at 5/19/2025  4:42 PM EDT -----  Call patient labs look great  ----- Message -----  From: [facility] Results In  Sent: 5/16/2025  11:53 PM EDT  To: Michelle Martinez, DO

## 2025-05-29 NOTE — TELEPHONE ENCOUNTER
Called patient with results of right breast biopsy which is positive for breast cancer.  Appointment made for Tuesday, 7/23/2024 at 9:00 to further discuss diagnosis and treatment options.    Delisa Rascon, DO    
ambulatory

## 2025-06-05 NOTE — PROGRESS NOTES
Patient ID: Fadia Bolden is a 70 y.o. female.     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, estrogen receptor positive, unspecified laterality     10/15/2024 -  Chemotherapy    TC (DOCEtaxel / Cyclophosphamide), 21 Day Cycles     Malignant neoplasm of upper-outer quadrant of right breast in female, estrogen receptor negative   7/23/2024 Initial Diagnosis    Malignant neoplasm of upper-outer quadrant of right breast in female, estrogen receptor negative (Multi)     7/23/2024 Cancer Staged    Staging form: Breast, AJCC 8th Edition, Clinical: Stage IB (cT1b, cN0, cM0, G2, ER-, OR-, HER2-) - Signed by Delisa Rascon DO on 7/23/2024 9/12/2024 Cancer Staged    Staging form: Breast, AJCC 8th Edition, Pathologic: Stage IB (pT1c, pN0, cM0, G2, ER-, OR-, HER2-) - Signed by Delisa Rascon DO on 9/12/2024           Subjective    HPI  Ms. Fadia Bolden is a 70 y.o. female who presents for follow up for breast cancer. Most recent CBC, CMP, Signatera and Guardant pending. Last mammogram in February was unremarkable. She also had a CT scan due to abdominal pain which showed large colonic stool burden but no other significant findings. She did have a pulmonary nodule that has been stable since 2017.     Today she reports some weight gain and fatigue. Her  notes some nail changes to both great toes. She denies new pain outside the bone, cold-like  symptoms.      Patient's past medical history, surgical history, family history and social history reviewed.    Review of Systems:   Review of Systems:    Positive per HPI, otherwise negative.       Objective    /75   Pulse 61   Temp 36.3 °C (97.3 °F)   Resp 15   Wt 47.1 kg (103 lb 13.4 oz)   SpO2 98%   BMI 17.82 kg/m²     Physical Exam  Gen: appears well in clinic, NAD  HEENT: atraumatic head, normocephalic, EOMI, conjunctiva normal  LUNG: no increased WOB, CTAB  CV: No JVD. RRR  GI: soft, NT, ND  LE: no LE edema  Skin: no obvious rashes or lesions on visible skin  Neuro: interactive, no focal deficits noted  Psych: normal mood and affect      Performance Status:  Symptomatic; fully ambulatory    Labs/Imaging/Pathology: personally reviewed reports and images in Epic electronic medical record system. Pertinent results as it related to the plan represented in below in assessment and plan.       Assessment/Plan   Right breast ILC, stage IB pT1c (17mm) N0M0 G2, triple negative     - dx 3/21/2023 after bilateral screening mammogram showed small possible mass in the upper outer quadrant and it was thought that biopsy of this area would be difficult due to location and short interval follow-up was recommended.    - Repeat imaging 6/13/2023 shows mass with stable category 4.    - Repeat ultrasound 9/14/2023 shows mass unchanged and biopsy recommended.   -  During this time shortly afterward she suffered a brain bleed following location in [place].    - Repeat imaging and ultrasound June 2024 shows increase in size of breast mass previously 0.7 then 0.9.  - biopsy 7/15/2024 which showed invasive lobular carcinoma grade 2 ER negative PA negative HER2 negative at 11:00 6 cm from the nipple.   - right breast partial mastectomy on 8/28/2024 which showed invasive lobular carcinoma grade 2, margins were negative and 0 of 1 lymph nodes involved.  Greatest dimension of largest invasive focus was 17 mm.  No LVI  identified  - Did develop a rash and a skin reaction shortly after surgery, that is slowly improving  10/1/24: -today we discussed given trip negative histology and tumor size of 17mm would recommend adjuvant chemotherpay  - given age and comorbidies discussed Taxotere plus cytoxan over anthracycline based chemotherapu  - discussed with CVA hx is not contraindication to proceed with treatment  - despite CVA she continues to have a good PS very independent with her ADLs and active just is limited form driving due to some vision loss  - discussed side effects of taxotere plus cytoxan and consent signed  - will plan to start C1 10/15/24 and plan for 4 cycles  - will plan to add benadryl, pepcid, dexamethasone premeds given she does have hx of increased hypersensitivy to drugs/creams but otherwise no major allergies     10/22/2024:   - Presents for toxicity check   - Referral to genetics is pending  - Tolerated treatment well with no complaints   - Labs unremarkable   - She is aware to continue hydrating well and eat a balanced diet   - She is already scheduled to RTC on 11/4/24 to see Dr. Treviño and then treatment the next day      11/4/24:  - Labs from today with CBC and CMP unremarkable.   - Patient had no significant nausea with no extra medications needed.   - She does reports some increased constipation that was manageable.   - No fevers or signs of infections.   - No dose adjustments for treatment tomorrow.  - Continue with current dose.  - Reviewed again the benefits of adjuvant chemo.  - She has some questions about about RT.  - RTC in 3 weeks for cycle 3.  - She would like to get treatment and see me in the same day to limit co pays.  - She will get blood work done at a closer to her home  lab.     11/26/2024:  - Presents for cycle 3 docetaxel + cyclophosphamide   - She is tolerating treatment well   - Labs meet parameters for treatment   - We will decrease benadryl to 25 mg today since she is now on cycle 3  due to feeling unsteady with the 50 mg dose   - Orders signed off to be scheduled for cycle 4 treatment in 3 weeks with Dr. Treviño, labs ahead of time again       12/17/24:  - No contraindications to proceed with cycle 4 today.  - We discussed this will be her final cycle.   - She edson come back tomorrow for her Neulasta shot.  - labs reviewed and meets parameters  - Follow-up in 6 weeks with assess for toxicity  - I will reach out to radiation team for follow up with Dr Hanna for next steps with RT.   - RTC in 6 weeks.      1/28/25:  -No new side effects since completing chemotherapy  -Blood work today shows that her CBC is recovering, guardant reveal and Signatera also ordered  -No new complaints and we will continue surveillance  -Is also followed by Dr. Rascon in the breast team in March and we will see her 3 months post for exam    6/10/25:   - No evidence of recurrence on exam today   - She was concerned about weight gain and weight has been steady   - She did have a CT scan in February, unremarkable except for constipation   - Mammogram next due in February 2026   - Will repeat labs. As long as unremarkable, will plan for mammogram in February and see her every August so she is spaced out where she sees us once a year and the breast team once a year    Reviewed ongoing medical problems and how they relate to her breast cancer, will continue long term monitoring.    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, estrogen receptor positive, unspecified laterality  -     Clinic Appointment Request  -     Referral to Podiatry; Future  -     Clinic Appointment Request; Future  -     CBC and Auto Differential; Future  -      Comprehensive metabolic panel; Future  -     guardant reveal; Other-indicate in comment; unknown - Miscellaneous Test; Future  -     signatera; Other-indicate in comment; unknown - Miscellaneous Test; Future      Alma Treviño MD  Hematology/Oncology  [facility]      Scribe Attestation  By signing my name below, I, Tutu Saul, attest that this documentation has been prepared under the direction and in the presence of Alma Treviño MD.    Time Spent  Prep time on day of patient encounter: 5 minutes  Time spent directly with patient, family or caregiver: 15 minutes  Additional Time Spent on Patient Care Activities: 5 minutes  Documentation Time: 5 minutes  Other Time Spent: 0 minutes  Total: 30 minutes

## 2025-06-10 ENCOUNTER — LAB (OUTPATIENT)
Dept: LAB | Facility: CLINIC | Age: 70
End: 2025-06-10
Payer: MEDICARE

## 2025-06-10 ENCOUNTER — OFFICE VISIT (OUTPATIENT)
Dept: HEMATOLOGY/ONCOLOGY | Facility: CLINIC | Age: 70
End: 2025-06-10
Payer: MEDICARE

## 2025-06-10 VITALS
RESPIRATION RATE: 15 BRPM | OXYGEN SATURATION: 98 % | HEART RATE: 61 BPM | SYSTOLIC BLOOD PRESSURE: 149 MMHG | DIASTOLIC BLOOD PRESSURE: 75 MMHG | TEMPERATURE: 97.3 F | WEIGHT: 103.84 LBS | BODY MASS INDEX: 17.82 KG/M2

## 2025-06-10 DIAGNOSIS — C50.419 MALIGNANT NEOPLASM OF UPPER-OUTER QUADRANT OF BREAST IN FEMALE, ESTROGEN RECEPTOR POSITIVE, UNSPECIFIED LATERALITY: ICD-10-CM

## 2025-06-10 DIAGNOSIS — Z17.0 MALIGNANT NEOPLASM OF UPPER-OUTER QUADRANT OF BREAST IN FEMALE, ESTROGEN RECEPTOR POSITIVE, UNSPECIFIED LATERALITY: ICD-10-CM

## 2025-06-10 DIAGNOSIS — S91.209A: Primary | ICD-10-CM

## 2025-06-10 LAB
ALBUMIN SERPL BCP-MCNC: 4.4 G/DL (ref 3.4–5)
ALP SERPL-CCNC: 59 U/L (ref 33–136)
ALT SERPL W P-5'-P-CCNC: 19 U/L (ref 7–45)
ANION GAP SERPL CALC-SCNC: 11 MMOL/L (ref 10–20)
AST SERPL W P-5'-P-CCNC: 17 U/L (ref 9–39)
BASOPHILS # BLD AUTO: 0.04 X10*3/UL (ref 0–0.1)
BASOPHILS NFR BLD AUTO: 0.7 %
BILIRUB SERPL-MCNC: 1.3 MG/DL (ref 0–1.2)
BUN SERPL-MCNC: 31 MG/DL (ref 6–23)
CALCIUM SERPL-MCNC: 9.7 MG/DL (ref 8.6–10.3)
CHLORIDE SERPL-SCNC: 104 MMOL/L (ref 98–107)
CO2 SERPL-SCNC: 31 MMOL/L (ref 21–32)
CREAT SERPL-MCNC: 0.91 MG/DL (ref 0.5–1.05)
EGFRCR SERPLBLD CKD-EPI 2021: 68 ML/MIN/1.73M*2
EOSINOPHIL # BLD AUTO: 0.08 X10*3/UL (ref 0–0.4)
EOSINOPHIL NFR BLD AUTO: 1.5 %
ERYTHROCYTE [DISTWIDTH] IN BLOOD BY AUTOMATED COUNT: 13.8 % (ref 11.5–14.5)
GLUCOSE SERPL-MCNC: 102 MG/DL (ref 74–99)
HCT VFR BLD AUTO: 41.1 % (ref 36–46)
HGB BLD-MCNC: 13.6 G/DL (ref 12–16)
IMM GRANULOCYTES # BLD AUTO: 0.01 X10*3/UL (ref 0–0.5)
IMM GRANULOCYTES NFR BLD AUTO: 0.2 % (ref 0–0.9)
LYMPHOCYTES # BLD AUTO: 0.77 X10*3/UL (ref 0.8–3)
LYMPHOCYTES NFR BLD AUTO: 14 %
MCH RBC QN AUTO: 32 PG (ref 26–34)
MCHC RBC AUTO-ENTMCNC: 33.1 G/DL (ref 32–36)
MCV RBC AUTO: 97 FL (ref 80–100)
MONOCYTES # BLD AUTO: 0.57 X10*3/UL (ref 0.05–0.8)
MONOCYTES NFR BLD AUTO: 10.4 %
NEUTROPHILS # BLD AUTO: 4.02 X10*3/UL (ref 1.6–5.5)
NEUTROPHILS NFR BLD AUTO: 73.2 %
PLATELET # BLD AUTO: 164 X10*3/UL (ref 150–450)
POTASSIUM SERPL-SCNC: 4.2 MMOL/L (ref 3.5–5.3)
PROT SERPL-MCNC: 7.1 G/DL (ref 6.4–8.2)
RBC # BLD AUTO: 4.25 X10*6/UL (ref 4–5.2)
SODIUM SERPL-SCNC: 142 MMOL/L (ref 136–145)
WBC # BLD AUTO: 5.5 X10*3/UL (ref 4.4–11.3)

## 2025-06-10 PROCEDURE — 84075 ASSAY ALKALINE PHOSPHATASE: CPT

## 2025-06-10 PROCEDURE — 1159F MED LIST DOCD IN RCRD: CPT | Performed by: INTERNAL MEDICINE

## 2025-06-10 PROCEDURE — 1126F AMNT PAIN NOTED NONE PRSNT: CPT | Performed by: INTERNAL MEDICINE

## 2025-06-10 PROCEDURE — 89240 UNLISTED MISC PATH TEST: CPT

## 2025-06-10 PROCEDURE — 36415 COLL VENOUS BLD VENIPUNCTURE: CPT

## 2025-06-10 PROCEDURE — 99214 OFFICE O/P EST MOD 30 MIN: CPT | Performed by: INTERNAL MEDICINE

## 2025-06-10 PROCEDURE — 85025 COMPLETE CBC W/AUTO DIFF WBC: CPT

## 2025-06-10 PROCEDURE — G2211 COMPLEX E/M VISIT ADD ON: HCPCS | Performed by: INTERNAL MEDICINE

## 2025-06-10 PROCEDURE — 3077F SYST BP >= 140 MM HG: CPT | Performed by: INTERNAL MEDICINE

## 2025-06-10 PROCEDURE — 3078F DIAST BP <80 MM HG: CPT | Performed by: INTERNAL MEDICINE

## 2025-06-10 ASSESSMENT — PAIN SCALES - GENERAL: PAINLEVEL_OUTOF10: 0-NO PAIN

## 2025-06-24 ENCOUNTER — TELEPHONE (OUTPATIENT)
Dept: HEMATOLOGY/ONCOLOGY | Facility: CLINIC | Age: 70
End: 2025-06-24
Payer: MEDICARE

## 2025-06-24 NOTE — TELEPHONE ENCOUNTER
I called and left Fadia a message notifying her that her Signatera was negative. Call back information was left and it was encouraged that she call the clinic with any questions.

## 2025-07-01 ENCOUNTER — OFFICE VISIT (OUTPATIENT)
Dept: PRIMARY CARE | Facility: CLINIC | Age: 70
End: 2025-07-01
Payer: MEDICARE

## 2025-07-01 VITALS
HEIGHT: 64 IN | OXYGEN SATURATION: 100 % | RESPIRATION RATE: 16 BRPM | BODY MASS INDEX: 17.58 KG/M2 | DIASTOLIC BLOOD PRESSURE: 72 MMHG | SYSTOLIC BLOOD PRESSURE: 142 MMHG | WEIGHT: 103 LBS | TEMPERATURE: 97.9 F | HEART RATE: 68 BPM

## 2025-07-01 DIAGNOSIS — Z17.1 MALIGNANT NEOPLASM OF UPPER-OUTER QUADRANT OF RIGHT BREAST IN FEMALE, ESTROGEN RECEPTOR NEGATIVE: ICD-10-CM

## 2025-07-01 DIAGNOSIS — I69.320 APHASIA AS LATE EFFECT OF CEREBROVASCULAR ACCIDENT (CVA): ICD-10-CM

## 2025-07-01 DIAGNOSIS — R51.9 ACUTE NONINTRACTABLE HEADACHE, UNSPECIFIED HEADACHE TYPE: ICD-10-CM

## 2025-07-01 DIAGNOSIS — F41.9 ANXIETY: ICD-10-CM

## 2025-07-01 DIAGNOSIS — R26.89 BALANCE PROBLEM: ICD-10-CM

## 2025-07-01 DIAGNOSIS — I63.89 CEREBROVASCULAR ACCIDENT (CVA) DUE TO OTHER MECHANISM: Primary | ICD-10-CM

## 2025-07-01 DIAGNOSIS — C50.411 MALIGNANT NEOPLASM OF UPPER-OUTER QUADRANT OF RIGHT BREAST IN FEMALE, ESTROGEN RECEPTOR NEGATIVE: ICD-10-CM

## 2025-07-01 LAB — SCAN RESULT: NORMAL

## 2025-07-01 PROCEDURE — 3078F DIAST BP <80 MM HG: CPT | Performed by: INTERNAL MEDICINE

## 2025-07-01 PROCEDURE — 99214 OFFICE O/P EST MOD 30 MIN: CPT | Performed by: INTERNAL MEDICINE

## 2025-07-01 PROCEDURE — 3008F BODY MASS INDEX DOCD: CPT | Performed by: INTERNAL MEDICINE

## 2025-07-01 PROCEDURE — 3077F SYST BP >= 140 MM HG: CPT | Performed by: INTERNAL MEDICINE

## 2025-07-01 PROCEDURE — G2211 COMPLEX E/M VISIT ADD ON: HCPCS | Performed by: INTERNAL MEDICINE

## 2025-07-01 PROCEDURE — 1160F RVW MEDS BY RX/DR IN RCRD: CPT | Performed by: INTERNAL MEDICINE

## 2025-07-01 PROCEDURE — 1159F MED LIST DOCD IN RCRD: CPT | Performed by: INTERNAL MEDICINE

## 2025-07-01 NOTE — PROGRESS NOTES
"Subjective   Fadia Bolden is a 70 y.o. female who presents for Head Pressure.    HPI   Pt c/o intermittent head pressure x1-2 months.  Wraps frontal, top of head.  Mild dizziness, movement like sensation.  Denies vision changes.  BP WNL.    C/o pressure w/intercourse.    Fall x4-5 weeks ago.  Fell to knees and hands going up steps quickly.  Unsure if bumped head.  Denies LOC.      3 months ago cleared from cancer  Always there mildly   Wrapping around head  Notices anytime not related to anything  Hasn't taken anything   No nausea  Vision and hearing are normal  A little dizzy but difficult to describe  Not spinning not presyncope  Concerned its related to meds    Review of Systems   Constitutional:  Negative for fatigue and fever.   Respiratory:  Negative for cough and shortness of breath.    Cardiovascular:  Negative for chest pain and leg swelling.   All other systems reviewed and are negative.      Health Maintenance Due   Topic Date Due    Bone Density Scan  Never done    COVID-19 Vaccine (1) Never done    Hepatitis C Screening  Never done    Pneumococcal Vaccine (1 of 2 - PCV) Never done    Zoster Vaccines (1 of 2) Never done    Hepatitis B Vaccines (2 of 3 - 19+ 3-dose series) 08/18/2005    RSV High Risk: (Elderly (60+) or Pregnant Population) (1 - Risk 60-74 years 1-dose series) Never done       Objective   /72   Pulse 68   Temp 36.6 °C (97.9 °F)   Resp 16   Ht 1.626 m (5' 4\")   Wt 46.7 kg (103 lb)   SpO2 100%   BMI 17.68 kg/m²     Physical Exam  Vitals and nursing note reviewed.   Constitutional:       Appearance: Normal appearance.   HENT:      Head: Normocephalic.   Eyes:      Conjunctiva/sclera: Conjunctivae normal.      Pupils: Pupils are equal, round, and reactive to light.   Cardiovascular:      Rate and Rhythm: Normal rate and regular rhythm.      Pulses: Normal pulses.      Heart sounds: Normal heart sounds.   Pulmonary:      Effort: Pulmonary effort is normal.      Breath sounds: " Normal breath sounds.   Musculoskeletal:         General: No swelling.      Cervical back: Neck supple.   Skin:     General: Skin is warm and dry.   Neurological:      General: No focal deficit present.      Mental Status: She is oriented to person, place, and time.         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  Stable based on symptoms and exam.  Continue established treatment plan and follow up at least yearly.

## 2025-07-02 ASSESSMENT — ENCOUNTER SYMPTOMS
SHORTNESS OF BREATH: 0
FEVER: 0
COUGH: 0
FATIGUE: 0

## 2025-07-09 ENCOUNTER — TELEPHONE (OUTPATIENT)
Dept: PRIMARY CARE | Facility: CLINIC | Age: 70
End: 2025-07-09
Payer: MEDICARE

## 2025-07-09 ENCOUNTER — HOSPITAL ENCOUNTER (OUTPATIENT)
Dept: RADIOLOGY | Facility: HOSPITAL | Age: 70
Discharge: HOME | End: 2025-07-09
Payer: MEDICARE

## 2025-07-09 DIAGNOSIS — I63.89 CEREBROVASCULAR ACCIDENT (CVA) DUE TO OTHER MECHANISM: ICD-10-CM

## 2025-07-09 DIAGNOSIS — R51.9 ACUTE NONINTRACTABLE HEADACHE, UNSPECIFIED HEADACHE TYPE: ICD-10-CM

## 2025-07-09 PROCEDURE — 70450 CT HEAD/BRAIN W/O DYE: CPT | Performed by: RADIOLOGY

## 2025-07-09 PROCEDURE — 70450 CT HEAD/BRAIN W/O DYE: CPT

## 2025-07-09 NOTE — RESULT ENCOUNTER NOTE
Call patient her ct looks good  The postsurgical changes from her surgery are stable and everything is steady

## 2025-07-09 NOTE — TELEPHONE ENCOUNTER
----- Message from Michelle Martinez sent at 7/9/2025 12:58 PM EDT -----  Call patient her ct looks good  The postsurgical changes from her surgery are stable and everything is steady  ----- Message -----  From: Cricket, Radiology Results In  Sent: 7/9/2025  11:41 AM EDT  To: Michelle Martinez, DO

## 2025-07-16 LAB — SCAN RESULT: NORMAL

## 2025-07-17 ENCOUNTER — APPOINTMENT (OUTPATIENT)
Facility: CLINIC | Age: 70
End: 2025-07-17
Payer: MEDICARE

## 2025-07-17 DIAGNOSIS — L60.8 OTHER NAIL DISORDERS: ICD-10-CM

## 2025-07-17 DIAGNOSIS — B35.1 ONYCHOMYCOSIS: Primary | ICD-10-CM

## 2025-07-17 PROCEDURE — 1036F TOBACCO NON-USER: CPT | Performed by: PODIATRIST

## 2025-07-17 PROCEDURE — 1159F MED LIST DOCD IN RCRD: CPT | Performed by: PODIATRIST

## 2025-07-17 PROCEDURE — 1160F RVW MEDS BY RX/DR IN RCRD: CPT | Performed by: PODIATRIST

## 2025-07-17 PROCEDURE — 99203 OFFICE O/P NEW LOW 30 MIN: CPT | Performed by: PODIATRIST

## 2025-07-17 NOTE — PROGRESS NOTES
Chief Complaint   Patient presents with    Toenail Problem     History Of Present Illness  Fadia Bolden is a 70 y.o. female presenting with chief complaint of bilateral toenail discoloration.  She states this started after chemotherapy.  She notices a clear line where a new nail is growing in.  She is wondering how she should manage this     Past Medical History  She has a past medical history of Breast cancer (2002 2021 2023), Cerebral vascular accident (Multi), HTN (hypertension), antineoplastic chemo (2024), Hypothyroidism, Lobular carcinoma in situ (2024), DAIJA (obstructive sleep apnea), Other chest pain (02/10/2021), Peripheral vision loss, right, Personal history of irradiation (2025), Personal history of malignant neoplasm of breast (06/27/2022), and Stroke (Multi) (10/24/2023).    Surgical History  She has a past surgical history that includes Other surgical history (11/22/2021); MR angio head w and wo IV contrast (10/25/2023); MR angio head wo IV contrast (01/05/2024); Other surgical history; Craniotomy; Colonoscopy; Upper gastrointestinal endoscopy; Breast lumpectomy (Right, 08/28/2024); Mastectomy (2002); and Breast biopsy (2024).     Social History  She reports that she has never smoked. She has never been exposed to tobacco smoke. She has never used smokeless tobacco. She reports that she does not currently use alcohol. She reports that she does not use drugs.    Family History  Family History[1]     Allergies  Chlorhexidine    Medications  Current Medications[2]    Review of Systems    REVIEW OF SYSTEMS  GENERAL:  Negative for malaise, significant weight loss, fever      Objective:   Vasc: DP and PT pulses are palpable bilateral.  CFT is less than 3 seconds bilateral.  Skin temperature is warm to cool proximal to distal bilateral.      Neuro:  Light touch is intact to the foot bilateral.      Derm: Nails 1 through 5 bilaterally are thickened and discolored.  There is a clear ridge proximally on all the  nail where new nail is growing in.  The right hallux nail has a subungual hematoma.  There is no drainage underneath the nail.  No nail loosening    Ortho: No pain with dorsal plantar compression of the nails    Assessment/Plan     Diagnoses and all orders for this visit:  Onychomycosis  Other nail disorders  Other orders  -     Referral to Podiatry      The patient is evaluated and examined.  She has nail changes secondary to chemotherapy as well as a possible fungal nail infection.  Understands only way to fully tell this would be a nail biopsy.  Regarding treatment, we discussed several treatment options including over-the-counter modalities, prescription oral medication, prescription topical medication and laser treatment.  After discussing the pros and cons of each the patient elects for over-the-counter modalities.  Recommendations made today.  She understands that anytime she gets nail loosening or drainage we can consider other treatment options.  She is agreeable to this plan      Stefany Rush DPM       [1]   Family History  Problem Relation Name Age of Onset    Thyroid disease Mother      Parkinsonism Father      Parkinsonism Sister      Other (HTN) Sister      Breast cancer Father's Sister      Breast cancer Paternal Grandmother     [2]   Current Outpatient Medications   Medication Sig Dispense Refill    aspirin 81 mg chewable tablet Chew and swallow 1 tablet (81 mg) once daily.      levETIRAcetam (Keppra) 500 mg tablet Take 1 tablet (500 mg) by mouth 2 times a day. 180 tablet 3    lisinopril 20 mg tablet Take 1 tablet (20 mg) by mouth once daily. 90 tablet 3     No current facility-administered medications for this visit.

## 2025-08-04 ENCOUNTER — APPOINTMENT (OUTPATIENT)
Dept: SLEEP MEDICINE | Facility: CLINIC | Age: 70
End: 2025-08-04
Payer: COMMERCIAL

## 2025-08-04 VITALS
WEIGHT: 105 LBS | SYSTOLIC BLOOD PRESSURE: 104 MMHG | BODY MASS INDEX: 17.49 KG/M2 | HEIGHT: 65 IN | HEART RATE: 56 BPM | RESPIRATION RATE: 18 BRPM | OXYGEN SATURATION: 92 % | DIASTOLIC BLOOD PRESSURE: 68 MMHG

## 2025-08-04 DIAGNOSIS — G47.33 OSA ON CPAP: Primary | ICD-10-CM

## 2025-08-04 DIAGNOSIS — I63.89 CEREBROVASCULAR ACCIDENT (CVA) DUE TO OTHER MECHANISM: ICD-10-CM

## 2025-08-04 PROCEDURE — 3074F SYST BP LT 130 MM HG: CPT | Performed by: GENERAL PRACTICE

## 2025-08-04 PROCEDURE — 1160F RVW MEDS BY RX/DR IN RCRD: CPT | Performed by: GENERAL PRACTICE

## 2025-08-04 PROCEDURE — 3078F DIAST BP <80 MM HG: CPT | Performed by: GENERAL PRACTICE

## 2025-08-04 PROCEDURE — 1036F TOBACCO NON-USER: CPT | Performed by: GENERAL PRACTICE

## 2025-08-04 PROCEDURE — 3008F BODY MASS INDEX DOCD: CPT | Performed by: GENERAL PRACTICE

## 2025-08-04 PROCEDURE — G2211 COMPLEX E/M VISIT ADD ON: HCPCS | Performed by: GENERAL PRACTICE

## 2025-08-04 PROCEDURE — 1159F MED LIST DOCD IN RCRD: CPT | Performed by: GENERAL PRACTICE

## 2025-08-04 PROCEDURE — 99214 OFFICE O/P EST MOD 30 MIN: CPT | Performed by: GENERAL PRACTICE

## 2025-08-04 ASSESSMENT — ENCOUNTER SYMPTOMS
OCCASIONAL FEELINGS OF UNSTEADINESS: 0
LOSS OF SENSATION IN FEET: 0
DEPRESSION: 0

## 2025-08-04 ASSESSMENT — SLEEP AND FATIGUE QUESTIONNAIRES
HOW LIKELY ARE YOU TO NOD OFF OR FALL ASLEEP WHILE WATCHING TV: WOULD NEVER DOZE
HOW LIKELY ARE YOU TO NOD OFF OR FALL ASLEEP IN A CAR, WHILE STOPPED FOR A FEW MINUTES IN TRAFFIC: WOULD NEVER DOZE
SITING INACTIVE IN A PUBLIC PLACE LIKE A CLASS ROOM OR A MOVIE THEATER: WOULD NEVER DOZE
HOW LIKELY ARE YOU TO NOD OFF OR FALL ASLEEP WHILE SITTING AND READING: WOULD NEVER DOZE
ESS-CHAD TOTAL SCORE: 0
HOW LIKELY ARE YOU TO NOD OFF OR FALL ASLEEP WHILE SITTING QUIETLY AFTER LUNCH WITHOUT ALCOHOL: WOULD NEVER DOZE
HOW LIKELY ARE YOU TO NOD OFF OR FALL ASLEEP WHILE LYING DOWN TO REST IN THE AFTERNOON WHEN CIRCUMSTANCES PERMIT: WOULD NEVER DOZE
HOW LIKELY ARE YOU TO NOD OFF OR FALL ASLEEP WHEN YOU ARE A PASSENGER IN A CAR FOR AN HOUR WITHOUT A BREAK: WOULD NEVER DOZE
HOW LIKELY ARE YOU TO NOD OFF OR FALL ASLEEP WHILE SITTING AND TALKING TO SOMEONE: WOULD NEVER DOZE

## 2025-08-04 NOTE — PROGRESS NOTES
Patient: Fadia Bolden    [id number]  : 1955 -- AGE 70 y.o.    Provider: Vaughn Vazquez DO     Location [facility]   Service Date: 2025              [facility]  Follow  Visit Note        HISTORY OF PRESENT ILLNESS     HISTORY OF PRESENT ILLNESS   Fadia Bolden is a 70 y.o. female who presents to a [facility] Sleep Medicine Clinic for a sleep medicine evaluation with concerns of Sleep Apnea.     The patient  has a past medical history of Breast cancer (2002), Cerebral vascular accident (Multi), HTN (hypertension), antineoplastic chemo (), Hypothyroidism, Lobular carcinoma in situ (), DAIJA (obstructive sleep apnea), Other chest pain (02/10/2021), Peripheral vision loss, right, Personal history of irradiation (), Personal history of malignant neoplasm of breast (2022), and Stroke (Multi) (10/24/2023)..    PAST SLEEP HISTORY     F with pmhx including GERD, history of breast cancer.      Patient has been snoring since around  and had a sleep study done.   HST 21 --> AHI 21.5. moderate sleep apnea.   patient was started on pap therapy at that time but she has been struggling with a mask leak, air is coming out from the sides of the mouth. Hence, she is not using pap therapy regularly.   she has decreased the max pressure to 14.6 as she could not tolerate higher pressure.       CURRENT HISTORY    24 the patient reports that she had a stroke in 2023?.   She has not been using pap therapy regularly.     24  Patient is trying to use pap therapy regularly. She is using a pad with her mask so that it does not rub her forehead.     PAP Related Problems: denies leak perceived. Denies dry mouth or skin irritation (uses a pad so that the mask does not rub her forehead).   Perceived Benefits of PAP Therapy: decreased snoring/ choking/ gasping.     25    Patient states that she is trying to use pap therapy  regularly.     PAP Related Problems: denies leak perceived. Admits to dry mouth, denies skin irritation     Perceived Benefits of PAP Therapy: decreased snoring/ choking/ gasping.     Sleep schedule  on weekdays / work days:  Usual Bedtime: 11 pm  Sleep latency: 30min  Wake time : 6 am  Total sleep time average/day: 7.5 hours/day  Awakenings: 1-2x per night, nocturia, variable length. Laying in bed.   Naps: 4x per week, 2pm, 1.5hrs but varies, refreshing, uses pap therapy.       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 works during the day.     Preferred sleeping position: SLEEP POSITION: supine    Sleep-related ROS:    Snoring: n  Witnessed apneas:  n     Gasping/ choking: no      Am Dry mouth:   y          Nasal congestion:  n      am headaches: n    Sleep is described as refreshing mostly.     Daytime sleepiness: y  Fatigue or decreased energy: y    Drowsy driving: does not drive now due to stroke / seizures.       RLS screen:  Denies     Sleep-related behaviors: DENIES    ESS: 0       REVIEW OF SYSTEMS     REVIEW OF SYSTEMS  Review of Systems   All other systems reviewed and are negative.      ALLERGIES AND MEDICATIONS     ALLERGIES  Allergies   Allergen Reactions    Chlorhexidine Rash     HYPERSENSITIVITY TO CHLOROPREP SURGERY 8/28/24         MEDICATIONS  Current Outpatient Medications   Medication Sig Dispense Refill    aspirin 81 mg chewable tablet Chew and swallow 1 tablet (81 mg) once daily.      levETIRAcetam (Keppra) 500 mg tablet Take 1 tablet (500 mg) by mouth 2 times a day. 180 tablet 3    lisinopril 20 mg tablet Take 1 tablet (20 mg) by mouth once daily. 90 tablet 3     No current facility-administered medications for this visit.         PAST HISTORY     PAST MEDICAL HISTORY  She  has a past medical history of Breast cancer (2002 2021  "2023), Cerebral vascular accident (Multi), HTN (hypertension), antineoplastic chemo (2024), Hypothyroidism, Lobular carcinoma in situ (2024), DAIJA (obstructive sleep apnea), Other chest pain (02/10/2021), Peripheral vision loss, right, Personal history of irradiation (2025), Personal history of malignant neoplasm of breast (06/27/2022), and Stroke (Multi) (10/24/2023).      PAST SURGICAL HISTORY:  Past Surgical History:   Procedure Laterality Date    BREAST BIOPSY  2024    BREAST LUMPECTOMY Right 08/28/2024    COLONOSCOPY      CRANIOTOMY      MASTECTOMY  2002    MR HEAD ANGIO W AND WO IV CONTRAST  10/25/2023    MR HEAD ANGIO W AND WO IV CONTRAST 10/25/2023    MR HEAD ANGIO WO IV CONTRAST  01/05/2024    MR HEAD ANGIO WO IV CONTRAST 1/5/2024 [facility] [id number] MRI    OTHER SURGICAL HISTORY  11/22/2021    Tonsillectomy    OTHER SURGICAL HISTORY      x2 lumpectomy    UPPER GASTROINTESTINAL ENDOSCOPY         FAMILY HISTORY  Family History   Problem Relation Name Age of Onset    Thyroid disease Mother      Parkinsonism Father      Parkinsonism Sister      Other (HTN) Sister      Breast cancer Father's Sister      Breast cancer Paternal Grandmother           SOCIAL HISTORY  She  reports that she has never smoked. She has never been exposed to tobacco smoke. She has never used smokeless tobacco. She reports that she does not currently use alcohol. She reports that she does not use drugs.     Caffeine consumption: n      PHYSICAL EXAM     VITAL SIGNS: /68   Pulse 56   Resp 18   Ht 1.645 m (5' 4.75\")   Wt 47.6 kg (105 lb)   SpO2 92%   BMI 17.61 kg/m²      PREVIOUS WEIGHTS:  Wt Readings from Last 3 Encounters:   08/04/25 47.6 kg (105 lb)   07/01/25 46.7 kg (103 lb)   06/10/25 47.1 kg (103 lb 13.4 oz)       Physical Exam  Constitutional: Alert and oriented, cooperative, no obvious distress.   HENT: normocephalic.   Neurologic: AOx3.   psychiatric: appropriate mood and affect.  Integumentary: no significant rashes observed " over pap mask area.       RESULTS/DATA         ASSESSMENT/PLAN     Ms. Bolden is a 70 y.o. female and  has a past medical history of Breast cancer (2002 2021 2023), Cerebral vascular accident (Multi), HTN (hypertension), antineoplastic chemo (2024), Hypothyroidism, Lobular carcinoma in situ (2024), DAIJA (obstructive sleep apnea), Other chest pain (02/10/2021), Peripheral vision loss, right, Personal history of irradiation (2025), Personal history of malignant neoplasm of breast (06/27/2022), and Stroke (Multi) (10/24/2023). She is following up on sleep apnea.     Problem List Items Addressed This Visit    None          Problem List and Orders    F with pmhx including GERD, history of breast cancer, stroke in oct. 2023.      1- DAIJA  HST 5/13/21 --> AHI 21.5. moderate sleep apnea.      Reviewed and discussed the above sleep study results and management options in details. All questions answered, patient verbalizes understanding.     -cont. Autopap 4-15cwp. rAHI 0.8, median pressure 7.8, max avg 10.9, acceptable compliance, acceptable seal.     -DME keon.     -had offered titration study especially with hx of recent stroke, but patient will let us know when she is ready for one, not currently interested.     -ordered supplies. [place].      You can use a chin strap to help with your dry mouth, you can also adjust your humidity level to your comfort. You can use a nasal spray to help you breathe better from your nose.    -do not drive or operate heavy machinery if drowsy.  -avoid sleeping on your back.   -avoid sedating substances/ medication, alcohol, illicit drugs and tobacco.  -counseled on the importance of compliance.      2- hx stroke   Following with neurology.   Cont. Current regimen and update us if there are any changes.     f/u 12 months or sooner as needed.

## 2025-10-01 ENCOUNTER — APPOINTMENT (OUTPATIENT)
Dept: NEUROLOGY | Facility: CLINIC | Age: 70
End: 2025-10-01
Payer: MEDICARE

## 2025-11-03 ENCOUNTER — APPOINTMENT (OUTPATIENT)
Dept: PRIMARY CARE | Facility: CLINIC | Age: 70
End: 2025-11-03
Payer: MEDICARE

## 2026-08-06 ENCOUNTER — APPOINTMENT (OUTPATIENT)
Facility: CLINIC | Age: 71
End: 2026-08-06
Payer: MEDICARE

## (undated) DEVICE — DRAPE, TOWEL, STERI DRAPE, 17 X 11 IN, PLASTIC, STERILE

## (undated) DEVICE — ELECTRODE, ELECTROSURGICAL, BLADE EXT 4 INCH, INSULATED

## (undated) DEVICE — PROBE COVER, INTRAOPERATIVE, 13 X 244CM (5 X 96IN)

## (undated) DEVICE — TOWEL PACK, STERILE, 4/PACK, BLUE

## (undated) DEVICE — BANDAGE, SPANDAGE 8"

## (undated) DEVICE — DRESSING, MEPILEX, BORDER, SACRUM, 8.7 X 9.8 IN

## (undated) DEVICE — SOLUTION, IRRIGATION, STERILE WATER, 1000 ML, POUR BOTTLE

## (undated) DEVICE — SUTURE, MONOCRYL, 4-0, 27 IN, PS-2, UNDYED

## (undated) DEVICE — COVER, TABLE, UHC

## (undated) DEVICE — DRAPE PACK, CRANIOTOMY, CUSTOM, UHC

## (undated) DEVICE — DRAPE, SHEET, 40 X 72 IN

## (undated) DEVICE — REST, HEAD, BAGEL, 9 IN

## (undated) DEVICE — DRESSING, GAUZE, 16 PLY, 4 X 4 IN, STERILE

## (undated) DEVICE — PREP, SKIN, DURAPREP, 6 CC

## (undated) DEVICE — DRESSING, QUICKCLOT, HEMOSTATIC, 4 X 4

## (undated) DEVICE — KIT, MARGINMARKER, 6 INK COLORS, STANDARD

## (undated) DEVICE — COVER, MAYO STAND, W/PAD, 23 IN, DISPOSABLE, PLASTIC, LF, STERILE

## (undated) DEVICE — DRAPE, TIBURON, SPLIT SHEET, REINF ADH STRIP, 77X122

## (undated) DEVICE — SUTURE, VICRYL, 2-0, 27 IN, SH, UNDYED

## (undated) DEVICE — MANIFOLD, 4 PORT NEPTUNE STANDARD

## (undated) DEVICE — BANDAGE, GAUZE, CONFORMING, KERLIX, 6 PLY, 4.5 IN X 4.1 YD

## (undated) DEVICE — GOWN, SURGICAL, SMARTGOWN, LARGE, STERILE

## (undated) DEVICE — EVACUATOR, WOUND, CLOSED, 3 SPRING, 400 CC, Y CONNECTING TUBE

## (undated) DEVICE — SUTURE, VICRYL, 3-0, 27 IN, SH

## (undated) DEVICE — EVACUATOR, WOUND, SUCTION, CLOSED, JACKSON-PRATT, 100 CC, SILICONE

## (undated) DEVICE — CATHETER TRAY, SURESTEP, 16FR, URINE METER W/STATLOCK

## (undated) DEVICE — DRAPE, IRRIGATION, W/POUCH, ADHESIVE STRIP, STERI DRAPE, 19 X 23 IN, DISPOSABLE, STERILE

## (undated) DEVICE — PROTECTOR, NERVE, ULNAR, PINK

## (undated) DEVICE — DRAPE, CONVERTORS SPLIT SHEET 224CM X 291 CM, W/ADH SPLIT

## (undated) DEVICE — BATTERY, AXS, NEURO DRIVER, STERILE, DISPOSABLE

## (undated) DEVICE — Device

## (undated) DEVICE — ELECTRODE, ELECTROSURGICAL, BLADE, INSULATED, ENT/IMA, STERILE

## (undated) DEVICE — COLLECTION/DELIVERY SYSTEM, COPAN ESWAB, REG SIZE SWAB

## (undated) DEVICE — DRESSING, GAUZE, PETROLATUM, PATCH, XEROFORM, 1 X 8 IN, STERILE

## (undated) DEVICE — COVER, CART, 45 X 27 X 48 IN, CLEAR

## (undated) DEVICE — MARKER, LIQUID, MAGTRACE

## (undated) DEVICE — SOLUTION, IRRIGATION, SODIUM CHLORIDE 0.9%, 1000 ML, POUR BOTTLE

## (undated) DEVICE — DRESSING, GAUZE, PAD, 4 X 4 IN, STERILE

## (undated) DEVICE — TAPE, SILK, DURAPORE, 3 IN X 10 YD, LF

## (undated) DEVICE — GLOVE, SURGICAL, PROTEXIS PI , 5.5, PF, LF

## (undated) DEVICE — MARKER, SKIN, RULER AND LABEL PACK, CUSTOM

## (undated) DEVICE — ADHESIVE, SKIN, MASTISOL, 2/3 CC VIAL

## (undated) DEVICE — SLEEVE, SUCTION, E-SEP, 125MM

## (undated) DEVICE — SEALANT, HEMOSTATIC, FLOSEAL, 10 ML

## (undated) DEVICE — DRESSING, GAUZE, PETROLATUM, XEROFORM, 5 X 9 IN, STERILE

## (undated) DEVICE — APPLICATOR, CHLORAPREP, W/ORANGE TINT, 26ML

## (undated) DEVICE — SPONGE, HEMOSTATIC, GELATIN, SURGIFOAM, 8 X 12.5 CM X 10 MM

## (undated) DEVICE — GOWN, SURGICAL, SMARTGOWN, XLARGE, STERILE

## (undated) DEVICE — DRESSING, TRANSPARENT, TEGADERM, 2-3/8 X 2-3/4 IN

## (undated) DEVICE — DRESSING, QUICKCLOT, HEMOSTATIC, 5 X 5

## (undated) DEVICE — GOWN, SURGICAL, SMARTGOWN, XX-LARGE, STERILE

## (undated) DEVICE — MODEL, BONE, CLEARVIEW FULL SKULL ADULT

## (undated) DEVICE — MARKER, SKIN, DUAL TIP INK W/9 LABEL AND REMOVABLE TIME OUT SLEEVE

## (undated) DEVICE — DRAIN, WOUND, ROUND, W/TROCAR, HOLE PATTERN, 10 IN, MEDIUM/LARGE, 3/16 X 49 IN

## (undated) DEVICE — PAD, GROUNDING, ELECTROSURGICAL, W/9 FT CABLE, POLYHESIVE II, ADULT, LF

## (undated) DEVICE — STRIP, SKIN CLOSURE, STERI STRIP, REINFORCED, 0.5 X 4 IN

## (undated) DEVICE — DRESSING, MEPILEX, BORDER, HEEL, 8.7 X 9.1 IN

## (undated) DEVICE — SUTURE, NUROLON, 4-0, 18 IN, TF, CONTROL RELEASE, MULTIPACK, BLACK

## (undated) DEVICE — BUR, ROUND 5MM OSTEON, ELITE, SOFT TOUCH

## (undated) DEVICE — RETRACTOR, HANDHELD, 250ML, DBL ENDED

## (undated) DEVICE — DRAPE, INSTRUMENT, W/POUCH, STERI DRAPE, 7 X 11 IN, DISPOSABLE, STERILE